# Patient Record
Sex: MALE | Race: WHITE | NOT HISPANIC OR LATINO | Employment: FULL TIME | URBAN - METROPOLITAN AREA
[De-identification: names, ages, dates, MRNs, and addresses within clinical notes are randomized per-mention and may not be internally consistent; named-entity substitution may affect disease eponyms.]

---

## 2020-03-15 ENCOUNTER — HOSPITAL ENCOUNTER (OUTPATIENT)
Dept: URGENT CARE | Facility: CLINIC | Age: 35
Discharge: HOME | End: 2020-03-15
Attending: FAMILY MEDICINE

## 2020-10-05 ENCOUNTER — HOSPITAL ENCOUNTER (OUTPATIENT)
Dept: URGENT CARE | Facility: CLINIC | Age: 35
Discharge: HOME | End: 2020-10-05
Attending: NURSE PRACTITIONER

## 2020-10-05 LAB — GLUCOSE BLD-MCNC: 144 MG/DL (ref 70–99)

## 2020-10-16 ENCOUNTER — OFFICE VISIT CONVERTED (OUTPATIENT)
Dept: CARDIOLOGY | Facility: CLINIC | Age: 35
End: 2020-10-16
Attending: SPECIALIST

## 2020-11-18 ENCOUNTER — CONVERSION ENCOUNTER (OUTPATIENT)
Dept: FAMILY MEDICINE CLINIC | Facility: CLINIC | Age: 35
End: 2020-11-18

## 2020-11-18 ENCOUNTER — HOSPITAL ENCOUNTER (OUTPATIENT)
Dept: FAMILY MEDICINE CLINIC | Facility: CLINIC | Age: 35
Discharge: HOME | End: 2020-11-18
Attending: NURSE PRACTITIONER

## 2020-11-18 ENCOUNTER — OFFICE VISIT CONVERTED (OUTPATIENT)
Dept: FAMILY MEDICINE CLINIC | Facility: CLINIC | Age: 35
End: 2020-11-18
Attending: NURSE PRACTITIONER

## 2020-11-18 LAB
ALBUMIN SERPL-MCNC: 4.2 G/DL (ref 3.5–5)
ALBUMIN/GLOB SERPL: 1.2 {RATIO} (ref 1.4–2.6)
ALP SERPL-CCNC: 71 U/L (ref 53–128)
ALT SERPL-CCNC: 40 U/L (ref 10–40)
ANION GAP SERPL CALC-SCNC: 19 MMOL/L (ref 8–19)
AST SERPL-CCNC: 34 U/L (ref 15–50)
BILIRUB SERPL-MCNC: 0.6 MG/DL (ref 0.2–1.3)
BUN SERPL-MCNC: 18 MG/DL (ref 5–25)
BUN/CREAT SERPL: 21 {RATIO} (ref 6–20)
CALCIUM SERPL-MCNC: 9.8 MG/DL (ref 8.7–10.4)
CHLORIDE SERPL-SCNC: 102 MMOL/L (ref 99–111)
CHOLEST SERPL-MCNC: 205 MG/DL (ref 107–200)
CHOLEST/HDLC SERPL: 5.9 {RATIO} (ref 3–6)
CONV CO2: 22 MMOL/L (ref 22–32)
CONV TOTAL PROTEIN: 7.8 G/DL (ref 6.3–8.2)
CREAT UR-MCNC: 0.85 MG/DL (ref 0.7–1.2)
EST. AVERAGE GLUCOSE BLD GHB EST-MCNC: 220 MG/DL
GFR SERPLBLD BASED ON 1.73 SQ M-ARVRAT: >60 ML/MIN/{1.73_M2}
GLOBULIN UR ELPH-MCNC: 3.6 G/DL (ref 2–3.5)
GLUCOSE SERPL-MCNC: 153 MG/DL (ref 70–99)
HBA1C MFR BLD: 9.3 % (ref 3.5–5.7)
HDLC SERPL-MCNC: 35 MG/DL (ref 40–60)
LDLC SERPL CALC-MCNC: 134 MG/DL (ref 70–100)
OSMOLALITY SERPL CALC.SUM OF ELEC: 291 MOSM/KG (ref 273–304)
POTASSIUM SERPL-SCNC: 4.6 MMOL/L (ref 3.5–5.3)
SODIUM SERPL-SCNC: 138 MMOL/L (ref 135–147)
T4 FREE SERPL-MCNC: 1 NG/DL (ref 0.9–1.8)
TRIGL SERPL-MCNC: 181 MG/DL (ref 40–150)
TSH SERPL-ACNC: 2.2 M[IU]/L (ref 0.27–4.2)
VLDLC SERPL-MCNC: 36 MG/DL (ref 5–37)

## 2020-11-20 LAB
C PEPTIDE SERPL-MCNC: 7.4 NG/ML (ref 1.1–4.4)
CONV HEPATITIS B SURFACE AG W CONFIRMATION RE: NEGATIVE
CONV HEPATITIS COMMENT: NORMAL
HBV CORE AB SER DONR QL IA: NEGATIVE
HBV CORE IGM SERPL QL IA: NEGATIVE
HBV E AB SERPL QL IA: NEGATIVE
HBV E AG SERPL QL IA: NEGATIVE
HBV SURFACE AB SER QL: REACTIVE
HCV AB S/CO SERPL IA: <0.1 S/CO RATIO (ref 0–0.9)

## 2020-12-07 ENCOUNTER — CONVERSION ENCOUNTER (OUTPATIENT)
Dept: FAMILY MEDICINE CLINIC | Facility: CLINIC | Age: 35
End: 2020-12-07

## 2020-12-07 ENCOUNTER — OFFICE VISIT CONVERTED (OUTPATIENT)
Dept: FAMILY MEDICINE CLINIC | Facility: CLINIC | Age: 35
End: 2020-12-07
Attending: NURSE PRACTITIONER

## 2021-02-23 ENCOUNTER — HOSPITAL ENCOUNTER (OUTPATIENT)
Dept: FAMILY MEDICINE CLINIC | Facility: CLINIC | Age: 36
Discharge: HOME | End: 2021-02-23
Attending: NURSE PRACTITIONER

## 2021-02-23 ENCOUNTER — OFFICE VISIT CONVERTED (OUTPATIENT)
Dept: FAMILY MEDICINE CLINIC | Facility: CLINIC | Age: 36
End: 2021-02-23
Attending: NURSE PRACTITIONER

## 2021-03-16 ENCOUNTER — OFFICE VISIT CONVERTED (OUTPATIENT)
Dept: CARDIOLOGY | Facility: CLINIC | Age: 36
End: 2021-03-16
Attending: SPECIALIST

## 2021-05-10 NOTE — H&P
History and Physical      Patient Name: Luiz Christianson   Patient ID: [id number]   Sex: Male   YOB: 1985        Visit Date: October 16, 2020    Provider: Russell Bledsoe MD   Location: [facility]   Location Address: [address] [place] [address], [address]   [place]  [id number]   Location Phone: [phone]          Chief Complaint  · Pedal edema.  · Congestive heart failure.      History Of Present Illness  Luiz Christianson is a 35 year old young male who moved here from [place] recently with a previous history of CHF. He had a cardiac cath in 2018 with normal coronary arteries. He has had some worsening pedal edema and shortness of breath on exertion since he has come here. No chest pain. No orthopnea. No PND. Cardiac risk factors: History of hypertension is positive. No history of smoking. History of hyperlipidemia is positive. History of diabetes mellitus is positive. No family history of coronary artery disease.   PAST MEDICAL HISTORY: Hernia; Cholelithiasis; Congestive heart failure; Ascites; Cellulitis; Sinus tachycardia; Type 2 diabetes mellitus; Hypertension; Nephrolithiasis.   FAMILY HISTORY: Positive for diabetes mellitus and hypertension. Negative for heart disease.   PSYCHOSOCIAL HISTORY: Denies tobacco use. Rare alcohol use. Moderate caffeine intake. .   CURRENT MEDICATIONS: Bisoprolol 10 mg daily; spironolactone 25 mg daily; furosemide 20 mg b.i.d.; lisinopril 10 mg b.i.d.; atorvastatin 80 mg daily; over-the-counter magnesium 500 mg daily.   ALLERGIES: No known drug allergies.       Review of Systems  · Constitutional  o Admits  o : fatigue, recent weight changes   o Denies  o : good general health lately  · Eyes  o Denies  o : blurred vision  · HENT  o Admits  o : chronic sinus problem  o Denies  o : hearing loss or ringing, swollen glands in neck  · Cardiovascular  o Admits  o : swelling (feet, ankles, hands), shortness of breath with activities  o Denies  o : chest pain,  "palpitations (fast, fluttering, or skipping beats)  · Respiratory  o Admits  o : chronic or frequent cough  o Denies  o : asthma or wheezing, COPD  · Gastrointestinal  o Admits  o : nausea or vomiting  o Denies  o : ulcers  · Neurologic  o Denies  o : lightheaded or dizzy, stroke, headaches  · Musculoskeletal  o Denies  o : joint pain, back pain  · Endocrine  o Admits  o : diabetes  o Denies  o : thyroid disease, heat or cold intolerance, excessive thirst or urination  · Heme-Lymph  o Denies  o : bleeding or bruising tendency, anemia      Vitals  Date Time BP Position Site L\R Cuff Size HR RR TEMP (F) WT  HT  BMI kg/m2 BSA m2 O2 Sat FR L/min FiO2 HC       10/16/2020 11:58 /92 Sitting    96 - R   299lbs 0oz 5'  11\" 41.7 2.61             Physical Examination  · Constitutional  o Appearance  o : Awake, alert, cooperative, pleasant.  · Eyes  o Pupils and Irises  o : Pupils equal and reacting to light and accommodation.  · Ears, Nose, Mouth and Throat  o Ears  o : Tympanic membranes are normal.  o Nose  o : Clear with no maxillary tenderness.  o Oral Cavity  o : Clear.  · Neck  o Inspection/Palpation  o : No JVD, bruits, thyromegaly, or adenopathy. Trachea midline. No axillary or cervical lymphadenopathy.  o Thyroid  o : No thyroid enlargement.   · Respiratory  o Inspection of Chest  o : No chest wall deformities, moving equal.  o Auscultation of Lungs  o : Good air entry with vesicular breath sounds.  o Percussion of Chest  o : Resonant bilaterally.   o Palpation of Chest  o : Equal movements bilaterally.  · Cardiovascular  o Heart  o :   § Auscultation of Heart  § : PMI is in the 5th intercostal space. S1 and S2 regular. No S3. No S4.  o Peripheral Vascular System  o :   § Extremities  § : 1+ edema.  · Gastrointestinal  o Abdominal Examination  o : No organomegaly, masses, tenderness, bruits, or abnormal pulsations. Bowel sounds are normal.  · Musculoskeletal  o General  o : Muscle strength is normal with normal " tone.  · Skin and Subcutaneous Tissue  o General Inspection  o : No rash, petechiae, or suspicious skin lesions. Skin turgor is normal.  · Labs  o Labs  o : Recent BNP was around 1220.  · Diagnostic Testing  o Diagnostic Testing  o : I reviewed his cath report from [place].          Assessment     ASSESSMENT & PLAN:    1.  Chronic diastolic heart failure.  Continue Lasix.  Add Zaroxolyn 2.5 mg every other day.  Monitor        electrolytes.  Check his BNP and BMP next visit.  2.  Essential hypertension, controlled.  Continue current dose of lisinopril and bisoprolol.  3.  Hyperlipidemia.  Continue current dose of Lipitor.  Be on a low-salt diet and fluid restriction.  Repeat        echocardiogram to evaluate the left ventricular systolic function.          Russell Bledsoe MD, FACC  CASSI:             Electronically Signed by: Daniella Chew-, Other -Author on October 19, 2020 11:40:07 AM  Electronically Co-signed by: Russell Bledsoe MD -Reviewer on October 20, 2020 12:48:17 PM

## 2021-05-10 NOTE — PROCEDURES
"   Procedure Note      Patient Name: Luiz Christianson   Patient ID: [id number]   Sex: Male   YOB: 1985    Primary Care Provider: Kandy CALVERT   Referring Provider: Kandy CALVERT    Visit Date: March 16, 2021    Provider: Russell Bledsoe MD   Location: [facility]   Location Address: [address] [place] [address], [facility]   [place], [place]  [id number]   Location Phone: [phone]          FINAL REPORT   TRANSTHORACIC ECHOCARDIOGRAM REPORT    Diagnosis: CHF (Congestive Heart Failure)   Height: 5'11\" Weight: 277 B/P: 132/78 BSA: 2.4   Tech: BNS   MEASUREMENTS:  RVID (Diastole) : RVID. (NORMAL: 0.7 to 2.4 cm max)   LVID (Systole): 3.6 cm (Diastole): 4.6 cm . (NORMAL: 3.7 - 5.4 cm)   Posterior Wall Thickness (Diastole): 1.3 cm. (NORMAL: 0.8 - 1.1 cm)   Septal Thickness (Diastole): 1.4 cm. (NORMAL: 0.7 - 1.2 cm)   LAID (Systole): 4.2 cm. (NORMAL: 1.9 - 3.8 cm)   Aortic Root Diameter (Diastole): 3.5 cm. (NORMAL: 2.0 - 3.7 cm)   DOPPLER:  E/A ratio 0.7 (NORMAL 0.8-2.0)   DT: 190 msec (NORMAL 140-240 msec.)   IVRT 81 m/sec (NORMAL  m/sec.)   E/E': 17 (NORMAL <8 avg.)   COMMENTS:  The patient underwent 2-D, M-Mode, and Doppler examination, including pulse-wave, continuous-wave, and color-flow analysis; the study is technically adequate.   FINDINGS:  AORTIC VALVE: Normal. Tricuspid in appearance with normal central closure.   MITRAL VALVE: Normal. Bicuspid in appearance.   TRICUSPID VALVE: Normal.   PULMONIC VALVE: Not well visualized.   LEFT ATRIUM: Normal. No intracavitary masses or clots seen. LA volume index is 19 mL/m2.   AORTIC ROOT: Normal in size with adequate motion.   LEFT VENTRICLE: Left ventricular hypertrophy. Normal left ventricular systolic function. Ejection fraction 60%.   RIGHT ATRIUM: Normal.   RIGHT VENTRICLE: Normal size and function.   PERICARDIUM: Unremarkable. No evidence of effusion.   INFERIOR VENA CAVA: Diameter is 1.8 cm.   DOPPLER: Doppler examination of the " aortic, mitral, tricuspid, and pulmonary valves was performed. Normal pulmonary artery systolic pressure by Doppler. No significant valvular abnormalities.   Faxed: 3/16/2021      CONCLUSION:  1.  Left ventricular hypertrophy. Normal left ventricular systolic function.  2.  No significant valvular abnormalities noted.        Russell Bledsoe MD  CASSI/pap                 Electronically Signed by: Phoebe Larson-, Other -Author on March 22, 2021 11:27:32 AM  Electronically Co-signed by: Russell Bledsoe MD -Reviewer on March 23, 2021 12:43:36 PM

## 2021-05-10 NOTE — H&P
History and Physical      Patient Name: Luiz Christianson   Patient ID: [id number]   Sex: Male   YOB: 1985        Visit Date: November 18, 2020    Provider: ENRIKE Leahy   Location: [facility]   Location Address: [address] [place] [address], [address] [place] [address]  [id number]   Location Phone: [phone]          Chief Complaint  · establish care  · medication refill      History Of Present Illness  Luiz Christianson is a 35 year old /White male who presents for evaluation and treatment of:      He is here today as a new patient to establish care.  He is recently moved here from [place].    He has a history of congestive heart failure, chronic diastolic heart failure, hypertension and hyperlipidemia.  He has seen cardiology Dr. Bledsoe.  He is currently stable on Lasix, spironolactone, Lisinopril, bisoprolol and atorvastatin all as listed.  He also takes aspirin 81 mg daily.    History of diabetes type 2: He states he was on Lantus low dose, 10 units previously.  He has been out of his insulin for over 2 months.  He states he has been following a keto diet.  He has not been checking his blood sugars.  He was in the ER last month and he did have labs checked at that time his blood sugar was 175.  He does not know what his last A1c was.  He has never been on oral medication for diabetes.  He states he knows he has diabetes type 2 and not type I.    He has lost 34 pounds in the past month by following a keto diet and after being back on his diuretics.    It was noted on his labs done in the ER that his liver functions were elevated.  His AST was 80, his .       Past Medical History  Disease Name Date Onset Notes   Congestive Heart Failure --  --    Depression (emotion) --  --    Diabetes mellitus --  --    Hyperlipidemia --  --    Hypertension --  --          Medication List  Name Date Started Instructions   Aspir-81 81 mg oral tablet,delayed release (/EC)  --   "  atorvastatin 80 mg oral tablet 10/27/2020 take 1 tablet (80 mg) by oral route once daily   bisoprolol fumarate 10 mg oral tablet 10/27/2020 take 1 tablet (10 mg) by oral route once daily   Lasix 20 mg oral tablet 10/27/2020 take 1 tablet (20 mg) by oral route 2 times per day   lisinopril 10 mg oral tablet 10/27/2020 take 1 tablet (10 mg) by oral route twice daily   spironolactone 25 mg oral tablet 10/27/2020 take 1 tablet (25 mg) by oral route once daily         Allergy List  Allergen Name Date Reaction Notes   NO KNOWN DRUG ALLERGIES --  --  --        Allergies Reconciled  Family Medical History  Disease Name Relative/Age Notes   Heart Disease  aunt or uncle   Diabetes Father/  Mother/   grandparent         Social History  Finding Status Start/Stop Quantity Notes   Tobacco Never --/-- --  --          Review of Systems  · Constitutional  o Admits  o : weight loss  o Denies  o : fever, fatigue, weight gain  · Cardiovascular  o Denies  o : lower extremity edema, claudication, chest pressure, palpitations  · Respiratory  o Denies  o : shortness of breath, wheezing, cough, hemoptysis, dyspnea on exertion  · Gastrointestinal  o Denies  o : nausea, vomiting, diarrhea, constipation, abdominal pain  · Genitourinary  o Denies  o : urgency, frequency  · Integument  o Denies  o : rash, itching  · Neurologic  o Denies  o : altered mental status, tingling or numbness  · Musculoskeletal  o Denies  o : joint pain, joint swelling  · Endocrine  o Admits  o : central obesity, weight loss  o Denies  o : polyuria, polydipsia  · Psychiatric  o Denies  o : anxiety, depression, suicidal ideation, homicidal ideation      Vitals  Date Time BP Position Site L\R Cuff Size HR RR TEMP (F) WT  HT  BMI kg/m2 BSA m2 O2 Sat FR L/min FiO2 HC       11/18/2020 02:07 /72 Sitting    94 - R  98 265lbs 4oz 5'  11\" 36.99 2.46 95 %            Physical Examination  · Constitutional  o Appearance  o : no acute distress, well-nourished  · Head and " Face  o Head  o :   § Inspection  § : atraumatic, normocephalic  · Neck  o Thyroid  o : gland size normal, nontender, no nodules or masses present on palpation, symmetric  · Respiratory  o Respiratory Effort  o : breathing comfortably, symmetric chest rise  o Auscultation of Lungs  o : clear to asculatation bilaterally, no wheezes, rales, or rhonchii  · Cardiovascular  o Heart  o :   § Auscultation of Heart  § : regular rate and rhythm, no murmurs, rubs, or gallops  o Peripheral Vascular System  o :   § Extremities  § : no edema  · Lymphatic  o Neck  o : no lymphadenopathy present  · Neurologic  o Mental Status Examination  o :   § Orientation  § : grossly oriented to person, place and time  o Gait and Station  o :   § Gait Screening  § : normal gait  · Psychiatric  o General  o : normal mood and affect          Assessment  · Need for influenza vaccination     V04.81/Z23  · Chronic diastolic congestive heart failure       Chronic diastolic (congestive) heart failure     428.32/I50.32  Currently stable, following with cardiology  · Diabetes mellitus, type 2     250.00/E11.9  I discussed with him that I want to check his A1c and other labs before deciding on a treatment plan for him. I discussed with him that we may be able to try him on Metformin or another oral med instead of insulin.  · Essential hypertension     401.9/I10  Blood pressure stable on meds as listed, following with cardiology.  · Hyperlipidemia     272.4/E78.5  Currently stable on atorvastatin 80 mg, will check lipid panel today.  · Elevated liver function tests     790.6/R79.89  We will recheck CMP today and will also check hepatitis screening. I discussed with him that if his liver functions are still elevated then I may need to order a liver ultrasound.      Plan  · Orders  o Fluzone Quadrivalent Vaccine, age 6 months + (71877) - V04.81/Z23 - 11/18/2020   Vaccine - Influenza; Dose: 0.5; Site: Left Deltoid; Route: Intramuscular; Date: 11/18/2020  15:12:00; Exp: 06/30/2021; Lot: [id number]; Mfg: sanofi pasteur; TradeName: Fluzone Quadrivalent; Administered By: Marisa Salas MA; Comment: pt tolerated well. pt left in good condition  o Diabetes 1 Panel (CMP, Lipid, A1c) [facility] (61776, 38915, 35925) - 250.00/E11.9 - 11/18/2020  o C-peptide (20022) - 250.00/E11.9 - 11/18/2020  o Thyroid Profile (46228, THYII, 59580) - 250.00/E11.9 - 11/18/2020  o ACO-39: Current medications updated and reviewed (1159F, ) - - 11/18/2020  o ACO-18: Negative screen for clinical depression using a standardized tool () - - 11/18/2020   0 pts.  o Hepatitis B and C screen (71860) - 790.6/R79.89 - 11/18/2020  · Medications  o Medications have been Reconciled  o Transition of Care or Provider Policy  · Instructions  o Continue blood sugar monitoring daily and record. Bring your log to office visits. Call the office for readings below 70 and above 250 or any complications.  o Discussed with patient blood pressure monitoring, hemoglobin A1C levels need to be below 7.0, and LDL (Lipid) goals below 70.  o Advised that cheeses and other sources of dairy fats, animal fats, fast food, and the extras (candy, pastries, pies, doughnuts and cookies) all contain LDL raising nutrients. Advised to increase fruits, vegetables, whole grains, and to monitor portion sizes.   o Patient was educated/instructed on their diagnosis, treatment and medications prior to discharge from the clinic today.  o Patient instructed to seek medical attention urgently for new or worsening symptoms.  o Call the office with any concerns or questions.  · Disposition  o Return to clinic in 3 months            Electronically Signed by: ENRIKE Leahy -Author on November 18, 2020 03:23:59 PM

## 2021-05-13 NOTE — PROGRESS NOTES
Progress Note      Patient Name: Luiz Christianson   Patient ID: [id number]   Sex: Male   YOB: 1985        Visit Date: 2020    Provider: ENRIKE Leahy   Location: [facility]   Location Address: [address] [place] [address], [address] [place] [address]  [id number]   Location Phone: [phone]          Chief Complaint  · headache      History Of Present Illness  Luiz Christianson is a 35 year old /White male who presents for evaluation and treatment of:      Patient is here for an acute visit.  He thinks his blood pressure is spiking but he has not checked it at home.  He states he does not know where his blood pressure monitor is at this time.  He states that he has been having headaches and feels that his blood pressure might be up.  His blood pressure is stable today at 142/72, rechecked at 126/70.  He is currently on Lisinopril 10 mg twice daily, bisoprolol 10 mg once daily, spironolactone 25 mg once daily and Lasix 20 mg twice daily.  He has a history of chronic diastolic congestive heart failure and essential hypertension.  He does follow with cardiologist Dr. Bledsoe.  He describes his headaches as being on the left side in the front, has difficulty with vision while he has a headache.  He complains of light bothering his eyes.  He is not sure if he has had nausea with it or not.  He does have a  baby in the house and has not been getting enough sleep and feeling stressed.  He does admit that he has had migraine headache in the past.    History of new onset diabetes type 2, non-insulin-dependent: He was started on Metformin 500 mg twice daily a few weeks ago.  His A1c was 9.3% on 2020.  He states he is tolerating Metformin without any side effects.       Past Medical History  Disease Name Date Onset Notes   Chronic diastolic congestive heart failure 2020 --    Congestive Heart Failure --  --    Depression (emotion) --  --    Diabetes mellitus --   --    Diabetes mellitus, type 2 11/18/2020 --    Essential hypertension 11/18/2020 --    Hyperlipidemia --  --    Hyperlipidemia 11/18/2020 --    Hypertension --  --          Medication List  Name Date Started Instructions   Aspir-81 81 mg oral tablet,delayed release (DR/EC)  --    atorvastatin 80 mg oral tablet 10/27/2020 take 1 tablet (80 mg) by oral route once daily   bisoprolol fumarate 10 mg oral tablet 10/27/2020 take 1 tablet (10 mg) by oral route once daily   Lasix 20 mg oral tablet 10/27/2020 take 1 tablet (20 mg) by oral route 2 times per day   lisinopril 10 mg oral tablet 10/27/2020 take 1 tablet (10 mg) by oral route twice daily   metformin 500 mg oral tablet 11/19/2020 take 1 tablet (500 mg) by oral route 2 times per day with morning and evening meals for 30 days   spironolactone 25 mg oral tablet 10/27/2020 take 1 tablet (25 mg) by oral route once daily         Allergy List  Allergen Name Date Reaction Notes   NO KNOWN DRUG ALLERGIES --  --  --        Allergies Reconciled  Family Medical History  Disease Name Relative/Age Notes   Heart Disease  aunt or uncle   Diabetes Father/  Mother/   grandparent         Social History  Finding Status Start/Stop Quantity Notes   Tobacco Never --/-- --  --          Immunizations  NameDate Admin Mfg Trade Name Lot Number Route Inj VIS Given VIS Publication   [id number]/18/2020 PMC Fluzone Quadrivalent [id number] IM LD 08/15/2019 08/15/2019   Comments: pt tolerated well. pt left in good condition         Review of Systems  · Constitutional  o Denies  o : fever, fatigue, weight loss, weight gain  · Eyes  o Admits  o : eye discomfort, blurred vision with headache  o Denies  o : discharge from eye  · HENT  o Admits  o : headaches  o Denies  o : neck pain, sore throat  · Cardiovascular  o Denies  o : lower extremity edema, claudication, chest pressure, palpitations  · Respiratory  o Denies  o : shortness of breath, wheezing, cough, hemoptysis, dyspnea on  "exertion  · Gastrointestinal  o Denies  o : nausea, vomiting, diarrhea, constipation, abdominal pain  · Genitourinary  o Denies  o : urgency, frequency  · Integument  o Denies  o : rash, itching      Vitals  Date Time BP Position Site L\R Cuff Size HR RR TEMP (F) WT  HT  BMI kg/m2 BSA m2 O2 Sat FR L/min FiO2 HC       12/07/2020 03:00 /72 Sitting     87 98.1 268lbs 0oz 5'  11\" 37.38 2.47 99 %            Physical Examination  · Constitutional  o Appearance  o : no acute distress, well-nourished  · Head and Face  o Head  o :   § Inspection  § : atraumatic, normocephalic  · Respiratory  o Respiratory Effort  o : breathing comfortably, symmetric chest rise  o Auscultation of Lungs  o : clear to asculatation bilaterally, no wheezes, rales, or rhonchii  · Cardiovascular  o Heart  o :   § Auscultation of Heart  § : regular rate and rhythm, no murmurs, rubs, or gallops  o Peripheral Vascular System  o :   § Extremities  § : no edema  · Neurologic  o Mental Status Examination  o :   § Orientation  § : grossly oriented to person, place and time  o Gait and Station  o :   § Gait Screening  § : normal gait  · Psychiatric  o General  o : normal mood and affect          Assessment  · Essential hypertension     401.9/I10  Blood pressure is currently stable on medications as listed. Patient instructed to find his blood pressure monitor in check his blood pressure when he is having a headache. He is to notify me if blood pressure is elevated.  · Chronic diastolic congestive heart failure       Chronic diastolic (congestive) heart failure     428.32/I50.32  He is currently stable, meds as listed. He is limited to fluid intake per cardiology instruction.  · Migraine     346.90/G43.909  I discussed with patient that his headaches are most likely migraine headaches due to stress and not enough sleep. He is not a candidate for triptan medications due to his heart conditions. Samples of Nurtec given to patient in the office and " discussed with him.      Plan  · Orders  o ACO-39: Current medications updated and reviewed (, 1159F) - - 12/07/2020  · Medications  o Nurtec ODT 75 mg oral tablet,disintegrating   SIG: take 1 tablet (75 mg) and place on top of tongue, allow to dissolve then swallow by oral route once as needed for migraine; max 1 dose/24 hrs for 2 days   DISP: (2) Tablet with 0 refills  Prescribed on 12/07/2020     · Instructions  o Patient advised to monitor blood pressure (B/P) at home and journal readings. Patient informed that a B/P reading at home of more than 130/80 is considered hypertension. For readings greater [id number]/90 or higher patient is advised to follow up in the office with readings for management. Patient advised to limit sodium intake.  o Patient was educated/instructed on their diagnosis, treatment and medications prior to discharge from the clinic today.  o Patient instructed to seek medical attention urgently for new or worsening symptoms.  o Call the office with any concerns or questions.  · Disposition  o Call or Return if symptoms worsen or persist.  o Return to clinic in 2 months            Electronically Signed by: Kandy Martinez APRN -Author on December 7, 2020 03:42:56 PM

## 2021-05-14 VITALS
DIASTOLIC BLOOD PRESSURE: 78 MMHG | OXYGEN SATURATION: 99 % | HEIGHT: 71 IN | HEART RATE: 74 BPM | BODY MASS INDEX: 38.8 KG/M2 | SYSTOLIC BLOOD PRESSURE: 132 MMHG | TEMPERATURE: 97.7 F | WEIGHT: 277.12 LBS

## 2021-05-14 VITALS
DIASTOLIC BLOOD PRESSURE: 72 MMHG | WEIGHT: 268 LBS | SYSTOLIC BLOOD PRESSURE: 142 MMHG | OXYGEN SATURATION: 99 % | TEMPERATURE: 98.1 F | HEIGHT: 71 IN | BODY MASS INDEX: 37.52 KG/M2

## 2021-05-14 VITALS
BODY MASS INDEX: 41.86 KG/M2 | SYSTOLIC BLOOD PRESSURE: 122 MMHG | DIASTOLIC BLOOD PRESSURE: 92 MMHG | HEIGHT: 71 IN | WEIGHT: 299 LBS | HEART RATE: 96 BPM

## 2021-05-14 VITALS
HEART RATE: 94 BPM | TEMPERATURE: 98 F | DIASTOLIC BLOOD PRESSURE: 72 MMHG | OXYGEN SATURATION: 95 % | BODY MASS INDEX: 37.14 KG/M2 | SYSTOLIC BLOOD PRESSURE: 110 MMHG | WEIGHT: 265.25 LBS | HEIGHT: 71 IN

## 2021-05-14 VITALS
HEART RATE: 97 BPM | WEIGHT: 276 LBS | DIASTOLIC BLOOD PRESSURE: 86 MMHG | HEIGHT: 71 IN | SYSTOLIC BLOOD PRESSURE: 134 MMHG | BODY MASS INDEX: 38.64 KG/M2

## 2021-05-14 NOTE — PROGRESS NOTES
Progress Note      Patient Name: Luiz Christianson   Patient ID: [id number]   Sex: Male   YOB: 1985    Primary Care Provider: Kandy CALVERT   Referring Provider: Kandy CALVERT    Visit Date: February 23, 2021    Provider: ENRIKE Leahy   [place] [facility] [place]   [place] Address: [address] [place] [address], [address]  [id number]   Location Phone: [phone]          Chief Complaint  · follow up      History Of Present Illness  Luiz Christianson is a 35 year old /White male who presents for evaluation and treatment of:      He is here for 3-month follow-up.    New onset diabetes type 2, non-insulin-dependent: His A1c was 9.3% 3 months ago and he was started on Metformin 500 mg twice daily.  He states he is tolerating medicine well.    Hypertension and congestive heart failure: Blood pressure stable 132/78 on Lisinopril 10 mg daily, bisoprolol 10 mg daily and he is on spironolactone 25 mg daily.    Hyperlipidemia: He is currently stable on atorvastatin 80 mg once daily.    He is complaining of his left pinky and ring finger going numb for the past 5 days.  He denies any known injuries.  He does complain of pain in his left wrist also.  He does computer work.    He is also complaining of ringing in his left ear.       Past Medical History  Disease Name Date Onset Notes   Chronic diastolic congestive heart failure 11/18/2020 --    Congestive Heart Failure --  --    Depression (emotion) --  --    Diabetes mellitus --  --    Diabetes mellitus, type 2 11/18/2020 --    Essential hypertension 11/18/2020 --    Hyperlipidemia --  --    Hyperlipidemia 11/18/2020 --    Hypertension --  --    Migraine 12/07/2020 --          Medication List  Name Date Started Instructions   Aspir-81 81 mg oral tablet,delayed release (DR/EC)  --    atorvastatin 80 mg oral tablet 10/27/2020 take 1 tablet (80 mg) by oral route once daily   bisoprolol fumarate 10 mg oral tablet  10/27/2020 take 1 tablet (10 mg) by oral route once daily   Lasix 20 mg oral tablet 10/27/2020 take 1 tablet (20 mg) by oral route 2 times per day   lisinopril 10 mg oral tablet 10/27/2020 take 1 tablet (10 mg) by oral route twice daily   metformin 500 mg oral tablet 11/19/2020 take 1 tablet (500 mg) by oral route 2 times per day with morning and evening meals for 30 days   Nurtec ODT 75 mg oral tablet,disintegrating 12/07/2020 take 1 tab and place on top of tongue, allow to dissolve then swallow by oral route once as needed for migraine; max 1 dose/24 hrs   spironolactone 25 mg oral tablet 10/27/2020 take 1 tablet (25 mg) by oral route once daily         Allergy List  Allergen Name Date Reaction Notes   NO KNOWN DRUG ALLERGIES --  --  --          Family Medical History  Disease Name Relative/Age Notes   Heart Disease  aunt or uncle   Diabetes Father/  Mother/   grandparent         Social History  Finding Status Start/Stop Quantity Notes   Tobacco Never --/-- --  --          Immunizations  NameDate Admin Mfg Trade Name Lot Number Route Inj VIS Given VIS Publication   Jegpbrvqk21/18/2020 [facility] Fluzone Quadrivalent [id number] IM LD 08/15/2019 08/15/2019   Comments: pt tolerated well. pt left in good condition         Review of Systems  · Constitutional  o Denies  o : fever, fatigue, weight loss, weight gain  · HENT  o Admits  o : tinnitus, ear fullness  o Denies  o : ear pain  · Cardiovascular  o Denies  o : lower extremity edema, claudication, chest pressure, palpitations  · Respiratory  o Denies  o : shortness of breath, wheezing, cough, hemoptysis, dyspnea on exertion  · Gastrointestinal  o Denies  o : nausea, vomiting, diarrhea, constipation, abdominal pain  · Integument  o Denies  o : rash, itching  · Neurologic  o Admits  o : tingling or numbness  o Denies  o : tremors  · Musculoskeletal  o Admits  o : wrist pain  o Denies  o : joint swelling, limitation of motion  · Endocrine  o Denies  o : polyuria, polydipsia,  "central obesity      Vitals  Date Time BP Position Site L\R Cuff Size HR RR TEMP (F) WT  HT  BMI kg/m2 BSA m2 O2 Sat FR L/min FiO2 HC       02/23/2021 02:21 /78 Sitting    74 - R  97.7 277lbs 2oz 5'  11\" 38.65 2.51 99 %            Physical Examination  · Constitutional  o Appearance  o : no acute distress, well-nourished  · Head and Face  o Head  o :   § Inspection  § : atraumatic, normocephalic  · Ears, Nose, Mouth and Throat  o Ears  o :   § External Ears  § : no auricle tenderness to palpation present, left cerumen present in canal   § Otoscopic Examination  § : tympanic membrane appearance within normal limits bilaterally  o Nose  o :   § Intranasal Exam  § : nares patent  · Neck  o Thyroid  o : gland size normal, nontender, no nodules or masses present on palpation, symmetric  · Respiratory  o Respiratory Effort  o : breathing comfortably, symmetric chest rise  o Auscultation of Lungs  o : clear to asculatation bilaterally, no wheezes, rales, or rhonchii  · Cardiovascular  o Heart  o :   § Auscultation of Heart  § : regular rate and rhythm, no murmurs, rubs, or gallops  o Peripheral Vascular System  o :   § Extremities  § : no edema  · Lymphatic  o Neck  o : no lymphadenopathy present  · Skin and Subcutaneous Tissue  o General Inspection  o : no rashes present  · Neurologic  o Mental Status Examination  o :   § Orientation  § : grossly oriented to person, place and time  o Gait and Station  o :   § Gait Screening  § : normal gait  · Psychiatric  o General  o : normal mood and affect  · Left Wrist  o Inspection  o : no deformity, no scarring, no redness, no swelling  o Palpation  o : non-tender to palpation          Assessment  · Diabetes mellitus, type 2     250.00/E11.9  We will recheck diabetic panel today, will call with results.  · Essential hypertension     401.9/I10  Stable on meds as listed.  · Hyperlipidemia     272.4/E78.5  · Finger numbness     782.0/R20.0  We will check vitamin B12 level " today.  · Left wrist pain     719.43/M25.532  I will give him an wrist splint today, advised to wear in his sleep and may wear during the day as needed.  · Impacted cerumen of left ear     380.4/H61.22  We irrigated his left ear and removed earwax. He states that the tinnitus has resolved after removing the earwax.      Plan  · Orders  o Diabetes 1 Panel (CMP, Lipid, A1c) [facility] (94470, 44012, 49394) - 250.00/E11.9 - 02/23/2021  o Urine microalbumin (95432) - 250.00/E11.9 - 02/23/2021  o ACO-39: Current medications updated and reviewed (, 1159F) - - 02/23/2021  o Vitamin B-12 (09838) - 782.0/R20.0, 250.00/E11.9 - 02/23/2021  o Folate (Folic Acid) (84119) - 782.0/R20.0, 250.00/E11.9 - 02/23/2021  o Cerumen Removal by MA or Nurse, Unilateral [facility] (34188) - 380.4/H61.22 - 02/23/2021   cerumen removal performed on left ear using 3 parts warm water and 1 part 3% hydrogen peroxide. tympanic membrane visualized. provider assessed pt after procedure. pt left office in stable condition. tac  · Instructions  o Discussed with patient blood pressure monitoring, hemoglobin A1C levels need to be below 7.0, and LDL (Lipid) goals below 70.  o Advised that cheeses and other sources of dairy fats, animal fats, fast food, and the extras (candy, pastries, pies, doughnuts and cookies) all contain LDL raising nutrients. Advised to increase fruits, vegetables, whole grains, and to monitor portion sizes.   o Patient was educated/instructed on their diagnosis, treatment and medications prior to discharge from the clinic today.  o Patient instructed to seek medical attention urgently for new or worsening symptoms.  o Call the office with any concerns or questions.  · Disposition  o Return to clinic in 3 months            Electronically Signed by: ENRIKE Leahy -Author on February 24, 2021 09:00:00 AM

## 2021-05-14 NOTE — PROGRESS NOTES
"   Progress Note      Patient Name: Luiz Christianson   Patient ID: [id number]   Sex: Male   YOB: 1985    Primary Care Provider: Kandy CALVERT   Referring Provider: Kandy CALVERT    Visit Date: March 16, 2021    Provider: Russell Bledsoe MD   [facility]   Location Address: [address] [place] [address], [facility]   [place], [place]  [id number]   Location Phone: [phone]          Chief Complaint     Hypertension.   Pedal edema.       History Of Present Illness  Luiz Christianson is a 35 year old morbidly obese male with history of previous CHF. No coronary artery disease. No chest pain. Pedal edema is improved.   CURRENT MEDICATIONS: Medication list was reviewed and is as documented.   PAST MEDICAL HISTORY: Hernia; Cholelithiasis; Congestive heart failure; Ascites; Cellulitis; Sinus tachycardia; Type 2 diabetes mellitus; Hypertension; Nephrolithiasis.   PSYCHOSOCIAL HISTORY: Rarely uses alcohol. Denies tobacco use.      ALLERGIES: No known drug allergies.       Review of Systems  · Cardiovascular  o Denies  o : palpitations (fast, fluttering, or skipping beats), swelling (feet, ankles, hands), shortness of breath while walking or lying flat, chest pain or angina pectoris   · Respiratory  o Denies  o : chronic or frequent cough      Vitals  Date Time BP Position Site L\R Cuff Size HR RR TEMP (F) WT  HT  BMI kg/m2 BSA m2 O2 Sat FR L/min FiO2 HC       03/16/2021 12:58 /86 Sitting    97 - R   276lbs 0oz 5'  11\" 38.49 2.5             Physical Examination  · Constitutional  o Appearance  o : Awake, alert, cooperative, pleasant.  · Respiratory  o Inspection of Chest  o : No chest wall deformities, moving equal.  o Auscultation of Lungs  o : Good air entry with vesicular breath sounds.  · Cardiovascular  o Heart  o :   § Auscultation of Heart  § : S1 and S2 regular. No S3. No S4.   o Peripheral Vascular System  o :   § Extremities  § : Peripheral pulses were well felt. No edema. No " cyanosis.  · Gastrointestinal  o Abdominal Examination  o : No masses or organomegaly noted.  · Echocardiogram  o Echocardiogram  o : Echocardiogram done today showed normal left ventricular systolic function and left ventricular hypertrophy.          Assessment     ASSESSMENT & PLAN:    1.  Chronic diastolic heart failure, stable.  Continue current dose of Lasix.  Discussed with him the results of the        echocardiogram.  2.  Essential hypertension, controlled hypertensive cardiovascular disease.  Continue lisinopril and bisoprolol.   3.  Morbid obesity.  Asked him to be on a low-fat diet to lose some weight.    4.  Hyperlipidemia.  Continue current dose of Lipitor.   5.  See me back in 6 months.     Russell Bledsoe MD, FACC  CASSI/rt                Electronically Signed by: Asia Dunlap-, Other -Author on March 26, 2021 10:26:23 AM  Electronically Co-signed by: Russell Bledsoe MD -Reviewer on March 29, 2021 09:36:54 AM

## 2021-06-11 PROBLEM — E78.5 HYPERLIPIDEMIA: Status: ACTIVE | Noted: 2020-11-18

## 2021-06-11 PROBLEM — I10 ESSENTIAL HYPERTENSION: Status: ACTIVE | Noted: 2020-11-18

## 2021-06-11 PROBLEM — R20.0 FINGER NUMBNESS: Status: ACTIVE | Noted: 2021-02-24

## 2021-06-11 PROBLEM — I50.32 CHRONIC DIASTOLIC CONGESTIVE HEART FAILURE: Status: ACTIVE | Noted: 2020-11-18

## 2021-06-11 PROBLEM — E11.9 DIABETES MELLITUS, TYPE 2: Status: ACTIVE | Noted: 2020-11-18

## 2021-06-11 PROBLEM — G43.909 MIGRAINE: Status: ACTIVE | Noted: 2020-12-07

## 2021-06-11 PROBLEM — I50.9 CONGESTIVE HEART FAILURE: Status: ACTIVE | Noted: 2021-06-11

## 2021-06-11 PROBLEM — M25.532 LEFT WRIST PAIN: Status: ACTIVE | Noted: 2021-02-24

## 2021-06-11 PROBLEM — F32.A DEPRESSION: Status: ACTIVE | Noted: 2021-06-11

## 2021-07-02 ENCOUNTER — OFFICE VISIT (OUTPATIENT)
Dept: FAMILY MEDICINE CLINIC | Facility: CLINIC | Age: 36
End: 2021-07-02

## 2021-07-02 VITALS
DIASTOLIC BLOOD PRESSURE: 88 MMHG | OXYGEN SATURATION: 98 % | HEIGHT: 71 IN | TEMPERATURE: 97.5 F | WEIGHT: 284.6 LBS | HEART RATE: 88 BPM | SYSTOLIC BLOOD PRESSURE: 138 MMHG | BODY MASS INDEX: 39.84 KG/M2

## 2021-07-02 DIAGNOSIS — I50.9 CONGESTIVE HEART FAILURE, UNSPECIFIED HF CHRONICITY, UNSPECIFIED HEART FAILURE TYPE (HCC): ICD-10-CM

## 2021-07-02 DIAGNOSIS — E11.9 TYPE 2 DIABETES MELLITUS WITHOUT COMPLICATION, WITHOUT LONG-TERM CURRENT USE OF INSULIN (HCC): Primary | ICD-10-CM

## 2021-07-02 DIAGNOSIS — M54.10 BACK PAIN WITH RADICULOPATHY: ICD-10-CM

## 2021-07-02 DIAGNOSIS — E78.2 MIXED HYPERLIPIDEMIA: ICD-10-CM

## 2021-07-02 DIAGNOSIS — I10 ESSENTIAL HYPERTENSION: ICD-10-CM

## 2021-07-02 DIAGNOSIS — G43.909 MIGRAINE WITHOUT STATUS MIGRAINOSUS, NOT INTRACTABLE, UNSPECIFIED MIGRAINE TYPE: ICD-10-CM

## 2021-07-02 PROCEDURE — 99214 OFFICE O/P EST MOD 30 MIN: CPT | Performed by: NURSE PRACTITIONER

## 2021-07-02 RX ORDER — BISOPROLOL FUMARATE 10 MG/1
10 TABLET, FILM COATED ORAL DAILY
Qty: 90 TABLET | Refills: 1 | Status: SHIPPED | OUTPATIENT
Start: 2021-07-02 | End: 2021-07-30 | Stop reason: SDUPTHER

## 2021-07-02 RX ORDER — LISINOPRIL 10 MG/1
10 TABLET ORAL DAILY
Qty: 90 TABLET | Refills: 1 | Status: SHIPPED | OUTPATIENT
Start: 2021-07-02 | End: 2021-07-30 | Stop reason: SDUPTHER

## 2021-07-02 RX ORDER — SPIRONOLACTONE 25 MG/1
25 TABLET ORAL DAILY
Qty: 90 TABLET | Refills: 1 | Status: SHIPPED | OUTPATIENT
Start: 2021-07-02 | End: 2021-07-30 | Stop reason: SDUPTHER

## 2021-07-02 RX ORDER — ATORVASTATIN CALCIUM 80 MG/1
80 TABLET, FILM COATED ORAL DAILY
Qty: 90 TABLET | Refills: 1 | Status: SHIPPED | OUTPATIENT
Start: 2021-07-02 | End: 2021-07-30 | Stop reason: SDUPTHER

## 2021-07-02 NOTE — PROGRESS NOTES
"Chief Complaint  Follow-up    Subjective          Luiz Christianson presents to [facility]  History of Present Illness  He is here for follow-up.    He is complaining of mild pain in his groin bilaterally.  He states he does do a lot of sitting at a computer.  He has been trying to exercise.  He does have some mild lower back pain.    Diabetes type 2, non-insulin-dependent: His last A1c was 6.5% on 2/23/2021, he is currently on Metformin 500 mg twice daily.  His urine microalbumin was normal at that time also.  He is trying to work on his diet and would like a referral to dietitian.    Hypertension and congestive heart failure.: Blood pressure stable on lisinopril 10 mg, bisoprolol 10 mg and he is on spironolactone.  He is also on Lasix 20 mg daily.    History of migraines: He has Nurtec to take as needed for migraines.    Hyperlipidemia: He is currently on atorvastatin 80 mg daily.  His triglycerides were 311, LDL 93, HDL 37, total cholesterol 192 on 2/23/2021.  He is not fasting today but states he can come back next week for labs.  Objective   Vital Signs:   /88   Pulse 88   Temp 97.5 °F (36.4 °C)   Ht 180.3 cm (71\")   Wt 129 kg (284 lb 9.6 oz)   SpO2 98%   BMI 39.69 kg/m²     Physical Exam  Vitals reviewed.   Constitutional:       Appearance: Normal appearance. He is well-developed.   Neck:      Thyroid: No thyroid mass, thyromegaly or thyroid tenderness.   Cardiovascular:      Rate and Rhythm: Normal rate and regular rhythm.      Heart sounds: No murmur heard.   No friction rub. No gallop.    Pulmonary:      Effort: Pulmonary effort is normal.      Breath sounds: Normal breath sounds. No wheezing or rhonchi.   Musculoskeletal:      Lumbar back: Spasms present. No tenderness.   Lymphadenopathy:      Cervical: No cervical adenopathy.   Skin:     General: Skin is warm and dry.   Neurological:      Mental Status: He is alert and oriented to person, place, and time.      " Cranial Nerves: No cranial nerve deficit.   Psychiatric:         Mood and Affect: Mood and affect normal.         Behavior: Behavior normal.         Thought Content: Thought content normal. Thought content does not include homicidal or suicidal ideation.         Judgment: Judgment normal.        Result Review :                 Assessment and Plan    Diagnoses and all orders for this visit:    1. Type 2 diabetes mellitus without complication, without long-term current use of insulin (CMS/[facility]) (Primary)  Assessment & Plan:  Diabetes is improving with treatment.   Continue current treatment regimen.  Diabetes will be reassessed in 3 months.    Orders:  -     Hemoglobin A1c; Future  -     Comprehensive Metabolic Panel; Future  -     Lipid Panel; Future  -     CBC w AUTO Differential; Future    2. Congestive heart failure, unspecified HF chronicity, unspecified heart failure type (CMS/[facility])  Assessment & Plan:  Heart failure is improving with treatment.  Continue current treatment regimen.  Heart failure will be reassessed in 3 months.      3. Essential hypertension  -     Comprehensive Metabolic Panel; Future  -     Lipid Panel; Future  -     CBC w AUTO Differential; Future    4. Mixed hyperlipidemia  -     Comprehensive Metabolic Panel; Future  -     Lipid Panel; Future    5. Migraine without status migrainosus, not intractable, unspecified migraine type  Assessment & Plan:  Headaches are improving with treatment.  Continue current treatment regimen.          6. Back pain with radiculopathy  Comments:  Discussed back health and stretches/exercises.    Other orders  -     atorvastatin (LIPITOR) 80 MG tablet; Take 1 tablet by mouth Daily.  Dispense: 90 tablet; Refill: 1  -     bisoprolol (ZEBeta) 10 MG tablet; Take 1 tablet by mouth Daily.  Dispense: 90 tablet; Refill: 1  -     lisinopril (PRINIVIL,ZESTRIL) 10 MG tablet; Take 1 tablet by mouth Daily.  Dispense: 90 tablet; Refill: 1  -     metFORMIN (GLUCOPHAGE) 500 MG  tablet; Take 1 tablet by mouth 2 (Two) Times a Day With Meals.  Dispense: 180 tablet; Refill: 1  -     spironolactone (ALDACTONE) 25 MG tablet; Take 1 tablet by mouth Daily.  Dispense: 90 tablet; Refill: 1      Follow Up   Return in about 6 months (around 1/2/2022) for Next scheduled follow up.  Patient was given instructions and counseling regarding his condition or for health maintenance advice. Please see specific information pulled into the AVS if appropriate.

## 2021-07-02 NOTE — PATIENT INSTRUCTIONS
Adductor Muscle Strain    An adductor muscle strain, also called a groin strain or pull, is an injury to the muscles or tendons on the upper, inner part of the thigh. These muscles are called the adductor muscles or groin muscles. They are responsible for moving the legs across the body or pulling the legs together.  A muscle strain occurs when a muscle is overstretched and some muscle fibers are torn. An adductor muscle strain can range from mild to severe, depending on how many muscle fibers are affected and whether the muscle fibers are partially or completely torn.  What are the causes?  Adductor muscle strains usually occur during exercise or while participating in sports. The injury often happens when a sudden, violent force is placed on a muscle, stretching the muscle too far. A strain is more likely to happen when your muscles are not warmed up or if you are not properly conditioned.  This injury may be caused by:  · Stretching the adductor muscles too far or too suddenly, often during side-to-side motion with a sudden change in direction.  · Putting repeated stress on the adductor muscles over a long period of time.  · Performing vigorous activity without properly stretching the adductor muscles beforehand.  What are the signs or symptoms?  Symptoms of this condition include:  · Pain and tenderness in the groin area. This begins as sharp pain and persists as a dull ache.  · A popping or snapping feeling when the injury occurs (for severe strains).  · Swelling or bruising.  · Muscle spasms.  · Weakness in the leg.  · Stiffness in the groin area with decreased ability to move the affected muscles.  How is this diagnosed?  This condition may be diagnosed based on:  · Your symptoms and a description of how the injury occurred.  · A physical exam.  · Imaging tests, such as:  ? X-rays. These are sometimes needed to rule out a broken bone or cartilage problems.  ? An ultrasound, CT scan, or MRI. These may be done  if your health care provider suspects a complete muscle tear or needs to check for other injuries.  How is this treated?  An adductor strain will often heal on its own. If needed, this condition may be treated with:  · PRICE therapy. PRICE stands for protection of the injured area, rest, ice, pressure (compression), and elevation.  · Medicines to help manage pain and swelling (anti-inflammatory medicines).  · Crutches. You may be directed to use these for the first few days to minimize your pain.  Depending on the severity of the muscle strain, recovery time may vary from a few weeks to several months. Severe injuries often require 4-6 weeks for recovery. In those cases, complete healing can take 4-5 months.  Follow these instructions at home:  PRICE Therapy    · Protect the muscle from being injured again.  · Rest. Do not use the strained muscle if it causes pain.  · If directed, put ice on the injured area:  ? Put ice in a plastic bag.  ? Place a towel between your skin and the bag.  ? Leave the ice on for 20 minutes, 2-3 times a day. Do this for the first 2 days after the injury.  · Apply compression by wrapping the injured area with an elastic bandage as told by your health care provider.  · Raise (elevate) the injured area above the level of your heart while you are sitting or lying down.  General instructions  · Take over-the-counter and prescription medicines only as told by your health care provider.  · Walk, stretch, and do exercises as told by your health care provider. Only do these activities if you can do so without any pain.  · Follow your treatment plan as told by your health care provider. This may include:  ? Physical therapy.  ? Massage.  ? Local electrical stimulation (transcutaneous electrical nerve stimulation, TENS).  How is this prevented?  · Warm up and stretch before being active.  · Cool down and stretch after being active.  · Give your body time to rest between periods of activity.  · Make  sure to use equipment that fits you.  · Be safe and responsible while being active to avoid slips and falls.  · Maintain physical fitness, including:  ? Proper conditioning in the adductor muscles.  ? Overall strength, flexibility, and endurance.  Contact a health care provider if:  · You have increased pain or swelling in the affected area.  · Your symptoms are not improving or they are getting worse.  Summary  · An adductor muscle strain, also called a groin strain or pull, is an injury to the muscles or tendons on the upper, inner part of the thigh.  · A muscle strain occurs when a muscle is overstretched and some muscle fibers are torn.  · Depending on the severity of the muscle strain, recovery time may vary from a few weeks to several months.  This information is not intended to replace advice given to you by your health care provider. Make sure you discuss any questions you have with your health care provider.  Document Revised: 04/07/2020 Document Reviewed: 05/20/2019  [employer] Patient Education © 2021 [employer]    Low Back Sprain or Strain Rehab  Ask your health care provider which exercises are safe for you. Do exercises exactly as told by your health care provider and adjust them as directed. It is normal to feel mild stretching, pulling, tightness, or discomfort as you do these exercises. Stop right away if you feel sudden pain or your pain gets worse. Do not begin these exercises until told by your health care provider.  Stretching and range-of-motion exercises  These exercises warm up your muscles and joints and improve the movement and flexibility of your back. These exercises also help to relieve pain, numbness, and tingling.  Lumbar rotation    1. Lie on your back on a firm surface and bend your knees.  2. Straighten your arms out to your sides so each arm forms a 90-degree angle (right angle) with a side of your body.  3. Slowly move (rotate) both of your knees to one side of your body until  you feel a stretch in your lower back (lumbar). Try not to let your shoulders lift off the floor.  4. Hold this position for __________ seconds.  5. Tense your abdominal muscles and slowly move your knees back to the starting position.  6. Repeat this exercise on the other side of your body.  Repeat __________ times. Complete this exercise __________ times a day.  Single knee to chest    1. Lie on your back on a firm surface with both legs straight.  2. Bend one of your knees. Use your hands to move your knee up toward your chest until you feel a gentle stretch in your lower back and buttock.  ? Hold your leg in this position by holding on to the front of your knee.  ? Keep your other leg as straight as possible.  3. Hold this position for __________ seconds.  4. Slowly return to the starting position.  5. Repeat with your other leg.  Repeat __________ times. Complete this exercise __________ times a day.  Prone extension on elbows    1. Lie on your abdomen on a firm surface (prone position).  2. Prop yourself up on your elbows.  3. Use your arms to help lift your chest up until you feel a gentle stretch in your abdomen and your lower back.  ? This will place some of your body weight on your elbows. If this is uncomfortable, try stacking pillows under your chest.  ? Your hips should stay down, against the surface that you are lying on. Keep your hip and back muscles relaxed.  4. Hold this position for __________ seconds.  5. Slowly relax your upper body and return to the starting position.  Repeat __________ times. Complete this exercise __________ times a day.  Strengthening exercises  These exercises build strength and endurance in your back. Endurance is the ability to use your muscles for a long time, even after they get tired.  Pelvic tilt  This exercise strengthens the muscles that lie deep in the abdomen.  1. Lie on your back on a firm surface. Bend your knees and keep your feet flat on the floor.  2. Tense  your abdominal muscles. Tip your pelvis up toward the ceiling and flatten your lower back into the floor.  ? To help with this exercise, you may place a small towel under your lower back and try to push your back into the towel.  3. Hold this position for __________ seconds.  4. Let your muscles relax completely before you repeat this exercise.  Repeat __________ times. Complete this exercise __________ times a day.  Alternating arm and leg raises    1. Get on your hands and knees on a firm surface. If you are on a hard floor, you may want to use padding, such as an exercise mat, to cushion your knees.  2. Line up your arms and legs. Your hands should be directly below your shoulders, and your knees should be directly below your hips.  3. Lift your left leg behind you. At the same time, raise your right arm and straighten it in front of you.  ? Do not lift your leg higher than your hip.  ? Do not lift your arm higher than your shoulder.  ? Keep your abdominal and back muscles tight.  ? Keep your hips facing the ground.  ? Do not arch your back.  ? Keep your balance carefully, and do not hold your breath.  4. Hold this position for __________ seconds.  5. Slowly return to the starting position.  6. Repeat with your right leg and your left arm.  Repeat __________ times. Complete this exercise __________ times a day.  Abdominal set with straight leg raise    1. Lie on your back on a firm surface.  2. Bend one of your knees and keep your other leg straight.  3. Tense your abdominal muscles and lift your straight leg up, 4-6 inches (10-15 cm) off the ground.  4. Keep your abdominal muscles tight and hold this position for __________ seconds.  ? Do not hold your breath.  ? Do not arch your back. Keep it flat against the ground.  5. Keep your abdominal muscles tense as you slowly lower your leg back to the starting position.  6. Repeat with your other leg.  Repeat __________ times. Complete this exercise __________ times a  day.  Single leg lower with bent knees  1. Lie on your back on a firm surface.  2. Tense your abdominal muscles and lift your feet off the floor, one foot at a time, so your knees and hips are bent in 90-degree angles (right angles).  ? Your knees should be over your hips and your lower legs should be parallel to the floor.  3. Keeping your abdominal muscles tense and your knee bent, slowly lower one of your legs so your toe touches the ground.  4. Lift your leg back up to return to the starting position.  ? Do not hold your breath.  ? Do not let your back arch. Keep your back flat against the ground.  5. Repeat with your other leg.  Repeat __________ times. Complete this exercise __________ times a day.  Posture and body mechanics  Good posture and healthy body mechanics can help to relieve stress in your body's tissues and joints. Body mechanics refers to the movements and positions of your body while you do your daily activities. Posture is part of body mechanics. Good posture means:  · Your spine is in its natural S-curve position (neutral).  · Your shoulders are pulled back slightly.  · Your head is not tipped forward.  Follow these guidelines to improve your posture and body mechanics in your everyday activities.  Standing    · When standing, keep your spine neutral and your feet about hip width apart. Keep a slight bend in your knees. Your ears, shoulders, and hips should line up.  · When you do a task in which you  one place for a long time, place one foot up on a stable object that is 2-4 inches (5-10 cm) high, such as a footstool. This helps keep your spine neutral.  Sitting    · When sitting, keep your spine neutral and keep your feet flat on the floor. Use a footrest, if necessary, and keep your thighs parallel to the floor. Avoid rounding your shoulders, and avoid tilting your head forward.  · When working at a desk or a computer, keep your desk at a height where your hands are slightly lower  than your elbows. Slide your chair under your desk so you are close enough to maintain good posture.  · When working at a computer, place your monitor at a height where you are looking straight ahead and you do not have to tilt your head forward or downward to look at the screen.  Resting  · When lying down and resting, avoid positions that are most painful for you.  · If you have pain with activities such as sitting, bending, stooping, or squatting, lie in a position in which your body does not bend very much. For example, avoid curling up on your side with your arms and knees near your chest (fetal position).  · If you have pain with activities such as standing for a long time or reaching with your arms, lie with your spine in a neutral position and bend your knees slightly. Try the following positions:  ? Lying on your side with a pillow between your knees.  ? Lying on your back with a pillow under your knees.  Lifting    · When lifting objects, keep your feet at least shoulder width apart and tighten your abdominal muscles.  · Bend your knees and hips and keep your spine neutral. It is important to lift using the strength of your legs, not your back. Do not lock your knees straight out.  · Always ask for help to lift heavy or awkward objects.  This information is not intended to replace advice given to you by your health care provider. Make sure you discuss any questions you have with your health care provider.  Document Revised: 04/10/2020 Document Reviewed: 01/09/2020  [employer] Patient Education © 2021 [employer]    Diabetes Mellitus and Nutrition, Adult  When you have diabetes, or diabetes mellitus, it is very important to have healthy eating habits because your blood sugar (glucose) levels are greatly affected by what you eat and drink. Eating healthy foods in the right amounts, at about the same times every day, can help you:  · Control your blood glucose.  · Lower your risk of heart disease.  · Improve  your blood pressure.  · Reach or maintain a healthy weight.  What can affect my meal plan?  Every person with diabetes is different, and each person has different needs for a meal plan. Your health care provider may recommend that you work with a dietitian to make a meal plan that is best for you. Your meal plan may vary depending on factors such as:  · The calories you need.  · The medicines you take.  · Your weight.  · Your blood glucose, blood pressure, and cholesterol levels.  · Your activity level.  · Other health conditions you have, such as heart or kidney disease.  How do carbohydrates affect me?  Carbohydrates, also called carbs, affect your blood glucose level more than any other type of food. Eating carbs naturally raises the amount of glucose in your blood. Carb counting is a method for keeping track of how many carbs you eat. Counting carbs is important to keep your blood glucose at a healthy level, especially if you use insulin or take certain oral diabetes medicines.  It is important to know how many carbs you can safely have in each meal. This is different for every person. Your dietitian can help you calculate how many carbs you should have at each meal and for each snack.  How does alcohol affect me?  Alcohol can cause a sudden decrease in blood glucose (hypoglycemia), especially if you use insulin or take certain oral diabetes medicines. Hypoglycemia can be a life-threatening condition. Symptoms of hypoglycemia, such as sleepiness, dizziness, and confusion, are similar to symptoms of having too much alcohol.  · Do not drink alcohol if:  ? Your health care provider tells you not to drink.  ? You are pregnant, may be pregnant, or are planning to become pregnant.  · If you drink alcohol:  ? Do not drink on an empty stomach.  ? Limit how much you use to:  § 0-1 drink a day for women.  § 0-2 drinks a day for men.  ? Be aware of how much alcohol is in your drink. In the [place] one drink equals one 12 oz  "bottle of beer (355 mL), one 5 oz glass of wine (148 mL), or one 1½ oz glass of hard liquor (44 mL).  ? Keep yourself hydrated with water, diet soda, or unsweetened iced tea.  § Keep in mind that regular soda, juice, and other mixers may contain a lot of sugar and must be counted as carbs.  What are tips for following this plan?    Reading food labels  · Start by checking the serving size on the \"Nutrition Facts\" label of packaged foods and drinks. The amount of calories, carbs, fats, and other nutrients listed on the label is based on one serving of the item. Many items contain more than one serving per package.  · Check the total grams (g) of carbs in one serving. You can calculate the number of servings of carbs in one serving by dividing the total carbs by 15. For example, if a food has 30 g of total carbs per serving, it would be equal to 2 servings of carbs.  · Check the number of grams (g) of saturated fats and trans fats in one serving. Choose foods that have a low amount or none of these fats.  · Check the number of milligrams (mg) of salt (sodium) in one serving. Most people should limit total sodium intake to less than 2,300 mg per day.  · Always check the nutrition information of foods labeled as \"low-fat\" or \"nonfat.\" These foods may be higher in added sugar or refined carbs and should be avoided.  · Talk to your dietitian to identify your daily goals for nutrients listed on the label.  Shopping  · Avoid buying canned, pre-made, or processed foods. These foods tend to be high in fat, sodium, and added sugar.  · Shop around the outside edge of the grocery store. This is where you will most often find fresh fruits and vegetables, bulk grains, fresh meats, and fresh dairy.  Cooking  · Use low-heat cooking methods, such as baking, instead of high-heat cooking methods like deep frying.  · Cook using healthy oils, such as olive, canola, or sunflower oil.  · Avoid cooking with butter, cream, or high-fat " meats.  Meal planning  · Eat meals and snacks regularly, preferably at the same times every day. Avoid going long periods of time without eating.  · Eat foods that are high in fiber, such as fresh fruits, vegetables, beans, and whole grains. Talk with your dietitian about how many servings of carbs you can eat at each meal.  · Eat 4-6 oz (112-168 g) of lean protein each day, such as lean meat, chicken, fish, eggs, or tofu. One ounce (oz) of lean protein is equal to:  ? 1 oz (28 g) of meat, chicken, or fish.  ? 1 egg.  ? ¼ cup (62 g) of tofu.  · Eat some foods each day that contain healthy fats, such as avocado, nuts, seeds, and fish.  What foods should I eat?  Fruits  Berries. Apples. Oranges. Peaches. Apricots. Plums. Grapes. Sridhar. Papaya. Pomegranate. Kiwi. Cherries.  Vegetables  Lettuce. Spinach. Leafy greens, including kale, chard, gualberto greens, and mustard greens. Beets. Cauliflower. Cabbage. Broccoli. Carrots. Green beans. Tomatoes. Peppers. Onions. Cucumbers. [place] sprouts.  Grains  Whole grains, such as whole-wheat or whole-grain bread, crackers, tortillas, cereal, and pasta. Unsweetened oatmeal. Quinoa. Brown or wild rice.  Meats and other proteins  Seafood. Poultry without skin. Lean cuts of poultry and beef. Tofu. Nuts. Seeds.  Dairy  Low-fat or fat-free dairy products such as milk, yogurt, and cheese.  The items listed above may not be a complete list of foods and beverages you can eat. Contact a dietitian for more information.  What foods should I avoid?  Fruits  Fruits canned with syrup.  Vegetables  Canned vegetables. Frozen vegetables with butter or cream sauce.  Grains  Refined white flour and flour products such as bread, pasta, snack foods, and cereals. Avoid all processed foods.  Meats and other proteins  Fatty cuts of meat. Poultry with skin. Breaded or fried meats. Processed meat. Avoid saturated fats.  Dairy  Full-fat yogurt, cheese, or milk.  Beverages  Sweetened drinks, such as soda or  iced tea.  The items listed above may not be a complete list of foods and beverages you should avoid. Contact a dietitian for more information.  Questions to ask a health care provider  · Do I need to meet with a diabetes educator?  · Do I need to meet with a dietitian?  · What number can I call if I have questions?  · When are the best times to check my blood glucose?  Where to find more information:  · American Diabetes Association: diabetes.org  · Academy of Nutrition and Dietetics: www.eatright.org  · National [place] of Diabetes and Digestive and Kidney Diseases: www.niddk.nih.gov  · Association of Diabetes Care and Education Specialists: www.diabeteseducator.org  Summary  · It is important to have healthy eating habits because your blood sugar (glucose) levels are greatly affected by what you eat and drink.  · A healthy meal plan will help you control your blood glucose and maintain a healthy lifestyle.  · Your health care provider may recommend that you work with a dietitian to make a meal plan that is best for you.  · Keep in mind that carbohydrates (carbs) and alcohol have immediate effects on your blood glucose levels. It is important to count carbs and to use alcohol carefully.  This information is not intended to replace advice given to you by your health care provider. Make sure you discuss any questions you have with your health care provider.  Document Revised: 11/24/2020 Document Reviewed: 11/24/2020  [employer] Patient Education © 2021 [employer]

## 2021-07-06 ENCOUNTER — LAB (OUTPATIENT)
Dept: FAMILY MEDICINE CLINIC | Facility: CLINIC | Age: 36
End: 2021-07-06

## 2021-07-06 DIAGNOSIS — I10 ESSENTIAL HYPERTENSION: ICD-10-CM

## 2021-07-06 DIAGNOSIS — E11.9 TYPE 2 DIABETES MELLITUS WITHOUT COMPLICATION, WITHOUT LONG-TERM CURRENT USE OF INSULIN (HCC): ICD-10-CM

## 2021-07-06 DIAGNOSIS — E78.2 MIXED HYPERLIPIDEMIA: ICD-10-CM

## 2021-07-06 LAB
ALBUMIN SERPL-MCNC: 4.6 G/DL (ref 3.5–5.2)
ALBUMIN/GLOB SERPL: 1.7 G/DL
ALP SERPL-CCNC: 68 U/L (ref 39–117)
ALT SERPL W P-5'-P-CCNC: 59 U/L (ref 1–41)
ANION GAP SERPL CALCULATED.3IONS-SCNC: 10.9 MMOL/L (ref 5–15)
AST SERPL-CCNC: 32 U/L (ref 1–40)
BILIRUB SERPL-MCNC: 0.6 MG/DL (ref 0–1.2)
BUN SERPL-MCNC: 18 MG/DL (ref 6–20)
BUN/CREAT SERPL: 19.8 (ref 7–25)
CALCIUM SPEC-SCNC: 9.2 MG/DL (ref 8.6–10.5)
CHLORIDE SERPL-SCNC: 98 MMOL/L (ref 98–107)
CHOLEST SERPL-MCNC: 188 MG/DL (ref 0–200)
CO2 SERPL-SCNC: 24.1 MMOL/L (ref 22–29)
CREAT SERPL-MCNC: 0.91 MG/DL (ref 0.76–1.27)
GFR SERPL CREATININE-BSD FRML MDRD: 93 ML/MIN/1.73
GLOBULIN UR ELPH-MCNC: 2.7 GM/DL
GLUCOSE SERPL-MCNC: 195 MG/DL (ref 65–99)
HBA1C MFR BLD: 9.2 % (ref 4.8–5.6)
HDLC SERPL-MCNC: 34 MG/DL (ref 40–60)
LDLC SERPL CALC-MCNC: 107 MG/DL (ref 0–100)
LDLC/HDLC SERPL: 2.93 {RATIO}
POTASSIUM SERPL-SCNC: 4.4 MMOL/L (ref 3.5–5.2)
PROT SERPL-MCNC: 7.3 G/DL (ref 6–8.5)
SODIUM SERPL-SCNC: 133 MMOL/L (ref 136–145)
TRIGL SERPL-MCNC: 272 MG/DL (ref 0–150)
VLDLC SERPL-MCNC: 47 MG/DL (ref 5–40)

## 2021-07-06 PROCEDURE — 36415 COLL VENOUS BLD VENIPUNCTURE: CPT | Performed by: NURSE PRACTITIONER

## 2021-07-06 PROCEDURE — 83036 HEMOGLOBIN GLYCOSYLATED A1C: CPT | Performed by: NURSE PRACTITIONER

## 2021-07-06 PROCEDURE — 80061 LIPID PANEL: CPT | Performed by: NURSE PRACTITIONER

## 2021-07-06 PROCEDURE — 80053 COMPREHEN METABOLIC PANEL: CPT | Performed by: NURSE PRACTITIONER

## 2021-07-21 ENCOUNTER — PATIENT MESSAGE (OUTPATIENT)
Dept: FAMILY MEDICINE CLINIC | Facility: CLINIC | Age: 36
End: 2021-07-21

## 2021-07-21 ENCOUNTER — TELEPHONE (OUTPATIENT)
Dept: FAMILY MEDICINE CLINIC | Facility: CLINIC | Age: 36
End: 2021-07-21

## 2021-07-21 RX ORDER — DAPAGLIFLOZIN 10 MG/1
10 TABLET, FILM COATED ORAL EVERY MORNING
Qty: 30 TABLET | Refills: 3 | Status: SHIPPED | OUTPATIENT
Start: 2021-07-21 | End: 2021-07-30 | Stop reason: SDUPTHER

## 2021-07-21 NOTE — TELEPHONE ENCOUNTER
Caller: Luiz Christianson    Relationship: Self    Best call back number:[phone]    What is the medical concern/diagnosis:     What specialty or service is being requested: NUTRITIONIST     What is the provider, practice or medical service name:     What is the office location:     What is the office phone number:     Any additional details:

## 2021-07-21 NOTE — TELEPHONE ENCOUNTER
Patient requesting referral to a nutritionist. States he wants to lose weight. Also, his cardiologist suggests he stay away from salt, and you want him to eat healthy to decrease sugar and cholesterol. States he wants to see a nutritionist so he can make better choices.

## 2021-07-21 NOTE — TELEPHONE ENCOUNTER
From: Luiz Christianson  To: ENRIKE Nieto  Sent: 7/21/2021 8:19 AM EDT  Subject: Prescription Question    Good morning Kandy,    You mentioned a new medicine in one of the test statements, has it already been sent to [facility] or do I need to do anything on my end?     Thanks!    Luiz

## 2021-07-30 RX ORDER — BISOPROLOL FUMARATE 10 MG/1
10 TABLET, FILM COATED ORAL DAILY
Qty: 90 TABLET | Refills: 1 | Status: SHIPPED | OUTPATIENT
Start: 2021-07-30 | End: 2022-01-13

## 2021-07-30 RX ORDER — DAPAGLIFLOZIN 10 MG/1
10 TABLET, FILM COATED ORAL EVERY MORNING
Qty: 30 TABLET | Refills: 3 | Status: SHIPPED | OUTPATIENT
Start: 2021-07-30 | End: 2021-11-29

## 2021-07-30 RX ORDER — SPIRONOLACTONE 25 MG/1
25 TABLET ORAL DAILY
Qty: 90 TABLET | Refills: 1 | Status: SHIPPED | OUTPATIENT
Start: 2021-07-30 | End: 2022-01-13

## 2021-07-30 RX ORDER — LISINOPRIL 10 MG/1
10 TABLET ORAL DAILY
Qty: 90 TABLET | Refills: 1 | Status: SHIPPED | OUTPATIENT
Start: 2021-07-30 | End: 2022-01-13

## 2021-07-30 RX ORDER — ATORVASTATIN CALCIUM 80 MG/1
80 TABLET, FILM COATED ORAL DAILY
Qty: 90 TABLET | Refills: 1 | Status: SHIPPED | OUTPATIENT
Start: 2021-07-30 | End: 2022-01-13

## 2021-08-03 ENCOUNTER — NUTRITION (OUTPATIENT)
Dept: DIABETES SERVICES | Facility: HOSPITAL | Age: 36
End: 2021-08-03

## 2021-08-03 VITALS — WEIGHT: 279.1 LBS | BODY MASS INDEX: 39.07 KG/M2 | HEIGHT: 71 IN

## 2021-08-03 DIAGNOSIS — E11.9 TYPE 2 DIABETES MELLITUS WITHOUT COMPLICATION, WITHOUT LONG-TERM CURRENT USE OF INSULIN (HCC): Primary | ICD-10-CM

## 2021-08-03 PROCEDURE — 97802 MEDICAL NUTRITION INDIV IN: CPT | Performed by: DIETITIAN, REGISTERED

## 2021-08-03 NOTE — PROGRESS NOTES
"Luiz Christianson presents to [facility] for nutrition consult r/t diagnosis of T2DM.     General Information  General Information   Referral From:: MD monk  Height: 180.3 cm (71\")  Height Method: Actual  Weight: 127 kg (279 lb 1.6 oz)  Weight Method: Standing scale  Is patient pregnant?: n/a    Diabetes History  Diabetes History  What type of diabetes do you have?: Type 2  Current DM knowledge: good  Do you test your blood sugar at home?: no    Education Preferences  Education Preferences  What areas of diabetes would you like to learn about?: diet information    Nutrition Information  Nutrition Information  When was the last time you saw a dietician?: never  Are you currently following a special meal plan?: occasionally  Do you use Food Assistance programs (WIC, food stamps, food bank)?: no  Do you need information about Food Assistance programs?: no  Enter everything you can remember eating in the last 24 hours (1 day): breakfast- sausage biscuit; lunch- ham and cheese sandwich w/ mustard and pickles; dinner- spaghetti w/ chickpea pasta; snacks- beef jerky; beverages- coffee, diet soda, water  How many meals do you eat each day?: 3  How many snacks do you eat each day?: 2  What is the biggest challenge you have with your diet?: Portions, Knowledge, Weight maintenance    Assessment Topics  Assessment Topics  Healthy Eating - Assessment: Competent  Being Active - Assessment: Competent  Taking Medication - Assessment: Competent    Medications  Pt reports taking Metformin and Farxiga.    Labs   Lab Results   Component Value Date    HGBA1C 9.20 (H) 07/06/2021        Nutrition counseling provided on carbohydrate counting, portion control, measuring and reading labels. Discussed eating out and gave suggestions on controlling carbohydrate intake and making healthier food choices.     Meal Plan:   Total Carbohydrates per meal: 3-4 carb servings/meal, at least 3 meals/day  Lean protein with " meals.  Limit added fats.  Snacks: 1 carbohydrate serving (</= 22 g) + 1 protein serving.     Daily exercise encouraged (as recommended by healthcare provider). Discussed the befits of exercise in lowering blood glucose, blood pressure, cholesterol, stress and controlling body weight.     Advised to continue daily blood glucose monitoring to assist with understanding of factors affecting blood glucose and assist with management of diabetes.  Discussed and provided with target BG ranges.     Literature provided: Diabetes Nutrition Placemat, Choose Your Foods Booklet    Phone number provided and encouraged to call with questions or concerns.     Time spent with patient: 30 minutes    Gracie Sam RDN, LD  08/03/2021

## 2021-10-04 ENCOUNTER — OFFICE VISIT (OUTPATIENT)
Dept: FAMILY MEDICINE CLINIC | Facility: CLINIC | Age: 36
End: 2021-10-04

## 2021-10-04 VITALS
HEART RATE: 81 BPM | SYSTOLIC BLOOD PRESSURE: 122 MMHG | BODY MASS INDEX: 39.09 KG/M2 | DIASTOLIC BLOOD PRESSURE: 82 MMHG | WEIGHT: 279.2 LBS | TEMPERATURE: 98.1 F | OXYGEN SATURATION: 97 % | HEIGHT: 71 IN

## 2021-10-04 DIAGNOSIS — E11.9 TYPE 2 DIABETES MELLITUS WITHOUT COMPLICATION, WITHOUT LONG-TERM CURRENT USE OF INSULIN (HCC): Primary | ICD-10-CM

## 2021-10-04 DIAGNOSIS — E78.2 MIXED HYPERLIPIDEMIA: ICD-10-CM

## 2021-10-04 DIAGNOSIS — I10 ESSENTIAL HYPERTENSION: ICD-10-CM

## 2021-10-04 LAB
ALBUMIN SERPL-MCNC: 4.9 G/DL (ref 3.5–5.2)
ALBUMIN/GLOB SERPL: 1.6 G/DL
ALP SERPL-CCNC: 89 U/L (ref 39–117)
ALT SERPL W P-5'-P-CCNC: 41 U/L (ref 1–41)
ANION GAP SERPL CALCULATED.3IONS-SCNC: 10.3 MMOL/L (ref 5–15)
AST SERPL-CCNC: 18 U/L (ref 1–40)
BILIRUB SERPL-MCNC: 0.5 MG/DL (ref 0–1.2)
BUN SERPL-MCNC: 15 MG/DL (ref 6–20)
BUN/CREAT SERPL: 20.8 (ref 7–25)
CALCIUM SPEC-SCNC: 9.9 MG/DL (ref 8.6–10.5)
CHLORIDE SERPL-SCNC: 101 MMOL/L (ref 98–107)
CHOLEST SERPL-MCNC: 186 MG/DL (ref 0–200)
CO2 SERPL-SCNC: 25.7 MMOL/L (ref 22–29)
CREAT SERPL-MCNC: 0.72 MG/DL (ref 0.76–1.27)
GFR SERPL CREATININE-BSD FRML MDRD: 122 ML/MIN/1.73
GLOBULIN UR ELPH-MCNC: 3.1 GM/DL
GLUCOSE SERPL-MCNC: 151 MG/DL (ref 65–99)
HBA1C MFR BLD: 7.2 % (ref 4.8–5.6)
HDLC SERPL-MCNC: 33 MG/DL (ref 40–60)
LDLC SERPL CALC-MCNC: 123 MG/DL (ref 0–100)
LDLC/HDLC SERPL: 3.61 {RATIO}
POTASSIUM SERPL-SCNC: 4.5 MMOL/L (ref 3.5–5.2)
PROT SERPL-MCNC: 8 G/DL (ref 6–8.5)
SODIUM SERPL-SCNC: 137 MMOL/L (ref 136–145)
TRIGL SERPL-MCNC: 169 MG/DL (ref 0–150)
VLDLC SERPL-MCNC: 30 MG/DL (ref 5–40)

## 2021-10-04 PROCEDURE — 80061 LIPID PANEL: CPT | Performed by: NURSE PRACTITIONER

## 2021-10-04 PROCEDURE — 90471 IMMUNIZATION ADMIN: CPT | Performed by: NURSE PRACTITIONER

## 2021-10-04 PROCEDURE — 99214 OFFICE O/P EST MOD 30 MIN: CPT | Performed by: NURSE PRACTITIONER

## 2021-10-04 PROCEDURE — 83036 HEMOGLOBIN GLYCOSYLATED A1C: CPT | Performed by: NURSE PRACTITIONER

## 2021-10-04 PROCEDURE — 90686 IIV4 VACC NO PRSV 0.5 ML IM: CPT | Performed by: NURSE PRACTITIONER

## 2021-10-04 PROCEDURE — 80053 COMPREHEN METABOLIC PANEL: CPT | Performed by: NURSE PRACTITIONER

## 2021-10-04 NOTE — PROGRESS NOTES
"Chief Complaint  Diabetes    Subjective          Luiz Christianson presents to [facility]  History of Present Illness  Patient presents for 3-month follow-up on diabetes.    Diabetes type 2, non-insulin-dependent: He is currently on Farxiga 10 mg daily and Metformin 500 mg twice daily.  His last A1c was 9.2% on 7/6/2021.  He has been trying to work on his diet.  He states that he ran out of his Farxiga 2 days ago but states it is coming by mail order.    Hypertension: Blood pressure stable 122/82 on lisinopril 10 mg daily and bisoprolol 10 mg daily.  He is also on spironolactone 25 mg daily.    Hyperlipidemia: He is currently on atorvastatin 80 mg daily.    He has questions about the Covid vaccine.  Objective   Vital Signs:   /82   Pulse 81   Temp 98.1 °F (36.7 °C)   Ht 180.3 cm (71\")   Wt 127 kg (279 lb 3.2 oz)   SpO2 97%   BMI 38.94 kg/m²     Physical Exam  Vitals reviewed.   Constitutional:       Appearance: Normal appearance. He is well-developed.   Neck:      Thyroid: No thyroid mass, thyromegaly or thyroid tenderness.   Cardiovascular:      Rate and Rhythm: Normal rate and regular rhythm.      Heart sounds: No murmur heard.   No friction rub. No gallop.    Pulmonary:      Effort: Pulmonary effort is normal.      Breath sounds: Normal breath sounds. No wheezing or rhonchi.   Lymphadenopathy:      Cervical: No cervical adenopathy.   Skin:     General: Skin is warm and dry.   Neurological:      Mental Status: He is alert and oriented to person, place, and time.      Cranial Nerves: No cranial nerve deficit.   Psychiatric:         Mood and Affect: Mood and affect normal.         Behavior: Behavior normal.         Thought Content: Thought content normal. Thought content does not include homicidal or suicidal ideation.         Judgment: Judgment normal.        Result Review :                 Assessment and Plan    Diagnoses and all orders for this visit:    1. Type 2 " diabetes mellitus without complication, without long-term current use of insulin (HCC) (Primary)  Assessment & Plan:  Diabetes is unchanged.   Continue current treatment regimen.  Dietary recommendations for ADA diet.  Diabetes will be reassessed Today with labs.    Orders:  -     Hemoglobin A1c  -     Comprehensive Metabolic Panel  -     Lipid Panel    2. Essential hypertension  -     Comprehensive Metabolic Panel  -     Lipid Panel    3. Mixed hyperlipidemia  Assessment & Plan:  Lipid abnormalities are unchanged.  Pharmacotherapy as ordered.  Lipids will be reassessed Today with labs.    Orders:  -     Comprehensive Metabolic Panel  -     Lipid Panel    Other orders  -     FluLaval/Fluarix >6 Months ([id number])  Discussed the different options for Covid vaccines, recommended either by Moderna or Pfizer vaccines.       Follow Up   Return in about 3 months (around 1/4/2022) for Next scheduled follow up.  Patient was given instructions and counseling regarding his condition or for health maintenance advice. Please see specific information pulled into the AVS if appropriate.

## 2021-10-05 NOTE — ASSESSMENT & PLAN NOTE
Lipid abnormalities are unchanged.  Pharmacotherapy as ordered.  Lipids will be reassessed Today with labs.

## 2021-10-05 NOTE — PATIENT INSTRUCTIONS
Frequently Asked Questions About COVID-19 Vaccination  · Below are answers to commonly asked questions about COVID-19 vaccination.  · Bust myths and learn the facts about COVID-19 vaccines  If I have already had COVID-19 and recovered, do I still need to get vaccinated with a COVID-19 vaccine?  Yes, you should be vaccinated regardless of whether you already had COVID-19. That's because experts do not yet know how long you are protected from getting sick again after recovering from COVID-19. Even if you have already recovered from COVID-19, it is possible--although rare--that you could be infected with the virus that causes COVID-19 again. Studies have shown that vaccination provides a strong boost in protection in people who have recovered from COVID-19. Learn more about why getting vaccinated is a safer way to build protection than getting infected.  If you were treated for COVID-19 with monoclonal antibodies or convalescent plasma, you should wait 90 days before getting a COVID-19 vaccine. Talk to your doctor if you are unsure what treatments you received or if you have more questions about getting a COVID-19 vaccine.  If you or your child has a history of multisystem inflammatory syndrome in adults or children (MIS-A or MIS-C), consider delaying vaccination until you or your child have recovered from being sick and for 90 days after the date of diagnosis of MIS-A or MIS-C. Learn more about the clinical considerations people with a history of multisystem MIS-C or MIS-A.  Experts are still learning more about how long vaccines protect against COVID-19. CDC will keep the public informed as new evidence becomes available.  Related pages:  · Benefits of Getting Vaccinated  · Preparing for Your COVID-19 Vaccination  Is it safe for my child to get a COVID-19 vaccine?  Yes. Studies show that COVID-19 vaccines are safe and effective. Like adults, children may have some side effects after COVID-19 vaccination. These side  effects may affect their ability to do daily activities, but they should go away in a few days. Children 12 years and older are now eligible to get vaccinated against COVID-19. COVID-19 vaccines have been used under the most intensive safety monitoring in [place] history, including studies in children 12 years and older. Your child cannot get COVID-19 from any COVID-19 vaccine.  Related page:  · COVID-19 Vaccines for Children and Teens  Why should my child get vaccinated against COVID-19?  COVID-19 vaccination can help protect your child from getting COVID-19. Although fewer children have been sick with COVID-19 compared to adults, children can be infected with the virus that causes COVID-19, can get sick from COVID-19, and can spread the virus that causes COVID-19 to others. Getting your child vaccinated helps to protect your child and your family. Vaccination is now recommended for everyone 12 years and older. Currently, the Pfizer-BioNTech COVID-19 Vaccine is the only one available to children 12 years and older.  Related page:  · COVID-19 Vaccines for Children and Teens  What are the ingredients in COVID-19 vaccines?  Vaccine ingredients can vary by . To learn more about the ingredients in authorized COVID-19 vaccines, see  · Information about the Pfizer-BioNTech COVID-19 Vaccine  · Information about the Moderna COVID-19 Vaccine  · Information about the [employer] COVID-19 Vaccine  · Ingredients Included in COVID-19 Vaccines  Do I need to wear a mask and avoid close contact with others if I am fully vaccinated?  No. Fully vaccinated people can resume activities without wearing a mask or physically distancing, except where required by federal, state, local, [place], or territorial laws, rules, and regulations, including local business and workplace guidance. If you are fully vaccinated, you can resume activities that you did before the pandemic.  Additional recommendations can be found at  When You've Been Fully Vaccinated.  Related pages:  · When You've Been Fully Vaccinated  · Key Things to Know  · Protect Yourself and Others  Can I choose which COVID-19 vaccine I get?  Yes. All currently authorized and recommended COVID-19 vaccines are safe and effective, and CDC does not recommend one vaccine over another. The most important decision is to get a COVID-19 vaccination as soon as possible. Widespread vaccination is a critical tool to help stop the pandemic.  People should be aware that a risk of a rare condition called thrombosis with thrombocytopenia syndrome (TTS) has been reported following vaccination with the [employer] COVID-19 Vaccine. TTS is a serious condition that involves blood clots with low platelet counts. This problem is rare, and most reports were in women between 18 and 49 years old. For women 50 years and older and men of any age, this problem is even more rare. There are other COVID-19 vaccine options available for which this risk has not been seen (Celotor, Moderna).  Learn more about your COVID-19 vaccination, including how to find a vaccination location, what to expect at your appointment, and more.  Related page:  · Your Vaccination  If I am pregnant, can I get a COVID-19 vaccine?  Yes, if you are pregnant, you can receive a COVID-19 vaccine.  You might want to have a conversation with your healthcare provider to help you decide whether to get vaccinated. While such a conversation might be helpful, it is not required before vaccination. Learn more about vaccination considerations for people who are pregnant or breastfeeding.  If you are pregnant and have received a COVID-19 vaccine, we encourage you to enroll in [employer]'s smartphone-based tool that provides personalized health check-ins after vaccination. A [employer] pregnancy registry has been established to gather information on the health of pregnant people who have received a COVID-19 vaccine.  Related  pages:  · COVID-19 Vaccines for Pregnant or Breastfeeding People  · Monitoring Systems for Pregnant People  · V-safe Pregnancy Registry  How long does protection from a COVID-19 vaccine last?  We don't know how long protection lasts for those who are vaccinated. What we do know is that COVID-19 has caused very serious illness and death for a lot of people. If you get COVID-19, you also risk giving it to loved ones who may get very sick. Getting a COVID-19 vaccine is a safer choice.  Experts are working to learn more about both natural immunity and vaccine-induced immunity. [facility] will keep the public informed as new evidence becomes available.  Related page:  · Vaccines Work  How many doses of COVID-19 vaccine will I need to get?  The number of doses needed depends on which vaccine you receive. To get the most protection:  · Two Pfizer-BioNTech vaccine doses should be given 3 weeks (21 days) apart.  · Two Moderna vaccine doses should be given 1 month (28 days) apart.  · Osito [employer] Johns[employer] Jose (J&J/Nely) COVID-19 vaccine requires only one dose.  If you receive a vaccine that requires two doses, you should get your second shot as close to the recommended interval as possible. However, your second dose may be given up to 6 weeks (42 days) after the first dose, if necessary.. You should not get the second dose earlier than the recommended interval.  Related pages:  · Pfizer-BioNTech  · Moderna  · Osito & Osito / Nely  If I have an underlying condition, can I get a COVID-19 vaccine?  People with underlying medical conditions can receive a COVID-19 vaccine as long as they have not had an immediate or severe allergic reaction to a COVID-19 vaccine or to any of the ingredients in the vaccine. Learn more about vaccination considerations for people with underlying medical conditions. Vaccination is an important consideration for adults of any age with certain underlying medical conditionsbecause they are at  increased risk for severe illness from COVID-19.  Related pages:  · Underlying Medical Conditions  · People at High Risk  · People with Allergies  Can I get vaccinated against COVID-19 while I am currently sick with COVID-19?  No. People with COVID-19 who have symptoms should wait to be vaccinated until they have recovered from their illness and have met the criteria for discontinuing isolation; those without symptoms should also wait until they meet the criteria before getting vaccinated. This guidance also applies to people who get COVID-19 before getting their second dose of vaccine.  Related pages:  · When to Quarantine  · Ending Home Isolation  Answers to more questions about:  · Healthcare Professionals and COVID-19 Vaccines  · Vaccines.gov  · Vaccine Administration Management System (VAMS)  · COVID-19 Vaccination in Long-term Care Facilities  · V-safe after Vaccination Health   06/15/2021  Content source: National Center for Immunization and Respiratory Diseases (NCIRD), Division of Viral Diseases  This information is not intended to replace advice given to you by your health care provider. Make sure you discuss any questions you have with your health care provider.  Document Revised: 08/04/2021 Document Reviewed: 08/04/2021  Elsevier Patient Education © 2021 [employer]  SARS-CoV-2 Virus (COVID-19) Vaccine Suspension for Injection (Moderna COVID-19 Vaccine)  What is this medicine?  SARS COVID-19 VACCINE (SARZ koh-vid 19 vak SEEN) is a vaccine used to reduce the risk of getting COVID-19. This vaccine does not treat COVID-19. There is no FDA-approved vaccine to prevent COVID-19. The FDA has authorized the emergency use of this vaccine during the COVID-19 pandemic.  This medicine may be used for other purposes; ask your health care provider or pharmacist if you have questions.  COMMON BRAND NAME(S): Moderna COVID-19  What should I tell my health care provider before I take this medicine?  They need to  know if you have any of these conditions:  · any allergies  · bleeding disorder  · fever or infection  · immune system problems  · received passive antibody therapy such as convalescent plasma or monoclonal antibodies for a COVID-19 infection  · recent or upcoming vaccine including previous COVID-19 vaccine  · an unusual or allergic reaction to COVID-19 vaccine, other medicines, foods, dyes, or preservatives  · pregnant or trying to get pregnant  · breast-feeding  How should I use this medicine?  This vaccine is injected into a muscle. It is given by a health care provider.  This vaccine requires 2 doses given 1 month apart to get the full benefit. Set a reminder for when your next dose is due. It is important you get the same type or brand of vaccine for both doses.  You will get a vaccination card to show you when to return for your second dose. Remember to bring your card with you when you return for your second dose.  A copy of the Fact Sheet for Recipients and Caregivers will be given before each vaccination. Be sure to read this information carefully each time. This sheet may change frequently.  Overdosage: If you think you have taken too much of this medicine contact a poison control center or emergency room at once.  NOTE: This medicine is only for you. Do not share this medicine with others.  What if I miss a dose?  It is important not to miss your dose. Call your health care provider if you are unable to keep an appointment.  What may interact with this medicine?  · certain medicines that thin your blood  · chemotherapy or radiation therapy  · medicines that lower your chance of fighting infection  · passive antibody therapy (monoclonal antibodies or convalescent plasma) for a COVID-19 infection  · steroid medicines like prednisone or cortisone  This list may not describe all possible interactions. Give your health care provider a list of all the medicines, herbs, non-prescription drugs, or dietary  supplements you use. Also tell them if you smoke, drink alcohol, or use illegal drugs. Some items may interact with your medicine.  What should I watch for while using this medicine?  Visit your health care provider regularly.  If you have received passive antibody therapy (also known as monoclonal antibodies or convalescent plasma) for a COVID-19 infection, wait at least 90 days after receiving the antibody therapy before getting this vaccine. If you received your first dose of vaccine and get passive antibody therapy before your second dose is due, wait at least 90 days from the antibody therapy before getting your second dose of this vaccine.  Cases of inflammation of the heart have been reported in adolescents and young adults after receiving this vaccine. It is not known whether the vaccine causes the heart inflammation. Talk to your health care provider right away if you are unusually weak or tired; have trouble breathing; chest pain; dizziness; fast, irregular heartbeat; fever; joint pain; swelling of the ankles, feet, hands or other unusual swelling. The Centers for Disease Control (CDC) is monitoring these reports to see if there is any relationship to this vaccine.  This vaccine, like all vaccines, may not fully protect everyone. Continue to follow all guidelines to prevent exposure.  What side effects may I notice from receiving this medicine?  Side effects that you should report to your doctor or health care professional as soon as possible:  · allergic reactions (skin rash, itching or hives; swelling of the face, lips, or tongue; dizziness or weakness)  · inflammation of the heart (unusually weak or tired; trouble breathing; chest pain; dizziness; fast, irregular heartbeat; fever; joint pain; swelling of the ankles, feet, hands or other unusual swelling)  Side effects that usually do not require medical attention (report these to your doctor or health care professional if they continue or are  bothersome):  · chills  · fever  · headache  · joint pain  · muscle pain  · nausea  · pain, redness, or irritation at site where injected  · swollen lymph nodes in the same arm as injection  · tiredness  This list may not describe all possible side effects. Call your doctor for medical advice about side effects. You may report side effects to FDA at 6-920-EHK-0356.  Where should I keep my medicine?  This vaccine is only given by a health care provider. It will not be stored at home.  NOTE: This sheet is a summary. It may not cover all possible information. If you have questions about this medicine, talk to your doctor, pharmacist, or health care provider.  © 2021 Elsevier/[employer] (2021-06-30 15:23:24)

## 2021-10-05 NOTE — ASSESSMENT & PLAN NOTE
Diabetes is unchanged.   Continue current treatment regimen.  Dietary recommendations for ADA diet.  Diabetes will be reassessed Today with labs.

## 2021-10-28 ENCOUNTER — OFFICE VISIT (OUTPATIENT)
Dept: CARDIOLOGY | Facility: CLINIC | Age: 36
End: 2021-10-28

## 2021-10-28 VITALS
HEIGHT: 71 IN | SYSTOLIC BLOOD PRESSURE: 114 MMHG | BODY MASS INDEX: 37.8 KG/M2 | DIASTOLIC BLOOD PRESSURE: 72 MMHG | WEIGHT: 270 LBS | HEART RATE: 82 BPM

## 2021-10-28 DIAGNOSIS — I10 HYPERTENSION, ESSENTIAL: Primary | ICD-10-CM

## 2021-10-28 DIAGNOSIS — E78.2 HYPERLIPEMIA, MIXED: ICD-10-CM

## 2021-10-28 DIAGNOSIS — I50.32 DIASTOLIC CHF, CHRONIC (HCC): ICD-10-CM

## 2021-10-28 PROCEDURE — 99214 OFFICE O/P EST MOD 30 MIN: CPT | Performed by: SPECIALIST

## 2021-10-28 NOTE — PROGRESS NOTES
[facility]  Cardiology progress Note    Patient Name: Luiz Christianson  : 1985    CHIEF COMPLAINT  Hypertension, pedal edema.      Subjective   Subjective     HISTORY OF PRESENT ILLNESS    Luiz Christianson is a 36 y.o. male with history of hypertension pedal edema.  Blood pressures controlled at home.  Pedal edema has improved.    Review of Systems:   Constitutional no fever,  no weight loss   Skin no rash   Otolaryngeal no difficulty swallowing   Cardiovascular See HPI   Pulmonary no cough, no sputum production   Gastrointestinal no constipation, no diarrhea   Genitourinary no dysuria, no hematuria   Hematologic no easy bruisability, no abnormal bleeding   Musculoskeletal no muscle pain   Neurologic no dizziness, no falls         Personal History     Social History:  reports that he has never smoked. He has never used smokeless tobacco. He reports that he does not drink alcohol and does not use drugs.    Home Medications:  Current Outpatient Medications on File Prior to Visit   Medication Sig   • aspirin (aspirin) 81 MG EC tablet    • atorvastatin (LIPITOR) 80 MG tablet Take 1 tablet by mouth Daily.   • bisoprolol (ZEBeta) 10 MG tablet Take 1 tablet by mouth Daily.   • Dapagliflozin Propanediol (Farxiga) 10 MG tablet Take 10 mg by mouth Every Morning.   • furosemide (LASIX) 20 MG tablet Take 20 mg by mouth 2 (Two) Times a Day.   • lisinopril (PRINIVIL,ZESTRIL) 10 MG tablet Take 1 tablet by mouth Daily.   • metFORMIN (GLUCOPHAGE) 500 MG tablet Take 1 tablet by mouth 2 (Two) Times a Day With Meals.   • Rimegepant Sulfate (NURTEC) 75 MG tablet dispersible tablet Take 1 tablet by mouth Daily.   • spironolactone (ALDACTONE) 25 MG tablet Take 1 tablet by mouth Daily.     No current facility-administered medications on file prior to visit.     Allergies:  No Known Allergies    Objective    Objective       Vitals:   Heart Rate:  [82] 82  BP: (114)/(72) 114/72  Body mass index is 37.66 kg/m².     Physical  Exam:   Constitutional: Awake, alert, No acute distress    Eyes: PERRLA, sclerae anicteric, no conjunctival injection   HENT: NCAT, mucous membranes moist   Neck: Supple, no thyromegaly, no lymphadenopathy, trachea midline   Respiratory: Clear to auscultation bilaterally, nonlabored respirations    Cardiovascular: RRR, no murmurs or rubs. Palpable pedal pulses bilaterally   Musculoskeletal: No bilateral ankle edema, no cyanosis to extremities   Psychiatric: Appropriate affect, cooperative   Neurologic: Oriented x 3, strength symmetric in all extremities, Cranial Nerves grossly intact to confrontation, speech clear   Skin: No rashes.    Result Review    Result Review:  I have personally reviewed the available results from  [x]  Laboratory  [x]  EKG  [x]  Cardiology  [x]  Medications  [x]  Old records  []  Other:   Procedures  Lab Results   Component Value Date    CHOL 186 10/04/2021    CHOL 188 07/06/2021     Lab Results   Component Value Date    TRIG 169 (H) 10/04/2021    TRIG 272 (H) 07/06/2021    TRIG 181 (H) 11/18/2020     Lab Results   Component Value Date    HDL 33 (L) 10/04/2021    HDL 34 (L) 07/06/2021    HDL 35 (L) 11/18/2020     Lab Results   Component Value Date     (H) 10/04/2021     (H) 07/06/2021     (H) 11/18/2020     Lab Results   Component Value Date    VLDL 30 10/04/2021    VLDL 47 (H) 07/06/2021    VLDL 36 11/18/2020         Impression/Plan:  1.  Essential hypertension controlled: Continue Zestril 10 mg once a day.  Blood pressure well controlled at home.  Continue bisoprolol 10 mg once a day.  2.  Hyperlipidemia: Continue Lipitor 80 mg once a day.  Able to tolerate Lipitor without any side effect such as muscle pains.  Lipid profile reviewed.  Which shows an LDL of 133.  Low-fat diet advised.  3.  Chronic diastolic heart failure stable: Continue Lasix 20 mg once a day.  Low-salt diet advised.           Russell Bledsoe MD   10/28/21   12:52 EDT

## 2021-11-10 RX ORDER — FUROSEMIDE 20 MG/1
20 TABLET ORAL 2 TIMES DAILY
Qty: 180 TABLET | Refills: 1 | Status: SHIPPED | OUTPATIENT
Start: 2021-11-10 | End: 2022-03-04 | Stop reason: SDUPTHER

## 2021-11-10 RX ORDER — FUROSEMIDE 20 MG/1
20 TABLET ORAL 2 TIMES DAILY
Qty: 180 TABLET | Refills: 1 | Status: SHIPPED | OUTPATIENT
Start: 2021-11-10 | End: 2021-11-10 | Stop reason: SDUPTHER

## 2021-11-15 ENCOUNTER — TELEPHONE (OUTPATIENT)
Dept: FAMILY MEDICINE CLINIC | Facility: CLINIC | Age: 36
End: 2021-11-15

## 2021-11-15 NOTE — TELEPHONE ENCOUNTER
PT CALLED AND WANTED TO KNOW IF SIDE PAIN AND BEING NAUSEOUS IS A SIDE OF EFFECT OF THE NEW MEDICATION THAT HE STARTED ON 11/10/21 FUROSEMIDE (LASIX).

## 2021-11-15 NOTE — TELEPHONE ENCOUNTER
Those are not usual side effects of Lasix.  If the symptoms do not improve he should make an appointment.

## 2021-11-15 NOTE — TELEPHONE ENCOUNTER
MADE PT APPT FOR WED 11/17/21 FOR 1045. PT SAID IF HE CAN'T MAKE IT TILL THEN HE WILL GO TO URGENT CARE

## 2021-11-17 ENCOUNTER — OFFICE VISIT (OUTPATIENT)
Dept: FAMILY MEDICINE CLINIC | Facility: CLINIC | Age: 36
End: 2021-11-17

## 2021-11-17 VITALS
HEART RATE: 94 BPM | TEMPERATURE: 98.8 F | HEIGHT: 71 IN | OXYGEN SATURATION: 95 % | DIASTOLIC BLOOD PRESSURE: 96 MMHG | SYSTOLIC BLOOD PRESSURE: 132 MMHG | BODY MASS INDEX: 39.09 KG/M2 | WEIGHT: 279.2 LBS

## 2021-11-17 DIAGNOSIS — N20.0 BILATERAL RENAL STONES: ICD-10-CM

## 2021-11-17 DIAGNOSIS — R10.9 LEFT FLANK PAIN: Primary | ICD-10-CM

## 2021-11-17 DIAGNOSIS — M62.830 SPASM OF LUMBAR PARASPINOUS MUSCLE: ICD-10-CM

## 2021-11-17 LAB
BILIRUB BLD-MCNC: NEGATIVE MG/DL
CLARITY, POC: CLEAR
COLOR UR: YELLOW
EXPIRATION DATE: ABNORMAL
GLUCOSE UR STRIP-MCNC: ABNORMAL MG/DL
KETONES UR QL: NEGATIVE
LEUKOCYTE EST, POC: NEGATIVE
Lab: ABNORMAL
NITRITE UR-MCNC: NEGATIVE MG/ML
PH UR: 6 [PH] (ref 5–8)
PROT UR STRIP-MCNC: NEGATIVE MG/DL
RBC # UR STRIP: ABNORMAL /UL
SP GR UR: 1.01 (ref 1–1.03)
UROBILINOGEN UR QL: NORMAL

## 2021-11-17 PROCEDURE — 81003 URINALYSIS AUTO W/O SCOPE: CPT | Performed by: NURSE PRACTITIONER

## 2021-11-17 PROCEDURE — 96372 THER/PROPH/DIAG INJ SC/IM: CPT | Performed by: NURSE PRACTITIONER

## 2021-11-17 PROCEDURE — 99214 OFFICE O/P EST MOD 30 MIN: CPT | Performed by: NURSE PRACTITIONER

## 2021-11-17 RX ORDER — CYCLOBENZAPRINE HCL 10 MG
10 TABLET ORAL NIGHTLY PRN
Qty: 30 TABLET | Refills: 2 | Status: SHIPPED | OUTPATIENT
Start: 2021-11-17 | End: 2022-06-14

## 2021-11-17 RX ORDER — KETOROLAC TROMETHAMINE 10 MG/1
10 TABLET, FILM COATED ORAL EVERY 6 HOURS PRN
Qty: 15 TABLET | Refills: 0 | Status: SHIPPED | OUTPATIENT
Start: 2021-11-17 | End: 2021-11-22

## 2021-11-17 RX ORDER — KETOROLAC TROMETHAMINE 30 MG/ML
60 INJECTION, SOLUTION INTRAMUSCULAR; INTRAVENOUS ONCE
Status: COMPLETED | OUTPATIENT
Start: 2021-11-17 | End: 2021-11-17

## 2021-11-17 RX ORDER — TAMSULOSIN HYDROCHLORIDE 0.4 MG/1
1 CAPSULE ORAL DAILY
Qty: 10 CAPSULE | Refills: 0 | Status: SHIPPED | OUTPATIENT
Start: 2021-11-17 | End: 2021-11-27

## 2021-11-17 RX ADMIN — KETOROLAC TROMETHAMINE 60 MG: 30 INJECTION, SOLUTION INTRAMUSCULAR; INTRAVENOUS at 11:22

## 2021-11-17 NOTE — PATIENT INSTRUCTIONS
Kidney Stones    Kidney stones are solid, rock-like deposits that form inside of the kidneys. The kidneys are a pair of organs that make urine. A kidney stone may form in a kidney and move into other parts of the urinary tract, including the tubes that connect the kidneys to the bladder (ureters), the bladder, and the tube that carries urine out of the body (urethra). As the stone moves through these areas, it can cause intense pain and block the flow of urine.  Kidney stones are created when high levels of certain minerals are found in the urine. The stones are usually passed out of the body through urination, but in some cases, medical treatment may be needed to remove them.  What are the causes?  Kidney stones may be caused by:  · A condition in which certain glands produce too much parathyroid hormone (primary hyperparathyroidism), which causes too much calcium buildup in the blood.  · A buildup of uric acid crystals in the bladder (hyperuricosuria). Uric acid is a chemical that the body produces when you eat certain foods. It usually exits the body in the urine.  · Narrowing (stricture) of one or both of the ureters.  · A kidney blockage that is present at birth (congenital obstruction).  · Past surgery on the kidney or the ureters, such as gastric bypass surgery.  What increases the risk?  The following factors may make you more likely to develop this condition:  · Having had a kidney stone in the past.  · Having a family history of kidney stones.  · Not drinking enough water.  · Eating a diet that is high in protein, salt (sodium), or sugar.  · Being overweight or obese.  What are the signs or symptoms?  Symptoms of a kidney stone may include:  · Pain in the side of the abdomen, right below the ribs (flank pain). Pain usually spreads (radiates) to the groin.  · Needing to urinate frequently or urgently.  · Painful urination.  · Blood in the urine (hematuria).  · Nausea.  · Vomiting.  · Fever and chills.  How  is this diagnosed?  This condition may be diagnosed based on:  · Your symptoms and medical history.  · A physical exam.  · Blood tests.  · Urine tests. These may be done before and after the stone passes out of your body through urination.  · Imaging tests, such as a CT scan, abdominal X-ray, or ultrasound.  · A procedure to examine the inside of the bladder (cystoscopy).  How is this treated?  Treatment for kidney stones depends on the size, location, and makeup of the stones. Kidney stones will often pass out of the body through urination. You may need to:  · Increase your fluid intake to help pass the stone. In some cases, you may be given fluids through an IV and may need to be monitored at the hospital.  · Take medicine for pain.  · Make changes in your diet to help prevent kidney stones from coming back.  Sometimes, medical procedures are needed to remove a kidney stone. This may involve:  · A procedure to break up kidney stones using:  ? A focused beam of light (laser therapy).  ? Shock waves (extracorporeal shock wave lithotripsy).  · Surgery to remove kidney stones. This may be needed if you have severe pain or have stones that block your urinary tract.  Follow these instructions at home:  Medicines  · Take over-the-counter and prescription medicines only as told by your health care provider.  · Ask your health care provider if the medicine prescribed to you requires you to avoid driving or using heavy machinery.  Eating and drinking  · Drink enough fluid to keep your urine pale yellow. You may be instructed to drink at least 8-10 glasses of water each day. This will help you pass the kidney stone.  · If directed, change your diet. This may include:  ? Limiting how much sodium you eat.  ? Eating more fruits and vegetables.  ? Limiting how much animal protein--such as red meat, poultry, fish, and eggs--you eat.  · Follow instructions from your health care provider about eating or drinking  restrictions.  General instructions  · Collect urine samples as told by your health care provider. You may need to collect a urine sample:  ? 24 hours after you pass the stone.  ? 8-12 weeks after passing the kidney stone, and every 6-12 months after that.  · Strain your urine every time you urinate, for as long as directed. Use the strainer that your health care provider recommends.  · Do not throw out the kidney stone after passing it. Keep the stone so it can be tested by your health care provider. Testing the makeup of your kidney stone may help prevent you from getting kidney stones in the future.  · Keep all follow-up visits as told by your health care provider. This is important. You may need follow-up X-rays or ultrasounds to make sure that your stone has passed.  How is this prevented?  To prevent another kidney stone:  · Drink enough fluid to keep your urine pale yellow. This is the best way to prevent kidney stones.  · Eat a healthy diet and follow recommendations from your health care provider about foods to avoid. You may be instructed to eat a low-protein diet. Recommendations vary depending on the type of kidney stone that you have.  · Maintain a healthy weight.  Where to find more information  · National Kidney Foundation (NKF): www.kidney.org  · [employer] (UCF): www.urologyhealth.org  Contact a health care provider if:  · You have pain that gets worse or does not get better with medicine.  Get help right away if:  · You have a fever or chills.  · You develop severe pain.  · You develop new abdominal pain.  · You faint.  · You are unable to urinate.  Summary  · Kidney stones are solid, rock-like deposits that form inside of the kidneys.  · Kidney stones can cause nausea, vomiting, blood in the urine, abdominal pain, and the urge to urinate frequently.  · Treatment for kidney stones depends on the size, location, and makeup of the stones. Kidney stones will often pass out of the body  through urination.  · Kidney stones can be prevented by drinking enough fluids, eating a healthy diet, and maintaining a healthy weight.  This information is not intended to replace advice given to you by your health care provider. Make sure you discuss any questions you have with your health care provider.  Document Revised: 05/05/2020 Document Reviewed: 05/05/2020  [employer] Patient Education © 2021 [employer]

## 2021-11-17 NOTE — PROGRESS NOTES
"Chief Complaint  left abdominal pain and left flank pain    Subjective          Luiz Christianson presents to [facility]  History of Present Illness  He presents today for an acute visit with complaints of pain on the left side of his back that has radiated to his left groin for the past 3 days.  He states on a previous CT scan it was noted that he had bilateral renal stones.  He has never passed a kidney stone that he knows of.  Urinalysis in office today is positive for trace of blood, negative leukocytes and negative nitrate.  He is positive for glucose but he is on Farxiga for diabetes.    I did review the CT scan of the abdomen and pelvis from 10/5/2020 which did show Bilateral nonobstructing renal stones measuring 6 mm or less.  Objective   Vital Signs:   /96   Pulse 94   Temp 98.8 °F (37.1 °C)   Ht 180.3 cm (71\")   Wt 127 kg (279 lb 3.2 oz)   SpO2 95%   BMI 38.94 kg/m²     Physical Exam  Vitals reviewed.   Constitutional:       Appearance: Normal appearance. He is well-developed.   Cardiovascular:      Rate and Rhythm: Normal rate and regular rhythm.      Heart sounds: No murmur heard.  No friction rub. No gallop.    Pulmonary:      Effort: Pulmonary effort is normal.      Breath sounds: Normal breath sounds. No wheezing or rhonchi.   Musculoskeletal:      Lumbar back: Spasms present. No tenderness.      Comments: No CVA tenderness.   Skin:     General: Skin is warm and dry.   Neurological:      Mental Status: He is alert and oriented to person, place, and time.      Cranial Nerves: No cranial nerve deficit.   Psychiatric:         Mood and Affect: Mood and affect normal.         Behavior: Behavior normal.         Thought Content: Thought content normal. Thought content does not include homicidal or suicidal ideation.         Judgment: Judgment normal.        Result Review :     UA    Urinalysis 11/17/21   Ketones, UA Negative   Leukocytes, UA Negative                "      Assessment and Plan    Diagnoses and all orders for this visit:    1. Left flank pain (Primary)  -     POCT urinalysis dipstick, automated  -     CT Abdomen Pelvis Stone Protocol; Future  -     ketorolac (TORADOL) injection 60 mg    2. Bilateral renal stones  -     CT Abdomen Pelvis Stone Protocol; Future  -     ketorolac (TORADOL) injection 60 mg    3. Spasm of lumbar paraspinous muscle    Other orders  -     ketorolac (TORADOL) 10 MG tablet; Take 1 tablet by mouth Every 6 (Six) Hours As Needed for Moderate Pain  for up to 5 days.  Dispense: 15 tablet; Refill: 0  -     tamsulosin (FLOMAX) 0.4 MG capsule 24 hr capsule; Take 1 capsule by mouth Daily for 10 days.  Dispense: 10 capsule; Refill: 0  -     cyclobenzaprine (FLEXERIL) 10 MG tablet; Take 1 tablet by mouth At Night As Needed for Muscle Spasms (back pain).  Dispense: 30 tablet; Refill: 2    Due to his symptoms and history of kidney stones shown on a CT scan in the past, will treat him with Toradol 60 mg IM, then start him on Toradol pills as needed for pain.  We will also start him on Flomax.  CT of the abdomen and pelvis with renal protocol ordered.  Advised patient that if he has any severe pain that he should go to the emergency room.  Advised to drink lots of water.  I also discussed with him that he has some muscle spasm in his lumbar spine so I will start him on Flexeril 10 mg nightly.    Follow Up   Return if symptoms worsen or fail to improve.  Patient was given instructions and counseling regarding his condition or for health maintenance advice. Please see specific information pulled into the AVS if appropriate.

## 2021-11-29 RX ORDER — DAPAGLIFLOZIN 10 MG/1
TABLET, FILM COATED ORAL
Qty: 90 TABLET | Refills: 3 | Status: SHIPPED | OUTPATIENT
Start: 2021-11-29 | End: 2022-01-04 | Stop reason: SINTOL

## 2021-12-14 ENCOUNTER — HOSPITAL ENCOUNTER (OUTPATIENT)
Dept: CT IMAGING | Facility: HOSPITAL | Age: 36
Discharge: HOME | End: 2021-12-14
Admitting: NURSE PRACTITIONER

## 2021-12-14 DIAGNOSIS — R10.9 LEFT FLANK PAIN: ICD-10-CM

## 2021-12-14 DIAGNOSIS — N20.0 BILATERAL RENAL STONES: ICD-10-CM

## 2021-12-14 PROCEDURE — 74176 CT ABD & PELVIS W/O CONTRAST: CPT

## 2022-01-13 RX ORDER — LISINOPRIL 10 MG/1
TABLET ORAL
Qty: 90 TABLET | Refills: 1 | Status: SHIPPED | OUTPATIENT
Start: 2022-01-13 | End: 2022-08-26 | Stop reason: SDUPTHER

## 2022-01-13 RX ORDER — ATORVASTATIN CALCIUM 80 MG/1
TABLET, FILM COATED ORAL
Qty: 90 TABLET | Refills: 1 | Status: SHIPPED | OUTPATIENT
Start: 2022-01-13 | End: 2022-08-26 | Stop reason: SDUPTHER

## 2022-01-13 RX ORDER — SPIRONOLACTONE 25 MG/1
TABLET ORAL
Qty: 90 TABLET | Refills: 1 | Status: SHIPPED | OUTPATIENT
Start: 2022-01-13 | End: 2022-08-26 | Stop reason: SDUPTHER

## 2022-01-13 RX ORDER — BISOPROLOL FUMARATE 10 MG/1
TABLET, FILM COATED ORAL
Qty: 90 TABLET | Refills: 1 | Status: SHIPPED | OUTPATIENT
Start: 2022-01-13 | End: 2022-08-26 | Stop reason: SDUPTHER

## 2022-01-14 ENCOUNTER — OFFICE VISIT (OUTPATIENT)
Dept: FAMILY MEDICINE CLINIC | Facility: CLINIC | Age: 37
End: 2022-01-14

## 2022-01-14 VITALS
HEART RATE: 77 BPM | DIASTOLIC BLOOD PRESSURE: 78 MMHG | SYSTOLIC BLOOD PRESSURE: 134 MMHG | BODY MASS INDEX: 38.36 KG/M2 | OXYGEN SATURATION: 99 % | TEMPERATURE: 97.6 F | WEIGHT: 274 LBS | HEIGHT: 71 IN

## 2022-01-14 DIAGNOSIS — E11.9 TYPE 2 DIABETES MELLITUS WITHOUT COMPLICATION, WITHOUT LONG-TERM CURRENT USE OF INSULIN: Primary | ICD-10-CM

## 2022-01-14 DIAGNOSIS — I10 PRIMARY HYPERTENSION: ICD-10-CM

## 2022-01-14 DIAGNOSIS — E78.2 HYPERLIPEMIA, MIXED: ICD-10-CM

## 2022-01-14 PROCEDURE — 99214 OFFICE O/P EST MOD 30 MIN: CPT | Performed by: NURSE PRACTITIONER

## 2022-01-14 NOTE — PROGRESS NOTES
"Chief Complaint  Diabetes    Subjective          Luiz Christianson presents to [facility]  History of Present Illness   36-year-old male presents today for follow-up on diabetes.    Diabetes type 2, non-insulin-dependent: He did not tolerate Farxiga it was causing urinary irritation. We discontinue Farxiga and start him on Januvia. He states that he is no longer having any irritation. He is also taking metformin. His last A1c 3 months ago was 7.2% and had improved.    Hyperlipidemia: He is currently on atorvastatin 80 mg every evening. He is not fasting today.    Hypertension: Blood pressure is stable 134/78 on lisinopril 10 mg daily, bisoprolol 10 mg daily and he takes spironolactone 25 mg daily.  Objective   Vital Signs:   /78   Pulse 77   Temp 97.6 °F (36.4 °C)   Ht 180.3 cm (71\")   Wt 124 kg (274 lb)   SpO2 99%   BMI 38.22 kg/m²     Physical Exam  Vitals reviewed.   Constitutional:       Appearance: Normal appearance. He is well-developed.   Neck:      Thyroid: No thyroid mass, thyromegaly or thyroid tenderness.   Cardiovascular:      Rate and Rhythm: Normal rate and regular rhythm.      Heart sounds: No murmur heard.  No friction rub. No gallop.    Pulmonary:      Effort: Pulmonary effort is normal.      Breath sounds: Normal breath sounds. No wheezing or rhonchi.   Lymphadenopathy:      Cervical: No cervical adenopathy.   Skin:     General: Skin is warm and dry.   Neurological:      Mental Status: He is alert and oriented to person, place, and time.      Cranial Nerves: No cranial nerve deficit.   Psychiatric:         Mood and Affect: Mood and affect normal.         Behavior: Behavior normal.         Thought Content: Thought content normal. Thought content does not include homicidal or suicidal ideation.         Judgment: Judgment normal.        Result Review :     CMP    CMP 7/6/21 10/4/21   Glucose 195 (A) 151 (A)   BUN 18 15   Creatinine 0.91 0.72 (A)   eGFR Non "  Am 93 122   Sodium 133 (A) 137   Potassium 4.4 4.5   Chloride 98 101   Calcium 9.2 9.9   Albumin 4.60 4.90   Total Bilirubin 0.6 0.5   Alkaline Phosphatase 68 89   AST (SGOT) 32 18   ALT (SGPT) 59 (A) 41   (A) Abnormal value            Lipid Panel    Lipid Panel 7/6/21 10/4/21   Total Cholesterol 188 186   Triglycerides 272 (A) 169 (A)   HDL Cholesterol 34 (A) 33 (A)   VLDL Cholesterol 47 (A) 30   LDL Cholesterol  107 (A) 123 (A)   LDL/HDL Ratio 2.93 3.61   (A) Abnormal value            A1C Last 3 Results    HGBA1C Last 3 Results 7/6/21 10/4/21   Hemoglobin A1C 9.20 (A) 7.20 (A)   (A) Abnormal value                      Assessment and Plan    Diagnoses and all orders for this visit:    1. Type 2 diabetes mellitus without complication, without long-term current use of insulin (HCC) (Primary)  Assessment & Plan:  Diabetes is improving with treatment.   Continue current treatment regimen.  Reminded to get yearly retinal exam.  Diabetes will be reassessed in 3 months.    Orders:  -     Hemoglobin A1c; Future  -     Comprehensive Metabolic Panel; Future  -     Lipid Panel; Future  -     Microalbumin / Creatinine Urine Ratio - Urine, Clean Catch; Future  -     TSH+Free T4; Future    2. Hyperlipemia, mixed  Assessment & Plan:  Lipid abnormalities are unchanged.  Pharmacotherapy as ordered. and Patient will return for fasting labs to recheck lipid panel.  Lipids will be reassessed in 3 months.    Orders:  -     Comprehensive Metabolic Panel; Future  -     Lipid Panel; Future  -     TSH+Free T4; Future    3. Primary hypertension  Assessment & Plan:  Hypertension is improving with treatment.  Continue current treatment regimen.  Continue current medications.  Blood pressure will be reassessed at the next regular appointment.    Orders:  -     Comprehensive Metabolic Panel; Future  -     Lipid Panel; Future  -     TSH+Free T4; Future      Follow Up   Return in about 3 months (around 4/14/2022) for Next scheduled follow  up.  Patient was given instructions and counseling regarding his condition or for health maintenance advice. Please see specific information pulled into the AVS if appropriate.

## 2022-03-04 RX ORDER — FUROSEMIDE 20 MG/1
20 TABLET ORAL 2 TIMES DAILY
Qty: 30 TABLET | Refills: 0 | Status: SHIPPED | OUTPATIENT
Start: 2022-03-04 | End: 2022-03-04 | Stop reason: SDUPTHER

## 2022-03-04 RX ORDER — FUROSEMIDE 20 MG/1
20 TABLET ORAL 2 TIMES DAILY
Qty: 180 TABLET | Refills: 2 | Status: SHIPPED | OUTPATIENT
Start: 2022-03-04 | End: 2022-06-14 | Stop reason: SDUPTHER

## 2022-04-04 ENCOUNTER — TELEPHONE (OUTPATIENT)
Dept: FAMILY MEDICINE CLINIC | Facility: CLINIC | Age: 37
End: 2022-04-04

## 2022-04-04 NOTE — TELEPHONE ENCOUNTER
ATTEMPTED TO CALL PATIENT TO RESCHEDULE APPOINTMENT ON 4/15 AT 3:45. PATIENT DID NOT ANSWER LEFT A VOICEMAIL

## 2022-05-13 ENCOUNTER — OFFICE VISIT (OUTPATIENT)
Dept: FAMILY MEDICINE CLINIC | Facility: CLINIC | Age: 37
End: 2022-05-13

## 2022-05-13 VITALS
SYSTOLIC BLOOD PRESSURE: 128 MMHG | HEIGHT: 71 IN | BODY MASS INDEX: 38.72 KG/M2 | WEIGHT: 276.6 LBS | DIASTOLIC BLOOD PRESSURE: 88 MMHG | TEMPERATURE: 98.1 F | OXYGEN SATURATION: 98 % | HEART RATE: 75 BPM

## 2022-05-13 DIAGNOSIS — I50.32 DIASTOLIC CHF, CHRONIC: ICD-10-CM

## 2022-05-13 DIAGNOSIS — R07.89 CHEST HEAVINESS: ICD-10-CM

## 2022-05-13 DIAGNOSIS — E11.9 TYPE 2 DIABETES MELLITUS WITHOUT COMPLICATION, WITHOUT LONG-TERM CURRENT USE OF INSULIN: Primary | ICD-10-CM

## 2022-05-13 DIAGNOSIS — I10 PRIMARY HYPERTENSION: ICD-10-CM

## 2022-05-13 PROCEDURE — 99214 OFFICE O/P EST MOD 30 MIN: CPT | Performed by: NURSE PRACTITIONER

## 2022-05-13 PROCEDURE — 93000 ELECTROCARDIOGRAM COMPLETE: CPT | Performed by: NURSE PRACTITIONER

## 2022-05-13 NOTE — ASSESSMENT & PLAN NOTE
Diabetes is improving with treatment.   Continue current treatment regimen.  Patient states he will return for fasting labs on Monday which will include an A1c.  Diabetes will be reassessed in 3 months.

## 2022-05-13 NOTE — ASSESSMENT & PLAN NOTE
Due to his congestive heart failure, I will add on a BNP to his lab work-up and a chest x-ray.  Patient did not want to do his blood work today but states he will return on Monday for fasting labs.

## 2022-05-13 NOTE — PROGRESS NOTES
"Chief Complaint  Diabetes    Subjective          Luiz Christianson presents to [facility]  History of Present Illness   36-year-old male presents today for a 3-month follow-up.  He was unable to get his labs done prior to his visit.  He states he is not fasting today.    Diabetes type 2, non-insulin-dependent: His last A1c was 7.2% 7 months ago which was an improvement from 9.2%.  He is currently on Januvia and metformin.    Hypertension: Blood pressure stable on bisoprolol and lisinopril as listed.    Hyperlipidemia: He is currently on atorvastatin 80 mg every evening.    Diastolic congestive heart failure: He is currently on Lasix and spironolactone.  He follows with cardiology, Dr. Bledsoe.  He states he has been having some chest heaviness/pressure feeling intermittently for the past few weeks.  He states he did have some chest pain last week but states \"it was not enough to send me to the hospital.\"  He denies having any chest pain or chest pressure today.  He states he has a follow-up appointment with his cardiologist next month on 6/30/2022.    Current Outpatient Medications on File Prior to Visit   Medication Sig Dispense Refill   • aspirin 81 MG EC tablet      • atorvastatin (LIPITOR) 80 MG tablet TAKE 1 TABLET DAILY 90 tablet 1   • bisoprolol (ZEBeta) 10 MG tablet TAKE 1 TABLET DAILY 90 tablet 1   • cyclobenzaprine (FLEXERIL) 10 MG tablet Take 1 tablet by mouth At Night As Needed for Muscle Spasms (back pain). 30 tablet 2   • furosemide (LASIX) 20 MG tablet Take 1 tablet by mouth 2 (Two) Times a Day for 90 days. 180 tablet 2   • lisinopril (PRINIVIL,ZESTRIL) 10 MG tablet TAKE 1 TABLET DAILY 90 tablet 1   • metFORMIN (GLUCOPHAGE) 500 MG tablet TAKE 1 TABLET TWICE DAILY  WITH MEALS. 180 tablet 1   • Rimegepant Sulfate (NURTEC) 75 MG tablet dispersible tablet Take 1 tablet by mouth Daily.     • SITagliptin (Januvia) 100 MG tablet Take 1 tablet by mouth Daily. 90 tablet 2   • " "spironolactone (ALDACTONE) 25 MG tablet TAKE 1 TABLET DAILY 90 tablet 1     No current facility-administered medications on file prior to visit.       Objective   Vital Signs:   /88   Pulse 75   Temp 98.1 °F (36.7 °C)   Ht 180.3 cm (71\")   Wt 125 kg (276 lb 9.6 oz)   SpO2 98%   BMI 38.58 kg/m²     Physical Exam  Vitals reviewed.   Constitutional:       Appearance: Normal appearance. He is well-developed. He is obese.   Neck:      Thyroid: No thyroid mass, thyromegaly or thyroid tenderness.   Cardiovascular:      Rate and Rhythm: Normal rate and regular rhythm.      Heart sounds: No murmur heard.    No friction rub. No gallop.   Pulmonary:      Effort: Pulmonary effort is normal.      Breath sounds: Normal breath sounds. No wheezing or rhonchi.   Lymphadenopathy:      Cervical: No cervical adenopathy.   Skin:     General: Skin is warm and dry.   Neurological:      Mental Status: He is alert and oriented to person, place, and time.      Cranial Nerves: No cranial nerve deficit.   Psychiatric:         Mood and Affect: Mood and affect normal.         Behavior: Behavior normal.         Thought Content: Thought content normal. Thought content does not include homicidal or suicidal ideation.         Judgment: Judgment normal.        Result Review :     CMP    CMP 7/6/21 10/4/21   Glucose 195 (A) 151 (A)   BUN 18 15   Creatinine 0.91 0.72 (A)   eGFR Non African Am 93 122   Sodium 133 (A) 137   Potassium 4.4 4.5   Chloride 98 101   Calcium 9.2 9.9   Albumin 4.60 4.90   Total Bilirubin 0.6 0.5   Alkaline Phosphatase 68 89   AST (SGOT) 32 18   ALT (SGPT) 59 (A) 41   (A) Abnormal value                Lipid Panel    Lipid Panel 7/6/21 10/4/21   Total Cholesterol 188 186   Triglycerides 272 (A) 169 (A)   HDL Cholesterol 34 (A) 33 (A)   VLDL Cholesterol 47 (A) 30   LDL Cholesterol  107 (A) 123 (A)   LDL/HDL Ratio 2.93 3.61   (A) Abnormal value            A1C Last 3 Results    HGBA1C Last 3 Results 7/6/21 10/4/21 "   Hemoglobin A1C 9.20 (A) 7.20 (A)   (A) Abnormal value                 ECG 12 Lead    Date/Time: 5/13/2022 4:21 PM  Performed by: Kandy Martinez APRN  Authorized by: Kandy Martinez APRN   Comparison: compared with previous ECG from 10/6/2020  Comparison to previous ECG: Borderline T abnormalities are noted  Rhythm: sinus rhythm  Rate: normal  Conduction: conduction normal  QRS axis: normal  Other findings: T wave abnormality    Clinical impression: abnormal EKG and non-specific ECG  Comments: EKG was reviewed with Dr. Vernon.              Assessment and Plan    Diagnoses and all orders for this visit:    1. Type 2 diabetes mellitus without complication, without long-term current use of insulin (HCC) (Primary)  Assessment & Plan:  Diabetes is improving with treatment.   Continue current treatment regimen.  Patient states he will return for fasting labs on Monday which will include an A1c.  Diabetes will be reassessed in 3 months.      2. Chest heaviness  Assessment & Plan:  I discussed the abnormal EKG findings with patient.  He denies having chest pain at this time.  Advised him that if he has any more chest pain or chest heaviness that he needs to go to the emergency room, verbalizes understanding.  Advised him I want him to call his cardiologist Monday and get an earlier follow-up appointment with his cardiologist.    Orders:  -     ECG 12 Lead  -     XR Chest PA & Lateral; Future    3. Diastolic CHF, chronic (HCC)  Assessment & Plan:  Due to his congestive heart failure, I will add on a BNP to his lab work-up and a chest x-ray.  Patient did not want to do his blood work today but states he will return on Monday for fasting labs.    Orders:  -     ECG 12 Lead  -     BNP; Future  -     XR Chest PA & Lateral; Future    4. Primary hypertension  Assessment & Plan:  Hypertension is improving with treatment.  Continue current treatment regimen.  Continue current medications.  Blood pressure will be  reassessed at the next regular appointment.        Follow Up   Return in about 3 months (around 8/13/2022) for Next scheduled follow up or sooner if any worsening of symptoms.  Patient was given instructions and counseling regarding his condition or for health maintenance advice. Please see specific information pulled into the AVS if appropriate.

## 2022-05-13 NOTE — ASSESSMENT & PLAN NOTE
I discussed the abnormal EKG findings with patient.  He denies having chest pain at this time.  Advised him that if he has any more chest pain or chest heaviness that he needs to go to the emergency room, verbalizes understanding.  Advised him I want him to call his cardiologist Monday and get an earlier follow-up appointment with his cardiologist.

## 2022-05-16 ENCOUNTER — LAB (OUTPATIENT)
Dept: FAMILY MEDICINE CLINIC | Facility: CLINIC | Age: 37
End: 2022-05-16

## 2022-05-16 DIAGNOSIS — I50.32 DIASTOLIC CHF, CHRONIC: ICD-10-CM

## 2022-05-16 LAB — NT-PROBNP SERPL-MCNC: 10.6 PG/ML (ref 0–450)

## 2022-05-16 PROCEDURE — 83880 ASSAY OF NATRIURETIC PEPTIDE: CPT | Performed by: NURSE PRACTITIONER

## 2022-05-16 PROCEDURE — 36415 COLL VENOUS BLD VENIPUNCTURE: CPT | Performed by: NURSE PRACTITIONER

## 2022-06-14 ENCOUNTER — OFFICE VISIT (OUTPATIENT)
Dept: CARDIOLOGY | Facility: CLINIC | Age: 37
End: 2022-06-14

## 2022-06-14 VITALS
BODY MASS INDEX: 40.29 KG/M2 | HEART RATE: 83 BPM | SYSTOLIC BLOOD PRESSURE: 135 MMHG | DIASTOLIC BLOOD PRESSURE: 73 MMHG | HEIGHT: 69 IN | WEIGHT: 272 LBS

## 2022-06-14 DIAGNOSIS — E78.2 HYPERLIPEMIA, MIXED: ICD-10-CM

## 2022-06-14 DIAGNOSIS — I50.32 DIASTOLIC CHF, CHRONIC: Primary | ICD-10-CM

## 2022-06-14 DIAGNOSIS — I10 HYPERTENSION, ESSENTIAL: ICD-10-CM

## 2022-06-14 PROBLEM — I50.9 CONGESTIVE HEART FAILURE: Status: RESOLVED | Noted: 2021-06-11 | Resolved: 2022-06-14

## 2022-06-14 PROBLEM — R07.89 CHEST HEAVINESS: Status: RESOLVED | Noted: 2022-05-13 | Resolved: 2022-06-14

## 2022-06-14 PROCEDURE — 99214 OFFICE O/P EST MOD 30 MIN: CPT | Performed by: NURSE PRACTITIONER

## 2022-06-14 RX ORDER — FUROSEMIDE 20 MG/1
20 TABLET ORAL 2 TIMES DAILY
Qty: 180 TABLET | Refills: 2 | Status: SHIPPED | OUTPATIENT
Start: 2022-06-14 | End: 2022-08-26 | Stop reason: SDUPTHER

## 2022-06-14 NOTE — PROGRESS NOTES
"Chief Complaint  Hypertension, Hyperlipidemia, and Congestive Heart Failure    Subjective            History of Present Illness  Luiz Christianson is a 37-year-old white/ male patient who presents to the office today for follow-up.  He has chronic diastolic CHF, hypertension, and hyperlipidemia.  He reports that he obtain the COVID-vaccine and experienced \"heart heaviness\" for approximately 2 months afterwards.  He states that it has now \"cleared up\".  He denies any chest pain, shortness of breath, lightheadedness/dizziness, palpitations, or edema.  He reports that there was a couple of days within the last month where he has taken a metolazone pill for fluid retention, he was expecting the fluid retention because he had increased his fluid intake.  He reports compliance with all his medications.    PMH  Past Medical History:   Diagnosis Date   • CHF (congestive heart failure) (HCC)    • Chronic diastolic congestive heart failure (HCC) 11/18/2020   • Diabetes mellitus, type 2 (HCC) 11/18/2020   • Diastolic CHF, chronic  11/18/2020   • Emotional depression    • Essential hypertension 11/18/2020   • Finger numbness 02/24/2021   • Hyperlipidemia    • Left wrist pain 02/24/2021   • Migraine 12/07/2020         ALLERGY  Allergies   Allergen Reactions   • Farxiga [Dapagliflozin] Other (See Comments)     Bladder irritation (persistant)          SURGICALHX  History reviewed. No pertinent surgical history.       SOC  Social History     Socioeconomic History   • Marital status:    Tobacco Use   • Smoking status: Never Smoker   • Smokeless tobacco: Never Used   Vaping Use   • Vaping Use: Never used   Substance and Sexual Activity   • Alcohol use: Never   • Drug use: Never   • Sexual activity: Yes     Partners: Female     Birth control/protection: Injection         FAMHX  Family History   Problem Relation Age of Onset   • Diabetes Mother    • Diabetes Father    • Heart disease Other         AUNT OR UNCLE   • Diabetes " "Other           MEDSIGONLY  Current Outpatient Medications on File Prior to Visit   Medication Sig   • aspirin 81 MG EC tablet Daily.   • atorvastatin (LIPITOR) 80 MG tablet TAKE 1 TABLET DAILY   • bisoprolol (ZEBeta) 10 MG tablet TAKE 1 TABLET DAILY   • lisinopril (PRINIVIL,ZESTRIL) 10 MG tablet TAKE 1 TABLET DAILY   • metFORMIN (GLUCOPHAGE) 500 MG tablet TAKE 1 TABLET TWICE DAILY  WITH MEALS.   • SITagliptin (Januvia) 100 MG tablet Take 1 tablet by mouth Daily.   • spironolactone (ALDACTONE) 25 MG tablet TAKE 1 TABLET DAILY   • furosemide (LASIX) 20 MG tablet Take 1 tablet by mouth 2 (Two) Times a Day for 90 days.   • [DISCONTINUED] cyclobenzaprine (FLEXERIL) 10 MG tablet Take 1 tablet by mouth At Night As Needed for Muscle Spasms (back pain).   • [DISCONTINUED] Rimegepant Sulfate (NURTEC) 75 MG tablet dispersible tablet Take 1 tablet by mouth Daily.     No current facility-administered medications on file prior to visit.         Objective   /73   Pulse 83   Ht 175.3 cm (69\")   Wt 123 kg (272 lb)   BMI 40.17 kg/m²     Physical Exam  Constitutional:       Appearance: He is obese.   HENT:      Head: Normocephalic.   Neck:      Vascular: No carotid bruit.   Cardiovascular:      Rate and Rhythm: Normal rate and regular rhythm.      Pulses: Normal pulses.      Heart sounds: Normal heart sounds. No murmur heard.  Pulmonary:      Effort: Pulmonary effort is normal.      Breath sounds: Normal breath sounds.   Musculoskeletal:      Cervical back: Neck supple.      Right lower leg: No edema.      Left lower leg: No edema.   Skin:     General: Skin is dry.      Capillary Refill: Capillary refill takes less than 2 seconds.   Neurological:      Mental Status: He is alert and oriented to person, place, and time.   Psychiatric:         Behavior: Behavior normal.       Result Review :   The following data was reviewed by: ENRIKE Cee on 06/14/2022:  proBNP   Date Value Ref Range Status   05/16/2022 10.6 0.0 - " 450.0 pg/mL Final     CMP    CMP 10/4/21   Glucose 151 (A)   BUN 15   Creatinine 0.72 (A)   eGFR Non African Am 122   Sodium 137   Potassium 4.5   Chloride 101   Calcium 9.9   Albumin 4.90   Total Bilirubin 0.5   Alkaline Phosphatase 89   AST (SGOT) 18   ALT (SGPT) 41   (A) Abnormal value               Lab Results   Component Value Date    TSH 2.200 11/18/2020      Lab Results   Component Value Date    FREET4 1.0 11/18/2020      No results found for: DDIMERQUANT  Magnesium   Date Value Ref Range Status   03/25/2014 2.1 1.6 - 2.6 mg/dL Final      No results found for: DIGOXIN   Lab Results   Component Value Date    TROPONINT 0.02 10/11/2020           Lipid Panel    Lipid Panel 10/4/21   Total Cholesterol 186   Triglycerides 169 (A)   HDL Cholesterol 33 (A)   VLDL Cholesterol 30   LDL Cholesterol  123 (A)   LDL/HDL Ratio 3.61   (A) Abnormal value          03/16/21 echo:  1.  Left ventricular hypertrophy. Normal left ventricular systolic function.  2.  No significant valvular abnormalities noted.               Assessment and Plan    Diagnoses and all orders for this visit:    1. Diastolic CHF, chronic  (Primary)  Symptomatically stable at this time and euvolemic on exam today.  Continue bisoprolol 10 mg daily, Lasix 20 mg twice daily, lisinopril 10 mg daily, and spironolactone 25 mg daily.    2. Hypertension, essential  Currently controlled without adverse effects of medication, continue bisoprolol 10 mg daily and lisinopril 10 mg daily.    3. Hyperlipemia, mixed  Last lipid panel was 10/4/2021 with  which is outside of his goal range, patient has active orders to have this rechecked once he is able to fast.  For now continue atorvastatin 80 mg daily.  Once new lipid panel is obtained medication may need to be adjusted if LDL is still elevated.      Follow Up   Return in about 8 months (around 2/14/2023) for Follow up with Dr Bledsoe.    Patient was given instructions and counseling regarding his condition or  for health maintenance advice. Please see specific information pulled into the AVS if appropriate.     Luiz BUSHRA Christianson  reports that he has never smoked. He has never used smokeless tobacco.           Lily Chilel, APRN  06/14/22  15:26 EDT    Dictated Utilizing Dragon Dictation

## 2022-07-12 RX ORDER — ATORVASTATIN CALCIUM 80 MG/1
TABLET, FILM COATED ORAL
Qty: 90 TABLET | Refills: 1 | OUTPATIENT
Start: 2022-07-12

## 2022-07-12 RX ORDER — SPIRONOLACTONE 25 MG/1
TABLET ORAL
Qty: 90 TABLET | Refills: 1 | OUTPATIENT
Start: 2022-07-12

## 2022-07-12 RX ORDER — BISOPROLOL FUMARATE 10 MG/1
TABLET, FILM COATED ORAL
Qty: 90 TABLET | Refills: 1 | OUTPATIENT
Start: 2022-07-12

## 2022-07-12 RX ORDER — LISINOPRIL 10 MG/1
TABLET ORAL
Qty: 90 TABLET | Refills: 1 | OUTPATIENT
Start: 2022-07-12

## 2022-08-08 NOTE — ASSESSMENT & PLAN NOTE
Heart failure is improving with treatment.  Continue current treatment regimen.  Heart failure will be reassessed in 3 months.   good throughout

## 2022-08-26 RX ORDER — LISINOPRIL 10 MG/1
10 TABLET ORAL DAILY
Qty: 90 TABLET | Refills: 1 | Status: SHIPPED | OUTPATIENT
Start: 2022-08-26 | End: 2023-02-02

## 2022-08-26 RX ORDER — FUROSEMIDE 20 MG/1
20 TABLET ORAL 2 TIMES DAILY
Qty: 180 TABLET | Refills: 3 | Status: SHIPPED | OUTPATIENT
Start: 2022-08-26 | End: 2022-08-26 | Stop reason: SDUPTHER

## 2022-08-26 RX ORDER — SPIRONOLACTONE 25 MG/1
25 TABLET ORAL DAILY
Qty: 90 TABLET | Refills: 1 | Status: SHIPPED | OUTPATIENT
Start: 2022-08-26 | End: 2023-02-02

## 2022-08-26 RX ORDER — FUROSEMIDE 20 MG/1
20 TABLET ORAL 2 TIMES DAILY
Qty: 180 TABLET | Refills: 3 | Status: SHIPPED | OUTPATIENT
Start: 2022-08-26 | End: 2023-01-03 | Stop reason: SDUPTHER

## 2022-08-26 RX ORDER — BISOPROLOL FUMARATE 10 MG/1
10 TABLET, FILM COATED ORAL DAILY
Qty: 90 TABLET | Refills: 1 | Status: SHIPPED | OUTPATIENT
Start: 2022-08-26 | End: 2023-02-02

## 2022-08-26 RX ORDER — ATORVASTATIN CALCIUM 80 MG/1
80 TABLET, FILM COATED ORAL DAILY
Qty: 90 TABLET | Refills: 1 | Status: SHIPPED | OUTPATIENT
Start: 2022-08-26 | End: 2023-02-02

## 2022-08-30 ENCOUNTER — TELEPHONE (OUTPATIENT)
Dept: FAMILY MEDICINE CLINIC | Facility: CLINIC | Age: 37
End: 2022-08-30

## 2022-08-30 NOTE — TELEPHONE ENCOUNTER
PATIENT CAME INTO OFFICE, SCHEDULED A TRANSFER OF CARE W/ ROSA NEW PATIENT APPOINTMENT WITH [facility]. PATIENT STATED HE WILL RUN OUT OF HIS METFORMIN BEGINNING OF September AND WANTED TO KNOW IF HE COULD GET ENOUGH FILLED UNTIL APPOINTMENT.

## 2022-08-31 NOTE — TELEPHONE ENCOUNTER
Caller: Luiz Christianson    Relationship: Self    Best call back number: [phone]    What is the best time to reach you: PREFERS [facility] MESSAGES     Who are you requesting to speak with (clinical staff, provider,  specific staff member): CLINICAL     Do you know the name of the person who called: NO     What was the call regarding: PATIENT WAS RETURNING A CALL WITH INFORMATION LEFT ON VOICEMAIL.   PATIENT NEEDS HIS metFORMIN (GLUCOPHAGE) 500 MG tablet REFILLED AND SENT TO THE [facility]   PHONE NUMBER [phone]  PATIENT HAS MORE THAN A 3 DAY SUPPLY     Do you require a callback: NO         ”

## 2022-09-02 NOTE — ASSESSMENT & PLAN NOTE
Diabetes is improving with treatment.   Continue current treatment regimen.  Reminded to get yearly retinal exam.  Diabetes will be reassessed in 3 months.  
Hypertension is improving with treatment.  Continue current treatment regimen.  Continue current medications.  Blood pressure will be reassessed at the next regular appointment.  
Lipid abnormalities are unchanged.  Pharmacotherapy as ordered. and Patient will return for fasting labs to recheck lipid panel.  Lipids will be reassessed in 3 months.  
Consultant

## 2022-09-19 ENCOUNTER — OFFICE VISIT (OUTPATIENT)
Dept: FAMILY MEDICINE CLINIC | Facility: CLINIC | Age: 37
End: 2022-09-19

## 2022-09-19 VITALS
BODY MASS INDEX: 38.56 KG/M2 | DIASTOLIC BLOOD PRESSURE: 78 MMHG | SYSTOLIC BLOOD PRESSURE: 112 MMHG | TEMPERATURE: 97.9 F | OXYGEN SATURATION: 98 % | WEIGHT: 275.4 LBS | HEART RATE: 83 BPM | HEIGHT: 71 IN

## 2022-09-19 DIAGNOSIS — E11.65 TYPE 2 DIABETES MELLITUS WITH HYPERGLYCEMIA, WITHOUT LONG-TERM CURRENT USE OF INSULIN: ICD-10-CM

## 2022-09-19 DIAGNOSIS — Z23 NEED FOR PROPHYLACTIC VACCINATION WITH STREPTOCOCCUS PNEUMONIAE (PNEUMOCOCCUS) AND INFLUENZA VACCINES: ICD-10-CM

## 2022-09-19 DIAGNOSIS — Z23 NEED FOR DIPHTHERIA-TETANUS-PERTUSSIS (TDAP) VACCINE: ICD-10-CM

## 2022-09-19 DIAGNOSIS — E78.2 HYPERLIPEMIA, MIXED: ICD-10-CM

## 2022-09-19 DIAGNOSIS — I10 HYPERTENSION, ESSENTIAL: Primary | ICD-10-CM

## 2022-09-19 DIAGNOSIS — E11.41 DIABETIC MONONEUROPATHY ASSOCIATED WITH TYPE 2 DIABETES MELLITUS: ICD-10-CM

## 2022-09-19 PROCEDURE — 99214 OFFICE O/P EST MOD 30 MIN: CPT

## 2022-09-19 PROCEDURE — 90686 IIV4 VACC NO PRSV 0.5 ML IM: CPT

## 2022-09-19 PROCEDURE — 90471 IMMUNIZATION ADMIN: CPT

## 2022-09-19 PROCEDURE — 90715 TDAP VACCINE 7 YRS/> IM: CPT

## 2022-09-19 PROCEDURE — 80061 LIPID PANEL: CPT

## 2022-09-19 PROCEDURE — 82043 UR ALBUMIN QUANTITATIVE: CPT

## 2022-09-19 PROCEDURE — 80050 GENERAL HEALTH PANEL: CPT

## 2022-09-19 PROCEDURE — 83036 HEMOGLOBIN GLYCOSYLATED A1C: CPT

## 2022-09-19 PROCEDURE — 90472 IMMUNIZATION ADMIN EACH ADD: CPT

## 2022-09-19 PROCEDURE — 90677 PCV20 VACCINE IM: CPT

## 2022-09-19 NOTE — PROGRESS NOTES
Chief Complaint  Chief Complaint   Patient presents with   • Establish Care   • Diabetes       Subjective          Luiz Christianson presents to [facility]  History of Present Illness  Patient presents today to follow up on diabetes, hypertension, hyperlipidemia.     He was last seen by Kandy Martinez in May of this year but he refused lab work that day.  He has been seen by cardiology since then.    His last A1c was 7.2% which had improved from 9.3% previously.  It has not been rechecked since October 2021.  He is currently on Januvia and metformin.  He does report some occasional numbness and tingling to bilateral feet.  He has not had a diabetic eye exam or foot exam within the last year but states that his cardiologist does regularly check his feet for swelling.    His blood pressure is stable on bisoprolol and lisinopril.  He sees cardiology regularly for diastolic congestive heart failure and is on Lasix and spironolactone.  He denies any swelling, weight gain, shortness of breath, chest pain or discomfort.    He is currently on atorvastatin 80 mg every evening and lipids were last checked in October 2021 with a slightly elevated LDL and triglycerides.      Objective     Medical History:  Past Medical History:   Diagnosis Date   • Chronic diastolic congestive heart failure (HCC) 11/18/2020   • Diabetes mellitus, type 2 (HCC) 11/18/2020   • Diastolic CHF, chronic  11/18/2020   • Emotional depression    • Essential hypertension 11/18/2020   • Finger numbness 02/24/2021   • Hyperlipidemia    • Left wrist pain 02/24/2021   • Migraine 12/07/2020     History reviewed. No pertinent surgical history.   Social History     Tobacco Use   • Smoking status: Never Smoker   • Smokeless tobacco: Never Used   Vaping Use   • Vaping Use: Never used   Substance Use Topics   • Alcohol use: Never   • Drug use: Never     Family History   Problem Relation Age of Onset   • Diabetes Mother    • Diabetes  "Father    • Heart disease Other         AUNT OR UNCLE   • Diabetes Other        Medications:  Prior to Admission medications    Medication Sig Start Date End Date Taking? Authorizing Provider   aspirin 81 MG EC tablet Daily.   Yes Provider, MD Lauren   atorvastatin (LIPITOR) 80 MG tablet Take 1 tablet by mouth Daily. 8/26/22  Yes Russell Bledsoe MD   bisoprolol (ZEBeta) 10 MG tablet Take 1 tablet by mouth Daily. 8/26/22  Yes Russell Bledsoe MD   furosemide (LASIX) 20 MG tablet Take 1 tablet by mouth 2 (Two) Times a Day for 360 days. 8/26/22 8/21/23 Yes Russell Bledsoe MD   lisinopril (PRINIVIL,ZESTRIL) 10 MG tablet Take 1 tablet by mouth Daily. 8/26/22  Yes Russell Bledsoe MD   metFORMIN (GLUCOPHAGE) 500 MG tablet Take 1 tablet by mouth 2 (Two) Times a Day With Meals. 8/31/22  Yes Luz Maria White APRN   SITagliptin (Januvia) 100 MG tablet Take 1 tablet by mouth Daily. 8/26/22  Yes Russell Bledsoe MD   spironolactone (ALDACTONE) 25 MG tablet Take 1 tablet by mouth Daily. 8/26/22  Yes Russell Bledsoe MD        Allergies:   Farxiga [dapagliflozin]    Health Maintenance Due   Topic Date Due   • ANNUAL PHYSICAL  Never done   • Hepatitis B (1 of 3 - Risk 3-dose series) Never done   • DIABETIC EYE EXAM  Never done   • COVID-19 Vaccine (3 - Booster for Moderna series) 02/28/2022           Vital Signs:     /78   Pulse 83   Temp 97.9 °F (36.6 °C)   Ht 180.3 cm (71\")   Wt 125 kg (275 lb 6.4 oz)   SpO2 98%   BMI 38.41 kg/m²       Physical Exam  Vitals reviewed.   Constitutional:       Appearance: Normal appearance. He is well-developed. He is obese.   HENT:      Head: Normocephalic and atraumatic.      Right Ear: External ear normal.      Left Ear: External ear normal.      Mouth/Throat:      Pharynx: No oropharyngeal exudate.   Eyes:      Conjunctiva/sclera: Conjunctivae normal.      Pupils: Pupils are equal, round, and reactive to light.   Cardiovascular:      Rate and " Rhythm: Normal rate and regular rhythm.      Pulses:           Dorsalis pedis pulses are 2+ on the right side and 2+ on the left side.      Heart sounds: No murmur heard.    No friction rub. No gallop.   Pulmonary:      Effort: Pulmonary effort is normal.      Breath sounds: Normal breath sounds. No wheezing or rhonchi.   Abdominal:      General: Bowel sounds are normal. There is no distension.      Palpations: Abdomen is soft.      Tenderness: There is no abdominal tenderness.   Feet:      Right foot:      Protective Sensation: 3 sites tested. 3 sites sensed.      Skin integrity: Skin integrity normal. No ulcer or blister.      Toenail Condition: Right toenails are normal.      Left foot:      Protective Sensation: 3 sites tested. 3 sites sensed.      Skin integrity: Skin integrity normal. No ulcer or blister.      Toenail Condition: Left toenails are normal.      Comments: Diabetic Foot Exam Performed     Skin:     General: Skin is warm and dry.   Neurological:      Mental Status: He is alert and oriented to person, place, and time.      Cranial Nerves: No cranial nerve deficit.   Psychiatric:         Mood and Affect: Affect normal. Mood is anxious.         Behavior: Behavior normal.         Thought Content: Thought content normal.         Judgment: Judgment normal.          Result Review :     CMP    CMP 9/19/22   Glucose 214 (A)   BUN 13   Creatinine 1.01   Sodium 137   Potassium 4.6   Chloride 96 (A)   Calcium 9.9   Albumin 4.80   Total Bilirubin 0.5   Alkaline Phosphatase 89   AST (SGOT) 42 (A)   ALT (SGPT) 81 (A)   (A) Abnormal value              Lipid Panel    Lipid Panel 9/19/22   Total Cholesterol 177   Triglycerides 325 (A)   HDL Cholesterol 30 (A)   VLDL Cholesterol 54 (A)   LDL Cholesterol  93   LDL/HDL Ratio 2.73   (A) Abnormal value            TSH    TSH 9/19/22   TSH 1.730           A1C Last 3 Results    HGBA1C Last 3 Results 9/19/22   Hemoglobin A1C 8.40 (A)   (A) Abnormal value             Microalbumin    Microalbumin 9/19/22   Microalbumin, Urine <1.2           Data reviewed: Reviewed notes from last visit with PCP and discussed most recent lab work and need to repeat labs today.            Assessment and Plan    Diagnoses and all orders for this visit:    1. Hypertension, essential (Primary)  Assessment & Plan:  Hypertension is improving with treatment.  Continue current treatment regimen.  Dietary sodium restriction.  Weight loss.  Continue current medications.  Blood pressure will be reassessed in 3 months.    Orders:  -     CBC & Differential  -     Comprehensive Metabolic Panel  -     TSH    2. Hyperlipemia, mixed  Assessment & Plan:  Lipid abnormalities are unchanged.  Nutritional counseling was provided. and Pharmacotherapy as ordered.  Lipids will be reassessed in 3 months.    Orders:  -     Lipid Panel  -     TSH    3. Type 2 diabetes mellitus with hyperglycemia, without long-term current use of insulin (HCC)  Assessment & Plan:  Diabetes is unchanged.   Continue current treatment regimen.  Dietary recommendations for ADA diet.  Discussed foot care.  Reminded to get yearly retinal exam.  Diabetes will be reassessed in 3 months.    Orders:  -     TSH  -     Hemoglobin A1c  -     MicroAlbumin, Urine, Random - Urine, Clean Catch  -     Discontinue: SITagliptin (Januvia) 100 MG tablet; Take 1 tablet by mouth Daily.  Dispense: 90 tablet; Refill: 3    4. Diabetic mononeuropathy associated with type 2 diabetes mellitus (HCC)  Comments:  Focus on decreasing A1c and improving overall blood sugar levels to decrease any long-term effects from hyperglycemia.    5. Need for prophylactic vaccination with Streptococcus pneumoniae (Pneumococcus) and Influenza vaccines  -     Pneumococcal Conjugate Vaccine 20-Valent (PCV20)  -     FluLaval/Fluzone >6 mos (1303-7239)    6. Need for diphtheria-tetanus-pertussis (Tdap) vaccine  -     Tdap Vaccine Greater Than or Equal To 8yo IM    Patient was advised to  have an annual diabetic eye exam.  We will check his A1c today to determine if it is continuing to improve, especially since he is reporting symptoms of neuropathy in his feet.  Diabetic foot exam performed in the office today with no abnormalities, no loss of sensation.  Patient was advised to return in approximately 3 months to continue monitoring lab work including A1c, lipids, hypertension.      Smoking Cessation:    Luiz Christianson  reports that he has never smoked. He has never used smokeless tobacco.          Follow Up   Return in about 3 months (around 12/19/2022) for f/u on diabetes, hypertension, hyperlipidemia.  Patient was given instructions and counseling regarding his condition or for health maintenance advice. Please see specific information pulled into the AVS if appropriate.

## 2022-09-20 DIAGNOSIS — E11.9 TYPE 2 DIABETES MELLITUS WITHOUT COMPLICATION, WITHOUT LONG-TERM CURRENT USE OF INSULIN: Primary | ICD-10-CM

## 2022-09-20 LAB
ALBUMIN SERPL-MCNC: 4.8 G/DL (ref 3.5–5.2)
ALBUMIN UR-MCNC: <1.2 MG/DL
ALBUMIN/GLOB SERPL: 1.5 G/DL
ALP SERPL-CCNC: 89 U/L (ref 39–117)
ALT SERPL W P-5'-P-CCNC: 81 U/L (ref 1–41)
ANION GAP SERPL CALCULATED.3IONS-SCNC: 15.4 MMOL/L (ref 5–15)
AST SERPL-CCNC: 42 U/L (ref 1–40)
BASOPHILS # BLD AUTO: 0.15 10*3/MM3 (ref 0–0.2)
BASOPHILS NFR BLD AUTO: 1.5 % (ref 0–1.5)
BILIRUB SERPL-MCNC: 0.5 MG/DL (ref 0–1.2)
BUN SERPL-MCNC: 13 MG/DL (ref 6–20)
BUN/CREAT SERPL: 12.9 (ref 7–25)
CALCIUM SPEC-SCNC: 9.9 MG/DL (ref 8.6–10.5)
CHLORIDE SERPL-SCNC: 96 MMOL/L (ref 98–107)
CHOLEST SERPL-MCNC: 177 MG/DL (ref 0–200)
CO2 SERPL-SCNC: 25.6 MMOL/L (ref 22–29)
CREAT SERPL-MCNC: 1.01 MG/DL (ref 0.76–1.27)
DEPRECATED RDW RBC AUTO: 41 FL (ref 37–54)
EGFRCR SERPLBLD CKD-EPI 2021: 97 ML/MIN/1.73
EOSINOPHIL # BLD AUTO: 0.22 10*3/MM3 (ref 0–0.4)
EOSINOPHIL NFR BLD AUTO: 2.3 % (ref 0.3–6.2)
ERYTHROCYTE [DISTWIDTH] IN BLOOD BY AUTOMATED COUNT: 12.5 % (ref 12.3–15.4)
GLOBULIN UR ELPH-MCNC: 3.2 GM/DL
GLUCOSE SERPL-MCNC: 214 MG/DL (ref 65–99)
HBA1C MFR BLD: 8.4 % (ref 4.8–5.6)
HCT VFR BLD AUTO: 50.3 % (ref 37.5–51)
HDLC SERPL-MCNC: 30 MG/DL (ref 40–60)
HGB BLD-MCNC: 16.9 G/DL (ref 13–17.7)
IMM GRANULOCYTES # BLD AUTO: 0.06 10*3/MM3 (ref 0–0.05)
IMM GRANULOCYTES NFR BLD AUTO: 0.6 % (ref 0–0.5)
LDLC SERPL CALC-MCNC: 93 MG/DL (ref 0–100)
LDLC/HDLC SERPL: 2.73 {RATIO}
LYMPHOCYTES # BLD AUTO: 3.38 10*3/MM3 (ref 0.7–3.1)
LYMPHOCYTES NFR BLD AUTO: 34.8 % (ref 19.6–45.3)
MCH RBC QN AUTO: 30.5 PG (ref 26.6–33)
MCHC RBC AUTO-ENTMCNC: 33.6 G/DL (ref 31.5–35.7)
MCV RBC AUTO: 90.8 FL (ref 79–97)
MONOCYTES # BLD AUTO: 1.04 10*3/MM3 (ref 0.1–0.9)
MONOCYTES NFR BLD AUTO: 10.7 % (ref 5–12)
NEUTROPHILS NFR BLD AUTO: 4.86 10*3/MM3 (ref 1.7–7)
NEUTROPHILS NFR BLD AUTO: 50.1 % (ref 42.7–76)
NRBC BLD AUTO-RTO: 0 /100 WBC (ref 0–0.2)
PLATELET # BLD AUTO: 345 10*3/MM3 (ref 140–450)
PMV BLD AUTO: 9.7 FL (ref 6–12)
POTASSIUM SERPL-SCNC: 4.6 MMOL/L (ref 3.5–5.2)
PROT SERPL-MCNC: 8 G/DL (ref 6–8.5)
RBC # BLD AUTO: 5.54 10*6/MM3 (ref 4.14–5.8)
SODIUM SERPL-SCNC: 137 MMOL/L (ref 136–145)
TRIGL SERPL-MCNC: 325 MG/DL (ref 0–150)
TSH SERPL DL<=0.05 MIU/L-ACNC: 1.73 UIU/ML (ref 0.27–4.2)
VLDLC SERPL-MCNC: 54 MG/DL (ref 5–40)
WBC NRBC COR # BLD: 9.71 10*3/MM3 (ref 3.4–10.8)

## 2022-09-23 ENCOUNTER — PATIENT ROUNDING (BHMG ONLY) (OUTPATIENT)
Dept: FAMILY MEDICINE CLINIC | Facility: CLINIC | Age: 37
End: 2022-09-23

## 2022-09-23 DIAGNOSIS — E11.9 TYPE 2 DIABETES MELLITUS WITHOUT COMPLICATION, WITHOUT LONG-TERM CURRENT USE OF INSULIN: ICD-10-CM

## 2022-09-23 NOTE — PROGRESS NOTES
September 23, 2022    Hello, may I speak with Luiz Christianson?    My name is Khadar Payan    I am  with [facility]  [address]  [address]  [place] [address]  [phone].    Before we get started may I verify your date of birth? 1985    I am calling to officially welcome you to our practice and ask about your recent visit. Is this a good time to talk? yes    Tell me about your visit with us. What things went well?  This visit went as well as I expected        We're always looking for ways to make our patients' experiences even better. Do you have recommendations on ways we may improve?  no    Overall were you satisfied with your first visit to our practice? yes       I appreciate you taking the time to speak with me today. Is there anything else I can do for you? no      Thank you, and have a great day.

## 2022-10-14 DIAGNOSIS — E11.9 TYPE 2 DIABETES MELLITUS WITHOUT COMPLICATION, WITHOUT LONG-TERM CURRENT USE OF INSULIN: ICD-10-CM

## 2023-01-04 RX ORDER — FUROSEMIDE 20 MG/1
20 TABLET ORAL 2 TIMES DAILY
Qty: 180 TABLET | Refills: 3 | Status: SHIPPED | OUTPATIENT
Start: 2023-01-04 | End: 2023-12-30

## 2023-01-17 ENCOUNTER — OFFICE VISIT (OUTPATIENT)
Dept: FAMILY MEDICINE CLINIC | Facility: CLINIC | Age: 38
End: 2023-01-17
Payer: COMMERCIAL

## 2023-01-17 VITALS
BODY MASS INDEX: 38.92 KG/M2 | HEART RATE: 68 BPM | TEMPERATURE: 98.1 F | OXYGEN SATURATION: 99 % | WEIGHT: 278 LBS | SYSTOLIC BLOOD PRESSURE: 132 MMHG | HEIGHT: 71 IN | DIASTOLIC BLOOD PRESSURE: 84 MMHG

## 2023-01-17 DIAGNOSIS — E66.01 CLASS 2 SEVERE OBESITY DUE TO EXCESS CALORIES WITH SERIOUS COMORBIDITY AND BODY MASS INDEX (BMI) OF 38.0 TO 38.9 IN ADULT: ICD-10-CM

## 2023-01-17 DIAGNOSIS — E11.65 TYPE 2 DIABETES MELLITUS WITH HYPERGLYCEMIA, WITHOUT LONG-TERM CURRENT USE OF INSULIN: Primary | ICD-10-CM

## 2023-01-17 DIAGNOSIS — I10 HYPERTENSION, ESSENTIAL: ICD-10-CM

## 2023-01-17 PROBLEM — E66.812 CLASS 2 SEVERE OBESITY DUE TO EXCESS CALORIES WITH SERIOUS COMORBIDITY AND BODY MASS INDEX (BMI) OF 38.0 TO 38.9 IN ADULT: Status: ACTIVE | Noted: 2023-01-17

## 2023-01-17 PROCEDURE — 83036 HEMOGLOBIN GLYCOSYLATED A1C: CPT

## 2023-01-17 PROCEDURE — 99214 OFFICE O/P EST MOD 30 MIN: CPT

## 2023-01-17 PROCEDURE — 80048 BASIC METABOLIC PNL TOTAL CA: CPT

## 2023-01-17 NOTE — PROGRESS NOTES
Chief Complaint  Chief Complaint   Patient presents with   • Diabetes   • Hypertension       Subjective      Luiz Christianson presents to [facility]  History of Present Illness  Patient presents today to follow-up on diabetes, hypertension, hyperlipidemia.  Patient sees cardiology for hypertension, hyperlipidemia, chronic diastolic CHF.  He has a follow-up appointment with cardiology next month.    At his last visit he was found to have an elevated A1c of 8.4.  It had previously been 7.2.  He was advised to increase his metformin from 500 mg to 1000 mg twice daily.  He has been compliant with medications.    Patient has been more physically active and going to the gym.  He has concerns of overdoing it given his cardiac history.      Objective     Medical History:  Past Medical History:   Diagnosis Date   • Chronic diastolic congestive heart failure (HCC) 11/18/2020   • Diabetes mellitus, type 2 (HCC) 11/18/2020   • Diastolic CHF, chronic  11/18/2020   • Emotional depression    • Essential hypertension 11/18/2020   • Finger numbness 02/24/2021   • Hyperlipidemia    • Left wrist pain 02/24/2021   • Migraine 12/07/2020     History reviewed. No pertinent surgical history.   Social History     Tobacco Use   • Smoking status: Never   • Smokeless tobacco: Never   Vaping Use   • Vaping Use: Never used   Substance Use Topics   • Alcohol use: Never   • Drug use: Never     Family History   Problem Relation Age of Onset   • Diabetes Mother    • Diabetes Father    • Heart disease Other         AUNT OR UNCLE   • Diabetes Other        Medications:  Prior to Admission medications    Medication Sig Start Date End Date Taking? Authorizing Provider   aspirin 81 MG EC tablet Daily.   Yes Provider, MD Lauren   atorvastatin (LIPITOR) 80 MG tablet Take 1 tablet by mouth Daily. 8/26/22  Yes Russell Bledsoe MD   bisoprolol (ZEBeta) 10 MG tablet Take 1 tablet by mouth Daily. 8/26/22  Yes Rakan  "MD Russell   furosemide (LASIX) 20 MG tablet Take 1 tablet by mouth 2 (Two) Times a Day for 360 days. 1/4/23 12/30/23 Yes Russell Bledsoe MD   lisinopril (PRINIVIL,ZESTRIL) 10 MG tablet Take 1 tablet by mouth Daily. 8/26/22  Yes Russell Bledsoe MD   SITagliptin (Januvia) 100 MG tablet Take 1 tablet by mouth Daily. 9/23/22  Yes Luz Maria White APRN   spironolactone (ALDACTONE) 25 MG tablet Take 1 tablet by mouth Daily. 8/26/22  Yes Russell Bledsoe MD   metFORMIN (GLUCOPHAGE) 500 MG tablet Take 2 tablets by mouth 2 (Two) Times a Day With Meals for 90 days. 10/14/22 1/12/23  Luz Maria White APRN        Allergies:   Farxiga [dapagliflozin]    Health Maintenance Due   Topic Date Due   • Hepatitis B (1 of 3 - 3-dose series) Never done   • COVID-19 Vaccine (1) Never done   • ANNUAL PHYSICAL  Never done   • DIABETIC EYE EXAM  Never done         Vital Signs:   /84 (BP Location: Left arm, Patient Position: Sitting, Cuff Size: Adult)   Pulse 68   Temp 98.1 °F (36.7 °C)   Ht 180.3 cm (71\")   Wt 126 kg (278 lb)   SpO2 99%   BMI 38.77 kg/m²     Wt Readings from Last 3 Encounters:   01/17/23 126 kg (278 lb)   09/19/22 125 kg (275 lb 6.4 oz)   06/14/22 123 kg (272 lb)     BP Readings from Last 3 Encounters:   01/17/23 132/84   09/19/22 112/78   06/14/22 135/73       Class 2 Severe Obesity (BMI >=35 and <=39.9). Obesity-related health conditions include the following: hypertension, diabetes mellitus and dyslipidemias. Obesity is unchanged. BMI is is above average; BMI management plan is completed. We discussed portion control and increasing exercise.       Physical Exam  Vitals reviewed.   Constitutional:       Appearance: Normal appearance. He is well-developed. He is obese.   HENT:      Head: Normocephalic and atraumatic.   Eyes:      Conjunctiva/sclera: Conjunctivae normal.      Pupils: Pupils are equal, round, and reactive to light.   Cardiovascular:      Rate and Rhythm: Normal rate " and regular rhythm.      Heart sounds: No murmur heard.    No friction rub. No gallop.   Pulmonary:      Effort: Pulmonary effort is normal.      Breath sounds: Normal breath sounds. No wheezing or rhonchi.   Abdominal:      General: Bowel sounds are normal. There is no distension.      Palpations: Abdomen is soft.      Tenderness: There is no abdominal tenderness.   Skin:     General: Skin is warm and dry.   Neurological:      Mental Status: He is alert and oriented to person, place, and time.      Cranial Nerves: No cranial nerve deficit.   Psychiatric:         Mood and Affect: Mood and affect normal.         Behavior: Behavior normal.         Thought Content: Thought content normal.         Judgment: Judgment normal.          Result Review :    The following data was reviewed by ENRIKE Shaw on 01/17/23 at 16:51 EST:    Common labs    Common Labs 9/19/22 9/19/22 9/19/22 9/19/22 9/19/22    1428 1428 1428 1428 1428   Glucose    214 (A)    BUN    13    Creatinine    1.01    Sodium    137    Potassium    4.6    Chloride    96 (A)    Calcium    9.9    Albumin    4.80    Total Bilirubin    0.5    Alkaline Phosphatase    89    AST (SGOT)    42 (A)    ALT (SGPT)    81 (A)    WBC  9.71      Hemoglobin  16.9      Hematocrit  50.3      Platelets  345      Total Cholesterol   177     Triglycerides   325 (A)     HDL Cholesterol   30 (A)     LDL Cholesterol    93     Hemoglobin A1C 8.40 (A)       Microalbumin, Urine     <1.2   (A) Abnormal value                Microalbumin    Microalbumin 9/19/22   Microalbumin, Urine <1.2             No Images in the past 120 days found..                  Assessment and Plan    Diagnoses and all orders for this visit:    1. Type 2 diabetes mellitus with hyperglycemia, without long-term current use of insulin (HCC) (Primary)  Assessment & Plan:  Diabetes is unchanged.   Continue current treatment regimen.  Dietary recommendations for ADA diet.  Regular aerobic  exercise.  Diabetes will be reassessed in 3 months.    Orders:  -     Basic metabolic panel  -     Hemoglobin A1c    2. Hypertension, essential  Assessment & Plan:  Hypertension is improving with treatment.  Continue current treatment regimen.  Dietary sodium restriction.  Weight loss.  Regular aerobic exercise.  Continue current medications.  Blood pressure will be reassessed in 3 months.      3. Class 2 severe obesity due to excess calories with serious comorbidity and body mass index (BMI) of 38.0 to 38.9 in adult ([facility])  Assessment & Plan:  Patient's (Body mass index is 38.77 kg/m².) indicates that they are obese (BMI >30) with health conditions that include hypertension, diabetes mellitus and dyslipidemias . Weight is unchanged. BMI is is above average; BMI management plan is completed. We discussed portion control and increasing exercise.     Patient was advised to monitor heart rate and respirations when exercising and to stop physical activity with any chest pain, extreme shortness of breath or racing heartbeat.  If this does not resolve with rest patient was advised to report to the ER for further evaluation. Patient was advised to avoid use of protein powders with added caffeine products.  Patient has upcoming appointment with cardiology and was encouraged to further discuss this with his cardiologist for any specific parameters that may be best for him given his cardiac history.    Labs will be checked today to monitor for improvement in A1c after increasing metformin.  Patient will follow-up in 3 months to continue monitoring diabetes and weight loss.        Smoking Cessation:    Luiz TOMLINSON Dellaysha  reports that he has never smoked. He has never used smokeless tobacco.            Follow Up   Return in about 3 months (around 4/17/2023) for Next scheduled follow up.  Patient was given instructions and counseling regarding his condition or for health maintenance advice. Please see specific information pulled into  the AVS if appropriate.     Please note that portions of this note were completed with a voice recognition program.    Answers for HPI/ROS submitted by the patient on 1/17/2023  What is the primary reason for your visit?: Other  Please describe your symptoms.: Follow up on medications, check numbers to see if they need to be adjusted. General questions regarding exercise.  Have you had these symptoms before?: No  How long have you been having these symptoms?: Greater than 2 weeks

## 2023-01-17 NOTE — ASSESSMENT & PLAN NOTE
Patient's (Body mass index is 38.77 kg/m².) indicates that they are obese (BMI >30) with health conditions that include hypertension, diabetes mellitus and dyslipidemias . Weight is unchanged. BMI is is above average; BMI management plan is completed. We discussed portion control and increasing exercise.

## 2023-01-18 LAB
ANION GAP SERPL CALCULATED.3IONS-SCNC: 11 MMOL/L (ref 5–15)
BUN SERPL-MCNC: 13 MG/DL (ref 6–20)
BUN/CREAT SERPL: 16 (ref 7–25)
CALCIUM SPEC-SCNC: 10 MG/DL (ref 8.6–10.5)
CHLORIDE SERPL-SCNC: 98 MMOL/L (ref 98–107)
CO2 SERPL-SCNC: 27 MMOL/L (ref 22–29)
CREAT SERPL-MCNC: 0.81 MG/DL (ref 0.76–1.27)
EGFRCR SERPLBLD CKD-EPI 2021: 115 ML/MIN/1.73
GLUCOSE SERPL-MCNC: 197 MG/DL (ref 65–99)
HBA1C MFR BLD: 8.3 % (ref 4.8–5.6)
POTASSIUM SERPL-SCNC: 4.2 MMOL/L (ref 3.5–5.2)
SODIUM SERPL-SCNC: 136 MMOL/L (ref 136–145)

## 2023-01-20 DIAGNOSIS — E11.65 TYPE 2 DIABETES MELLITUS WITH HYPERGLYCEMIA, WITHOUT LONG-TERM CURRENT USE OF INSULIN: Primary | ICD-10-CM

## 2023-01-20 RX ORDER — SEMAGLUTIDE 1.34 MG/ML
0.25 INJECTION, SOLUTION SUBCUTANEOUS WEEKLY
Qty: 6 ML | Refills: 2 | Status: SHIPPED | OUTPATIENT
Start: 2023-01-20 | End: 2023-02-17 | Stop reason: ALTCHOICE

## 2023-02-02 RX ORDER — LISINOPRIL 10 MG/1
TABLET ORAL
Qty: 90 TABLET | Refills: 1 | Status: SHIPPED | OUTPATIENT
Start: 2023-02-02

## 2023-02-02 RX ORDER — ATORVASTATIN CALCIUM 80 MG/1
TABLET, FILM COATED ORAL
Qty: 90 TABLET | Refills: 1 | Status: SHIPPED | OUTPATIENT
Start: 2023-02-02

## 2023-02-02 RX ORDER — BISOPROLOL FUMARATE 10 MG/1
TABLET, FILM COATED ORAL
Qty: 90 TABLET | Refills: 1 | Status: SHIPPED | OUTPATIENT
Start: 2023-02-02

## 2023-02-02 RX ORDER — SPIRONOLACTONE 25 MG/1
TABLET ORAL
Qty: 90 TABLET | Refills: 1 | Status: SHIPPED | OUTPATIENT
Start: 2023-02-02

## 2023-02-16 NOTE — PROGRESS NOTES
[facility]  Cardiology progress Note    Patient Name: Luiz Christianson  : 1985    CHIEF COMPLAINT  Hypertension        Subjective   Subjective     HISTORY OF PRESENT ILLNESS    Luiz Christianson is a 37 y.o. male with history of hypertension.  No chest pain or shortness of breath.    REVIEW OF SYSTEMS    Constitutional:    No fever, no weight loss  Skin:     No rash  Otolaryngeal:    No difficulty swallowing  Cardiovascular: See HPI.  Pulmonary:    No cough, no sputum production    Personal History     Social History:    reports that he has never smoked. He has never used smokeless tobacco. He reports that he does not drink alcohol and does not use drugs.    Home Medications:  Current Outpatient Medications on File Prior to Visit   Medication Sig   • aspirin 81 MG EC tablet Daily.   • atorvastatin (LIPITOR) 80 MG tablet TAKE 1 TABLET DAILY   • bisoprolol (ZEBeta) 10 MG tablet TAKE 1 TABLET DAILY   • furosemide (LASIX) 20 MG tablet Take 1 tablet by mouth 2 (Two) Times a Day for 360 days.   • lisinopril (PRINIVIL,ZESTRIL) 10 MG tablet TAKE 1 TABLET DAILY   • metFORMIN (GLUCOPHAGE) 500 MG tablet Take 2 tablets by mouth 2 (Two) Times a Day With Meals for 90 days.   • SITagliptin (Januvia) 100 MG tablet Take 1 tablet by mouth Daily.   • spironolactone (ALDACTONE) 25 MG tablet TAKE 1 TABLET DAILY   • [DISCONTINUED] Semaglutide,0.25 or 0.5MG/DOS, (Ozempic, 0.25 or 0.5 MG/DOSE,) 2 MG/1.5ML solution pen-injector Inject 0.25 mg under the skin into the appropriate area as directed 1 (One) Time Per Week.     No current facility-administered medications on file prior to visit.       Past Medical History:   Diagnosis Date   • Chronic diastolic congestive heart failure (HCC) 2020   • Diabetes mellitus, type 2 (HCC) 2020   • Diastolic CHF, chronic  2020   • Emotional depression    • Essential hypertension 2020   • Finger numbness 2021   • Hyperlipidemia    • Left wrist pain 2021   •  Migraine 12/07/2020       Allergies:  Allergies   Allergen Reactions   • Farxiga [Dapagliflozin] Other (See Comments)     Bladder irritation (persistant)       Objective    Objective       Vitals:   Heart Rate:  [86] 86  BP: (131)/(85) 131/85  Body mass index is 38.91 kg/m².     PHYSICAL EXAM:    General Appearance:   · well developed  · well nourished  HENT:   · oropharynx moist  · lips not cyanotic  Neck:  · thyroid not enlarged  · supple  Respiratory:  · no respiratory distress  · normal breath sounds  · no rales  Cardiovascular:  · no jugular venous distention  · regular rhythm  · apical impulse normal  · S1 normal, S2 normal  · no S3, no S4   · no murmur  · no rub, no thrill  · carotid pulses normal; no bruit  · pedal pulses normal  · lower extremity edema: none    Skin:   · warm, dry  Psychiatric:  · judgement and insight appropriate  · normal mood and affect        Result Review:  I have personally reviewed the available results from  [x]  Laboratory  [x]  EKG  [x]  Cardiology  [x]  Medications  [x]  Old records  []  Other:     Procedures  Lab Results   Component Value Date    CHOL 177 09/19/2022    CHOL 186 10/04/2021    CHOL 188 07/06/2021     Lab Results   Component Value Date    TRIG 325 (H) 09/19/2022    TRIG 169 (H) 10/04/2021    TRIG 272 (H) 07/06/2021     Lab Results   Component Value Date    HDL 30 (L) 09/19/2022    HDL 33 (L) 10/04/2021    HDL 34 (L) 07/06/2021     Lab Results   Component Value Date    LDL 93 09/19/2022     (H) 10/04/2021     (H) 07/06/2021     Lab Results   Component Value Date    VLDL 54 (H) 09/19/2022    VLDL 30 10/04/2021    VLDL 47 (H) 07/06/2021        Impression/Plan:  1.  Essential hypertension controlled: Continue Zestril 10 mg once a day.  Continue Zebeta 10 mg once a day.  Monitor blood pressure regularly.  2.  Hyperlipidemia: Continue Lipitor 80 mg once a day.  Monitor lipid and hepatic profile.  3.  Chronic diastolic heart failure stable: Continue Lasix 20  mg twice a day.           Russell Bledsoe MD   02/17/23   12:24 EST

## 2023-02-17 ENCOUNTER — OFFICE VISIT (OUTPATIENT)
Dept: CARDIOLOGY | Facility: CLINIC | Age: 38
End: 2023-02-17
Payer: COMMERCIAL

## 2023-02-17 VITALS
DIASTOLIC BLOOD PRESSURE: 85 MMHG | BODY MASS INDEX: 39.06 KG/M2 | HEART RATE: 86 BPM | SYSTOLIC BLOOD PRESSURE: 131 MMHG | HEIGHT: 71 IN | WEIGHT: 279 LBS

## 2023-02-17 DIAGNOSIS — E78.2 HYPERLIPEMIA, MIXED: ICD-10-CM

## 2023-02-17 DIAGNOSIS — I50.32 DIASTOLIC CHF, CHRONIC: ICD-10-CM

## 2023-02-17 DIAGNOSIS — I10 HYPERTENSION, ESSENTIAL: Primary | ICD-10-CM

## 2023-02-17 PROCEDURE — 99214 OFFICE O/P EST MOD 30 MIN: CPT | Performed by: SPECIALIST

## 2023-05-18 DIAGNOSIS — E11.9 TYPE 2 DIABETES MELLITUS WITHOUT COMPLICATION, WITHOUT LONG-TERM CURRENT USE OF INSULIN: ICD-10-CM

## 2023-06-09 ENCOUNTER — OFFICE VISIT (OUTPATIENT)
Dept: FAMILY MEDICINE CLINIC | Facility: CLINIC | Age: 38
End: 2023-06-09
Payer: COMMERCIAL

## 2023-06-09 VITALS
HEIGHT: 71 IN | TEMPERATURE: 97.9 F | DIASTOLIC BLOOD PRESSURE: 85 MMHG | WEIGHT: 273.8 LBS | SYSTOLIC BLOOD PRESSURE: 145 MMHG | OXYGEN SATURATION: 98 % | BODY MASS INDEX: 38.33 KG/M2 | HEART RATE: 76 BPM

## 2023-06-09 DIAGNOSIS — E11.65 TYPE 2 DIABETES MELLITUS WITH HYPERGLYCEMIA, WITHOUT LONG-TERM CURRENT USE OF INSULIN: Primary | ICD-10-CM

## 2023-06-09 DIAGNOSIS — R06.2 WHEEZING: ICD-10-CM

## 2023-06-09 DIAGNOSIS — J30.1 SEASONAL ALLERGIC RHINITIS DUE TO POLLEN: ICD-10-CM

## 2023-06-09 DIAGNOSIS — E66.01 CLASS 2 SEVERE OBESITY DUE TO EXCESS CALORIES WITH SERIOUS COMORBIDITY AND BODY MASS INDEX (BMI) OF 38.0 TO 38.9 IN ADULT: ICD-10-CM

## 2023-06-09 DIAGNOSIS — E78.2 HYPERLIPEMIA, MIXED: ICD-10-CM

## 2023-06-09 DIAGNOSIS — I10 HYPERTENSION, ESSENTIAL: ICD-10-CM

## 2023-06-09 LAB
ALBUMIN SERPL-MCNC: 4.5 G/DL (ref 3.5–5.2)
ALBUMIN/GLOB SERPL: 1.5 G/DL
ALP SERPL-CCNC: 113 U/L (ref 39–117)
ALT SERPL W P-5'-P-CCNC: 79 U/L (ref 1–41)
ANION GAP SERPL CALCULATED.3IONS-SCNC: 11.3 MMOL/L (ref 5–15)
AST SERPL-CCNC: 38 U/L (ref 1–40)
BASOPHILS # BLD AUTO: 0.14 10*3/MM3 (ref 0–0.2)
BASOPHILS NFR BLD AUTO: 1.5 % (ref 0–1.5)
BILIRUB SERPL-MCNC: 0.4 MG/DL (ref 0–1.2)
BUN SERPL-MCNC: 11 MG/DL (ref 6–20)
BUN/CREAT SERPL: 12.4 (ref 7–25)
CALCIUM SPEC-SCNC: 10.2 MG/DL (ref 8.6–10.5)
CHLORIDE SERPL-SCNC: 99 MMOL/L (ref 98–107)
CHOLEST SERPL-MCNC: 135 MG/DL (ref 0–200)
CO2 SERPL-SCNC: 25.7 MMOL/L (ref 22–29)
CREAT SERPL-MCNC: 0.89 MG/DL (ref 0.76–1.27)
DEPRECATED RDW RBC AUTO: 39 FL (ref 37–54)
EGFRCR SERPLBLD CKD-EPI 2021: 111.1 ML/MIN/1.73
EOSINOPHIL # BLD AUTO: 0.31 10*3/MM3 (ref 0–0.4)
EOSINOPHIL NFR BLD AUTO: 3.3 % (ref 0.3–6.2)
ERYTHROCYTE [DISTWIDTH] IN BLOOD BY AUTOMATED COUNT: 12.3 % (ref 12.3–15.4)
GLOBULIN UR ELPH-MCNC: 3.1 GM/DL
GLUCOSE SERPL-MCNC: 185 MG/DL (ref 65–99)
HBA1C MFR BLD: 8.2 % (ref 4.8–5.6)
HCT VFR BLD AUTO: 43.9 % (ref 37.5–51)
HDLC SERPL-MCNC: 26 MG/DL (ref 40–60)
HGB BLD-MCNC: 15.2 G/DL (ref 13–17.7)
IMM GRANULOCYTES # BLD AUTO: 0.05 10*3/MM3 (ref 0–0.05)
IMM GRANULOCYTES NFR BLD AUTO: 0.5 % (ref 0–0.5)
LDLC SERPL CALC-MCNC: 79 MG/DL (ref 0–100)
LDLC/HDLC SERPL: 2.88 {RATIO}
LYMPHOCYTES # BLD AUTO: 3.57 10*3/MM3 (ref 0.7–3.1)
LYMPHOCYTES NFR BLD AUTO: 38.2 % (ref 19.6–45.3)
MCH RBC QN AUTO: 30.5 PG (ref 26.6–33)
MCHC RBC AUTO-ENTMCNC: 34.6 G/DL (ref 31.5–35.7)
MCV RBC AUTO: 88 FL (ref 79–97)
MONOCYTES # BLD AUTO: 0.9 10*3/MM3 (ref 0.1–0.9)
MONOCYTES NFR BLD AUTO: 9.6 % (ref 5–12)
NEUTROPHILS NFR BLD AUTO: 4.37 10*3/MM3 (ref 1.7–7)
NEUTROPHILS NFR BLD AUTO: 46.9 % (ref 42.7–76)
NRBC BLD AUTO-RTO: 0 /100 WBC (ref 0–0.2)
PLATELET # BLD AUTO: 368 10*3/MM3 (ref 140–450)
PMV BLD AUTO: 9.8 FL (ref 6–12)
POTASSIUM SERPL-SCNC: 4.3 MMOL/L (ref 3.5–5.2)
PROT SERPL-MCNC: 7.6 G/DL (ref 6–8.5)
RBC # BLD AUTO: 4.99 10*6/MM3 (ref 4.14–5.8)
SODIUM SERPL-SCNC: 136 MMOL/L (ref 136–145)
TRIGL SERPL-MCNC: 171 MG/DL (ref 0–150)
VLDLC SERPL-MCNC: 30 MG/DL (ref 5–40)
WBC NRBC COR # BLD: 9.34 10*3/MM3 (ref 3.4–10.8)

## 2023-06-09 PROCEDURE — 83036 HEMOGLOBIN GLYCOSYLATED A1C: CPT

## 2023-06-09 PROCEDURE — 85025 COMPLETE CBC W/AUTO DIFF WBC: CPT

## 2023-06-09 PROCEDURE — 80061 LIPID PANEL: CPT

## 2023-06-09 PROCEDURE — 80053 COMPREHEN METABOLIC PANEL: CPT

## 2023-06-09 RX ORDER — CETIRIZINE HYDROCHLORIDE 10 MG/1
10 TABLET ORAL DAILY
Qty: 90 TABLET | Refills: 1 | Status: SHIPPED | OUTPATIENT
Start: 2023-06-09

## 2023-06-09 RX ORDER — AZITHROMYCIN 250 MG/1
TABLET, FILM COATED ORAL
Qty: 6 TABLET | Refills: 0 | Status: SHIPPED | OUTPATIENT
Start: 2023-06-09

## 2023-06-09 NOTE — PROGRESS NOTES
Chief Complaint  Chief Complaint   Patient presents with    Diabetes    Hypertension    Sore Throat       Subjective      Luiz Christianson presents to [facility]  History of Present Illness  Patient presents today for follow-up on hypertension, hyperlipidemia and diabetes.  He also has complaints of a sore throat.    Hypertension/hyperlipidemia/chronic diastolic heart failure: Blood pressure is slightly elevated today at 145/85.  Previously stable on current medications of bisoprolol 10 mg daily, Lasix 20 mg twice daily, lisinopril 10 mg daily, spironolactone 25 mg daily.  Patient does see cardiology, Dr. Bledsoe whom he last saw in February with no medication changes.  He was advised to continue Lasix and spironolactone for treatment of heart failure, continue atorvastatin 80 mg for treatment of mixed hyperlipidemia. He does not monitor his blood pressure at home. He denies any chest pain, headaches, blurred vision, increased swelling.    Type 2 diabetes: Previously stable on current medications of metformin 1000 mg twice daily and Januvia 100 mg daily. He does not monitor his glucose at home.  When A1c was last checked 4 months ago it was slightly elevated at 8.3.  Patient is concerned about weight gain and wondering if diabetes contributes to inability to lose weight.    Patients states that he began developing a sore throat about 2 days ago. He notices this when he eats and swallows.  He denies any fevers or chills, shortness of breath, productive cough.  He has not tried anything at home for treatment of the symptoms.    Objective     Medical History:  Past Medical History:   Diagnosis Date    Chronic diastolic congestive heart failure 11/18/2020    Diabetes mellitus, type 2 11/18/2020    Diastolic CHF, chronic  11/18/2020    Emotional depression     Essential hypertension 11/18/2020    Finger numbness 02/24/2021    Hyperlipidemia     Left wrist pain 02/24/2021    Migraine  "12/07/2020     History reviewed. No pertinent surgical history.   Social History     Tobacco Use    Smoking status: Never    Smokeless tobacco: Never   Vaping Use    Vaping Use: Never used   Substance Use Topics    Alcohol use: Never    Drug use: Never     Family History   Problem Relation Age of Onset    Diabetes Mother     Diabetes Father     Heart disease Other         AUNT OR UNCLE    Diabetes Other        Medications:  Prior to Admission medications    Medication Sig Start Date End Date Taking? Authorizing Provider   aspirin 81 MG EC tablet Daily.   Yes Provider, MD Lauren   atorvastatin (LIPITOR) 80 MG tablet TAKE 1 TABLET DAILY 2/2/23  Yes Russell Bledsoe MD   bisoprolol (ZEBeta) 10 MG tablet TAKE 1 TABLET DAILY 2/2/23  Yes Russell Bledsoe MD   furosemide (LASIX) 20 MG tablet Take 1 tablet by mouth 2 (Two) Times a Day for 360 days. 1/4/23 12/30/23 Yes Russell Bledsoe MD   lisinopril (PRINIVIL,ZESTRIL) 10 MG tablet TAKE 1 TABLET DAILY 2/2/23  Yes Russell Bledsoe MD   metFORMIN (GLUCOPHAGE) 500 MG tablet TAKE 2 TABLETS BY MOUTH TWICE DAILY WITH MEALS 5/19/23  Yes Luz Maria White APRN   SITagliptin (Januvia) 100 MG tablet Take 1 tablet by mouth Daily. 9/23/22  Yes Luz Maria White APRN   spironolactone (ALDACTONE) 25 MG tablet TAKE 1 TABLET DAILY 2/2/23  Yes Russell Bledsoe MD        Allergies:   Farxiga [dapagliflozin]    Health Maintenance Due   Topic Date Due    Hepatitis B (1 of 3 - 3-dose series) Never done    ANNUAL PHYSICAL  Never done    DIABETIC EYE EXAM  Never done    COVID-19 Vaccine (3 - Moderna series) 02/28/2022         Vital Signs:   /85   Pulse 76   Temp 97.9 °F (36.6 °C)   Ht 180.3 cm (71\")   Wt 124 kg (273 lb 12.8 oz)   SpO2 98%   BMI 38.19 kg/m²     Wt Readings from Last 3 Encounters:   06/09/23 124 kg (273 lb 12.8 oz)   02/17/23 127 kg (279 lb)   01/17/23 126 kg (278 lb)     BP Readings from Last 3 Encounters:   06/09/23 145/85 "   02/17/23 131/85   01/17/23 132/84     Physical Exam  Vitals reviewed.   Constitutional:       Appearance: Normal appearance. He is well-developed.   HENT:      Head: Normocephalic and atraumatic.      Mouth/Throat:      Pharynx: Posterior oropharyngeal erythema present.   Eyes:      Conjunctiva/sclera: Conjunctivae normal.      Pupils: Pupils are equal, round, and reactive to light.   Cardiovascular:      Rate and Rhythm: Normal rate and regular rhythm.      Heart sounds: No murmur heard.    No friction rub. No gallop.   Pulmonary:      Effort: Pulmonary effort is normal.      Breath sounds: Examination of the right-upper field reveals wheezing. Wheezing present. No rhonchi.   Abdominal:      General: Bowel sounds are normal. There is no distension.      Palpations: Abdomen is soft.      Tenderness: There is no abdominal tenderness.   Skin:     General: Skin is warm and dry.   Neurological:      Mental Status: He is alert and oriented to person, place, and time.      Cranial Nerves: No cranial nerve deficit.   Psychiatric:         Mood and Affect: Mood and affect normal.         Behavior: Behavior normal.         Thought Content: Thought content normal.         Judgment: Judgment normal.        Result Review :    The following data was reviewed by ENRIKE Shaw on 06/09/23 at 11:09 EDT:    Common labs          9/19/2022    14:28 1/17/2023    16:35   Common Labs   Glucose 214  197    BUN 13  13    Creatinine 1.01  0.81    Sodium 137  136    Potassium 4.6  4.2    Chloride 96  98    Calcium 9.9  10.0    Albumin 4.80     Total Bilirubin 0.5     Alkaline Phosphatase 89     AST (SGOT) 42     ALT (SGPT) 81     WBC 9.71     Hemoglobin 16.9     Hematocrit 50.3     Platelets 345     Total Cholesterol 177     Triglycerides 325     HDL Cholesterol 30     LDL Cholesterol  93     Hemoglobin A1C 8.40  8.30    Microalbumin, Urine <1.2       A1C Last 3 Results          9/19/2022    14:28 1/17/2023    16:35   HGBA1C  Last 3 Results   Hemoglobin A1C 8.40  8.30        No Images in the past 120 days found..    Data reviewed : Consultant notes from recent cardiology visit               Assessment and Plan    Diagnoses and all orders for this visit:    1. Type 2 diabetes mellitus with hyperglycemia, without long-term current use of insulin (Primary)  Assessment & Plan:  Diabetes is unchanged.   Continue current treatment regimen.  Dietary recommendations for ADA diet.  Regular aerobic exercise.  Discussed foot care.  Reminded to get yearly retinal exam.  Diabetes will be reassessed in 6 months.    Orders:  -     Hemoglobin A1c    2. Hypertension, essential  Assessment & Plan:  Hypertension is improving with treatment.  Continue current treatment regimen.  Dietary sodium restriction.  Weight loss.  Continue current medications.  Blood pressure will be reassessed at the next regular appointment.    Orders:  -     CBC & Differential  -     Comprehensive Metabolic Panel  -     Lipid Panel    3. Hyperlipemia, mixed  Assessment & Plan:  Lipid abnormalities are improving with treatment.  Nutritional counseling was provided. and Pharmacotherapy as ordered.  Lipids will be reassessed in 6 months.    Orders:  -     CBC & Differential  -     Comprehensive Metabolic Panel  -     Lipid Panel    4. Class 2 severe obesity due to excess calories with serious comorbidity and body mass index (BMI) of 38.0 to 38.9 in adult  Assessment & Plan:  Patient's (Body mass index is 38.19 kg/m².) indicates that they are obese (BMI >30) with health conditions that include hypertension, diabetes mellitus, and dyslipidemias . Weight is unchanged. BMI  is above average; BMI management plan is completed. We discussed portion control, increasing exercise, and pharmacologic options including GLP-1 agonist for treatment of diabetes and obesity .       5. Wheezing  -     azithromycin (Zithromax Z-Clayton) 250 MG tablet; Take 2 tablets by mouth on day 1, then 1 tablet daily  on days 2-5  Dispense: 6 tablet; Refill: 0  -     cetirizine (zyrTEC) 10 MG tablet; Take 1 tablet by mouth Daily.  Dispense: 90 tablet; Refill: 1    6. Seasonal allergic rhinitis due to pollen  -     cetirizine (zyrTEC) 10 MG tablet; Take 1 tablet by mouth Daily.  Dispense: 90 tablet; Refill: 1    A1c to be evaluated today.  If no improvement we discussed potential medication changes including dosage adjustments to current medications or potential addition of GLP-1 agonist.  Patient prefers to not begin injectable form of medication so we discussed potential use of Rybelsus.    For treatment of recent sore throat, congestion and wheezing noted on assessment today patient to complete course of Z-Clayton and begin regular use of Zyrtec for allergic rhinitis.    Patient will follow-up with me in 6 months or sooner if indicated.  If A1c is elevated or unchanged and would make medication changes or additions, patient will need to follow-up with me sooner.        Smoking Cessation:    Luiz TOMLINSON Sammy  reports that he has never smoked. He has never used smokeless tobacco.            Follow Up   Return in about 6 months (around 12/9/2023) for Next scheduled follow up.  Patient was given instructions and counseling regarding his condition or for health maintenance advice. Please see specific information pulled into the AVS if appropriate.     Please note that portions of this note were completed with a voice recognition program.

## 2023-06-09 NOTE — ASSESSMENT & PLAN NOTE
Patient's (Body mass index is 38.19 kg/m².) indicates that they are obese (BMI >30) with health conditions that include hypertension, diabetes mellitus, and dyslipidemias . Weight is unchanged. BMI  is above average; BMI management plan is completed. We discussed portion control, increasing exercise, and pharmacologic options including GLP-1 agonist for treatment of diabetes and obesity .

## 2023-06-09 NOTE — ASSESSMENT & PLAN NOTE
Diabetes is unchanged.   Continue current treatment regimen.  Dietary recommendations for ADA diet.  Regular aerobic exercise.  Discussed foot care.  Reminded to get yearly retinal exam.  Diabetes will be reassessed in 6 months.

## 2023-06-12 DIAGNOSIS — E11.65 TYPE 2 DIABETES MELLITUS WITH HYPERGLYCEMIA, WITHOUT LONG-TERM CURRENT USE OF INSULIN: Primary | ICD-10-CM

## 2023-06-12 RX ORDER — ORAL SEMAGLUTIDE 3 MG/1
3 TABLET ORAL DAILY
Qty: 30 TABLET | Refills: 0 | Status: SHIPPED | OUTPATIENT
Start: 2023-06-12

## 2023-08-08 RX ORDER — BISOPROLOL FUMARATE 10 MG/1
TABLET, FILM COATED ORAL
Qty: 90 TABLET | Refills: 1 | Status: SHIPPED | OUTPATIENT
Start: 2023-08-08

## 2023-08-08 RX ORDER — SPIRONOLACTONE 25 MG/1
TABLET ORAL
Qty: 90 TABLET | Refills: 1 | Status: SHIPPED | OUTPATIENT
Start: 2023-08-08

## 2023-08-08 RX ORDER — ATORVASTATIN CALCIUM 80 MG/1
TABLET, FILM COATED ORAL
Qty: 90 TABLET | Refills: 1 | Status: SHIPPED | OUTPATIENT
Start: 2023-08-08

## 2023-08-08 RX ORDER — LISINOPRIL 10 MG/1
TABLET ORAL
Qty: 90 TABLET | Refills: 1 | Status: SHIPPED | OUTPATIENT
Start: 2023-08-08

## 2023-08-11 DIAGNOSIS — E11.9 TYPE 2 DIABETES MELLITUS WITHOUT COMPLICATION, WITHOUT LONG-TERM CURRENT USE OF INSULIN: ICD-10-CM

## 2023-08-18 ENCOUNTER — OFFICE VISIT (OUTPATIENT)
Dept: FAMILY MEDICINE CLINIC | Facility: CLINIC | Age: 38
End: 2023-08-18
Payer: COMMERCIAL

## 2023-08-18 VITALS
BODY MASS INDEX: 38.6 KG/M2 | WEIGHT: 275.7 LBS | DIASTOLIC BLOOD PRESSURE: 82 MMHG | SYSTOLIC BLOOD PRESSURE: 128 MMHG | HEIGHT: 71 IN | TEMPERATURE: 98.6 F | HEART RATE: 74 BPM | OXYGEN SATURATION: 100 %

## 2023-08-18 DIAGNOSIS — J02.9 PHARYNGITIS, UNSPECIFIED ETIOLOGY: Primary | ICD-10-CM

## 2023-08-18 PROCEDURE — 99212 OFFICE O/P EST SF 10 MIN: CPT | Performed by: NURSE PRACTITIONER

## 2023-08-18 NOTE — PROGRESS NOTES
"Chief Complaint  Sore Throat    Subjective      Luiz Christianson is a 38 year old male that presents to [facility] with c/o persistent sore throat that started after beginning rybelsus in June.  He states that he initially had some tingling in the throat that progressed to discomfort. He states that he only took it for 3 days and stopped it. Even after stopping the medication, the pain has not resolved.  Pain is intermittent in nature, mild and near the selene's apple area. He denies difficulty swallowing, voice hoarseness, n/v or acid reflux.          Current Outpatient Medications   Medication Instructions    aspirin 81 MG EC tablet Daily    atorvastatin (LIPITOR) 80 MG tablet TAKE 1 TABLET DAILY    bisoprolol (ZEBeta) 10 MG tablet TAKE 1 TABLET DAILY    cetirizine (ZYRTEC) 10 mg, Oral, Daily    furosemide (LASIX) 20 mg, Oral, 2 Times Daily    lisinopril (PRINIVIL,ZESTRIL) 10 MG tablet TAKE 1 TABLET DAILY    metFORMIN (GLUCOPHAGE) 500 MG tablet TAKE 2 TABLETS BY MOUTH TWICE DAILY WITH MEALS    Rybelsus 3 mg, Oral, Daily    SITagliptin (JANUVIA) 100 mg, Oral, Daily    spironolactone (ALDACTONE) 25 MG tablet TAKE 1 TABLET DAILY    triamcinolone (KENALOG) 0.1 % ointment 1 application , Topical, 2 Times Daily       The following portions of the patient's history were reviewed and updated as appropriate: allergies, current medications, past family history, past medical history, past social history, past surgical history, and problem list.    Objective   Vital Signs:   /82   Pulse 74   Temp 98.6 øF (37 øC)   Ht 180.3 cm (71\")   Wt 125 kg (275 lb 11.2 oz)   SpO2 100%   BMI 38.45 kg/mý     Wt Readings from Last 3 Encounters:   08/18/23 125 kg (275 lb 11.2 oz)   06/09/23 124 kg (273 lb 12.8 oz)   02/17/23 127 kg (279 lb)     BP Readings from Last 3 Encounters:   08/18/23 128/82   06/09/23 145/85   02/17/23 131/85     Physical Exam  Vitals reviewed.   Constitutional:       " Appearance: Normal appearance. He is well-developed. He is not ill-appearing.   HENT:      Head: Normocephalic and atraumatic.      Mouth/Throat:      Mouth: Mucous membranes are moist.      Pharynx: Uvula midline. Posterior oropharyngeal erythema present. No oropharyngeal exudate.      Tonsils: No tonsillar exudate or tonsillar abscesses. 1+ on the right. 1+ on the left.   Eyes:      Conjunctiva/sclera: Conjunctivae normal.      Pupils: Pupils are equal, round, and reactive to light.   Neck:      Thyroid: Thyroid tenderness (left side) present. No thyroid mass or thyromegaly.   Cardiovascular:      Rate and Rhythm: Normal rate and regular rhythm.      Heart sounds: No murmur heard.  Pulmonary:      Effort: Pulmonary effort is normal.      Breath sounds: Normal breath sounds.   Skin:     General: Skin is warm and dry.   Neurological:      Mental Status: He is alert and oriented to person, place, and time.   Psychiatric:         Mood and Affect: Mood and affect normal.         Behavior: Behavior normal.        Result Review :  The following data was reviewed by: ENRIKE Elias on 08/18/2023:          Lab Results (last 72 hours)       ** No results found for the last 72 hours. **             No Images in the past 120 days found..      Procedures        Assessment and Plan   Diagnoses and all orders for this visit:    1. Pharyngitis, unspecified etiology (Primary)  -     Ambulatory Referral to ENT (Otolaryngology)        Pharyngitis possibly related to post nasal drip or acid reflux even though symptoms do not feel similar.     Medications Discontinued During This Encounter   Medication Reason    azithromycin (Zithromax Z-Clayton) 250 MG tablet *Therapy completed          Follow Up   No follow-ups on file.  Patient was given instructions and counseling regarding his condition or for health maintenance advice. Please see specific information pulled into the AVS if appropriate.       Brenda Alfonso  APRN  08/18/23  14:13 EDT

## 2023-08-23 DIAGNOSIS — E11.9 TYPE 2 DIABETES MELLITUS WITHOUT COMPLICATION, WITHOUT LONG-TERM CURRENT USE OF INSULIN: ICD-10-CM

## 2023-08-23 NOTE — TELEPHONE ENCOUNTER
Caller: Luiz Christianson    Relationship: Self    Best call back number: [phone]     Requested Prescriptions:   Requested Prescriptions     Pending Prescriptions Disp Refills    metFORMIN (GLUCOPHAGE) 500 MG tablet 180 tablet 0     Sig: Take 2 tablets by mouth 2 (Two) Times a Day With Meals.        Pharmacy where request should be sent: [facility]/PH[facility]ACY #[id number] - [place], [place] - [address] [facility] - [phone]  - [phone] FX     Last office visit with prescribing clinician: 6/9/2023   Last telemedicine visit with prescribing clinician: Visit date not found   Next office visit with prescribing clinician: 2/16/2024     Additional details provided by patient: PATIENT NEEDS PRESCRIPTION RESENT TO NEW PHARMACY - [facility]    Does the patient have less than a 3 day supply:  [x] Yes  [] No      Daren Lehman Rep   08/23/23 16:17 EDT

## 2023-08-28 ENCOUNTER — TELEPHONE (OUTPATIENT)
Dept: FAMILY MEDICINE CLINIC | Facility: CLINIC | Age: 38
End: 2023-08-28
Payer: COMMERCIAL

## 2023-08-28 DIAGNOSIS — E11.9 TYPE 2 DIABETES MELLITUS WITHOUT COMPLICATION, WITHOUT LONG-TERM CURRENT USE OF INSULIN: ICD-10-CM

## 2023-08-28 NOTE — TELEPHONE ENCOUNTER
Patient is needing his refill resent to [facility] please.  [employer] is not covering his Insurance anymore. Asking for a rush please as he is completely out. Please call and advise   [phone]    metFORMIN (GLUCOPHAGE) 500 MG tablet

## 2023-09-25 DIAGNOSIS — E11.9 TYPE 2 DIABETES MELLITUS WITHOUT COMPLICATION, WITHOUT LONG-TERM CURRENT USE OF INSULIN: ICD-10-CM

## 2023-10-15 NOTE — PROGRESS NOTES
[facility]  Cardiology progress Note    Patient Name: Luiz Christianson  : 1985    CHIEF COMPLAINT  Hypertension        Subjective   Subjective     HISTORY OF PRESENT ILLNESS    Luiz Christianson is a 38 y.o. male with history of hypertension, hyperlipidemia.  No chest pain or shortness of breath.  He had an episode of palpitations a few weeks before relieved spontaneously    REVIEW OF SYSTEMS    Constitutional:    No fever, no weight loss  Skin:     No rash  Otolaryngeal:    No difficulty swallowing  Cardiovascular: See HPI.  Pulmonary:    No cough, no sputum production    Personal History     Social History:    reports that he has never smoked. He has never used smokeless tobacco. He reports that he does not drink alcohol and does not use drugs.    Home Medications:  Current Outpatient Medications on File Prior to Visit   Medication Sig    aspirin 81 MG EC tablet Daily.    atorvastatin (LIPITOR) 80 MG tablet TAKE 1 TABLET DAILY    bisoprolol (ZEBeta) 10 MG tablet TAKE 1 TABLET DAILY    furosemide (LASIX) 20 MG tablet Take 1 tablet by mouth 2 (Two) Times a Day for 360 days.    lisinopril (PRINIVIL,ZESTRIL) 10 MG tablet TAKE 1 TABLET DAILY    metFORMIN (GLUCOPHAGE) 500 MG tablet TAKE 2 TABLETS BY MOUTH 2 TIMES A DAY WITH MEALS.    SITagliptin (Januvia) 100 MG tablet Take 1 tablet by mouth Daily.    spironolactone (ALDACTONE) 25 MG tablet TAKE 1 TABLET DAILY    triamcinolone (KENALOG) 0.1 % ointment Apply 1 application  topically to the appropriate area as directed 2 (Two) Times a Day.    [DISCONTINUED] cetirizine (zyrTEC) 10 MG tablet Take 1 tablet by mouth Daily. (Patient not taking: Reported on 2023)    [DISCONTINUED] Semaglutide (Rybelsus) 3 MG tablet Take 1 tablet by mouth Daily. (Patient not taking: Reported on 2023)     No current facility-administered medications on file prior to visit.       Past Medical History:   Diagnosis Date    Chronic diastolic congestive heart failure 2020     Diabetes mellitus, type 2 11/18/2020    Diastolic CHF, chronic  11/18/2020    Emotional depression     Essential hypertension 11/18/2020    Finger numbness 02/24/2021    Hyperlipidemia     Left wrist pain 02/24/2021    Migraine 12/07/2020       Allergies:  Allergies   Allergen Reactions    Farxiga [Dapagliflozin] Other (See Comments)     Bladder irritation (persistant)       Objective    Objective       Vitals:   Heart Rate:  [76] 76  BP: (129)/(85) 129/85  Body mass index is 38.77 kg/m².     PHYSICAL EXAM:    General Appearance:   well developed  well nourished  HENT:   oropharynx moist  lips not cyanotic  Neck:  thyroid not enlarged  supple  Respiratory:  no respiratory distress  normal breath sounds  no rales  Cardiovascular:  no jugular venous distention  regular rhythm  apical impulse normal  S1 normal, S2 normal  no S3, no S4   no murmur  no rub, no thrill  carotid pulses normal; no bruit  pedal pulses normal  lower extremity edema: none    Skin:   warm, dry  Psychiatric:  judgement and insight appropriate  normal mood and affect        Result Review:  I have personally reviewed the available results from  [x]  Laboratory  [x]  EKG  [x]  Cardiology  [x]  Medications  [x]  Old records  []  Other:     Procedures  Lab Results   Component Value Date    CHOL 135 06/09/2023    CHOL 177 09/19/2022    CHOL 186 10/04/2021     Lab Results   Component Value Date    TRIG 171 (H) 06/09/2023    TRIG 325 (H) 09/19/2022    TRIG 169 (H) 10/04/2021     Lab Results   Component Value Date    HDL 26 (L) 06/09/2023    HDL 30 (L) 09/19/2022    HDL 33 (L) 10/04/2021     Lab Results   Component Value Date    LDL 79 06/09/2023    LDL 93 09/19/2022     (H) 10/04/2021     Lab Results   Component Value Date    VLDL 30 06/09/2023    VLDL 54 (H) 09/19/2022    VLDL 30 10/04/2021        Impression/Plan:  1.  Chronic diastolic heart failure stable: Continue Lasix 20 mg twice a day.  Monitor BMP.  2.  Hyperlipidemia: Continue Lipitor  80 mg once a day.  Monitor lipid and hepatic profile.  3.  Essential hypertension controlled: Continue Zestril 10 mg once a day.  Continue Lipitor 10 mg once a day.  Monitor blood pressure regularly.  4.  Palpitations: 24-hour Holter monitoring.  Echocardiogram.        Russell Bledsoe MD   10/17/23   11:41 EDT

## 2023-10-17 ENCOUNTER — OFFICE VISIT (OUTPATIENT)
Dept: CARDIOLOGY | Facility: CLINIC | Age: 38
End: 2023-10-17
Payer: COMMERCIAL

## 2023-10-17 VITALS
HEART RATE: 76 BPM | WEIGHT: 278 LBS | DIASTOLIC BLOOD PRESSURE: 85 MMHG | BODY MASS INDEX: 38.92 KG/M2 | SYSTOLIC BLOOD PRESSURE: 129 MMHG | HEIGHT: 71 IN

## 2023-10-17 DIAGNOSIS — I10 HYPERTENSION, ESSENTIAL: Primary | ICD-10-CM

## 2023-10-17 DIAGNOSIS — I50.32 DIASTOLIC CHF, CHRONIC: ICD-10-CM

## 2023-10-17 DIAGNOSIS — E78.2 HYPERLIPEMIA, MIXED: ICD-10-CM

## 2023-10-17 DIAGNOSIS — R00.2 PALPITATIONS: ICD-10-CM

## 2023-10-26 ENCOUNTER — OFFICE VISIT (OUTPATIENT)
Dept: OTOLARYNGOLOGY | Facility: CLINIC | Age: 38
End: 2023-10-26
Payer: COMMERCIAL

## 2023-10-26 VITALS — HEIGHT: 71 IN | WEIGHT: 276 LBS | BODY MASS INDEX: 38.64 KG/M2 | TEMPERATURE: 96.4 F

## 2023-10-26 DIAGNOSIS — J31.2 CHRONIC PHARYNGITIS: Primary | ICD-10-CM

## 2023-10-26 PROCEDURE — 99203 OFFICE O/P NEW LOW 30 MIN: CPT | Performed by: OTOLARYNGOLOGY

## 2023-10-26 NOTE — PROGRESS NOTES
Patient Name: Luiz Christianson   Visit Date: 10/26/2023   Patient ID: [id number]  Provider: Gavin Christianson MD    Sex: male  Location: [facility]   YOB: 1985  Location Address: [address] [place] [address], [address],?[place]?[address]    Primary Care Provider Luz Maria White APRN  Location Phone: [phone]    Referring Provider: SAMY Elias*        Chief Complaint  Sore Throat    History of Present Illness  Luiz Christianson is a 38 y.o. male who presents to [facility] today as a consult from SAMY Elias* for evaluation of chronic sore throat.  He tells me that he initially developed sore throat sometime in June after starting on semaglutide.  He describes it as more of an achy pain and points to the midline of his neck in the area of the cricoid cartilage when asked where he experienced the sensation.  It was associated with mild dysphagia.  He was not experiencing any hoarseness or lymphadenopathy at that time. He is unsure of any fevers.  He denies any heartburn.  He also reports that he had an infected tooth around that time which eventually required extraction.  His symptoms have since resolved.  He denies any family history of thyroid cancer.    Past Medical History:   Diagnosis Date    Chronic diastolic congestive heart failure 11/18/2020    Diabetes mellitus, type 2 11/18/2020    Diastolic CHF, chronic  11/18/2020    Emotional depression     Essential hypertension 11/18/2020    Finger numbness 02/24/2021    Hyperlipidemia     Left wrist pain 02/24/2021    Migraine 12/07/2020       History reviewed. No pertinent surgical history.      Current Outpatient Medications:     aspirin 81 MG EC tablet, Daily., Disp: , Rfl:     atorvastatin (LIPITOR) 80 MG tablet, TAKE 1 TABLET DAILY, Disp: 90 tablet, Rfl: 1    bisoprolol (ZEBeta) 10 MG tablet, TAKE 1 TABLET DAILY, Disp: 90 tablet, Rfl: 1    furosemide (LASIX) 20 MG  "tablet, Take 1 tablet by mouth 2 (Two) Times a Day for 360 days., Disp: 180 tablet, Rfl: 3    lisinopril (PRINIVIL,ZESTRIL) 10 MG tablet, TAKE 1 TABLET DAILY, Disp: 90 tablet, Rfl: 1    metFORMIN (GLUCOPHAGE) 500 MG tablet, TAKE 2 TABLETS BY MOUTH 2 TIMES A DAY WITH MEALS., Disp: 180 tablet, Rfl: 0    SITagliptin (Januvia) 100 MG tablet, Take 1 tablet by mouth Daily., Disp: 90 tablet, Rfl: 3    spironolactone (ALDACTONE) 25 MG tablet, TAKE 1 TABLET DAILY, Disp: 90 tablet, Rfl: 1    triamcinolone (KENALOG) 0.1 % ointment, Apply 1 application  topically to the appropriate area as directed 2 (Two) Times a Day., Disp: 30 g, Rfl: 0     Allergies   Allergen Reactions    Farxiga [Dapagliflozin] Other (See Comments)     Bladder irritation (persistant)       Social History     Tobacco Use    Smoking status: Never    Smokeless tobacco: Never   Vaping Use    Vaping Use: Never used   Substance Use Topics    Alcohol use: Never    Drug use: Never        Objective     Vital Signs:   Temp 96.4 °F (35.8 °C)   Ht 180.3 cm (71\")   Wt 125 kg (276 lb)   BMI 38.49 kg/m²       Physical Exam    General: Well developed, well nourished patient of stated age in no acute distress. Voice is strong and clear.   Head: Normocephalic and atraumatic.  Face: No lesions.  Bilateral parotid and submandibular glands are unremarkable.  Stensen's and Warthin's ducts are productive of clear saliva bilaterally.  House-Brackmann I/VI     bilaterally.   muscles and temporomandibular joint nontender to palpation.  No TMJ crepitus.  Eyes: PERRLA, sclerae anicteric, no conjunctival injection. Extraocular movements are intact and full. No nystagmus.   Ears: Auricles are normal in appearance. Bilateral external auditory canals are unremarkable. Bilateral tympanic membranes are clear and without effusion. Hearing normal to conversational voice.   Nose: External nose is normal in appearance. Bilateral nares are patent with normal appearing mucosa. " Septum midline. Turbinates are unremarkable. No lesions.   Oral Cavity: Lips are normal in appearance. Oral mucosa is unremarkable. Gingiva is unremarkable.  Partial, poor dentition for age. Tongue is unremarkable with good movement. Hard palate is unremarkable.   Oropharynx: Soft palate is unremarkable with full movement. Uvula is unremarkable. Bilateral tonsils are unremarkable. Posterior oropharynx is unremarkable.    Larynx and hypopharynx: Deferred secondary to gag reflex.  Neck: Supple.  No mass.  Nontender to palpation.  Trachea midline. Thyroid normal size and without nodules to palpation.   Lymphatic: No lymphadenopathy upon palpation.  Respiratory: Clear to auscultation bilaterally, nonlabored respirations    Cardiovascular: RRR, no murmurs, rubs, or gallops,   Psychiatric: Appropriate affect, cooperative   Neurologic: Oriented x 3, strength symmetric in all extremities, Cranial Nerves II-XII are grossly intact to confrontation   Skin: Warm and dry. No rashes.    Procedures           Result Review :               Assessment and Plan    Diagnoses and all orders for this visit:    1. Chronic pharyngitis (Primary)    Impressions and findings were discussed at great length.  Currently, he is seen for evaluation after experiencing a chronic sore throat after starting semaglutide.  He also had an odontogenic infection at the time requiring extraction and he notices symptoms resolved shortly thereafter.  He has not taken the semaglutide in some time and the symptoms have not returned.  We discussed the differential for chronic pharyngitis including the potential for reflux as that can often be a side effect of the semaglutide.  Options for further evaluation were discussed including the potential for laryngoscopic examination today.  After thorough discussion we will hold off on any further work-up since he is feeling better.  He was given ample time to ask questions, all of his were answered to his  satisfaction.      Follow Up   No follow-ups on file.  Patient was given instructions and counseling regarding his condition or for health maintenance advice. Please see specific information pulled into the AVS if appropriate.

## 2023-11-08 DIAGNOSIS — E11.9 TYPE 2 DIABETES MELLITUS WITHOUT COMPLICATION, WITHOUT LONG-TERM CURRENT USE OF INSULIN: ICD-10-CM

## 2023-11-16 RX ORDER — FUROSEMIDE 20 MG/1
20 TABLET ORAL 2 TIMES DAILY
Qty: 180 TABLET | Refills: 3 | Status: SHIPPED | OUTPATIENT
Start: 2023-11-16

## 2023-11-27 DIAGNOSIS — E11.9 TYPE 2 DIABETES MELLITUS WITHOUT COMPLICATION, WITHOUT LONG-TERM CURRENT USE OF INSULIN: ICD-10-CM

## 2023-11-27 RX ORDER — SITAGLIPTIN 100 MG/1
100 TABLET, FILM COATED ORAL DAILY
Qty: 90 TABLET | Refills: 3 | Status: SHIPPED | OUTPATIENT
Start: 2023-11-27

## 2023-11-28 ENCOUNTER — TELEPHONE (OUTPATIENT)
Dept: CARDIOLOGY | Facility: CLINIC | Age: 38
End: 2023-11-28
Payer: COMMERCIAL

## 2023-11-28 NOTE — TELEPHONE ENCOUNTER
Received VM from patient stating he had questions regarding holter.     Attempted to call. VM left

## 2024-02-07 ENCOUNTER — TELEPHONE (OUTPATIENT)
Dept: FAMILY MEDICINE CLINIC | Facility: CLINIC | Age: 39
End: 2024-02-07
Payer: COMMERCIAL

## 2024-02-07 RX ORDER — LISINOPRIL 10 MG/1
10 TABLET ORAL DAILY
Qty: 90 TABLET | Refills: 1 | Status: SHIPPED | OUTPATIENT
Start: 2024-02-07

## 2024-02-07 RX ORDER — SPIRONOLACTONE 25 MG/1
25 TABLET ORAL DAILY
Qty: 90 TABLET | Refills: 1 | Status: SHIPPED | OUTPATIENT
Start: 2024-02-07

## 2024-02-07 RX ORDER — ATORVASTATIN CALCIUM 80 MG/1
80 TABLET, FILM COATED ORAL DAILY
Qty: 90 TABLET | Refills: 1 | Status: SHIPPED | OUTPATIENT
Start: 2024-02-07

## 2024-02-07 RX ORDER — BISOPROLOL FUMARATE 10 MG/1
10 TABLET, FILM COATED ORAL DAILY
Qty: 90 TABLET | Refills: 1 | Status: SHIPPED | OUTPATIENT
Start: 2024-02-07

## 2024-02-07 NOTE — TELEPHONE ENCOUNTER
Pt is needing refills on atorvastatin ,spironolactone ,     bisoprolol ,lisinopril . Please call pt when these have been sent to Pharmacy

## 2024-02-14 ENCOUNTER — OFFICE VISIT (OUTPATIENT)
Dept: FAMILY MEDICINE CLINIC | Facility: CLINIC | Age: 39
End: 2024-02-14
Payer: COMMERCIAL

## 2024-02-14 VITALS
OXYGEN SATURATION: 96 % | HEIGHT: 71 IN | WEIGHT: 269.2 LBS | BODY MASS INDEX: 37.69 KG/M2 | SYSTOLIC BLOOD PRESSURE: 112 MMHG | TEMPERATURE: 97.6 F | HEART RATE: 84 BPM | DIASTOLIC BLOOD PRESSURE: 78 MMHG

## 2024-02-14 DIAGNOSIS — I50.32 DIASTOLIC CHF, CHRONIC: ICD-10-CM

## 2024-02-14 DIAGNOSIS — N50.812 LEFT TESTICULAR PAIN: ICD-10-CM

## 2024-02-14 DIAGNOSIS — E11.65 TYPE 2 DIABETES MELLITUS WITH HYPERGLYCEMIA, WITHOUT LONG-TERM CURRENT USE OF INSULIN: ICD-10-CM

## 2024-02-14 DIAGNOSIS — I10 HYPERTENSION, ESSENTIAL: Primary | ICD-10-CM

## 2024-02-14 DIAGNOSIS — E78.2 HYPERLIPEMIA, MIXED: ICD-10-CM

## 2024-02-14 DIAGNOSIS — M25.552 LEFT HIP PAIN: ICD-10-CM

## 2024-02-14 DIAGNOSIS — Z23 NEED FOR INFLUENZA VACCINATION: ICD-10-CM

## 2024-02-14 LAB
ALBUMIN UR-MCNC: 1.7 MG/DL
BILIRUB UR QL STRIP: NEGATIVE
CHOLEST SERPL-MCNC: 172 MG/DL (ref 0–200)
CLARITY UR: CLEAR
COLOR UR: YELLOW
CREAT UR-MCNC: 150.5 MG/DL
GLUCOSE UR STRIP-MCNC: ABNORMAL MG/DL
HBA1C MFR BLD: 10.1 % (ref 4.8–5.6)
HDLC SERPL-MCNC: 34 MG/DL (ref 40–60)
HGB UR QL STRIP.AUTO: NEGATIVE
HOLD SPECIMEN: NORMAL
KETONES UR QL STRIP: NEGATIVE
LDLC SERPL CALC-MCNC: 100 MG/DL (ref 0–100)
LDLC/HDLC SERPL: 2.75 {RATIO}
LEUKOCYTE ESTERASE UR QL STRIP.AUTO: NEGATIVE
MICROALBUMIN/CREAT UR: 11.3 MG/G (ref 0–29)
NITRITE UR QL STRIP: NEGATIVE
PH UR STRIP.AUTO: 5.5 [PH] (ref 5–8)
PROT UR QL STRIP: NEGATIVE
SP GR UR STRIP: 1.02 (ref 1–1.03)
TRIGL SERPL-MCNC: 223 MG/DL (ref 0–150)
UROBILINOGEN UR QL STRIP: ABNORMAL
VLDLC SERPL-MCNC: 38 MG/DL (ref 5–40)

## 2024-02-14 PROCEDURE — 83036 HEMOGLOBIN GLYCOSYLATED A1C: CPT

## 2024-02-14 PROCEDURE — 80061 LIPID PANEL: CPT

## 2024-02-14 PROCEDURE — 81003 URINALYSIS AUTO W/O SCOPE: CPT

## 2024-02-14 PROCEDURE — 82043 UR ALBUMIN QUANTITATIVE: CPT

## 2024-02-14 PROCEDURE — 82570 ASSAY OF URINE CREATININE: CPT

## 2024-02-14 RX ORDER — TIZANIDINE 4 MG/1
4 TABLET ORAL NIGHTLY PRN
Qty: 20 TABLET | Refills: 0 | Status: SHIPPED | OUTPATIENT
Start: 2024-02-14

## 2024-02-14 RX ORDER — SEMAGLUTIDE 1.34 MG/ML
0.25 INJECTION, SOLUTION SUBCUTANEOUS WEEKLY
Qty: 3 ML | Refills: 0 | Status: SHIPPED | OUTPATIENT
Start: 2024-02-14

## 2024-02-19 ENCOUNTER — TELEPHONE (OUTPATIENT)
Dept: FAMILY MEDICINE CLINIC | Facility: CLINIC | Age: 39
End: 2024-02-19
Payer: COMMERCIAL

## 2024-02-19 NOTE — TELEPHONE ENCOUNTER
Caller:   SHARMILA GARCIA     Relationship:   SELF     Best call back number:    [phone]     Who are you requesting to speak with (clinical staff, provider,  specific staff member):   CLINICAL     What was the call regarding:     PATIENT RETURNING CALL TO OFFICE/[facility] FOR LAB RESULTS PLEASE.

## 2024-03-18 ENCOUNTER — HOSPITAL ENCOUNTER (OUTPATIENT)
Dept: ULTRASOUND IMAGING | Facility: HOSPITAL | Age: 39
Discharge: HOME | End: 2024-03-18
Payer: COMMERCIAL

## 2024-03-18 DIAGNOSIS — E11.65 TYPE 2 DIABETES MELLITUS WITH HYPERGLYCEMIA, WITHOUT LONG-TERM CURRENT USE OF INSULIN: ICD-10-CM

## 2024-03-18 DIAGNOSIS — N50.812 LEFT TESTICULAR PAIN: ICD-10-CM

## 2024-03-18 PROCEDURE — 76870 US EXAM SCROTUM: CPT

## 2024-03-18 RX ORDER — SEMAGLUTIDE 0.68 MG/ML
INJECTION, SOLUTION SUBCUTANEOUS
Qty: 4.5 ML | Refills: 1 | Status: SHIPPED | OUTPATIENT
Start: 2024-03-18

## 2024-03-19 DIAGNOSIS — N43.3 HYDROCELE IN ADULT: ICD-10-CM

## 2024-03-19 DIAGNOSIS — N50.812 LEFT TESTICULAR PAIN: Primary | ICD-10-CM

## 2024-03-25 ENCOUNTER — TELEPHONE (OUTPATIENT)
Dept: FAMILY MEDICINE CLINIC | Facility: CLINIC | Age: 39
End: 2024-03-25
Payer: COMMERCIAL

## 2024-03-25 DIAGNOSIS — E11.9 TYPE 2 DIABETES MELLITUS WITHOUT COMPLICATION, WITHOUT LONG-TERM CURRENT USE OF INSULIN: ICD-10-CM

## 2024-03-25 NOTE — TELEPHONE ENCOUNTER
Spoke with patient and let him know Luz Maria had sent in a 90 day supply of those medication 11/2023. [facility] and [facility] stated the new pharmacy address is in [place], [place]. Will resend medications.

## 2024-03-25 NOTE — TELEPHONE ENCOUNTER
Caller: Luiz Christianson    Relationship: Self    Best call back number: [phone]    What is the best time to reach you: ANYTIME TODAY    Who are you requesting to speak with (clinical staff, provider,  specific staff member): CLINICAL    Do you know the name of the person who called: PATIENT    What was the call regarding: PATIENT HAS BEEN WITHOUT METFORMIN AND JANUVIA FOR WEEKS. HE IS NOW EXPERIENCING A RASH. PLEASE CALL PATIENT BACK AND ADVISE.    Is it okay if the provider responds through MyChart: N/A

## 2024-03-25 NOTE — TELEPHONE ENCOUNTER
Called patient and let him know that I resent these medications to the new address for [facility]. Patient verbalized understanding.

## 2024-05-06 ENCOUNTER — TELEPHONE (OUTPATIENT)
Dept: CARDIOLOGY | Facility: CLINIC | Age: 39
End: 2024-05-06
Payer: COMMERCIAL

## 2024-05-06 RX ORDER — LISINOPRIL 10 MG/1
10 TABLET ORAL DAILY
Qty: 30 TABLET | Refills: 0 | Status: SHIPPED | OUTPATIENT
Start: 2024-05-06

## 2024-05-06 RX ORDER — ATORVASTATIN CALCIUM 80 MG/1
80 TABLET, FILM COATED ORAL DAILY
Qty: 30 TABLET | Refills: 0 | Status: SHIPPED | OUTPATIENT
Start: 2024-05-06

## 2024-05-06 RX ORDER — BISOPROLOL FUMARATE 10 MG/1
10 TABLET, FILM COATED ORAL DAILY
Qty: 30 TABLET | Refills: 0 | Status: SHIPPED | OUTPATIENT
Start: 2024-05-06

## 2024-05-06 RX ORDER — SPIRONOLACTONE 25 MG/1
25 TABLET ORAL DAILY
Qty: 30 TABLET | Refills: 0 | Status: SHIPPED | OUTPATIENT
Start: 2024-05-06

## 2024-05-31 DIAGNOSIS — E11.65 TYPE 2 DIABETES MELLITUS WITH HYPERGLYCEMIA, WITHOUT LONG-TERM CURRENT USE OF INSULIN: ICD-10-CM

## 2024-05-31 RX ORDER — SEMAGLUTIDE 0.68 MG/ML
INJECTION, SOLUTION SUBCUTANEOUS
Qty: 3 ML | Refills: 1 | Status: SHIPPED | OUTPATIENT
Start: 2024-05-31

## 2024-06-08 PROBLEM — N50.82 SCROTAL PAIN: Status: ACTIVE | Noted: 2024-06-08

## 2024-06-08 PROBLEM — N28.1 RENAL CYST: Status: ACTIVE | Noted: 2024-06-08

## 2024-06-18 NOTE — PROGRESS NOTES
[facility]  Cardiology progress Note    Patient Name: Luiz Christianson  : 1985    CHIEF COMPLAINT  Hypertension        Subjective   Subjective     HISTORY OF PRESENT ILLNESS    Luiz Christianson is a 39 y.o. male with history of hypertension and CHF.  No palpitations.    REVIEW OF SYSTEMS    Constitutional:    No fever, no weight loss  Skin:     No rash  Otolaryngeal:    No difficulty swallowing  Cardiovascular: See HPI.  Pulmonary:    No cough, no sputum production    Personal History     Social History:    reports that he has never smoked. He has never used smokeless tobacco. He reports that he does not drink alcohol and does not use drugs.    Home Medications:  Current Outpatient Medications on File Prior to Visit   Medication Sig    aspirin 81 MG EC tablet Daily.    furosemide (LASIX) 20 MG tablet TAKE 1 TABLET BY MOUTH TWICE A DAY    metFORMIN (GLUCOPHAGE) 500 MG tablet Take 2 tablets by mouth 2 (Two) Times a Day With Meals.    Semaglutide,0.25 or 0.5MG/DOS, (Ozempic, 0.25 or 0.5 MG/DOSE,) 2 MG/3ML solution pen-injector INJECT 0.25 MG UNDER THE SKIN INTO THE APPROPRIATE AREA AS DIRECTED 1 (ONE) TIME PER WEEK.    SITagliptin (Januvia) 100 MG tablet Take 1 tablet by mouth Daily.    tiZANidine (ZANAFLEX) 4 MG tablet Take 1 tablet by mouth At Night As Needed for Muscle Spasms.    triamcinolone (KENALOG) 0.1 % ointment Apply 1 application  topically to the appropriate area as directed 2 (Two) Times a Day.    [DISCONTINUED] atorvastatin (LIPITOR) 80 MG tablet Take 1 tablet by mouth Daily.    [DISCONTINUED] bisoprolol (ZEBeta) 10 MG tablet Take 1 tablet by mouth Daily.    [DISCONTINUED] lisinopril (PRINIVIL,ZESTRIL) 10 MG tablet Take 1 tablet by mouth Daily.    [DISCONTINUED] spironolactone (ALDACTONE) 25 MG tablet Take 1 tablet by mouth Daily.     No current facility-administered medications on file prior to visit.       Past Medical History:   Diagnosis Date    Chronic diastolic congestive heart  failure 11/18/2020    Diabetes mellitus, type 2 11/18/2020    Diastolic CHF, chronic  11/18/2020    Emotional depression     Essential hypertension 11/18/2020    Finger numbness 02/24/2021    Hyperlipidemia     Left wrist pain 02/24/2021    Migraine 12/07/2020       Allergies:  Allergies   Allergen Reactions    Farxiga [Dapagliflozin] Other (See Comments)     Bladder irritation (persistant)       Objective    Objective       Vitals:   Heart Rate:  [90] 90  BP: (137)/(78) 137/78  Body mass index is 36.96 kg/m².     PHYSICAL EXAM:    General Appearance:   well developed  well nourished  HENT:   oropharynx moist  lips not cyanotic  Neck:  thyroid not enlarged  supple  Respiratory:  no respiratory distress  normal breath sounds  no rales  Cardiovascular:  no jugular venous distention  regular rhythm  apical impulse normal  S1 normal, S2 normal  no S3, no S4   no murmur  no rub, no thrill  carotid pulses normal; no bruit  pedal pulses normal  lower extremity edema: none    Skin:   warm, dry  Psychiatric:  judgement and insight appropriate  normal mood and affect        Result Review:  I have personally reviewed the available results from  [x]  Laboratory  [x]  EKG  [x]  Cardiology  [x]  Medications  [x]  Old records  []  Other:     Procedures  Lab Results   Component Value Date    CHOL 172 02/14/2024    CHOL 135 06/09/2023    CHOL 177 09/19/2022     Lab Results   Component Value Date    TRIG 223 (H) 02/14/2024    TRIG 171 (H) 06/09/2023    TRIG 325 (H) 09/19/2022     Lab Results   Component Value Date    HDL 34 (L) 02/14/2024    HDL 26 (L) 06/09/2023    HDL 30 (L) 09/19/2022     Lab Results   Component Value Date     02/14/2024    LDL 79 06/09/2023    LDL 93 09/19/2022     Lab Results   Component Value Date    VLDL 38 02/14/2024    VLDL 30 06/09/2023    VLDL 54 (H) 09/19/2022     Results for orders placed in visit on 11/21/23    Adult Transthoracic Echo Complete W/ Cont if Necessary Per Protocol    Interpretation  Summary  Normal left ventricular systolic function.  No significant valve abnormalities noted.     Impression/Plan:  1.  Chronic diastolic heart failure stable: Continue Lasix 20 mg twice a day.  Monitor BMP.  2.  Mixed hyperlipidemia: Continue Lipitor 80 mg once a day.  Monitor lipid and hepatic profile.  3.  Essential hypertension controlled: Continue Zestril 10 mg once a day.  Continue bisoprolol 10 mg once a day.  Monitor blood pressure regularly.  4.  Palpitations: Echocardiogram within normal limits.  Continue Zebeta 10 mg once a day.                 Russell Bledsoe MD   06/21/24   10:15 EDT

## 2024-06-21 ENCOUNTER — OFFICE VISIT (OUTPATIENT)
Dept: CARDIOLOGY | Facility: CLINIC | Age: 39
End: 2024-06-21
Payer: COMMERCIAL

## 2024-06-21 VITALS
HEART RATE: 90 BPM | WEIGHT: 265 LBS | BODY MASS INDEX: 37.1 KG/M2 | HEIGHT: 71 IN | DIASTOLIC BLOOD PRESSURE: 78 MMHG | SYSTOLIC BLOOD PRESSURE: 137 MMHG

## 2024-06-21 DIAGNOSIS — I10 HYPERTENSION, ESSENTIAL: ICD-10-CM

## 2024-06-21 DIAGNOSIS — E78.2 HYPERLIPEMIA, MIXED: ICD-10-CM

## 2024-06-21 DIAGNOSIS — I50.32 DIASTOLIC CHF, CHRONIC: Primary | ICD-10-CM

## 2024-06-21 RX ORDER — SPIRONOLACTONE 25 MG/1
25 TABLET ORAL DAILY
Qty: 90 TABLET | Refills: 3 | Status: SHIPPED | OUTPATIENT
Start: 2024-06-21

## 2024-06-21 RX ORDER — LISINOPRIL 10 MG/1
10 TABLET ORAL DAILY
Qty: 90 TABLET | Refills: 3 | Status: SHIPPED | OUTPATIENT
Start: 2024-06-21

## 2024-06-21 RX ORDER — BISOPROLOL FUMARATE 10 MG/1
10 TABLET, FILM COATED ORAL DAILY
Qty: 90 TABLET | Refills: 3 | Status: SHIPPED | OUTPATIENT
Start: 2024-06-21

## 2024-06-21 RX ORDER — ATORVASTATIN CALCIUM 80 MG/1
80 TABLET, FILM COATED ORAL DAILY
Qty: 90 TABLET | Refills: 3 | Status: SHIPPED | OUTPATIENT
Start: 2024-06-21

## 2024-08-14 ENCOUNTER — OFFICE VISIT (OUTPATIENT)
Dept: FAMILY MEDICINE CLINIC | Facility: CLINIC | Age: 39
End: 2024-08-14
Payer: COMMERCIAL

## 2024-08-14 VITALS
BODY MASS INDEX: 37.34 KG/M2 | HEART RATE: 90 BPM | TEMPERATURE: 98.3 F | HEIGHT: 71 IN | SYSTOLIC BLOOD PRESSURE: 104 MMHG | DIASTOLIC BLOOD PRESSURE: 72 MMHG | OXYGEN SATURATION: 97 % | WEIGHT: 266.7 LBS

## 2024-08-14 DIAGNOSIS — I50.32 DIASTOLIC CHF, CHRONIC: ICD-10-CM

## 2024-08-14 DIAGNOSIS — I10 HYPERTENSION, ESSENTIAL: ICD-10-CM

## 2024-08-14 DIAGNOSIS — E11.65 TYPE 2 DIABETES MELLITUS WITH HYPERGLYCEMIA, WITHOUT LONG-TERM CURRENT USE OF INSULIN: ICD-10-CM

## 2024-08-14 DIAGNOSIS — J30.2 SEASONAL ALLERGIES: ICD-10-CM

## 2024-08-14 DIAGNOSIS — N52.9 ERECTILE DYSFUNCTION, UNSPECIFIED ERECTILE DYSFUNCTION TYPE: ICD-10-CM

## 2024-08-14 DIAGNOSIS — E78.2 HYPERLIPEMIA, MIXED: ICD-10-CM

## 2024-08-14 DIAGNOSIS — Z00.00 ANNUAL PHYSICAL EXAM: Primary | ICD-10-CM

## 2024-08-14 LAB
ALBUMIN SERPL-MCNC: 4.4 G/DL (ref 3.5–5.2)
ALBUMIN/GLOB SERPL: 1.3 G/DL
ALP SERPL-CCNC: 117 U/L (ref 39–117)
ALT SERPL W P-5'-P-CCNC: 66 U/L (ref 1–41)
ANION GAP SERPL CALCULATED.3IONS-SCNC: 12.6 MMOL/L (ref 5–15)
AST SERPL-CCNC: 41 U/L (ref 1–40)
BASOPHILS # BLD AUTO: 0.11 10*3/MM3 (ref 0–0.2)
BASOPHILS NFR BLD AUTO: 1 % (ref 0–1.5)
BILIRUB SERPL-MCNC: 0.4 MG/DL (ref 0–1.2)
BUN SERPL-MCNC: 10 MG/DL (ref 6–20)
BUN/CREAT SERPL: 14.7 (ref 7–25)
CALCIUM SPEC-SCNC: 10.4 MG/DL (ref 8.6–10.5)
CHLORIDE SERPL-SCNC: 98 MMOL/L (ref 98–107)
CHOLEST SERPL-MCNC: 153 MG/DL (ref 0–200)
CO2 SERPL-SCNC: 23.4 MMOL/L (ref 22–29)
CREAT SERPL-MCNC: 0.68 MG/DL (ref 0.76–1.27)
DEPRECATED RDW RBC AUTO: 40 FL (ref 37–54)
EGFRCR SERPLBLD CKD-EPI 2021: 119.8 ML/MIN/1.73
EOSINOPHIL # BLD AUTO: 0.34 10*3/MM3 (ref 0–0.4)
EOSINOPHIL NFR BLD AUTO: 3.2 % (ref 0.3–6.2)
ERYTHROCYTE [DISTWIDTH] IN BLOOD BY AUTOMATED COUNT: 12.5 % (ref 12.3–15.4)
GLOBULIN UR ELPH-MCNC: 3.5 GM/DL
GLUCOSE SERPL-MCNC: 216 MG/DL (ref 65–99)
HBA1C MFR BLD: 9.8 % (ref 4.8–5.6)
HCT VFR BLD AUTO: 46.5 % (ref 37.5–51)
HDLC SERPL-MCNC: 29 MG/DL (ref 40–60)
HGB BLD-MCNC: 15.7 G/DL (ref 13–17.7)
IMM GRANULOCYTES # BLD AUTO: 0.05 10*3/MM3 (ref 0–0.05)
IMM GRANULOCYTES NFR BLD AUTO: 0.5 % (ref 0–0.5)
LDLC SERPL CALC-MCNC: 88 MG/DL (ref 0–100)
LDLC/HDLC SERPL: 2.83 {RATIO}
LYMPHOCYTES # BLD AUTO: 3.38 10*3/MM3 (ref 0.7–3.1)
LYMPHOCYTES NFR BLD AUTO: 31.6 % (ref 19.6–45.3)
MCH RBC QN AUTO: 29.7 PG (ref 26.6–33)
MCHC RBC AUTO-ENTMCNC: 33.8 G/DL (ref 31.5–35.7)
MCV RBC AUTO: 87.9 FL (ref 79–97)
MONOCYTES # BLD AUTO: 1.32 10*3/MM3 (ref 0.1–0.9)
MONOCYTES NFR BLD AUTO: 12.3 % (ref 5–12)
NEUTROPHILS NFR BLD AUTO: 5.51 10*3/MM3 (ref 1.7–7)
NEUTROPHILS NFR BLD AUTO: 51.4 % (ref 42.7–76)
NRBC BLD AUTO-RTO: 0 /100 WBC (ref 0–0.2)
PLATELET # BLD AUTO: 343 10*3/MM3 (ref 140–450)
PMV BLD AUTO: 9.8 FL (ref 6–12)
POTASSIUM SERPL-SCNC: 4.4 MMOL/L (ref 3.5–5.2)
PROT SERPL-MCNC: 7.9 G/DL (ref 6–8.5)
RBC # BLD AUTO: 5.29 10*6/MM3 (ref 4.14–5.8)
SODIUM SERPL-SCNC: 134 MMOL/L (ref 136–145)
TRIGL SERPL-MCNC: 209 MG/DL (ref 0–150)
TSH SERPL DL<=0.05 MIU/L-ACNC: 1.9 UIU/ML (ref 0.27–4.2)
VLDLC SERPL-MCNC: 36 MG/DL (ref 5–40)
WBC NRBC COR # BLD AUTO: 10.71 10*3/MM3 (ref 3.4–10.8)

## 2024-08-14 PROCEDURE — 83036 HEMOGLOBIN GLYCOSYLATED A1C: CPT

## 2024-08-14 PROCEDURE — 80061 LIPID PANEL: CPT

## 2024-08-14 PROCEDURE — 80050 GENERAL HEALTH PANEL: CPT

## 2024-08-14 NOTE — PROGRESS NOTES
Chief Complaint  Chief Complaint   Patient presents with    Annual Exam    Diabetes    Hypertension    Cough     X 3-4 weeks       Subjective      Luiz Christianson presents to [facility]  History of Present Illness  The patient presents for an annual physical and follow-up on hypertension and diabetes.    He reports a persistent cough for the past 3 to 4 weeks, describing it as a sensation of something lodged in his throat that is cleared by coughing. He has not experienced any fever or chills. He had a COVID-19 infection 4 weeks ago but is currently asymptomatic. He suspects allergies may be contributing to his symptoms and is considering taking Zyrtec.    He also reports low energy levels and decreased sexual performance, including erectile dysfunction and reduced libido, over the past few months. He is currently on spironolactone and is concerned about potential side effects. He has eliminated caffeine from his diet.    His blood pressure is well-managed, and his diabetes is under control. He is on Ozempic 0.5 mg once a week but missed a dose due to travel. He is also taking metformin and Januvia. He is interested in exploring options to increase his testosterone levels to boost his energy and is hesitant about using Viagra due to potential cardiac risks.    He saw Dr. Bledsoe for his heart on 06/21/2024. He did not change any medications. He is to continue Lasix 20 mg twice a day, monitor his chemistry panel, and ensure his renal function and potassium levels are okay. He is to continue Lipitor 80 mg once a day for his cholesterol and monitor his liver enzymes. His blood pressure is good. He is to continue Zestril and bisoprolol once a day. His echocardiogram was within normal limits. He is to continue Zebeta 10 mg once a day, which is the bisoprolol. He is to take daily aspirin, Lasix, Zebeta, Lipitor, and lisinopril.      Objective     Medical History:  Past Medical History:    Diagnosis Date    Chronic diastolic congestive heart failure 11/18/2020    Diabetes mellitus, type 2 11/18/2020    Diastolic CHF, chronic  11/18/2020    Emotional depression     Essential hypertension 11/18/2020    Finger numbness 02/24/2021    Hyperlipidemia     Left wrist pain 02/24/2021    Migraine 12/07/2020     History reviewed. No pertinent surgical history.   Social History     Tobacco Use    Smoking status: Never    Smokeless tobacco: Never   Vaping Use    Vaping status: Never Used   Substance Use Topics    Alcohol use: Never    Drug use: Never     Family History   Problem Relation Age of Onset    Diabetes Mother     Diabetes Father     Heart disease Other         AUNT OR UNCLE    Diabetes Other        Medications:  Prior to Admission medications    Medication Sig Start Date End Date Taking? Authorizing Provider   aspirin 81 MG EC tablet Daily.   Yes Provider, MD Lauren   atorvastatin (LIPITOR) 80 MG tablet Take 1 tablet by mouth Daily. 6/21/24  Yes Russell Bledsoe MD   bisoprolol (ZEBeta) 10 MG tablet Take 1 tablet by mouth Daily. 6/21/24  Yes Russell Bledsoe MD   furosemide (LASIX) 20 MG tablet TAKE 1 TABLET BY MOUTH TWICE A DAY 11/16/23  Yes Russell Bledsoe MD   lisinopril (PRINIVIL,ZESTRIL) 10 MG tablet Take 1 tablet by mouth Daily. 6/21/24  Yes Russell Bledsoe MD   metFORMIN (GLUCOPHAGE) 500 MG tablet Take 2 tablets by mouth 2 (Two) Times a Day With Meals. 3/25/24  Yes Luz Maria White APRN   Semaglutide,0.25 or 0.5MG/DOS, (Ozempic, 0.25 or 0.5 MG/DOSE,) 2 MG/3ML solution pen-injector INJECT 0.25 MG UNDER THE SKIN INTO THE APPROPRIATE AREA AS DIRECTED 1 (ONE) TIME PER WEEK. 5/31/24  Yes Luz Maria White APRN   SITagliptin (Januvia) 100 MG tablet Take 1 tablet by mouth Daily. 3/25/24  Yes Luz Maria White APRN   spironolactone (ALDACTONE) 25 MG tablet Take 1 tablet by mouth Daily. 6/21/24  Yes Russell Bledsoe MD   tiZANidine (ZANAFLEX) 4 MG tablet Take  "1 tablet by mouth At Night As Needed for Muscle Spasms. 2/14/24   Luz Maria White APRN   triamcinolone (KENALOG) 0.1 % ointment Apply 1 application  topically to the appropriate area as directed 2 (Two) Times a Day. 7/31/23   Luz Maria White APRN        Allergies:   Farxiga [dapagliflozin]    Health Maintenance Due   Topic Date Due    DIABETIC EYE EXAM  Never done    Hepatitis B (1 of 3 - 19+ 3-dose series) Never done    ANNUAL PHYSICAL  Never done    COVID-19 Vaccine (3 - 2023-24 season) 09/01/2023    DIABETIC FOOT EXAM  09/19/2023    INFLUENZA VACCINE  08/01/2024         Vital Signs:   /72 (BP Location: Left arm, Patient Position: Sitting, Cuff Size: Adult)   Pulse 90   Temp 98.3 °F (36.8 °C) (Oral)   Ht 180.3 cm (71\")   Wt 121 kg (266 lb 11.2 oz)   SpO2 97%   BMI 37.20 kg/m²     Wt Readings from Last 3 Encounters:   08/14/24 121 kg (266 lb 11.2 oz)   06/21/24 120 kg (265 lb)   02/14/24 122 kg (269 lb 3.2 oz)     BP Readings from Last 3 Encounters:   08/14/24 104/72   06/21/24 137/78   02/14/24 112/78       Physical Exam  Vitals reviewed.   Constitutional:       Appearance: Normal appearance. He is well-developed. He is obese.   HENT:      Head: Normocephalic and atraumatic.   Eyes:      Conjunctiva/sclera: Conjunctivae normal.      Pupils: Pupils are equal, round, and reactive to light.   Cardiovascular:      Rate and Rhythm: Normal rate and regular rhythm.      Heart sounds: No murmur heard.     No friction rub. No gallop.   Pulmonary:      Effort: Pulmonary effort is normal.      Breath sounds: Normal breath sounds. No wheezing or rhonchi.   Abdominal:      General: Bowel sounds are normal. There is no distension.      Palpations: Abdomen is soft.      Tenderness: There is no abdominal tenderness.   Skin:     General: Skin is warm and dry.   Neurological:      Mental Status: He is alert and oriented to person, place, and time.      Cranial Nerves: No cranial nerve deficit. "   Psychiatric:         Mood and Affect: Mood and affect normal.         Behavior: Behavior normal.         Thought Content: Thought content normal.         Judgment: Judgment normal.       Physical Exam  Vital Signs  Blood pressure reading is 104/72.      Result Review :    The following data was reviewed by ENRIKE Shaw on 08/15/24 at 10:04 EDT:    Common labs          2/14/2024    12:22 8/14/2024    15:41   Common Labs   Glucose  216    BUN  10    Creatinine  0.68    Sodium  134    Potassium  4.4    Chloride  98    Calcium  10.4    Albumin  4.4    Total Bilirubin  0.4    Alkaline Phosphatase  117    AST (SGOT)  41    ALT (SGPT)  66    WBC  10.71    Hemoglobin  15.7    Hematocrit  46.5    Platelets  343    Total Cholesterol 172  153    Triglycerides 223  209    HDL Cholesterol 34  29    LDL Cholesterol  100  88    Hemoglobin A1C 10.10  9.80    Microalbumin, Urine 1.7       A1C Last 3 Results          2/14/2024    12:22 8/14/2024    15:41   HGBA1C Last 3 Results   Hemoglobin A1C 10.10  9.80        No Images in the past 120 days found..    Results  Laboratory Studies  A1c was up to 10.               Assessment and Plan    Diagnoses and all orders for this visit:    1. Annual physical exam (Primary)  -     CBC & Differential  -     Comprehensive Metabolic Panel  -     Lipid Panel  -     TSH Rfx On Abnormal To Free T4    2. Type 2 diabetes mellitus with hyperglycemia, without long-term current use of insulin  -     Hemoglobin A1c    3. Hypertension, essential  -     CBC & Differential  -     Comprehensive Metabolic Panel  -     Lipid Panel    4. Hyperlipemia, mixed  -     CBC & Differential  -     Comprehensive Metabolic Panel  -     Lipid Panel    5. Diastolic CHF, chronic  -     CBC & Differential  -     Comprehensive Metabolic Panel  -     Lipid Panel  -     TSH Rfx On Abnormal To Free T4    6. Erectile dysfunction, unspecified erectile dysfunction type    7. Seasonal allergies       Assessment &  Plan  1. Cough.  The cough could be attributed to allergies or postnasal drip. Over-the-counter antihistamines such as Zyrtec or Xyzal are recommended for symptom relief. If there is no improvement in the cough after a few weeks, the lisinopril dosage may need to be adjusted, as it can cause a dry, nagging cough. He does not report fever or chills, making an infection less likely.     2. Erectile Dysfunction.  His blood pressure is well managed at 104/72, and his diabetes has been improving. However, his elevated A1c of 10 could be contributing to the erectile dysfunction. Spironolactone, which he has been taking for some time, could also be a factor due to its potential side effects. He is advised to discontinue Aldactone temporarily to assess its impact on the erectile dysfunction. If there is no improvement after discontinuing Aldactone, the focus will shift to managing his diabetes and lowering his blood sugar levels. Testosterone levels may also be checked to rule out low testosterone as a cause.    3. Diabetes Mellitus.  His A1c is elevated at 10. He is advised to continue his current regimen of metformin, Januvia, and Ozempic at 0.5 mg. Routine labs, including A1c, will be repeated today. If the A1c remains high, adjustments to his medication regimen will be considered.    4. Hypertension.  His blood pressure is well controlled at 104/72. He is advised to continue his current medications, including Lasix 20 mg twice a day, Lipitor 80 mg once a day, Zestril, and bisoprolol. Routine monitoring of renal function and potassium levels is recommended.            Smoking Cessation:    Luiz TOMLINSON Sammy  reports that he has never smoked. He has never used smokeless tobacco.       Follow Up   Return in about 3 months (around 11/14/2024) for Next scheduled follow up.  Patient was given instructions and counseling regarding his condition or for health maintenance advice. Please see specific information pulled into the AVS  if appropriate.     Please note that portions of this note were completed with a voice recognition program.    Patient or patient representative verbalized consent for the use of Ambient Listening during the visit with  ENRIKE Shaw for chart documentation. 8/15/2024  09:59 EDT

## 2024-08-22 RX ORDER — SEMAGLUTIDE 1.34 MG/ML
1 INJECTION, SOLUTION SUBCUTANEOUS WEEKLY
Qty: 6 ML | Refills: 0 | Status: SHIPPED | OUTPATIENT
Start: 2024-08-22

## 2024-09-23 ENCOUNTER — TELEMEDICINE (OUTPATIENT)
Dept: FAMILY MEDICINE CLINIC | Facility: TELEHEALTH | Age: 39
End: 2024-09-23
Payer: COMMERCIAL

## 2024-09-23 DIAGNOSIS — R39.89 SUSPECTED UTI: Primary | ICD-10-CM

## 2024-09-23 PROCEDURE — 99213 OFFICE O/P EST LOW 20 MIN: CPT | Performed by: NURSE PRACTITIONER

## 2024-09-23 RX ORDER — PHENAZOPYRIDINE HYDROCHLORIDE 200 MG/1
200 TABLET, FILM COATED ORAL 3 TIMES DAILY PRN
Qty: 6 TABLET | Refills: 0 | Status: SHIPPED | OUTPATIENT
Start: 2024-09-23 | End: 2024-09-25

## 2024-09-23 RX ORDER — SEMAGLUTIDE 1.34 MG/ML
INJECTION, SOLUTION SUBCUTANEOUS
COMMUNITY
Start: 2024-08-22

## 2024-09-23 RX ORDER — SULFAMETHOXAZOLE/TRIMETHOPRIM 800-160 MG
1 TABLET ORAL 2 TIMES DAILY
Qty: 14 TABLET | Refills: 0 | Status: SHIPPED | OUTPATIENT
Start: 2024-09-23 | End: 2024-09-30

## 2024-11-17 DIAGNOSIS — E11.9 TYPE 2 DIABETES MELLITUS WITHOUT COMPLICATION, WITHOUT LONG-TERM CURRENT USE OF INSULIN: ICD-10-CM

## 2024-11-18 DIAGNOSIS — E11.9 TYPE 2 DIABETES MELLITUS WITHOUT COMPLICATION, WITHOUT LONG-TERM CURRENT USE OF INSULIN: ICD-10-CM

## 2024-11-18 RX ORDER — SITAGLIPTIN 100 MG/1
100 TABLET, FILM COATED ORAL DAILY
Qty: 90 TABLET | Refills: 3 | Status: SHIPPED | OUTPATIENT
Start: 2024-11-18

## 2024-12-13 RX ORDER — SEMAGLUTIDE 1.34 MG/ML
1 INJECTION, SOLUTION SUBCUTANEOUS WEEKLY
Qty: 9 ML | Refills: 1 | Status: SHIPPED | OUTPATIENT
Start: 2024-12-13

## 2024-12-18 ENCOUNTER — OFFICE VISIT (OUTPATIENT)
Dept: FAMILY MEDICINE CLINIC | Facility: CLINIC | Age: 39
End: 2024-12-18
Payer: COMMERCIAL

## 2024-12-18 VITALS
TEMPERATURE: 97.9 F | SYSTOLIC BLOOD PRESSURE: 108 MMHG | DIASTOLIC BLOOD PRESSURE: 72 MMHG | OXYGEN SATURATION: 98 % | WEIGHT: 264.2 LBS | HEART RATE: 95 BPM | BODY MASS INDEX: 36.99 KG/M2 | HEIGHT: 71 IN

## 2024-12-18 DIAGNOSIS — E11.9 TYPE 2 DIABETES MELLITUS WITHOUT COMPLICATION, WITHOUT LONG-TERM CURRENT USE OF INSULIN: Primary | ICD-10-CM

## 2024-12-18 DIAGNOSIS — I10 HYPERTENSION, ESSENTIAL: ICD-10-CM

## 2024-12-18 DIAGNOSIS — M25.531 RIGHT WRIST PAIN: ICD-10-CM

## 2024-12-18 DIAGNOSIS — E78.2 HYPERLIPEMIA, MIXED: ICD-10-CM

## 2024-12-18 DIAGNOSIS — I50.32 DIASTOLIC CHF, CHRONIC: ICD-10-CM

## 2024-12-18 DIAGNOSIS — Z56.6 WORK-RELATED STRESS: ICD-10-CM

## 2024-12-18 PROCEDURE — 99214 OFFICE O/P EST MOD 30 MIN: CPT

## 2024-12-18 SDOH — HEALTH STABILITY - MENTAL HEALTH: OTHER PHYSICAL AND MENTAL STRAIN RELATED TO WORK: Z56.6

## 2024-12-18 NOTE — PROGRESS NOTES
Chief Complaint  Chief Complaint   Patient presents with    Diabetes    Hypertension    Hyperlipidemia    Wrist Pain     Right wrist pain due to excessive writing over the past 3 months       Subjective      Luiz Christianson presents to [facility]  History of Present Illness  The patient is a 39-year-old male who presents today to follow up on hypertension, hyperlipidemia, diabetes, and right wrist pain.    He has been without Ozempic for the past month but plans to refill his prescription. He reports no adverse effects from the medication. He is uncertain about his current blood glucose levels. For diabetes, he is prescribed Januvia 100 mg daily, metformin 1000 mg twice daily, and Ozempic 1 mg once weekly.    He has been experiencing pain in his right wrist, which he attributes to extensive writing over the past 2 to 3 months. The pain has subsided since completing the initial draft of his work but persists with certain movements such as twisting. The pain is localized to the ulnar side of the wrist and does not radiate up the arm. He used a brace during the peak of his symptoms, which provided relief. Currently, he is attempting to prevent further exacerbation of the pain.    He reports feeling stressed due to work and home life. He is a  and is currently working with a client in [place]. 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 He is getting enough sleep and has been trying to work out more. He enjoys playing video games and reading. He has not taken any antidepressants or antianxiety medications in the past.    Supplemental Information  He has a history of chronic diastolic congestive heart failure and sees cardiology for management of these issues. He has an appointment scheduled with Dr. Bledsoe in 02/2025. He has been experiencing palpitations, particularly after physical exertion, which resolve spontaneously. He reports no associated chest pain or  shortness of breath. His daily caffeine intake is limited to one cup of coffee, and he does not consume pre-workout supplements. He also reports no peripheral edema. He occasionally experiences abdominal discomfort during coughing or sneezing.          Objective     Medical History:  Past Medical History:   Diagnosis Date    Chronic diastolic congestive heart failure 11/18/2020    Diabetes mellitus, type 2 11/18/2020    Diastolic CHF, chronic  11/18/2020    Emotional depression     Essential hypertension 11/18/2020    Finger numbness 02/24/2021    Hyperlipidemia     Left wrist pain 02/24/2021    Migraine 12/07/2020     History reviewed. No pertinent surgical history.   Social History     Tobacco Use    Smoking status: Never    Smokeless tobacco: Never   Vaping Use    Vaping status: Never Used   Substance Use Topics    Alcohol use: Not Currently    Drug use: Never     Family History   Problem Relation Age of Onset    Diabetes Mother     Diabetes Father     Heart disease Other         AUNT OR UNCLE    Diabetes Other        Medications:  Prior to Admission medications    Medication Sig Start Date End Date Taking? Authorizing Provider   aspirin 81 MG EC tablet Daily.   Yes Provider, MD Lauren   atorvastatin (LIPITOR) 80 MG tablet Take 1 tablet by mouth Daily. 6/21/24  Yes Russell Bledsoe MD   bisoprolol (ZEBeta) 10 MG tablet Take 1 tablet by mouth Daily. 6/21/24  Yes Russell Bledsoe MD   furosemide (LASIX) 20 MG tablet TAKE 1 TABLET BY MOUTH TWICE A DAY 11/16/23  Yes Russell Bledsoe MD   Januvia 100 MG tablet TAKE 1 TABLET DAILY 11/18/24  Yes Luz Maria White APRN   lisinopril (PRINIVIL,ZESTRIL) 10 MG tablet Take 1 tablet by mouth Daily. 6/21/24  Yes Russell Bledsoe MD   metFORMIN (GLUCOPHAGE) 500 MG tablet TAKE 2 TABLETS 2 TIMES     DAILY WITH MEALS 11/18/24  Yes Luz Maria White APRN   Ozempic, 1 MG/DOSE, 4 MG/3ML solution pen-injector Inject 1 mg under the skin into the  "appropriate area as directed 1 (One) Time Per Week. 12/13/24  Yes Luz Maria White APRN   spironolactone (ALDACTONE) 25 MG tablet Take 1 tablet by mouth Daily. 6/21/24  Yes Russell Bledsoe MD        Allergies:   Farxiga [dapagliflozin]    Health Maintenance Due   Topic Date Due    DIABETIC EYE EXAM  Never done    Hepatitis B (1 of 3 - 19+ 3-dose series) Never done    COVID-19 Vaccine (3 - 2024-25 season) 09/01/2024    HEMOGLOBIN A1C  02/14/2025         Vital Signs:   /72 (BP Location: Left arm, Patient Position: Sitting, Cuff Size: Adult)   Pulse 95   Temp 97.9 °F (36.6 °C) (Oral)   Ht 180.3 cm (71\")   Wt 120 kg (264 lb 3.2 oz)   SpO2 98%   BMI 36.85 kg/m²     Wt Readings from Last 3 Encounters:   12/18/24 120 kg (264 lb 3.2 oz)   08/14/24 121 kg (266 lb 11.2 oz)   06/21/24 120 kg (265 lb)     BP Readings from Last 3 Encounters:   12/18/24 108/72   08/14/24 104/72   06/21/24 137/78       Physical Exam  Vitals reviewed.   Constitutional:       Appearance: Normal appearance. He is well-developed. He is obese.   HENT:      Head: Normocephalic and atraumatic.   Eyes:      Conjunctiva/sclera: Conjunctivae normal.      Pupils: Pupils are equal, round, and reactive to light.   Cardiovascular:      Rate and Rhythm: Normal rate and regular rhythm.      Heart sounds: No murmur heard.     No friction rub. No gallop.   Pulmonary:      Effort: Pulmonary effort is normal.      Breath sounds: Normal breath sounds. No wheezing or rhonchi.   Abdominal:      General: Bowel sounds are normal. There is no distension.      Palpations: Abdomen is soft.      Tenderness: There is no abdominal tenderness.   Skin:     General: Skin is warm and dry.   Neurological:      Mental Status: He is alert and oriented to person, place, and time.      Cranial Nerves: No cranial nerve deficit.   Psychiatric:         Mood and Affect: Mood and affect normal.         Behavior: Behavior normal.         Thought Content: Thought " content normal.         Judgment: Judgment normal.       Physical Exam  Lungs are clear.    Vital Signs  Blood pressure is 108/72.      Result Review :    The following data was reviewed by ENRIKE Shaw on 12/18/24 at 11:45 EST:    Common labs          2/14/2024    12:22 8/14/2024    15:41   Common Labs   Glucose  216    BUN  10    Creatinine  0.68    Sodium  134    Potassium  4.4    Chloride  98    Calcium  10.4    Albumin  4.4    Total Bilirubin  0.4    Alkaline Phosphatase  117    AST (SGOT)  41    ALT (SGPT)  66    WBC  10.71    Hemoglobin  15.7    Hematocrit  46.5    Platelets  343    Total Cholesterol 172  153    Triglycerides 223  209    HDL Cholesterol 34  29    LDL Cholesterol  100  88    Hemoglobin A1C 10.10  9.80    Microalbumin, Urine 1.7           No Images in the past 120 days found..    Results  Laboratory Studies  A1c was high in August 2024.               Assessment and Plan    Diagnoses and all orders for this visit:    1. Type 2 diabetes mellitus without complication, without long-term current use of insulin (Primary)    2. Hypertension, essential    3. Diastolic CHF, chronic    4. Hyperlipemia, mixed    5. Right wrist pain    6. Work-related stress       Assessment & Plan  1. Diabetes Mellitus.  His A1c levels were elevated during the last evaluation in August 2024. He has been off Ozempic for a month. He is advised to resume his medication regimen, including Ozempic 1 mg once weekly, metformin 1000 mg twice daily, and Januvia 100 mg daily. A re-evaluation of his A1c levels will be conducted during his next visit in 3 months.    2. Right Wrist Pain.  He is advised to continue using a brace for support, apply ice packs, and rest the wrist. Acetaminophen is recommended for pain management. He is instructed to avoid NSAIDs like ibuprofen due to his history of heart failure.    3. Stress Management.  He is encouraged to engage in stress-relieving activities, prioritize sleep, and  explore deep breathing exercises available online. If he feels the need for medication, he can research and message the provider.    4.  Hypertension/hyperlipidemia.  Continue current medication regimen.  Blood pressure is currently well-managed.  Keep follow-up appointment with cardiology to further discuss occasional palpitations with activity.    Follow-up  The patient will follow up in 3 months.          Smoking Cessation:    Luiz Christianson  reports that he has never smoked. He has never used smokeless tobacco.           Follow Up   Return in about 3 months (around 3/18/2025), or if symptoms worsen or fail to improve, for Next scheduled follow up.  Patient was given instructions and counseling regarding his condition or for health maintenance advice. Please see specific information pulled into the AVS if appropriate.     Please note that portions of this note were completed with a voice recognition program.    Patient or patient representative verbalized consent for the use of Ambient Listening during the visit with  ENRIKE Shaw for chart documentation. 12/18/2024  11:45 EST

## 2024-12-26 RX ORDER — FUROSEMIDE 20 MG/1
20 TABLET ORAL 2 TIMES DAILY
Qty: 180 TABLET | Refills: 3 | Status: SHIPPED | OUTPATIENT
Start: 2024-12-26

## 2025-02-12 ENCOUNTER — OFFICE VISIT (OUTPATIENT)
Dept: FAMILY MEDICINE CLINIC | Facility: CLINIC | Age: 40
End: 2025-02-12
Payer: COMMERCIAL

## 2025-02-12 VITALS
BODY MASS INDEX: 37.17 KG/M2 | HEART RATE: 100 BPM | DIASTOLIC BLOOD PRESSURE: 72 MMHG | SYSTOLIC BLOOD PRESSURE: 108 MMHG | TEMPERATURE: 98.9 F | OXYGEN SATURATION: 98 % | WEIGHT: 265.5 LBS | HEIGHT: 71 IN

## 2025-02-12 DIAGNOSIS — J40 BRONCHITIS: ICD-10-CM

## 2025-02-12 DIAGNOSIS — E78.2 HYPERLIPEMIA, MIXED: ICD-10-CM

## 2025-02-12 DIAGNOSIS — E11.9 TYPE 2 DIABETES MELLITUS WITHOUT COMPLICATION, WITHOUT LONG-TERM CURRENT USE OF INSULIN: ICD-10-CM

## 2025-02-12 DIAGNOSIS — R05.8 PRODUCTIVE COUGH: Primary | ICD-10-CM

## 2025-02-12 DIAGNOSIS — J04.0 ACUTE LARYNGITIS: ICD-10-CM

## 2025-02-12 DIAGNOSIS — R50.9 FEVER AND CHILLS: ICD-10-CM

## 2025-02-12 DIAGNOSIS — I10 HYPERTENSION, ESSENTIAL: ICD-10-CM

## 2025-02-12 DIAGNOSIS — I50.32 DIASTOLIC CHF, CHRONIC: ICD-10-CM

## 2025-02-12 LAB
ALBUMIN SERPL-MCNC: 4.2 G/DL (ref 3.5–5.2)
ALP SERPL-CCNC: 67 U/L (ref 39–117)
ALT SERPL W P-5'-P-CCNC: 44 U/L (ref 1–41)
ANION GAP SERPL CALCULATED.3IONS-SCNC: 17.4 MMOL/L (ref 5–15)
AST SERPL-CCNC: 28 U/L (ref 1–40)
BILIRUB CONJ SERPL-MCNC: 0.3 MG/DL (ref 0–0.3)
BILIRUB INDIRECT SERPL-MCNC: 0.6 MG/DL
BILIRUB SERPL-MCNC: 0.9 MG/DL (ref 0–1.2)
BUN SERPL-MCNC: 13 MG/DL (ref 6–20)
BUN/CREAT SERPL: 12.5 (ref 7–25)
CALCIUM SPEC-SCNC: 9.4 MG/DL (ref 8.6–10.5)
CHLORIDE SERPL-SCNC: 98 MMOL/L (ref 98–107)
CHOLEST SERPL-MCNC: 115 MG/DL (ref 0–200)
CO2 SERPL-SCNC: 22.6 MMOL/L (ref 22–29)
CREAT SERPL-MCNC: 1.04 MG/DL (ref 0.76–1.27)
EGFRCR SERPLBLD CKD-EPI 2021: 92.5 ML/MIN/1.73
GLUCOSE SERPL-MCNC: 196 MG/DL (ref 65–99)
HBA1C MFR BLD: 8.4 % (ref 4.8–5.6)
HDLC SERPL-MCNC: 26 MG/DL (ref 40–60)
LDLC SERPL CALC-MCNC: 66 MG/DL (ref 0–100)
LDLC/HDLC SERPL: 2.43 {RATIO}
POTASSIUM SERPL-SCNC: 4.4 MMOL/L (ref 3.5–5.2)
PROT SERPL-MCNC: 7.2 G/DL (ref 6–8.5)
SODIUM SERPL-SCNC: 138 MMOL/L (ref 136–145)
TRIGL SERPL-MCNC: 129 MG/DL (ref 0–150)
VLDLC SERPL-MCNC: 23 MG/DL (ref 5–40)

## 2025-02-12 PROCEDURE — 99214 OFFICE O/P EST MOD 30 MIN: CPT

## 2025-02-12 PROCEDURE — 80061 LIPID PANEL: CPT | Performed by: SPECIALIST

## 2025-02-12 PROCEDURE — 80048 BASIC METABOLIC PNL TOTAL CA: CPT | Performed by: SPECIALIST

## 2025-02-12 PROCEDURE — 83036 HEMOGLOBIN GLYCOSYLATED A1C: CPT

## 2025-02-12 PROCEDURE — 80076 HEPATIC FUNCTION PANEL: CPT | Performed by: SPECIALIST

## 2025-02-12 RX ORDER — AZITHROMYCIN 250 MG/1
TABLET, FILM COATED ORAL
Qty: 6 TABLET | Refills: 0 | Status: SHIPPED | OUTPATIENT
Start: 2025-02-12

## 2025-02-12 RX ORDER — BENZONATATE 100 MG/1
100 CAPSULE ORAL 3 TIMES DAILY PRN
Qty: 30 CAPSULE | Refills: 0 | Status: SHIPPED | OUTPATIENT
Start: 2025-02-12

## 2025-02-12 NOTE — PROGRESS NOTES
Chief Complaint  Chief Complaint   Patient presents with    Respiratory issues     Pt states he is having lingering respiratory issues. He had flu symptoms a few weeks ago and still has phlegm/mucus and drainage.    Cough    Chest Pain     Pt states he has pain under the left pectoral muscle       Subjective      Luiz Christianson presents to [facility]  History of Present Illness  The patient is a 39-year-old male who presents today with respiratory issues, left-sided chest pain, and an ongoing cough.    He reports a persistent cough that has been present for several weeks, which he suspects may be due to a previous influenza infection, although no formal testing was conducted. He experiences pain at the base of his diaphragm during coughing episodes, leading him to question whether the discomfort is a result of the cough itself or an underlying issue. He also reports occasional difficulty breathing when lying down, but this symptom improves with movement. He experienced a fever on Monday and initially thought his symptoms were due to drainage, but this has not been the case for the past few weeks. His cough is triggered by physical exertion, such as lifting heavy objects or climbing stairs, and is accompanied by congestion. He reports no ear pain or pressure, and no headaches or sinus pressure, except for one episode of headache which he attributes to dehydration or caffeine withdrawal. He is not currently taking any over-the-counter medications and has discontinued DayQuil, NyQuil, and Mucinex due to uncertainty about their effectiveness.    He is currently on metformin and Januvia for diabetes management. He has previously taken Farxiga but has not tried glipizide.    Supplemental Information  He is not taking Ozempic because it is causing soreness around his throat which he fears is related to his thyroid.        Objective     Medical History:  Past Medical History:   Diagnosis  Date    Chronic diastolic congestive heart failure 11/18/2020    Diabetes mellitus, type 2 11/18/2020    Diastolic CHF, chronic  11/18/2020    Emotional depression     Essential hypertension 11/18/2020    Finger numbness 02/24/2021    Hyperlipidemia     Left wrist pain 02/24/2021    Migraine 12/07/2020     No past surgical history on file.   Social History     Tobacco Use    Smoking status: Never    Smokeless tobacco: Never   Vaping Use    Vaping status: Never Used   Substance Use Topics    Alcohol use: Not Currently    Drug use: Never     Family History   Problem Relation Age of Onset    Diabetes Mother     Diabetes Father     Heart disease Other         AUNT OR UNCLE    Diabetes Other        Medications:  Prior to Admission medications    Medication Sig Start Date End Date Taking? Authorizing Provider   aspirin 81 MG EC tablet Daily.   Yes Provider, MD Lauren   atorvastatin (LIPITOR) 80 MG tablet Take 1 tablet by mouth Daily. 6/21/24  Yes Russell Bledsoe MD   bisoprolol (ZEBeta) 10 MG tablet Take 1 tablet by mouth Daily. 6/21/24  Yes Russell Bledsoe MD   furosemide (LASIX) 20 MG tablet TAKE 1 TABLET BY MOUTH TWICE A DAY 12/26/24  Yes Lily Chilel APRN   Januvia 100 MG tablet TAKE 1 TABLET DAILY 11/18/24  Yes Luz Maria White APRN   lisinopril (PRINIVIL,ZESTRIL) 10 MG tablet Take 1 tablet by mouth Daily. 6/21/24  Yes Russell Bledsoe MD   metFORMIN (GLUCOPHAGE) 500 MG tablet TAKE 2 TABLETS 2 TIMES     DAILY WITH MEALS 11/18/24  Yes Luz Maria White APRN   Ozempic, 1 MG/DOSE, 4 MG/3ML solution pen-injector Inject 1 mg under the skin into the appropriate area as directed 1 (One) Time Per Week. 12/13/24  Yes Luz Maria White APRN   spironolactone (ALDACTONE) 25 MG tablet Take 1 tablet by mouth Daily. 6/21/24  Yes Russell Bledsoe MD        Allergies:   Farxiga [dapagliflozin]    Health Maintenance Due   Topic Date Due    DIABETIC EYE EXAM  Never done    Hepatitis B  "(1 of 3 - 19+ 3-dose series) Never done    BMI FOLLOWUP  06/09/2024    COVID-19 Vaccine (3 - 2024-25 season) 09/01/2024    HEMOGLOBIN A1C  02/14/2025         Vital Signs:   /72 (BP Location: Left arm, Patient Position: Sitting, Cuff Size: Adult)   Pulse 100   Temp 98.9 °F (37.2 °C) (Oral)   Ht 180.3 cm (71\")   Wt 120 kg (265 lb 8 oz)   SpO2 98%   BMI 37.03 kg/m²     Wt Readings from Last 3 Encounters:   02/12/25 120 kg (265 lb 8 oz)   12/18/24 120 kg (264 lb 3.2 oz)   08/14/24 121 kg (266 lb 11.2 oz)     BP Readings from Last 3 Encounters:   02/12/25 108/72   12/18/24 108/72   08/14/24 104/72       Physical Exam  Vitals reviewed.   Constitutional:       Appearance: Normal appearance. He is well-developed.   HENT:      Head: Normocephalic and atraumatic.      Right Ear: No tenderness. There is impacted cerumen.      Left Ear: No tenderness. There is impacted cerumen.      Nose: Congestion present.      Mouth/Throat:      Pharynx: Posterior oropharyngeal erythema present.   Eyes:      Conjunctiva/sclera: Conjunctivae normal.      Pupils: Pupils are equal, round, and reactive to light.   Cardiovascular:      Rate and Rhythm: Normal rate and regular rhythm.      Heart sounds: No murmur heard.     No friction rub. No gallop.   Pulmonary:      Effort: Pulmonary effort is normal. No tachypnea or respiratory distress.      Breath sounds: Normal breath sounds. No wheezing or rhonchi.   Abdominal:      General: Bowel sounds are normal. There is no distension.      Palpations: Abdomen is soft.      Tenderness: There is no abdominal tenderness.   Skin:     General: Skin is warm and dry.   Neurological:      Mental Status: He is alert and oriented to person, place, and time.      Cranial Nerves: No cranial nerve deficit.   Psychiatric:         Mood and Affect: Mood and affect normal.         Behavior: Behavior normal.         Thought Content: Thought content normal.         Judgment: Judgment normal.       Physical " Exam  Throat was examined.  Lungs are clear. No wheezes or crackles.      Result Review :    The following data was reviewed by ENRIKE Shaw on 02/12/25 at 13:47 EST:    Common labs          8/14/2024    15:41   Common Labs   Glucose 216    BUN 10    Creatinine 0.68    Sodium 134    Potassium 4.4    Chloride 98    Calcium 10.4    Albumin 4.4    Total Bilirubin 0.4    Alkaline Phosphatase 117    AST (SGOT) 41    ALT (SGPT) 66    WBC 10.71    Hemoglobin 15.7    Hematocrit 46.5    Platelets 343    Total Cholesterol 153    Triglycerides 209    HDL Cholesterol 29    LDL Cholesterol  88    Hemoglobin A1C 9.80          No Images in the past 120 days found..    Results                 Assessment and Plan    Diagnoses and all orders for this visit:    1. Productive cough (Primary)  -     azithromycin (Zithromax Z-Clayton) 250 MG tablet; Take 2 tablets by mouth on day 1, then 1 tablet daily on days 2-5  Dispense: 6 tablet; Refill: 0  -     benzonatate (Tessalon Perles) 100 MG capsule; Take 1 capsule by mouth 3 (Three) Times a Day As Needed for Cough.  Dispense: 30 capsule; Refill: 0    2. Fever and chills  -     azithromycin (Zithromax Z-Clayton) 250 MG tablet; Take 2 tablets by mouth on day 1, then 1 tablet daily on days 2-5  Dispense: 6 tablet; Refill: 0  -     benzonatate (Tessalon Perles) 100 MG capsule; Take 1 capsule by mouth 3 (Three) Times a Day As Needed for Cough.  Dispense: 30 capsule; Refill: 0    3. Bronchitis  -     azithromycin (Zithromax Z-Clayton) 250 MG tablet; Take 2 tablets by mouth on day 1, then 1 tablet daily on days 2-5  Dispense: 6 tablet; Refill: 0  -     benzonatate (Tessalon Perles) 100 MG capsule; Take 1 capsule by mouth 3 (Three) Times a Day As Needed for Cough.  Dispense: 30 capsule; Refill: 0    4. Acute laryngitis  -     azithromycin (Zithromax Z-Clayton) 250 MG tablet; Take 2 tablets by mouth on day 1, then 1 tablet daily on days 2-5  Dispense: 6 tablet; Refill: 0  -     benzonatate (Tessalon  Perles) 100 MG capsule; Take 1 capsule by mouth 3 (Three) Times a Day As Needed for Cough.  Dispense: 30 capsule; Refill: 0    5. Type 2 diabetes mellitus without complication, without long-term current use of insulin  -     Hemoglobin A1c    6. Diastolic CHF, chronic  -     Hepatic Function Panel  -     Basic Metabolic Panel  -     Lipid Panel    7. Hypertension, essential  -     Hepatic Function Panel  -     Basic Metabolic Panel  -     Lipid Panel    8. Hyperlipemia, mixed  -     Hepatic Function Panel  -     Basic Metabolic Panel  -     Lipid Panel       Assessment & Plan  1. Bronchitis.  Symptoms include a productive cough, congestion, and chest pain, which have persisted for a couple of weeks. There is no evidence of pneumonia upon lung examination. A prescription for Z-Clayton (azithromycin) will be provided. Over-the-counter Mucinex (guaifenesin) is recommended to help thin secretions. A cough suppressant will also be prescribed to aid in sleep. He is advised to drink plenty of water.    2. Sinusitis.  There is a possibility of sinusitis contributing to the symptoms. No sinus pressure or headaches were reported, but sinus and nasal airways appear normal. The prescribed Z-Clayton and over-the-counter Mucinex should help alleviate any sinus-related symptoms.    3. Diabetes Mellitus.  He is currently on metformin and Januvia. He reports soreness around his throat that she believes may be related to GLP-1 agonist medication. An A1c test will be added to his lab work to assess current glucose control. He is advised to continue his current medications and avoid injectable GLP-1 medications due to side effects. If glucose levels remain high, glipizide may be considered as an alternative treatment option.          Smoking Cessation:    Luiz BUSHRA Christianson  reports that he has never smoked. He has never used smokeless tobacco.             Follow Up   Return in about 3 months (around 5/12/2025), or if symptoms worsen or fail to  improve, for Next scheduled follow up.  Patient was given instructions and counseling regarding his condition or for health maintenance advice. Please see specific information pulled into the AVS if appropriate.     Please note that portions of this note were completed with a voice recognition program.    Patient or patient representative verbalized consent for the use of Ambient Listening during the visit with  ENRIKE Shaw for chart documentation. 2/12/2025  13:47 EST

## 2025-02-13 DIAGNOSIS — E11.9 TYPE 2 DIABETES MELLITUS WITHOUT COMPLICATION, WITHOUT LONG-TERM CURRENT USE OF INSULIN: Primary | ICD-10-CM

## 2025-02-13 RX ORDER — GLIPIZIDE 5 MG/1
5 TABLET ORAL
Qty: 60 TABLET | Refills: 2 | Status: SHIPPED | OUTPATIENT
Start: 2025-02-13

## 2025-02-24 ENCOUNTER — TELEMEDICINE (OUTPATIENT)
Dept: FAMILY MEDICINE CLINIC | Facility: TELEHEALTH | Age: 40
End: 2025-02-24
Payer: COMMERCIAL

## 2025-02-24 DIAGNOSIS — K59.00 CONSTIPATION, UNSPECIFIED CONSTIPATION TYPE: Primary | ICD-10-CM

## 2025-02-24 PROCEDURE — 99213 OFFICE O/P EST LOW 20 MIN: CPT | Performed by: NURSE PRACTITIONER

## 2025-02-24 RX ORDER — MAGNESIUM HYDROXIDE 1200 MG/15ML
1 LIQUID ORAL ONCE
Qty: 1 EACH | Refills: 0 | Status: SHIPPED | OUTPATIENT
Start: 2025-02-24 | End: 2025-02-24

## 2025-02-24 NOTE — PATIENT INSTRUCTIONS
Constipation, Adult  Constipation is when a person has trouble pooping (having a bowel movement). When you have this condition, you may poop fewer than 3 times a week. Your poop (stool) may also be dry, hard, or bigger than normal.  Follow these instructions at home:  Eating and drinking    Eat foods that have a lot of fiber, such as:  Fresh fruits and vegetables.  Whole grains.  Beans.  Eat less of foods that are low in fiber and high in fat and sugar, such as:  French fries.  Hamburgers.  Cookies.  Candy.  Soda.  Drink enough fluid to keep your pee (urine) pale yellow.  General instructions  Exercise regularly or as told by your doctor. Try to do 150 minutes of exercise each week.  Go to the restroom when you feel like you need to poop. Do not hold it in.  Take over-the-counter and prescription medicines only as told by your doctor. These include any fiber supplements.  When you poop:  Do deep breathing while relaxing your lower belly (abdomen).  Relax your pelvic floor. The pelvic floor is a group of muscles that support the rectum, bladder, and intestines (as well as the uterus in women).  Watch your condition for any changes. Tell your doctor if you notice any.  Keep all follow-up visits as told by your doctor. This is important.  Contact a doctor if:  You have pain that gets worse.  You have a fever.  You have not pooped for 4 days.  You vomit.  You are not hungry.  You lose weight.  You are bleeding from the opening of the butt (anus).  You have thin, pencil-like poop.  Get help right away if:  You have a fever, and your symptoms suddenly get worse.  You leak poop or have blood in your poop.  Your belly feels hard or bigger than normal (bloated).  You have very bad belly pain.  You feel dizzy or you faint.  Summary  Constipation is when a person poops fewer than 3 times a week, has trouble pooping, or has poop that is dry, hard, or bigger than normal.  Eat foods that have a lot of fiber.  Drink enough fluid  to keep your pee (urine) pale yellow.  Take over-the-counter and prescription medicines only as told by your doctor. These include any fiber supplements.  This information is not intended to replace advice given to you by your health care provider. Make sure you discuss any questions you have with your health care provider.  Document Revised: 11/01/2023 Document Reviewed: 11/01/2023  Elsevier Patient Education © 2024 [employer] Inc[employer]

## 2025-02-24 NOTE — PROGRESS NOTES
No chief complaint on file.      Video Visit Reason:   Free Text Description: I believe I'm constipated as a side effect of a recent medication. I'm just wanting to discuss the next steps for treatment.  Subjective   Luiz Christianson is a 39 y.o. male.     History of Present Illness  The patient presents via virtual visit for evaluation of constipation.    He reports experiencing constipation, which he attributes to the initiation of benzonatate therapy approximately one week ago. He has since discontinued the medication a few days prior to this consultation. He has not previously experienced constipation and typically maintains regular bowel movements. He has not yet attempted any laxative treatments. He also mentions occasional episodes of nausea. He has not had a BM since approximately 2/15/2025.    Supplemental Information  He was seen in office on 02/12/2025 for bronchitis and was given an antibiotic and benzonatate.            The following portions of the patient's history were reviewed and updated as appropriate: allergies, current medications, past medical history, and problem list.      Past Medical History:   Diagnosis Date    Chronic diastolic congestive heart failure 11/18/2020    Diabetes mellitus, type 2 11/18/2020    Diastolic CHF, chronic  11/18/2020    Emotional depression     Essential hypertension 11/18/2020    Finger numbness 02/24/2021    Hyperlipidemia     Left wrist pain 02/24/2021    Migraine 12/07/2020     Social History     Socioeconomic History    Marital status:    Tobacco Use    Smoking status: Never    Smokeless tobacco: Never   Vaping Use    Vaping status: Never Used   Substance and Sexual Activity    Alcohol use: Not Currently    Drug use: Never    Sexual activity: Yes     Partners: Female     Birth control/protection: Injection     medication documentation: reviewed and updated with patient and   Current Outpatient Medications:     aspirin 81 MG EC tablet, Daily., Disp: , Rfl:      atorvastatin (LIPITOR) 80 MG tablet, Take 1 tablet by mouth Daily., Disp: 90 tablet, Rfl: 3    azithromycin (Zithromax Z-Clayton) 250 MG tablet, Take 2 tablets by mouth on day 1, then 1 tablet daily on days 2-5, Disp: 6 tablet, Rfl: 0    benzonatate (Tessalon Perles) 100 MG capsule, Take 1 capsule by mouth 3 (Three) Times a Day As Needed for Cough., Disp: 30 capsule, Rfl: 0    bisoprolol (ZEBeta) 10 MG tablet, Take 1 tablet by mouth Daily., Disp: 90 tablet, Rfl: 3    furosemide (LASIX) 20 MG tablet, TAKE 1 TABLET BY MOUTH TWICE A DAY, Disp: 180 tablet, Rfl: 3    glipizide (GLUCOTROL) 5 MG tablet, Take 1 tablet by mouth 2 (Two) Times a Day Before Meals., Disp: 60 tablet, Rfl: 2    Januvia 100 MG tablet, TAKE 1 TABLET DAILY, Disp: 90 tablet, Rfl: 3    lisinopril (PRINIVIL,ZESTRIL) 10 MG tablet, Take 1 tablet by mouth Daily., Disp: 90 tablet, Rfl: 3    magnesium citrate solution, Take 296 mL by mouth 1 (One) Time for 1 dose., Disp: 296 mL, Rfl: 0    metFORMIN (GLUCOPHAGE) 500 MG tablet, TAKE 2 TABLETS 2 TIMES     DAILY WITH MEALS, Disp: 360 tablet, Rfl: 3    sodium phosphate (FLEET) 7-19 GM/118ML ADULT enema, Insert 1 enema into the rectum 1 (One) Time for 1 dose., Disp: 1 each, Rfl: 0    spironolactone (ALDACTONE) 25 MG tablet, Take 1 tablet by mouth Daily., Disp: 90 tablet, Rfl: 3  Review of Systems  See HPI  Objective   Physical Exam  Constitutional:       General: He is not in acute distress.     Appearance: He is not ill-appearing.   Pulmonary:      Effort: Pulmonary effort is normal.   Abdominal:      General: There is no distension.      Tenderness: There is no abdominal tenderness.   Neurological:      Mental Status: He is alert.         Assessment & Plan   Diagnoses and all orders for this visit:    1. Constipation, unspecified constipation type (Primary)  -     magnesium citrate solution; Take 296 mL by mouth 1 (One) Time for 1 dose.  Dispense: 296 mL; Refill: 0  -     sodium phosphate (FLEET) 7-19 GM/118ML  ADULT enema; Insert 1 enema into the rectum 1 (One) Time for 1 dose.  Dispense: 1 each; Refill: 0     The patient's constipation is likely related to the use of benzonatate, which he started taking for bronchitis. He stopped taking the medication a couple of days ago but continues to experience symptoms. He is advised to take Dulcolax tablets, 1 to 4 tablets depending on the severity of his constipation. If there is no improvement, he should use a Fleet enema and half a bottle of magnesium citrate. If there is still no relief, he should drink the remaining half of the magnesium citrate bottle. If symptoms persist beyond tomorrow, he should seek immediate medical attention. He is also advised to monitor for vomiting, which could indicate a more serious issue.             Follow Up:  If your symptoms are not resolving by the completion of your treatment or are worsening, see your primary care provider for follow up. If you don't have a primary care provider, you may go to any Urgent Care for re-evaluation. If you develop any life threatening symptoms, go to the nearest Emergency Department immediately or call EMS.       Patient or patient representative verbalized consent for the use of Ambient Listening during the visit with  ENRIKE Ashraf for chart documentation. 2/24/2025  10:09 EST        The use of  Video Visit was utilized during this visit, using both [employer] and [employer]. The use of a video visit has been reviewed with the patient and verbal informed consent has been obtained. No technical difficulties occurred during the visit.    is located at [address] [place] [address] [address] [place] [address]  Provider is located at [place], [place]

## 2025-03-20 NOTE — PROGRESS NOTES
[facility]  Cardiology progress Note    Patient Name: Luiz Christianson  : 1985    CHIEF COMPLAINT  Hypertension        Subjective   Subjective     HISTORY OF PRESENT ILLNESS    Luiz Christianson is a 39 y.o. male with history of hypertension  and CHF.  He has some atypical chest pains.  He also complains of cough for a month or so.    REVIEW OF SYSTEMS    Constitutional:    No fever, no weight loss  Skin:     No rash  Otolaryngeal:    No difficulty swallowing  Cardiovascular: See HPI.  Pulmonary:    No cough, no sputum production    Personal History     Social History:    reports that he has never smoked. He has never used smokeless tobacco. He reports that he does not currently use alcohol. He reports that he does not use drugs.    Home Medications:  Current Outpatient Medications on File Prior to Visit   Medication Sig    aspirin 81 MG EC tablet Daily.    atorvastatin (LIPITOR) 80 MG tablet Take 1 tablet by mouth Daily.    bisoprolol (ZEBeta) 10 MG tablet Take 1 tablet by mouth Daily.    Dextromethorphan-guaiFENesin (Coricidin HBP Congestion/Cough)  MG capsule Take 1 dose by mouth 3 times a day for 10 days.    furosemide (LASIX) 20 MG tablet TAKE 1 TABLET BY MOUTH TWICE A DAY    Januvia 100 MG tablet TAKE 1 TABLET DAILY    metFORMIN (GLUCOPHAGE) 500 MG tablet TAKE 2 TABLETS 2 TIMES     DAILY WITH MEALS    spironolactone (ALDACTONE) 25 MG tablet Take 1 tablet by mouth Daily.    [DISCONTINUED] lisinopril (PRINIVIL,ZESTRIL) 10 MG tablet Take 1 tablet by mouth Daily.    [DISCONTINUED] docusate sodium (COLACE) 100 MG capsule Take 1 capsule by mouth 2 (Two) Times a Day for 30 days.    [DISCONTINUED] glipizide (GLUCOTROL) 5 MG tablet Take 1 tablet by mouth 2 (Two) Times a Day Before Meals. (Patient not taking: Reported on 3/25/2025)    [DISCONTINUED] polyethylene glycol (MIRALAX) 17 g packet Take 17 g by mouth Daily.    [DISCONTINUED] Wheat Dextrin (Benefiber Drink Mix) pack Take 1 each by mouth As  Needed (constipation) for up to 10 days.     No current facility-administered medications on file prior to visit.       Past Medical History:   Diagnosis Date    Chronic diastolic congestive heart failure 11/18/2020    Diabetes mellitus, type 2 11/18/2020    Diastolic CHF, chronic  11/18/2020    Emotional depression     Essential hypertension 11/18/2020    Finger numbness 02/24/2021    Hyperlipidemia     Left wrist pain 02/24/2021    Migraine 12/07/2020       Allergies:  Allergies   Allergen Reactions    Farxiga [Dapagliflozin] Other (See Comments)     Bladder irritation (persistant)       Objective    Objective       Vitals:   Heart Rate:  [103] 103  BP: (116)/(87) 116/87  Body mass index is 38.22 kg/m².     PHYSICAL EXAM:    General Appearance:   well developed  well nourished  HENT:   oropharynx moist  lips not cyanotic  Neck:  thyroid not enlarged  supple  Respiratory:  no respiratory distress  normal breath sounds  no rales  Cardiovascular:  no jugular venous distention  regular rhythm  apical impulse normal  S1 normal, S2 normal  no S3, no S4   no murmur  no rub, no thrill  carotid pulses normal; no bruit  pedal pulses normal  lower extremity edema: none    Skin:   warm, dry  Psychiatric:  judgement and insight appropriate  normal mood and affect        Result Review:  I have personally reviewed the available results from  [x]  Laboratory  [x]  EKG  [x]  Cardiology  [x]  Medications  [x]  Old records  []  Other:     Procedures  Lab Results   Component Value Date    CHOL 115 02/12/2025    CHOL 153 08/14/2024    CHOL 172 02/14/2024     Lab Results   Component Value Date    TRIG 129 02/12/2025    TRIG 209 (H) 08/14/2024    TRIG 223 (H) 02/14/2024     Lab Results   Component Value Date    HDL 26 (L) 02/12/2025    HDL 29 (L) 08/14/2024    HDL 34 (L) 02/14/2024     Lab Results   Component Value Date    LDL 66 02/12/2025    LDL 88 08/14/2024     02/14/2024     Lab Results   Component Value Date    VLDL 23  02/12/2025    VLDL 36 08/14/2024    VLDL 38 02/14/2024     Results for orders placed in visit on 11/21/23    Adult Transthoracic Echo Complete W/ Cont if Necessary Per Protocol    Interpretation Summary  Normal left ventricular systolic function.  No significant valve abnormalities noted.     Impression/Plan:  1.  Chronic diastolic heart failure stable: Continue Lasix 20 mg twice a day.  Monitor BMP.  2.  Mixed hyperlipidemia: Continue Lipitor 80 mg once a day.  Monitor lipid and hepatic profile.  3.  Essential hypertension controlled: Change lisinopril to losartan 50 mg once a day in view of cough .continue bisoprolol 10 mg once a day.  Monitor blood pressure regularly.  4.  Palpitations: Echocardiogram within normal limits.  Continue Zebeta 10 mg once a day.  5.  Precordial atypical chest pain: Stress echocardiogram.           Russell Bledsoe MD   03/25/25   14:37 EDT

## 2025-03-25 ENCOUNTER — OFFICE VISIT (OUTPATIENT)
Dept: FAMILY MEDICINE CLINIC | Facility: CLINIC | Age: 40
End: 2025-03-25
Payer: COMMERCIAL

## 2025-03-25 ENCOUNTER — OFFICE VISIT (OUTPATIENT)
Dept: CARDIOLOGY | Facility: CLINIC | Age: 40
End: 2025-03-25
Payer: COMMERCIAL

## 2025-03-25 VITALS
DIASTOLIC BLOOD PRESSURE: 62 MMHG | HEIGHT: 71 IN | HEART RATE: 98 BPM | WEIGHT: 275 LBS | OXYGEN SATURATION: 99 % | SYSTOLIC BLOOD PRESSURE: 100 MMHG | BODY MASS INDEX: 38.5 KG/M2 | TEMPERATURE: 98.9 F

## 2025-03-25 VITALS
BODY MASS INDEX: 38.36 KG/M2 | HEIGHT: 71 IN | WEIGHT: 274 LBS | HEART RATE: 103 BPM | SYSTOLIC BLOOD PRESSURE: 116 MMHG | DIASTOLIC BLOOD PRESSURE: 87 MMHG

## 2025-03-25 DIAGNOSIS — R07.9 CHEST PAIN, UNSPECIFIED TYPE: ICD-10-CM

## 2025-03-25 DIAGNOSIS — I50.32 DIASTOLIC CHF, CHRONIC: Primary | ICD-10-CM

## 2025-03-25 DIAGNOSIS — R00.2 PALPITATIONS: ICD-10-CM

## 2025-03-25 DIAGNOSIS — I49.9 IRREGULAR HEARTBEAT: ICD-10-CM

## 2025-03-25 DIAGNOSIS — R10.9 RIGHT SIDED ABDOMINAL PAIN: ICD-10-CM

## 2025-03-25 DIAGNOSIS — N28.1 RENAL CYST: ICD-10-CM

## 2025-03-25 DIAGNOSIS — I10 HYPERTENSION, ESSENTIAL: ICD-10-CM

## 2025-03-25 DIAGNOSIS — R00.0 TACHYCARDIA: ICD-10-CM

## 2025-03-25 DIAGNOSIS — R74.8 ELEVATED LIVER ENZYMES: Primary | ICD-10-CM

## 2025-03-25 DIAGNOSIS — E78.2 HYPERLIPEMIA, MIXED: ICD-10-CM

## 2025-03-25 PROCEDURE — 99214 OFFICE O/P EST MOD 30 MIN: CPT | Performed by: SPECIALIST

## 2025-03-25 PROCEDURE — 93000 ELECTROCARDIOGRAM COMPLETE: CPT | Performed by: FAMILY MEDICINE

## 2025-03-25 PROCEDURE — 99214 OFFICE O/P EST MOD 30 MIN: CPT | Performed by: FAMILY MEDICINE

## 2025-03-25 RX ORDER — LOSARTAN POTASSIUM 50 MG/1
50 TABLET ORAL DAILY
Qty: 90 TABLET | Refills: 3 | Status: SHIPPED | OUTPATIENT
Start: 2025-03-25

## 2025-03-25 NOTE — PROGRESS NOTES
Chief Complaint  Constipation (Starting around 2/16/25 patient had an upper respiratory infection and after that developed constipation.  Tried over the counter medications and things started to move but has not gotten back to normal.  Still has some pain and sometimes feels lumps/pain in the tract on his side. )    Subjective        Luiz Christianson presents to [facility]  History of Present Illness  The patient presents for evaluation of right-sided abdominal pain, irregular heartbeat, and elevated liver enzymes.    He has been experiencing right-sided abdominal pain for approximately 1 month, which he attributes to fluid accumulation in his system. The pain is described as variable in intensity and location, sometimes radiating downwards. He reports no use of over-the-counter analgesics for pain management. He has a history of kidney stones and has not undergone a renal protocol MRI. He reports no urinary discomfort but notes increased frequency of urination. He has a history of testicular pain, which has since resolved. He also reports a single episode of hematochezia, likely due to straining during defecation. He experiences nausea when bowel movements are absent but reports no vomiting. He has a history of bronchitis and reports coughing when performing abdominal crunches, which can lead to choking and subsequent nausea. He has a history of constipation dating back to mid-February 2025, which was resolved with over-the-counter medications. Since then, he has been experiencing frequent bowel movements and persistent coughing, leading to the use of core muscles. He notes that fluid intake facilitates easier bowel movements without straining, but due to his history of heart failure, his physician has advised him to limit fluid intake.    He has been informed of elevated liver enzymes and a diagnosis of fatty liver.    His blood pressure has been low since August 2024, a deviation  "from his previously normal readings. He has an EKG scheduled and an echocardiogram to assess for potential pericardial effusion. He reports no chest pain and is compliant with his baby aspirin regimen.    Supplemental Information  He has been prescribed new medications for his diabetes but has not started them due to his current health issues. His appetite is currently diminished.    FAMILY HISTORY  He does not have a family history of bowel issues, liver issues, or kidney issues.    MEDICATIONS  Current: Baby aspirin        Medical History: has a past medical history of Chronic diastolic congestive heart failure (11/18/2020), Diabetes mellitus, type 2 (11/18/2020), Diastolic CHF, chronic  (11/18/2020), Emotional depression, Essential hypertension (11/18/2020), Finger numbness (02/24/2021), Hyperlipidemia, Left wrist pain (02/24/2021), and Migraine (12/07/2020).   Surgical History: has no past surgical history on file.   Family History: family history includes Diabetes in his father, mother, and another family member; Heart disease in an other family member.   Social History: reports that he has never smoked. He has been exposed to tobacco smoke. He has never used smokeless tobacco. He reports that he does not currently use alcohol. He reports that he does not use drugs.  Immunization History   Administered Date(s) Administered    COVID-19 (MODERNA) 1st,2nd,3rd Dose Monovalent 12/06/2021, 01/03/2022    Fluzone (or Fluarix & Flulaval for VFC) >6mos 10/04/2021, 09/19/2022, 02/14/2024    Influenza, Unspecified 11/18/2020    Pneumococcal Conjugate 20-Valent (PCV20) 09/19/2022    Tdap 09/19/2022       Objective   Vital Signs:  /62   Pulse 98   Temp 98.9 °F (37.2 °C)   Ht 180.3 cm (71\")   Wt 125 kg (275 lb)   SpO2 99%   BMI 38.35 kg/m²   Estimated body mass index is 38.35 kg/m² as calculated from the following:    Height as of this encounter: 180.3 cm (71\").    Weight as of this encounter: 125 kg (275 lb).   "   Class 2 Severe Obesity (BMI >=35 and <=39.9). Obesity-related health conditions include the following: hypertension. Obesity is improving with lifestyle modifications. BMI is is above average; BMI management plan is completed. We discussed low calorie, low carb based diet program, portion control, and increasing exercise.      ROS:  Review of Systems   Constitutional:  Negative for fatigue and fever.   HENT:  Negative for congestion and ear pain.    Eyes:  Negative for blurred vision and discharge.   Respiratory:  Negative for cough and shortness of breath.    Cardiovascular:  Negative for chest pain, palpitations and leg swelling.   Gastrointestinal:  Negative for abdominal distention, abdominal pain, constipation and diarrhea.   Genitourinary:  Negative for difficulty urinating and dysuria.   Musculoskeletal:  Negative for back pain and gait problem.   Skin:  Negative for rash and skin lesions.   Neurological:  Negative for headache, memory problem and confusion.   Psychiatric/Behavioral:  Negative for behavioral problems and depressed mood.       Physical Exam  Vitals reviewed.   Constitutional:       Appearance: Normal appearance.   HENT:      Head: Normocephalic.      Right Ear: Tympanic membrane normal.      Left Ear: Tympanic membrane normal.      Nose: Nose normal.      Mouth/Throat:      Mouth: Mucous membranes are moist.   Eyes:      Extraocular Movements: Extraocular movements intact.      Conjunctiva/sclera: Conjunctivae normal.      Pupils: Pupils are equal, round, and reactive to light.   Cardiovascular:      Rate and Rhythm: Regular rhythm. Tachycardia present. Rhythm irregular.      Pulses: Normal pulses.      Heart sounds: Normal heart sounds.   Pulmonary:      Effort: Pulmonary effort is normal.      Breath sounds: Normal breath sounds.   Abdominal:      General: Bowel sounds are normal.      Palpations: Abdomen is soft.      Tenderness: There is rebound. There is no right CVA tenderness or left  CVA tenderness.   Musculoskeletal:         General: Normal range of motion.      Cervical back: Normal range of motion.   Skin:     General: Skin is warm and dry.   Neurological:      General: No focal deficit present.      Mental Status: He is alert and oriented to person, place, and time.   Psychiatric:         Mood and Affect: Mood normal.         Behavior: Behavior normal.       Physical Exam  Lungs were auscultated.  Heart sounds irregular.  Abdomen was examined.    Vital Signs  Blood pressure is 100 systolic.      Result Review     The following data was reviewed by: Malou Vernon MD on 03/25/2025:  Common labs          8/14/2024    15:41 2/12/2025    13:49   Common Labs   Glucose 216  196    BUN 10  13    Creatinine 0.68  1.04    Sodium 134  138    Potassium 4.4  4.4    Chloride 98  98    Calcium 10.4  9.4    Albumin 4.4  4.2    Total Bilirubin 0.4  0.9    Alkaline Phosphatase 117  67    AST (SGOT) 41  28    ALT (SGPT) 66  44    WBC 10.71     Hemoglobin 15.7     Hematocrit 46.5     Platelets 343     Total Cholesterol 153  115    Triglycerides 209  129    HDL Cholesterol 29  26    LDL Cholesterol  88  66    Hemoglobin A1C 9.80  8.40      Results  Laboratory Studies  A1c was 8.4 about a month ago. Liver enzymes have been elevated since 2022.    Imaging  Abdominal CT in 2021 showed kidney stones and cysts.    Testing  Holter monitor showed heart rate went up to 116, average around 79.        ECG 12 Lead    Date/Time: 3/25/2025 5:42 PM  Performed by: Malou Vernon MD    Authorized by: Malou Vernon MD  Comparison: compared with previous ECG from 5/13/2022  Ectopy: atrial premature contractions  Rate: tachycardic    Clinical impression: abnormal EKG            Assessment and Plan    Diagnoses and all orders for this visit:    1. Elevated liver enzymes (Primary)  -     CT Abdomen Pelvis With & Without Contrast; Future    2. Right sided abdominal pain  -     CT Abdomen Pelvis With & Without Contrast;  Future    3. Renal cyst  -     CT Abdomen Pelvis With & Without Contrast; Future    4. Irregular heartbeat  -     ECG 12 Lead  -     Holter Monitor - 72 Hour Up To 15 Days; Future    5. Tachycardia  -     Holter Monitor - 72 Hour Up To 15 Days; Future      Assessment & Plan  1. Right-sided abdominal pain.  The patient's symptoms may be indicative of appendicitis, particularly given the discomfort in the lower right quadrant of the abdomen. A comprehensive CT scan of the abdomen will be conducted to evaluate the condition of the kidneys, liver, and intestines. The patient has been advised to maintain a comfortable sleeping position until the results of the CT scan are available.    2. Irregular heartbeat.  The patient's heart rate exhibits significant variability, with periods of tachycardia followed by bradycardia. This pattern was not observed in previous EKGs, suggesting a recent development. The possibility of atrial fibrillation was also noted. An EKG will be performed today to assess the current rhythm. A Holter monitor will be utilized for further evaluation of the heart rhythm over a period of 3 to 5 days. The patient has been instructed to continue taking baby aspirin as it provides protection against potential clot formation if he is experiencing intermittent atrial fibrillation.    3. Elevated liver enzymes.  The patient's liver enzymes have been consistently elevated since 2022. A CT scan with contrast will be conducted to assess the liver's condition and identify any potential issues.       I spent 35 minutes caring for Luiz on this date of service. This time includes time spent by me in the following activities:reviewing tests  Follow Up   Return in about 4 weeks (around 4/22/2025).  Patient was given instructions and counseling regarding his condition or for health maintenance advice. Please see specific information pulled into the AVS if appropriate.   Patient or patient representative verbalized  consent for the use of Ambient Listening during the visit with  Malou Vernon MD for chart documentation. 3/25/2025  17:07 EDT    Malou Vernon MD

## 2025-03-26 ENCOUNTER — TELEPHONE (OUTPATIENT)
Dept: CARDIOLOGY | Facility: CLINIC | Age: 40
End: 2025-03-26
Payer: COMMERCIAL

## 2025-03-26 RX ORDER — METOPROLOL SUCCINATE 25 MG/1
25 TABLET, EXTENDED RELEASE ORAL NIGHTLY
Qty: 90 TABLET | Refills: 3 | Status: SHIPPED | OUTPATIENT
Start: 2025-03-26

## 2025-03-26 NOTE — TELEPHONE ENCOUNTER
----- Message from Lily Getachew sent at 3/26/2025 12:05 PM EDT -----  I have reviewed the EKGs from Dr Vernon's office visit yesterday, the irregularity and rapid rate are consistent with atrial fibrillation. Holter monitor has been ordered for further evaluation. Stress echo has already been ordered by Dr Bledsoe. Would recommend changing zebeta to Toprol XL 25 mg nightly for better heart rate control. Would also recommend eliquis 5 mg BID in place of aspirin due to chadsvasc score of 3.

## 2025-04-04 ENCOUNTER — APPOINTMENT (OUTPATIENT)
Dept: CT IMAGING | Facility: HOSPITAL | Age: 40
End: 2025-04-04
Payer: COMMERCIAL

## 2025-04-04 ENCOUNTER — HOSPITAL ENCOUNTER (EMERGENCY)
Facility: HOSPITAL | Age: 40
Discharge: HOME | End: 2025-04-04
Attending: EMERGENCY MEDICINE
Payer: COMMERCIAL

## 2025-04-04 VITALS
DIASTOLIC BLOOD PRESSURE: 79 MMHG | OXYGEN SATURATION: 100 % | SYSTOLIC BLOOD PRESSURE: 122 MMHG | RESPIRATION RATE: 18 BRPM | TEMPERATURE: 98.2 F | BODY MASS INDEX: 39.38 KG/M2 | HEART RATE: 72 BPM | HEIGHT: 71 IN | WEIGHT: 281.31 LBS

## 2025-04-04 DIAGNOSIS — N39.0 URINARY TRACT INFECTION WITHOUT HEMATURIA, SITE UNSPECIFIED: ICD-10-CM

## 2025-04-04 DIAGNOSIS — R74.8 ELEVATED LIVER ENZYMES: ICD-10-CM

## 2025-04-04 DIAGNOSIS — R10.84 GENERALIZED ABDOMINAL PAIN: Primary | ICD-10-CM

## 2025-04-04 LAB
ALBUMIN SERPL-MCNC: 4 G/DL (ref 3.5–5.2)
ALBUMIN/GLOB SERPL: 1.2 G/DL
ALP SERPL-CCNC: 81 U/L (ref 39–117)
ALT SERPL W P-5'-P-CCNC: 230 U/L (ref 1–41)
ANION GAP SERPL CALCULATED.3IONS-SCNC: 17.9 MMOL/L (ref 5–15)
AST SERPL-CCNC: 187 U/L (ref 1–40)
BACTERIA UR QL AUTO: ABNORMAL /HPF
BASOPHILS # BLD AUTO: 0.13 10*3/MM3 (ref 0–0.2)
BASOPHILS NFR BLD AUTO: 1 % (ref 0–1.5)
BILIRUB SERPL-MCNC: 1.8 MG/DL (ref 0–1.2)
BILIRUB UR QL STRIP: NEGATIVE
BUN SERPL-MCNC: 17 MG/DL (ref 6–20)
BUN/CREAT SERPL: 13.6 (ref 7–25)
CALCIUM SPEC-SCNC: 9.7 MG/DL (ref 8.6–10.5)
CHLORIDE SERPL-SCNC: 93 MMOL/L (ref 98–107)
CLARITY UR: CLEAR
CO2 SERPL-SCNC: 19.1 MMOL/L (ref 22–29)
COLOR UR: ABNORMAL
CREAT SERPL-MCNC: 1.25 MG/DL (ref 0.76–1.27)
DEPRECATED RDW RBC AUTO: 47.4 FL (ref 37–54)
EGFRCR SERPLBLD CKD-EPI 2021: 74.2 ML/MIN/1.73
EOSINOPHIL # BLD AUTO: 0.2 10*3/MM3 (ref 0–0.4)
EOSINOPHIL NFR BLD AUTO: 1.5 % (ref 0.3–6.2)
ERYTHROCYTE [DISTWIDTH] IN BLOOD BY AUTOMATED COUNT: 14.5 % (ref 12.3–15.4)
GLOBULIN UR ELPH-MCNC: 3.4 GM/DL
GLUCOSE SERPL-MCNC: 166 MG/DL (ref 65–99)
GLUCOSE UR STRIP-MCNC: NEGATIVE MG/DL
GRAN CASTS URNS QL MICRO: ABNORMAL /LPF
HAV IGM SERPL QL IA: NORMAL
HBV CORE IGM SERPL QL IA: NORMAL
HBV SURFACE AG SERPL QL IA: NORMAL
HCT VFR BLD AUTO: 50.5 % (ref 37.5–51)
HCV AB SER QL: NORMAL
HGB BLD-MCNC: 16.3 G/DL (ref 13–17.7)
HGB UR QL STRIP.AUTO: NEGATIVE
HOLD SPECIMEN: NORMAL
HOLD SPECIMEN: NORMAL
HYALINE CASTS UR QL AUTO: ABNORMAL /LPF
IMM GRANULOCYTES # BLD AUTO: 0.1 10*3/MM3 (ref 0–0.05)
IMM GRANULOCYTES NFR BLD AUTO: 0.8 % (ref 0–0.5)
INR PPP: 2.77 (ref 0.86–1.15)
KETONES UR QL STRIP: ABNORMAL
LEUKOCYTE ESTERASE UR QL STRIP.AUTO: ABNORMAL
LIPASE SERPL-CCNC: 45 U/L (ref 13–60)
LYMPHOCYTES # BLD AUTO: 4.03 10*3/MM3 (ref 0.7–3.1)
LYMPHOCYTES NFR BLD AUTO: 31 % (ref 19.6–45.3)
MCH RBC QN AUTO: 29.2 PG (ref 26.6–33)
MCHC RBC AUTO-ENTMCNC: 32.3 G/DL (ref 31.5–35.7)
MCV RBC AUTO: 90.5 FL (ref 79–97)
MONOCYTES # BLD AUTO: 1.46 10*3/MM3 (ref 0.1–0.9)
MONOCYTES NFR BLD AUTO: 11.2 % (ref 5–12)
NEUTROPHILS NFR BLD AUTO: 54.5 % (ref 42.7–76)
NEUTROPHILS NFR BLD AUTO: 7.09 10*3/MM3 (ref 1.7–7)
NITRITE UR QL STRIP: NEGATIVE
NRBC BLD AUTO-RTO: 0 /100 WBC (ref 0–0.2)
PH UR STRIP.AUTO: 5.5 [PH] (ref 5–8)
PLATELET # BLD AUTO: 328 10*3/MM3 (ref 140–450)
PMV BLD AUTO: 10.2 FL (ref 6–12)
POTASSIUM SERPL-SCNC: 4.2 MMOL/L (ref 3.5–5.2)
PROT SERPL-MCNC: 7.4 G/DL (ref 6–8.5)
PROT UR QL STRIP: ABNORMAL
PROTHROMBIN TIME: 30.7 SECONDS (ref 11.8–14.9)
RBC # BLD AUTO: 5.58 10*6/MM3 (ref 4.14–5.8)
RBC # UR STRIP: ABNORMAL /HPF
REF LAB TEST METHOD: ABNORMAL
SODIUM SERPL-SCNC: 130 MMOL/L (ref 136–145)
SP GR UR STRIP: 1.02 (ref 1–1.03)
SQUAMOUS #/AREA URNS HPF: ABNORMAL /HPF
UROBILINOGEN UR QL STRIP: ABNORMAL
WBC # UR STRIP: ABNORMAL /HPF
WBC NRBC COR # BLD AUTO: 13.01 10*3/MM3 (ref 3.4–10.8)
WHOLE BLOOD HOLD COAG: NORMAL
WHOLE BLOOD HOLD SPECIMEN: NORMAL

## 2025-04-04 PROCEDURE — 74177 CT ABD & PELVIS W/CONTRAST: CPT

## 2025-04-04 PROCEDURE — 36415 COLL VENOUS BLD VENIPUNCTURE: CPT

## 2025-04-04 PROCEDURE — 96374 THER/PROPH/DIAG INJ IV PUSH: CPT

## 2025-04-04 PROCEDURE — 85610 PROTHROMBIN TIME: CPT | Performed by: EMERGENCY MEDICINE

## 2025-04-04 PROCEDURE — 25810000003 SODIUM CHLORIDE 0.9 % SOLUTION

## 2025-04-04 PROCEDURE — 80074 ACUTE HEPATITIS PANEL: CPT

## 2025-04-04 PROCEDURE — 83690 ASSAY OF LIPASE: CPT | Performed by: EMERGENCY MEDICINE

## 2025-04-04 PROCEDURE — 99285 EMERGENCY DEPT VISIT HI MDM: CPT

## 2025-04-04 PROCEDURE — 81001 URINALYSIS AUTO W/SCOPE: CPT | Performed by: EMERGENCY MEDICINE

## 2025-04-04 PROCEDURE — 80053 COMPREHEN METABOLIC PANEL: CPT | Performed by: EMERGENCY MEDICINE

## 2025-04-04 PROCEDURE — 25010000002 MORPHINE PER 10 MG: Performed by: EMERGENCY MEDICINE

## 2025-04-04 PROCEDURE — 96375 TX/PRO/DX INJ NEW DRUG ADDON: CPT

## 2025-04-04 PROCEDURE — 85025 COMPLETE CBC W/AUTO DIFF WBC: CPT | Performed by: EMERGENCY MEDICINE

## 2025-04-04 PROCEDURE — 25010000002 CEFTRIAXONE PER 250 MG

## 2025-04-04 PROCEDURE — 25510000001 IOPAMIDOL PER 1 ML: Performed by: EMERGENCY MEDICINE

## 2025-04-04 PROCEDURE — 25010000002 ONDANSETRON PER 1 MG

## 2025-04-04 RX ORDER — IOPAMIDOL 755 MG/ML
100 INJECTION, SOLUTION INTRAVASCULAR
Status: COMPLETED | OUTPATIENT
Start: 2025-04-04 | End: 2025-04-04

## 2025-04-04 RX ORDER — PHENAZOPYRIDINE HYDROCHLORIDE 200 MG/1
200 TABLET, FILM COATED ORAL 3 TIMES DAILY PRN
Qty: 12 TABLET | Refills: 0 | Status: SHIPPED | OUTPATIENT
Start: 2025-04-04

## 2025-04-04 RX ORDER — SODIUM CHLORIDE 0.9 % (FLUSH) 0.9 %
10 SYRINGE (ML) INJECTION AS NEEDED
Status: DISCONTINUED | OUTPATIENT
Start: 2025-04-04 | End: 2025-04-04 | Stop reason: HOSPADM

## 2025-04-04 RX ORDER — ONDANSETRON 4 MG/1
4 TABLET, ORALLY DISINTEGRATING ORAL 4 TIMES DAILY PRN
Qty: 20 TABLET | Refills: 0 | Status: SHIPPED | OUTPATIENT
Start: 2025-04-04

## 2025-04-04 RX ORDER — CEPHALEXIN 500 MG/1
500 CAPSULE ORAL 4 TIMES DAILY
Qty: 28 CAPSULE | Refills: 0 | Status: SHIPPED | OUTPATIENT
Start: 2025-04-04 | End: 2025-04-11

## 2025-04-04 RX ORDER — ONDANSETRON 2 MG/ML
4 INJECTION INTRAMUSCULAR; INTRAVENOUS ONCE
Status: COMPLETED | OUTPATIENT
Start: 2025-04-04 | End: 2025-04-04

## 2025-04-04 RX ADMIN — IOPAMIDOL 95 ML: 755 INJECTION, SOLUTION INTRAVENOUS at 12:09

## 2025-04-04 RX ADMIN — SODIUM CHLORIDE 2000 MG: 9 INJECTION INTRAMUSCULAR; INTRAVENOUS; SUBCUTANEOUS at 15:16

## 2025-04-04 RX ADMIN — MORPHINE SULFATE 4 MG: 4 INJECTION, SOLUTION INTRAMUSCULAR; INTRAVENOUS at 14:12

## 2025-04-04 RX ADMIN — Medication 10 ML: at 11:46

## 2025-04-04 RX ADMIN — SODIUM CHLORIDE 1000 ML: 9 INJECTION, SOLUTION INTRAVENOUS at 13:49

## 2025-04-04 RX ADMIN — ONDANSETRON 4 MG: 2 INJECTION INTRAMUSCULAR; INTRAVENOUS at 11:46

## 2025-04-04 NOTE — ED PROVIDER NOTES
Time: 11:28 AM EDT  Date of encounter:  4/4/2025  Independent Historian/Clinical History and Information was obtained by:   Patient    History is limited by: N/A    Chief Complaint: Abdominal pain      History of Present Illness:  Patient is a 39 y.o. year old male who presents to the emergency department for evaluation of abdominal pain, bilateral flank pain, nausea, diarrhea.  Patient reports he has had bilateral flank pain over the past 2 months, reports he was having some issues with constipation which have now resolved, now he reports having diarrhea today.  Patient also reports intermittent dysuria.  Patient also reports mid upper abdominal pain, nausea for the past 3 days.  Patient denies fevers or chills.      Patient Care Team  Primary Care Provider: Luz Maria White APRN    Past Medical History:     Allergies   Allergen Reactions    Farxiga [Dapagliflozin] Other (See Comments)     Bladder irritation (persistant)     Past Medical History:   Diagnosis Date    Chronic diastolic congestive heart failure 11/18/2020    Diabetes mellitus, type 2 11/18/2020    Diastolic CHF, chronic  11/18/2020    Emotional depression     Essential hypertension 11/18/2020    Finger numbness 02/24/2021    Hyperlipidemia     Left wrist pain 02/24/2021    Migraine 12/07/2020     No past surgical history on file.  Family History   Problem Relation Age of Onset    Diabetes Mother     Diabetes Father     Heart disease Other         AUNT OR UNCLE    Diabetes Other        Home Medications:  Prior to Admission medications    Medication Sig Start Date End Date Taking? Authorizing Provider   apixaban (ELIQUIS) 5 MG tablet tablet Take 1 tablet by mouth 2 (Two) Times a Day. 3/26/25   Lily Chilel APRN   atorvastatin (LIPITOR) 80 MG tablet Take 1 tablet by mouth Daily. 6/21/24   Russell Bledsoe MD   furosemide (LASIX) 20 MG tablet TAKE 1 TABLET BY MOUTH TWICE A DAY 12/26/24   Lily Chilel APRN   Januvia 100 MG  "tablet TAKE 1 TABLET DAILY 11/18/24   Christopher ENRIKE Ayala   losartan (COZAAR) 50 MG tablet Take 1 tablet by mouth Daily. 3/25/25   Russell Bledsoe MD   metFORMIN (GLUCOPHAGE) 500 MG tablet TAKE 2 TABLETS 2 TIMES     DAILY WITH MEALS 11/18/24   Christopher ENRIKE Ayala   metoprolol succinate XL (TOPROL-XL) 25 MG 24 hr tablet Take 1 tablet by mouth Every Night. 3/26/25   Lily Chilel APRN   spironolactone (ALDACTONE) 25 MG tablet Take 1 tablet by mouth Daily. 6/21/24   Russell Bledsoe MD        Social History:   Social History     Tobacco Use    Smoking status: Never     Passive exposure: Past    Smokeless tobacco: Never   Vaping Use    Vaping status: Never Used   Substance Use Topics    Alcohol use: Not Currently    Drug use: Never         Review of Systems:  Review of Systems   Constitutional:  Negative for activity change, appetite change and fever.   HENT:  Negative for congestion and sore throat.    Eyes: Negative.    Respiratory:  Negative for cough and shortness of breath.    Cardiovascular:  Negative for chest pain and leg swelling.   Gastrointestinal:  Positive for abdominal pain, diarrhea and nausea. Negative for vomiting.   Endocrine: Negative.    Genitourinary:  Positive for dysuria and flank pain. Negative for decreased urine volume.   Musculoskeletal:  Negative for neck pain.   Skin:  Negative for rash and wound.   Allergic/Immunologic: Negative.    Neurological:  Negative for weakness, numbness and headaches.   Hematological: Negative.    Psychiatric/Behavioral: Negative.     All other systems reviewed and are negative.       Physical Exam:  /79 (BP Location: Left arm, Patient Position: Sitting)   Pulse 72   Temp 98.2 °F (36.8 °C) (Oral)   Resp 18   Ht 180.3 cm (71\")   Wt 128 kg (281 lb 4.9 oz)   SpO2 100%   BMI 39.23 kg/m²     Physical Exam  Vitals and nursing note reviewed.   Constitutional:       General: He is not in acute distress.     Appearance: Normal " appearance. He is not toxic-appearing.   HENT:      Head: Normocephalic and atraumatic.      Jaw: There is normal jaw occlusion.   Eyes:      General: Lids are normal.      Extraocular Movements: Extraocular movements intact.      Conjunctiva/sclera: Conjunctivae normal.      Pupils: Pupils are equal, round, and reactive to light.   Cardiovascular:      Rate and Rhythm: Normal rate and regular rhythm.      Pulses: Normal pulses.      Heart sounds: Normal heart sounds.   Pulmonary:      Effort: Pulmonary effort is normal. No respiratory distress.      Breath sounds: Normal breath sounds. No wheezing or rhonchi.   Abdominal:      General: Abdomen is flat.      Palpations: Abdomen is soft.      Tenderness: There is abdominal tenderness in the right upper quadrant and epigastric area. There is no guarding or rebound.   Musculoskeletal:         General: Normal range of motion.      Cervical back: Normal range of motion and neck supple.      Right lower leg: No edema.      Left lower leg: No edema.   Skin:     General: Skin is warm and dry.      Coloration: Skin is jaundiced.   Neurological:      Mental Status: He is alert and oriented to person, place, and time. Mental status is at baseline.   Psychiatric:         Mood and Affect: Mood normal.            Medical Decision Making:      Comorbidities that affect care:    Congestive Heart Failure, Diabetes, Hypertension    External Notes reviewed:    Previous Clinic Note: Family medicine office visit from 3/25/2025 when patient was seen for right sided abdominal pain, elevated liver enzymes, renal cyst, an order was placed for CT of the abdomen and pelvis with and without contrast, however this test has not been done yet.      The following orders were placed and all results were independently analyzed by me:  Orders Placed This Encounter   Procedures    CT Abdomen Pelvis With Contrast    [place] Draw    Comprehensive Metabolic Panel    Lipase    Urinalysis With Microscopic  If Indicated (No Culture) - Urine, Clean Catch    CBC Auto Differential    Urinalysis, Microscopic Only - Urine, Clean Catch    Protime-INR    Hepatitis Panel, Acute    Urinalysis With Culture If Indicated -    Ambulatory Referral to Gastroenterology    NPO Diet NPO Type: Strict NPO    Undress & Gown    Gastroenterology (on-call MD unless specified)    Insert Peripheral IV    CBC & Differential    Green Top (Gel)    Lavender Top    Gold Top - SST    Light Blue Top       Medications Given in the Emergency Department:  Medications   sodium chloride 0.9 % flush 10 mL (10 mL Intravenous Given 4/4/25 1146)   ondansetron (ZOFRAN) injection 4 mg (4 mg Intravenous Given 4/4/25 1146)   iopamidol (ISOVUE-370) 76 % injection 100 mL (95 mL Intravenous Given 4/4/25 1209)   sodium chloride 0.9 % bolus 1,000 mL (0 mL Intravenous Stopped 4/4/25 1510)   morphine injection 4 mg (4 mg Intravenous Given 4/4/25 1412)   cefTRIAXone (ROCEPHIN) in NS 2 GRAMS/20ml IV PUSH syringe (2,000 mg Intravenous Given 4/4/25 1516)        ED Course:         Labs:    Lab Results (last 24 hours)       Procedure Component Value Units Date/Time    CBC & Differential [[id number]]  (Abnormal) Collected: 04/04/25 1131    Specimen: Blood Updated: 04/04/25 1140    Narrative:      The following orders were created for panel order CBC & Differential.  Procedure                               Abnormality         Status                     ---------                               -----------         ------                     CBC Auto Differential[[id number]]        Abnormal            Final result                 Please view results for these tests on the individual orders.    Comprehensive Metabolic Panel [[id number]]  (Abnormal) Collected: 04/04/25 1131    Specimen: Blood Updated: 04/04/25 1158     Glucose 166 mg/dL      BUN 17 mg/dL      Creatinine 1.25 mg/dL      Sodium 130 mmol/L      Potassium 4.2 mmol/L      Chloride 93 mmol/L      CO2 19.1 mmol/L      Calcium  9.7 mg/dL      Total Protein 7.4 g/dL      Albumin 4.0 g/dL      ALT (SGPT) 230 U/L      AST (SGOT) 187 U/L      Alkaline Phosphatase 81 U/L      Total Bilirubin 1.8 mg/dL      Globulin 3.4 gm/dL      A/G Ratio 1.2 g/dL      BUN/Creatinine Ratio 13.6     Anion Gap 17.9 mmol/L      eGFR 74.2 mL/min/1.73     Narrative:      GFR Categories in Chronic Kidney Disease (CKD)      GFR Category          GFR (mL/min/1.73)    Interpretation  G1                     90 or greater         Normal or high (1)  G2                      60-89                Mild decrease (1)  G3a                   45-59                Mild to moderate decrease  G3b                   30-44                Moderate to severe decrease  G4                    15-29                Severe decrease  G5                    14 or less           Kidney failure          (1)In the absence of evidence of kidney disease, neither GFR category G1 or G2 fulfill the criteria for CKD.    eGFR calculation 2021 CKD-EPI creatinine equation, which does not include race as a factor    Lipase [[id number]]  (Normal) Collected: 04/04/25 1131    Specimen: Blood Updated: 04/04/25 1158     Lipase 45 U/L     Urinalysis With Microscopic If Indicated (No Culture) - Urine, Clean Catch [[id number]]  (Abnormal) Collected: 04/04/25 1131    Specimen: Urine, Clean Catch Updated: 04/04/25 1221     Color, UA Dark Yellow     Appearance, UA Clear     pH, UA 5.5     Specific Gravity, UA 1.016     Glucose, UA Negative     Ketones, UA Trace     Bilirubin, UA Negative     Blood, UA Negative     Protein, UA >=300 mg/dL (3+)     Leuk Esterase, UA Small (1+)     Nitrite, UA Negative     Urobilinogen, UA 1.0 E.U./dL    CBC Auto Differential [[id number]]  (Abnormal) Collected: 04/04/25 1131    Specimen: Blood Updated: 04/04/25 1140     WBC 13.01 10*3/mm3      RBC 5.58 10*6/mm3      Hemoglobin 16.3 g/dL      Hematocrit 50.5 %      MCV 90.5 fL      MCH 29.2 pg      MCHC 32.3 g/dL      RDW 14.5 %      RDW-SD  47.4 fl      MPV 10.2 fL      Platelets 328 10*3/mm3      Neutrophil % 54.5 %      Lymphocyte % 31.0 %      Monocyte % 11.2 %      Eosinophil % 1.5 %      Basophil % 1.0 %      Immature Grans % 0.8 %      Neutrophils, Absolute 7.09 10*3/mm3      Lymphocytes, Absolute 4.03 10*3/mm3      Monocytes, Absolute 1.46 10*3/mm3      Eosinophils, Absolute 0.20 10*3/mm3      Basophils, Absolute 0.13 10*3/mm3      Immature Grans, Absolute 0.10 10*3/mm3      nRBC 0.0 /100 WBC     Urinalysis, Microscopic Only - Urine, Clean Catch [[id number]]  (Abnormal) Collected: 04/04/25 1131    Specimen: Urine, Clean Catch Updated: 04/04/25 1221     RBC, UA None Seen /HPF      WBC, UA 6-10 /HPF      Bacteria, UA Trace /HPF      Squamous Epithelial Cells, UA 0-2 /HPF      Hyaline Casts, UA 3-6 /LPF      Granular Casts, UA 0-2 /LPF      Methodology Manual Light Microscopy    Protime-INR [[id number]]  (Abnormal) Collected: 04/04/25 1417    Specimen: Blood Updated: 04/04/25 1501     Protime 30.7 Seconds      INR 2.77    Narrative:      Suggested Therapeutic Ranges For Oral Anticoagulant Therapy:  Level of Therapy                      INR Target Range  Standard Dose                            2.0-3.0  High Dose                                2.5-3.5  Patients not receiving anticoagulant  Therapy Normal Range                     0.86-1.15    Hepatitis Panel, Acute [[id number]] Collected: 04/04/25 1510    Specimen: Blood Updated: 04/04/25 1512             Imaging:    CT Abdomen Pelvis With Contrast  Result Date: 4/4/2025  CT ABDOMEN PELVIS W CONTRAST Date of Exam: 4/4/2025 12:07 PM EDT Indication: abdominal pain, bilateral flank pain abdominal pain. Comparison: 12/14/2021 and prior Technique: Axial CT images were obtained of the abdomen and pelvis after the uneventful intravenous administration of iodinated contrast. Reconstructed coronal and sagittal images were also obtained. Automated exposure control and iterative construction methods were used.  FINDINGS: Abdomen/Pelvis: Lower Chest: Limited imaging lung bases demonstrates evidence of old granulomatous disease. No free air noted below the diaphragm. Small pericardial effusion is noted slightly increased in comparison. Organs: Liver is somewhat heterogeneous in appearance and diffusely decreased in attenuation. Subtle nodular contour of the margin of the liver is noted. No definite focal lesion Benefiber. The gallbladder is decompressed, contracted. Small to moderate amount of ascites noted. Spleen is normal in size and appearance. The kidneys demonstrate some tiny subcentimeter low-attenuation lesions too small to characterize but statistically likely represent cysts. The pancreas and adrenal glands are grossly unremarkable. Lymph nodes are noted within the naga hepatic region which may be related to underlying liver dysfunction GI/Bowel: The stomach and small bowel demonstrate no definite acute abnormality. Small lymph nodes within the mesentery likely reactive. Ascites noted as mentioned above. The ileocecal valve is grossly unremarkable in appearance. The appendix is visualized and appears within normal limits. The colon demonstrates no definite acute abnormality Pelvis: Bladder is incompletely distended with diffuse wall thickening. Perivesicular stranding is nonspecific. Fat-containing left inguinal hernia noted. Peritoneum/Retroperitoneum: The aorta is normal in caliber. 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 Small lymph nodes within the retroperitoneum are likely reactive Bones/Soft Tissues: No acute osseous abnormality. Trace body wall anasarca.     1.Heterogeneous appearance of the liver with suggestion of a subtle nodular contour. Findings suggest underlying cirrhosis. Please correlate with liver function tests. 2.Small to moderate amount of ascites. 3.Diffuse wall thickening of the urinary bladder. Please correlate with urinalysis. 4.Other incidental findings as  above. Electronically Signed: Jose Quiñonez MD  4/4/2025 12:39 PM EDT  Workstation ID: [id number]        Differential Diagnosis and Discussion:    Abdominal Pain: Based on the patient's signs and symptoms, I considered abdominal aortic aneurysm, small bowel obstruction, pancreatitis, acute cholecystitis, acute appendecitis, peptic ulcer disease, gastritis, colitis, endocrine disorders, irritable bowel syndrome and other differential diagnosis an etiology of the patient's abdominal pain.  Flank Pain: Differential diagnosis includes but is not limited to kidney stones, pyelonephritis, musculoskeletal disorders, renal infarction, urinary tract infection, hydronephrosis, radiculopathy, aortic aneurysm, renal cell carcinoma.    PROCEDURES:    Labs were collected in the emergency department and all labs were reviewed and interpreted by me.  CT scan was performed in the emergency department and the CT scan radiology impression was interpreted by me.    No orders to display       Procedures    MDM     Amount and/or Complexity of Data Reviewed  Clinical lab tests: reviewed  Tests in the radiology section of CPT®: reviewed  Decide to obtain previous medical records or to obtain history from someone other than the patient: yes       The patient is resting comfortably and feels better, is alert and in no distress. Repeat examination is unremarkable and benign; in particular, there's no discomfort at McBurney's point and there is no pulsatile mass. The history, exam, diagnostic testing, and current condition does not suggest acute appendicitis, bowel obstruction, acute cholecystitis, bowel perforation, major gastrointestinal bleeding, severe diverticulitis, abdominal aortic aneurysm, mesenteric ischemia, volvulus, sepsis, or other significant pathology that warrants further testing, continued ED treatment, admission, for surgical evaluation at this point. The vital signs have been stable. The patient does not have  uncontrollable pain, intractable vomiting, or other significant symptoms. The patient's condition is stable and appropriate for discharge from the emergency department.  CT of the abdomen pelvis shows wall thickening of the bladder and urinalysis correlates with urinary tract infection with small amount of leukocytes and 6-10 WBC, patient also has urinary symptoms and WBC of 13.  Will start on antibiotics and send urine culture.  Discussed CT findings of possible cirrhosis and elevated liver enzymes with Dr. Irving with gastroenterology.  He states patient will need to follow-up with him as an outpatient.               Patient Care Considerations:    SEPSIS was considered but is NOT present in the emergency department as SIRS criteria is not present.      Consultants/Shared Management Plan:    Consultant: I have discussed the case with Dr. Irving with gastroenterology who states patient will need to follow-up with him as an outpatient to monitor liver enzymes, also recommends hepatitis panel.    Social Determinants of Health:    Patient is independent, reliable, and has access to care.       Disposition and Care Coordination:    Discharged: I considered escalation of care by admitting this patient to the hospital, however patient's pain is controlled, patient is not septic, is appropriate for treatment as an outpatient.    I have explained the patient´s condition, diagnoses and treatment plan based on the information available to me at this time. I have answered questions and addressed any concerns. The patient has a good  understanding of the patient´s diagnosis, condition, and treatment plan as can be expected at this point. The vital signs have been stable. The patient´s condition is stable and appropriate for discharge from the emergency department.      The patient will pursue further outpatient evaluation with the primary care physician or other designated or consulting physician as outlined in the discharge  instructions. They are agreeable to this plan of care and follow-up instructions have been explained in detail. The patient has received these instructions in written format and has expressed an understanding of the discharge instructions. The patient is aware that any significant change in condition or worsening of symptoms should prompt an immediate return to this or the closest emergency department or call to 911.  I have explained discharge medications and the need for follow up with the patient/caretakers. This was also printed in the discharge instructions. Patient was discharged with the following medications and follow up:      Medication List        New Prescriptions      cephalexin 500 MG capsule  Commonly known as: KEFLEX  Take 1 capsule by mouth 4 (Four) Times a Day for 7 days.     ondansetron ODT 4 MG disintegrating tablet  Commonly known as: ZOFRAN-ODT  Place 1 tablet on the tongue 4 (Four) Times a Day As Needed for Nausea or Vomiting.     phenazopyridine 200 MG tablet  Commonly known as: PYRIDIUM  Take 1 tablet by mouth 3 (Three) Times a Day As Needed for Bladder Spasms or Dysuria.               Where to Get Your Medications        These medications were sent to [facility]/pharmacy #[id number] - [place], [place] - [address] - [phone]  - [phone] FX  [address] [place] [address]      Hours: 24-hours Phone: [phone]   cephalexin 500 MG capsule  ondansetron ODT 4 MG disintegrating tablet  phenazopyridine 200 MG tablet      Dave Irving MD  [address] [facility]  [place] [id number] [phone]             Final diagnoses:   Generalized abdominal pain   Urinary tract infection without hematuria, site unspecified   Elevated liver enzymes        ED Disposition       ED Disposition   Discharge    Condition   Stable    Comment   --               This medical record created using voice recognition software.             Yue Burkett, ENRIKE  04/04/25 [id number]

## 2025-04-04 NOTE — DISCHARGE INSTRUCTIONS
Continue taking the antibiotics until the entire course is finished.  You may take the Pyridium to help with your urinary symptoms.  A referral was sent to gastroenterology and their office should be contacting you to set up an appointment.  If you do not hear back from them in the next couple of days you should give them a call.

## 2025-04-06 ENCOUNTER — TELEMEDICINE (OUTPATIENT)
Dept: FAMILY MEDICINE CLINIC | Facility: TELEHEALTH | Age: 40
End: 2025-04-06
Payer: COMMERCIAL

## 2025-04-06 DIAGNOSIS — R10.9 BILATERAL FLANK PAIN: Primary | ICD-10-CM

## 2025-04-06 DIAGNOSIS — K59.00 CONSTIPATION, UNSPECIFIED CONSTIPATION TYPE: ICD-10-CM

## 2025-04-06 RX ORDER — POLYETHYLENE GLYCOL 3350 17 G/17G
17 POWDER, FOR SOLUTION ORAL DAILY
Qty: 30 EACH | Refills: 0 | Status: SHIPPED | OUTPATIENT
Start: 2025-04-06

## 2025-04-06 NOTE — PROGRESS NOTES
You have chosen to receive care through a telehealth visit.  Do you consent to use a video/audio connection for your medical care today? Yes     HPI  History of Present Illness  The patient presents via virtual visit for evaluation of abdominal pain.    He sought emergency care on 04/04/2025 due to abdominal pain, which was investigated with a CT scan revealing intestinal wall thickening. He was diagnosed with a urinary tract infection (UTI) and initiated on cephalexin, to be taken 4 times daily for 7 days, and Pyridium. His pain has slightly intensified since the ED visit, localized to his back along the spine and bilateral flanks. He rates his current pain as 3 out of 10, down from a peak of 4 to 5 late last night. He reports mild sweating, which he attributes to a hot bath taken the previous night. He does not recall if a fever was reported during his ED visit. He has a history of kidney stones, which have previously passed naturally.    He experiences nausea, particularly when straining during bowel movements or experiencing drainage issues. He took his first dose of Zofran last night, which he found beneficial. He does not experience dysuria but reports pain during defecation. He has a history of constipation, which has resolved, but he still requires significant effort for bowel movements. He consumed prunes last night, which caused bloating.   He is scheduled for a follow-up with a gastroenterologist. He has Dulcolax at for constipation but has not used this.     MEDICATIONS  cephalexin, Pyridium, Zofran, Aldactone, metoprolol, losartan, Januvia, Lasix    Review of Systems: See HPI    Past Medical History:   Diagnosis Date    Chronic diastolic congestive heart failure 11/18/2020    Diabetes mellitus, type 2 11/18/2020    Diastolic CHF, chronic  11/18/2020    Emotional depression     Essential hypertension 11/18/2020    Finger numbness 02/24/2021    Hyperlipidemia     Left wrist pain 02/24/2021    Migraine  12/07/2020       Family History   Problem Relation Age of Onset    Diabetes Mother     Diabetes Father     Heart disease Other         AUNT OR UNCLE    Diabetes Other        Social History     Socioeconomic History    Marital status:    Tobacco Use    Smoking status: Never     Passive exposure: Past    Smokeless tobacco: Never   Vaping Use    Vaping status: Never Used   Substance and Sexual Activity    Alcohol use: Not Currently    Drug use: Never    Sexual activity: Yes     Partners: Female     Birth control/protection: Injection         There were no vitals taken for this visit.    PHYSICAL EXAM  Virtual Visit Physical Exam  Physical Exam      Diagnoses and all orders for this visit:    1. Bilateral flank pain (Primary)    2. Constipation, unspecified constipation type  -     polyethylene glycol (MIRALAX) 17 g packet; Take 17 g by mouth Daily.  Dispense: 30 each; Refill: 0    May take Tylenol 500 mg every 4-6 hours prn pain.   Increase water   Assessment & Plan  1. Abdominal pain.  The etiology of the pain remains uncertain, with potential causes including gas or constipation. The possibility of an intestinal stomach virus is considered less likely. He is currently on cephalexin for a urinary tract infection and Zofran 4 mg for nausea. He is advised to take his medications with food. A prescription for MiraLAX will be provided. He is encouraged to maintain a diet consisting primarily of clear liquids, such as the BRAT diet (bananas, rice, apples, toast), baked chicken, and other bland foods that are gentle on the stomach. He is also advised to increase his water intake. If the Zofran 4 mg proves ineffective, he may increase the dosage to 8 mg. Should the pain intensify, he is instructed to return to the emergency department.    2. Constipation.  He is advised to start taking a stool softener today. If the stool softener does not alleviate the symptoms, he can use MiraLAX, which will be called in for  him.      FOLLOW-UP  As discussed during visit with [facility], if symptoms worsen or fail to improve, follow-up with PCP/Urgent Care/Emergency Department.    Patient verbalizes understanding of medications, instructions for treatment and follow-up.    Patient or patient representative verbalized consent for the use of Ambient Listening during the visit with  ENRIKE Campos for chart documentation. 4/6/2025  08:31 EDT    ENRIKE Campos  04/06/2025  08:31 EDT    The use of a video visit has been reviewed with the patient and verbal informed consent has been obtained. Myself and Luiz Christianson participated in this visit. The patient is located in [place], [place], and I am located in [place], [place]. [facility] and [facility] were utilized.

## 2025-04-08 ENCOUNTER — LAB (OUTPATIENT)
Dept: LAB | Facility: HOSPITAL | Age: 40
End: 2025-04-08
Payer: COMMERCIAL

## 2025-04-08 ENCOUNTER — OFFICE VISIT (OUTPATIENT)
Dept: GASTROENTEROLOGY | Facility: CLINIC | Age: 40
End: 2025-04-08
Payer: COMMERCIAL

## 2025-04-08 ENCOUNTER — HOSPITAL ENCOUNTER (OUTPATIENT)
Facility: HOSPITAL | Age: 40
Discharge: HOME | End: 2025-04-08
Payer: COMMERCIAL

## 2025-04-08 VITALS
DIASTOLIC BLOOD PRESSURE: 86 MMHG | WEIGHT: 286.6 LBS | OXYGEN SATURATION: 99 % | BODY MASS INDEX: 40.12 KG/M2 | HEART RATE: 71 BPM | HEIGHT: 71 IN | SYSTOLIC BLOOD PRESSURE: 119 MMHG

## 2025-04-08 VITALS — HEIGHT: 69 IN | BODY MASS INDEX: 42.36 KG/M2 | WEIGHT: 286 LBS

## 2025-04-08 DIAGNOSIS — R07.9 CHEST PAIN, UNSPECIFIED TYPE: ICD-10-CM

## 2025-04-08 DIAGNOSIS — R18.8 CIRRHOSIS OF LIVER WITH ASCITES, UNSPECIFIED HEPATIC CIRRHOSIS TYPE: ICD-10-CM

## 2025-04-08 DIAGNOSIS — R17 ELEVATED BILIRUBIN: ICD-10-CM

## 2025-04-08 DIAGNOSIS — K74.60 CIRRHOSIS OF LIVER WITH ASCITES, UNSPECIFIED HEPATIC CIRRHOSIS TYPE: ICD-10-CM

## 2025-04-08 DIAGNOSIS — R11.2 NAUSEA AND VOMITING, UNSPECIFIED VOMITING TYPE: ICD-10-CM

## 2025-04-08 DIAGNOSIS — R79.89 ELEVATED LIVER FUNCTION TESTS: Primary | ICD-10-CM

## 2025-04-08 LAB
ALPHA-FETOPROTEIN: <2 NG/ML (ref 0–8.3)
AMMONIA BLD-SCNC: 23 UMOL/L (ref 16–60)
AORTIC DIMENSIONLESS INDEX: 0.63 (DI)
ASCENDING AORTA: 2.7 CM
AV MEAN PRESS GRAD SYS DOP V1V2: 4.3 MMHG
AV VMAX SYS DOP: 120.8 CM/SEC
BH CV ECHO MEAS - AO MAX PG: 5.8 MMHG
BH CV ECHO MEAS - AO ROOT DIAM: 2.24 CM
BH CV ECHO MEAS - AO V2 VTI: 21.3 CM
BH CV ECHO MEAS - AVA(I,D): 1.9 CM2
BH CV ECHO MEAS - EDV(CUBED): 202.4 ML
BH CV ECHO MEAS - EDV(MOD-SP2): 182 ML
BH CV ECHO MEAS - EDV(MOD-SP4): 212 ML
BH CV ECHO MEAS - EF(MOD-SP2): 12.1 %
BH CV ECHO MEAS - EF(MOD-SP4): 23.1 %
BH CV ECHO MEAS - ESV(CUBED): 169.4 ML
BH CV ECHO MEAS - ESV(MOD-SP2): 160 ML
BH CV ECHO MEAS - ESV(MOD-SP4): 163 ML
BH CV ECHO MEAS - FS: 5.8 %
BH CV ECHO MEAS - IVS/LVPW: 1.01 CM
BH CV ECHO MEAS - IVSD: 1.04 CM
BH CV ECHO MEAS - LAT PEAK E' VEL: 11.6 CM/SEC
BH CV ECHO MEAS - LV DIASTOLIC VOL/BSA (35-75): 87.2 CM2
BH CV ECHO MEAS - LV MASS(C)D: 249.6 GRAMS
BH CV ECHO MEAS - LV MAX PG: 2.41 MMHG
BH CV ECHO MEAS - LV MEAN PG: 1.39 MMHG
BH CV ECHO MEAS - LV SYSTOLIC VOL/BSA (12-30): 67.1 CM2
BH CV ECHO MEAS - LV V1 MAX: 77.7 CM/SEC
BH CV ECHO MEAS - LV V1 VTI: 13.4 CM
BH CV ECHO MEAS - LVIDD: 5.9 CM
BH CV ECHO MEAS - LVIDS: 5.5 CM
BH CV ECHO MEAS - LVOT AREA: 3 CM2
BH CV ECHO MEAS - LVOT DIAM: 1.96 CM
BH CV ECHO MEAS - LVPWD: 1.03 CM
BH CV ECHO MEAS - MED PEAK E' VEL: 9.2 CM/SEC
BH CV ECHO MEAS - MR MAX PG: 49.1 MMHG
BH CV ECHO MEAS - MR MAX VEL: 349.4 CM/SEC
BH CV ECHO MEAS - MV DEC SLOPE: 1412 CM/SEC2
BH CV ECHO MEAS - MV DEC TIME: 0.09 SEC
BH CV ECHO MEAS - MV E MAX VEL: 123 CM/SEC
BH CV ECHO MEAS - RAP SYSTOLE: 15 MMHG
BH CV ECHO MEAS - RVSP: 40 MMHG
BH CV ECHO MEAS - SV(LVOT): 40.4 ML
BH CV ECHO MEAS - SV(MOD-SP2): 22 ML
BH CV ECHO MEAS - SV(MOD-SP4): 49 ML
BH CV ECHO MEAS - SVI(LVOT): 16.6 ML/M2
BH CV ECHO MEAS - SVI(MOD-SP2): 9.1 ML/M2
BH CV ECHO MEAS - SVI(MOD-SP4): 20.2 ML/M2
BH CV ECHO MEAS - TR MAX PG: 25.2 MMHG
BH CV ECHO MEAS - TR MAX VEL: 251.1 CM/SEC
BH CV ECHO MEASUREMENTS AVERAGE E/E' RATIO: 11.83
BH CV XLRA - TDI S': 10.3 CM/SEC
BILIRUB CONJ SERPL-MCNC: 0.6 MG/DL (ref 0–0.3)
DEPRECATED RDW RBC AUTO: 42.7 FL (ref 37–54)
ERYTHROCYTE [DISTWIDTH] IN BLOOD BY AUTOMATED COUNT: 13.3 % (ref 12.3–15.4)
HAV IGM SERPL QL IA: NORMAL
HBV CORE IGM SERPL QL IA: NORMAL
HBV SURFACE AG SERPL QL IA: NORMAL
HCT VFR BLD AUTO: 47 % (ref 37.5–51)
HCV AB SER QL: NORMAL
HGB BLD-MCNC: 15 G/DL (ref 13–17.7)
INR PPP: 2.56 (ref 0.86–1.15)
LEFT ATRIUM VOLUME INDEX: 31.6 ML/M2
LV EF BIPLANE MOD: 18.6 %
MCH RBC QN AUTO: 28.2 PG (ref 26.6–33)
MCHC RBC AUTO-ENTMCNC: 31.9 G/DL (ref 31.5–35.7)
MCV RBC AUTO: 88.5 FL (ref 79–97)
PLATELET # BLD AUTO: 352 10*3/MM3 (ref 140–450)
PMV BLD AUTO: 10.5 FL (ref 6–12)
PROTHROMBIN TIME: 28.9 SECONDS (ref 11.8–14.9)
RBC # BLD AUTO: 5.31 10*6/MM3 (ref 4.14–5.8)
WBC NRBC COR # BLD AUTO: 10.97 10*3/MM3 (ref 3.4–10.8)

## 2025-04-08 PROCEDURE — 85610 PROTHROMBIN TIME: CPT

## 2025-04-08 PROCEDURE — 80053 COMPREHEN METABOLIC PANEL: CPT

## 2025-04-08 PROCEDURE — 82525 ASSAY OF COPPER: CPT

## 2025-04-08 PROCEDURE — 82103 ALPHA-1-ANTITRYPSIN TOTAL: CPT

## 2025-04-08 PROCEDURE — 82105 ALPHA-FETOPROTEIN SERUM: CPT

## 2025-04-08 PROCEDURE — 82784 ASSAY IGA/IGD/IGG/IGM EACH: CPT

## 2025-04-08 PROCEDURE — 86334 IMMUNOFIX E-PHORESIS SERUM: CPT

## 2025-04-08 PROCEDURE — 86038 ANTINUCLEAR ANTIBODIES: CPT

## 2025-04-08 PROCEDURE — 82104 ALPHA-1-ANTITRYPSIN PHENO: CPT

## 2025-04-08 PROCEDURE — 93306 TTE W/DOPPLER COMPLETE: CPT

## 2025-04-08 PROCEDURE — 80074 ACUTE HEPATITIS PANEL: CPT

## 2025-04-08 PROCEDURE — 82390 ASSAY OF CERULOPLASMIN: CPT

## 2025-04-08 PROCEDURE — 93306 TTE W/DOPPLER COMPLETE: CPT | Performed by: SPECIALIST

## 2025-04-08 PROCEDURE — 85027 COMPLETE CBC AUTOMATED: CPT

## 2025-04-08 PROCEDURE — 82248 BILIRUBIN DIRECT: CPT

## 2025-04-08 PROCEDURE — 82728 ASSAY OF FERRITIN: CPT

## 2025-04-08 PROCEDURE — 84165 PROTEIN E-PHORESIS SERUM: CPT

## 2025-04-08 PROCEDURE — 36415 COLL VENOUS BLD VENIPUNCTURE: CPT

## 2025-04-08 PROCEDURE — 99214 OFFICE O/P EST MOD 30 MIN: CPT

## 2025-04-08 PROCEDURE — 84466 ASSAY OF TRANSFERRIN: CPT

## 2025-04-08 PROCEDURE — 83540 ASSAY OF IRON: CPT

## 2025-04-08 PROCEDURE — 25510000001 PERFLUTREN 6.52 MG/ML SUSPENSION: Performed by: SPECIALIST

## 2025-04-08 PROCEDURE — 82140 ASSAY OF AMMONIA: CPT

## 2025-04-08 PROCEDURE — 86381 MITOCHONDRIAL ANTIBODY EACH: CPT

## 2025-04-08 PROCEDURE — 86015 ACTIN ANTIBODY EACH: CPT

## 2025-04-08 RX ORDER — LACTULOSE 10 G/15ML
SOLUTION ORAL
Qty: 900 ML | Refills: 1 | Status: ON HOLD | OUTPATIENT
Start: 2025-04-08

## 2025-04-08 RX ADMIN — PERFLUTREN 9.78 MG: 6.52 INJECTION, SUSPENSION INTRAVENOUS at 13:46

## 2025-04-08 NOTE — PROGRESS NOTES
Chief Complaint     Elevated Hepatic Enzymes, Bloated (Pt states he has bloating at night time, and makes it difficult to sleep ), and Abdominal Pain (RLQ intermittent pain, pt states it ranges from a dull pain to a sharp pain)    Patient or patient representative verbalized consent for the use of Ambient Listening during the visit with  ENRIKE Crisostomo for chart documentation. 4/9/2025  10:49 EDT      History of Present Illness     Luiz Christianson is a 39 y.o. male who presents to [facility] on referral from ENRIKE Gonzalez for a gastroenterology evaluation of elevated liver enzymes.      History of Present Illness  The patient is a 39-year-old male who presents for evaluation of elevated liver enzymes.    He was recently evaluated in the emergency room on 04/04/2025 due to generalized abdominal pain, which he describes as intermittent and localized to the right upper quadrant, lower abdomen, and lower back. The most severe discomfort is experienced in the flanks and back, which he attributes to a sensation of accumulated pressure. He reports no alcohol consumption outside of work functions, with the last instance being in 11/2024. He does not use intravenous drugs or have unprofessional tattoos. He has no history of hepatitis exposure or blood transfusions. He does not use herbal medications and only takes prescribed medications. His pain fluctuates, with periods of no pain followed by dull or acute pain, currently experiencing no pain. He reports no itching but notes increased abdominal swelling, particularly at night, which disrupts his sleep. He has noticed increased bruising and bleeding, which he attributes to his recent initiation of Eliquis. His bowel movements occur 2 to 3 times daily, often coinciding with urination. He reports no cognitive issues such as disorientation or memory loss. He has been informed of a fatty liver diagnosis by his general  practitioner in the past. He has not undergone any upper or lower endoscopy procedures. He has been under significant stress due to work and health concerns. He reports no black, tarry stools but notes occasional bright red blood in his stool when it is dry and hard. He reports no heartburn but admits to weight gain, from 265 pounds in 02/2025 to nearly 290 pounds currently. He has been experiencing nausea and vomiting, particularly after consuming processed foods, and has requested his wife to avoid these. He has been experiencing dizziness, which he believes is a side effect of his new medications. He uses acetaminophen for pain management but has not used it recently due to concerns about potential side effects. He has not been prescribed any gastrointestinal medications. He is currently on Lasix and Aldactone and has been avoiding salt in his diet.    SOCIAL HISTORY  He rarely drinks alcohol, only at work functions, with the last one being in November. He does not use illicit drugs.    FAMILY HISTORY  He reports no family history of liver disease, colon cancer, or colon polyps.    MEDICATIONS  Current: Eliquis, metoprolol, losartan, Lasix, Aldactone       History      Past Medical History:   Diagnosis Date    Chronic diastolic congestive heart failure 11/18/2020    Diabetes mellitus, type 2 11/18/2020    Diastolic CHF, chronic  11/18/2020    Emotional depression     Essential hypertension 11/18/2020    Finger numbness 02/24/2021    Hyperlipidemia     Left wrist pain 02/24/2021    Migraine 12/07/2020       History reviewed. No pertinent surgical history.    Family History   Problem Relation Age of Onset    Diabetes Mother     Diabetes Father     Heart disease Other         AUNT OR UNCLE    Diabetes Other     Colon cancer Neg Hx         Current Medications        Current Outpatient Medications:     apixaban (ELIQUIS) 5 MG tablet tablet, Take 1 tablet by mouth 2 (Two) Times a Day., Disp: 180 tablet, Rfl: 3     atorvastatin (LIPITOR) 80 MG tablet, Take 1 tablet by mouth Daily., Disp: 90 tablet, Rfl: 3    cephalexin (KEFLEX) 500 MG capsule, Take 1 capsule by mouth 4 (Four) Times a Day for 7 days., Disp: 28 capsule, Rfl: 0    furosemide (LASIX) 20 MG tablet, TAKE 1 TABLET BY MOUTH TWICE A DAY, Disp: 180 tablet, Rfl: 3    Januvia 100 MG tablet, TAKE 1 TABLET DAILY, Disp: 90 tablet, Rfl: 3    losartan (COZAAR) 50 MG tablet, Take 1 tablet by mouth Daily., Disp: 90 tablet, Rfl: 3    metFORMIN (GLUCOPHAGE) 500 MG tablet, TAKE 2 TABLETS 2 TIMES     DAILY WITH MEALS, Disp: 360 tablet, Rfl: 3    metoprolol succinate XL (TOPROL-XL) 25 MG 24 hr tablet, Take 1 tablet by mouth Every Night., Disp: 90 tablet, Rfl: 3    ondansetron ODT (ZOFRAN-ODT) 4 MG disintegrating tablet, Place 1 tablet on the tongue 4 (Four) Times a Day As Needed for Nausea or Vomiting., Disp: 20 tablet, Rfl: 0    phenazopyridine (PYRIDIUM) 200 MG tablet, Take 1 tablet by mouth 3 (Three) Times a Day As Needed for Bladder Spasms or Dysuria., Disp: 12 tablet, Rfl: 0    spironolactone (ALDACTONE) 25 MG tablet, Take 1 tablet by mouth Daily., Disp: 90 tablet, Rfl: 3    lactulose (CHRONULAC) 10 GM/15ML solution, Take 15-30 mls daily for a goal of 2-3 BM/day, Disp: 900 mL, Rfl: 1    polyethylene glycol (MIRALAX) 17 g packet, Take 17 g by mouth Daily. (Patient not taking: Reported on 4/8/2025), Disp: 30 each, Rfl: 0  No current facility-administered medications for this visit.     Allergies     Allergies   Allergen Reactions    Farxiga [Dapagliflozin] Other (See Comments)     Bladder irritation (persistant)       Social History       Social History     Social History Narrative    Not on file       Immunizations     Immunization:  Immunization History   Administered Date(s) Administered    COVID-19 (MODERNA) 1st,2nd,3rd Dose Monovalent 12/06/2021, 01/03/2022    Fluzone (or Fluarix & Flulaval for VFC) >6mos 10/04/2021, 09/19/2022, 02/14/2024    Influenza, Unspecified 11/18/2020  "   Pneumococcal Conjugate 20-Valent (PCV20) 09/19/2022    Tdap 09/19/2022          Objective     Objective     Vital Signs:   /86 (BP Location: Left arm, Patient Position: Sitting, Cuff Size: Large Adult)   Pulse 71   Ht 180.3 cm (71\")   Wt 130 kg (286 lb 9.6 oz)   SpO2 99%   BMI 39.97 kg/m²       Physical Exam  HENT:      Head: Normocephalic.   Eyes:      General: Scleral icterus present.   Cardiovascular:      Rate and Rhythm: Normal rate.   Pulmonary:      Effort: Pulmonary effort is normal.   Skin:     Coloration: Skin is jaundiced.   Neurological:      General: No focal deficit present.      Mental Status: He is alert.   Psychiatric:         Mood and Affect: Mood normal.                 Results      Result Review :   The following data was reviewed by: ENRIKE Crisostomo on 04/08/2025:    Lab Results - Last 18 Months   Lab Units 04/08/25  1146 04/04/25  1131 08/14/24  1541   WBC 10*3/mm3 10.97* 13.01* 10.71   HEMOGLOBIN g/dL 15.0 16.3 15.7   MCV fL 88.5 90.5 87.9   PLATELETS 10*3/mm3 352 328 343         Lab 04/04/25  1131   NEUTROS ABS 7.09*     Lab Results - Last 18 Months   Lab Units 04/08/25  1146 04/04/25  1131 02/12/25  1349   BUN mg/dL 16 17 13   CREATININE mg/dL 1.35* 1.25 1.04   SODIUM mmol/L 129* 130* 138   POTASSIUM mmol/L 4.0 4.2 4.4   CHLORIDE mmol/L 93* 93* 98   CO2 mmol/L 20.8* 19.1* 22.6   GLUCOSE mg/dL 80 166* 196*      Lab Results - Last 18 Months   Lab Units 04/08/25  1146 04/04/25  1417   PROTIME Seconds 28.9* 30.7*   INR  2.56* 2.77*     Lab Results - Last 18 Months   Lab Units 04/08/25  1146 04/04/25  1131 02/12/25  1349   AST (SGOT) U/L 249* 187* 28   ALT (SGPT) U/L 313* 230* 44*   ALK PHOS U/L 73 81 67   BILIRUBIN mg/dL 1.6* 1.8* 0.9   BILIRUBIN DIRECT mg/dL 0.6*  --  0.3   TOTAL PROTEIN g/dL 7.2 7.4 7.2   ALBUMIN g/dL 3.3* 4.0 4.2      Lab Results - Last 18 Months   Lab Units 04/08/25  1146   IRON mcg/dL 41*   TRANSFERRIN mg/dL 358   TIBC mcg/dL 533   FERRITIN ng/mL " 135.00     Lab Results - Last 18 Months   Lab Units 04/08/25  1146 04/04/25  1510   HEP A IGM  Non-Reactive Non-Reactive       CT Abdomen Pelvis With Contrast  Result Date: 4/4/2025  1.Heterogeneous appearance of the liver with suggestion of a subtle nodular contour. Findings suggest underlying cirrhosis. Please correlate with liver function tests. 2.Small to moderate amount of ascites. 3.Diffuse wall thickening of the urinary bladder. Please correlate with urinalysis. 4.Other incidental findings as above. Electronically Signed: Jose Quiñonez MD  4/4/2025 12:39 PM EDT  Workstation ID: [id number]      Results  Laboratory Studies  ALT was 44 a month ago and increased to 230. AST was 28 a month ago and increased to 187. Alkaline phosphatase and bilirubin are very elevated.    Imaging  CT of the abdomen and pelvis showed heterogeneous appearance of the liver with suggestion of septal nodular contour.             Assessment and Plan        Assessment and Plan    Diagnoses and all orders for this visit:    1. Elevated liver function tests (Primary)    2. Elevated bilirubin    3. Cirrhosis of liver with ascites, unspecified hepatic cirrhosis type  -     AFP Tumor Marker; Future  -     Alpha - 1 - Antitrypsin Phenotype; Future  -     SANTI; Future  -     Hepatic Function Panel; Future  -     Hepatitis Panel, Acute; Future  -     Iron Profile; Future  -     Ferritin; Future  -     Copper, Serum; Future  -     Protime-INR; Future  -     Ceruloplasmin; Future  -     Mitochondrial Antibodies, M2; Future  -     Anti-Smooth Muscle Antibody Titer; Future  -     JOMAR + PE; Future  -     CBC (No Diff); Future  -     Comprehensive Metabolic Panel; Future  -     lactulose (CHRONULAC) 10 GM/15ML solution; Take 15-30 mls daily for a goal of 2-3 BM/day  Dispense: 900 mL; Refill: 1  -     Ambulatory Referral to Nutrition Services  -     Ammonia; Future    4. BMI 39.0-39.9,adult    5. Nausea and vomiting, unspecified vomiting  type          Assessment & Plan  1. Elevated liver enzymes.  The patient's elevated liver enzymes necessitate further investigation to ascertain the etiology. A hepatic function panel will be ordered to determine whether the elevation is due to liver or bile duct issues. Extensive lab work will also be conducted to rule out rare causes that may have contributed to his fatty liver and subsequent cirrhosis. An upper endoscopy will be scheduled to check for esophageal varices. He is advised to continue his Lasix and Aldactone regimen and report any worsening symptoms. He should avoid alcohol and other substances that could potentially harm the liver, including Advil, ibuprofen, naproxen, and herbal remedies such as kava kava. Tylenol can be used, but the maximum dose should not exceed 2000 mg per day. A low sodium diet is recommended, with intake not exceeding 2000 mg per day. A consultation with a dietitian will be arranged. He is encouraged to consume a high-protein snack before bedtime. Lactulose will be prescribed to facilitate 2 to 3 bowel movements per day. His ammonia levels will be checked today. If his bilirubin levels remain elevated, an MRCP may be considered.    Follow-up  The patient will follow up in 3 months.      * Surgery not found *      Follow Up        Follow Up   Return in about 3 months (around 7/8/2025) for Elevated Liver Enzymes.    Hepatic lab workup ordered to determine cause of fatty liver. Differential diagnosis include but are not limited to viral hepatitis, autoimmune hepatitis, NAFLD, toxic hepatitis, vascular causes such as Budd Chiari or CHF, or hereditary causes such as hemochromatosis, alpha 1 antitrypsin deficiency, or Pepe disease.     Advised patient to maintain a healthy lifestyle: weight loss of 10%, cutting back on carbs, sugars, and fried fatty foods, limiting intake of soda and sugary drinks, and abstain from alcohol. Recommend 2 to 3 cups of black coffee a day. Advise  patient to go on daily walks with 10,000 steps daily (as recommended for patients with NAFLD/RIVERA).     Treatment for Cirrhosis involves slow or reverse the cause of the liver disease. Prevent, identify, and treat the complications of cirrhosis. Protect the liver from other sources of damage. Manage symptoms and blood abnormalities. Determine if and when a liver transplant is needed.    Patient should follow a heart healthy lifestyle related to cirrhosis.  If smoking, stop smoking. Abstain from alcohol.  Restrict dietary sodium to less than than 2 g/day as this should decrease ascites development.  Continue lasix and aldactone, notify office if patient experiences increasing extremity or abdominal swelling. Continue lactulose, titrating to a goal of 3 bowel movements per day. Explained that increased clearance of colonic gregorio should reduce encephalopathy/ammonia level.  HCC every 6 months with right upper quadrant ultrasound and serum alpha-fetoprotein tumor marker. Awaiting ordered labs to Calculate updated MELD score and Child Blanchard Score  Colon cancer screening: No history  Last EGD to check for Esophageal varices: No history.    Patient was given instructions and counseling regarding his condition or for health maintenance advice. Please see specific information pulled into the AVS if appropriate.

## 2025-04-08 NOTE — PATIENT INSTRUCTIONS
Nutrition is very important. High protein night time snacks.    PREVENT FURTHER DAMAGE TO THE LIVER  Vaccines to protect the liver -- Vaccines against hepatitis A and B for those who are not already immune can help prevent further damage to the liver. Because infections can be especially hard on people with ?irrh??is, it's also important to get other vaccines, including vaccines to protect against diseases such as the flu (once a year), pneumonia (at least once), diphtheria and tetanus (once every 10 years), and pertussis (once during adulthood).    Avoid alcohol and other drugs that could harm the liver -- People with ?irrh??i? should avoid all substances that are known to damage the liver. This includes: Alcohol, Nonsteroidal antiinflammatory drugs such as advil, ibuprofen, and naproxen, Some supplements and herbal remedies, such as kava kava.   Also, people with ?irrh??i? who take acetaminophen (sample brand name: Tylenol) should not take more than 2,000 milligrams per day

## 2025-04-09 ENCOUNTER — RESULTS FOLLOW-UP (OUTPATIENT)
Dept: CARDIOLOGY | Facility: CLINIC | Age: 40
End: 2025-04-09
Payer: COMMERCIAL

## 2025-04-09 LAB
ALBUMIN SERPL-MCNC: 3.3 G/DL (ref 3.5–5.2)
ALBUMIN/GLOB SERPL: 0.8 G/DL
ALP SERPL-CCNC: 73 U/L (ref 39–117)
ALT SERPL W P-5'-P-CCNC: 313 U/L (ref 1–41)
ANION GAP SERPL CALCULATED.3IONS-SCNC: 15.2 MMOL/L (ref 5–15)
AST SERPL-CCNC: 249 U/L (ref 1–40)
BILIRUB SERPL-MCNC: 1.6 MG/DL (ref 0–1.2)
BUN SERPL-MCNC: 16 MG/DL (ref 6–20)
BUN/CREAT SERPL: 11.9 (ref 7–25)
CALCIUM SPEC-SCNC: 9.4 MG/DL (ref 8.6–10.5)
CERULOPLASMIN SERPL-MCNC: 44 MG/DL (ref 16–31)
CHLORIDE SERPL-SCNC: 93 MMOL/L (ref 98–107)
CO2 SERPL-SCNC: 20.8 MMOL/L (ref 22–29)
CREAT SERPL-MCNC: 1.35 MG/DL (ref 0.76–1.27)
EGFRCR SERPLBLD CKD-EPI 2021: 67.6 ML/MIN/1.73
FERRITIN SERPL-MCNC: 135 NG/ML (ref 30–400)
GLOBULIN UR ELPH-MCNC: 3.9 GM/DL
GLUCOSE SERPL-MCNC: 80 MG/DL (ref 65–99)
IRON 24H UR-MRATE: 41 MCG/DL (ref 59–158)
IRON SATN MFR SERPL: 8 % (ref 20–50)
POTASSIUM SERPL-SCNC: 4 MMOL/L (ref 3.5–5.2)
PROT SERPL-MCNC: 7.2 G/DL (ref 6–8.5)
SODIUM SERPL-SCNC: 129 MMOL/L (ref 136–145)
TIBC SERPL-MCNC: 533 MCG/DL (ref 298–536)
TRANSFERRIN SERPL-MCNC: 358 MG/DL (ref 200–360)

## 2025-04-10 ENCOUNTER — RESULTS FOLLOW-UP (OUTPATIENT)
Dept: LAB | Facility: HOSPITAL | Age: 40
End: 2025-04-10
Payer: COMMERCIAL

## 2025-04-10 DIAGNOSIS — E80.6 HYPERBILIRUBINEMIA: ICD-10-CM

## 2025-04-10 DIAGNOSIS — K74.60 CIRRHOSIS OF LIVER WITH ASCITES, UNSPECIFIED HEPATIC CIRRHOSIS TYPE: Primary | ICD-10-CM

## 2025-04-10 DIAGNOSIS — R18.8 CIRRHOSIS OF LIVER WITH ASCITES, UNSPECIFIED HEPATIC CIRRHOSIS TYPE: Primary | ICD-10-CM

## 2025-04-10 DIAGNOSIS — E87.1 HYPONATREMIA: ICD-10-CM

## 2025-04-10 LAB
ANA SER QL: NEGATIVE
MITOCHONDRIA M2 IGG SER-ACNC: <20 UNITS (ref 0–20)
SMA IGG SER-ACNC: 7 UNITS (ref 0–19)

## 2025-04-10 NOTE — TELEPHONE ENCOUNTER
Patient's bilirubin remains elevated at 1.6. Direct bilirubin is elevated at 0.6. Orders for MRI MRCP placed for urgent within 1 week. Advise patient to use hydrocortisone cream for itching. If patient starts experiencing abdominal pain, worsening jaundice, advised patient to go to the ER if he gets worse. Awaiting the rest of the liver workup labs.  Orders placed for a paracentesis also would get paracentesis with labs from fluid to rule out SBP. Placed orders for labs as well.

## 2025-04-11 ENCOUNTER — PATIENT ROUNDING (BHMG ONLY) (OUTPATIENT)
Dept: GASTROENTEROLOGY | Facility: CLINIC | Age: 40
End: 2025-04-11
Payer: COMMERCIAL

## 2025-04-11 ENCOUNTER — TELEPHONE (OUTPATIENT)
Dept: GASTROENTEROLOGY | Facility: CLINIC | Age: 40
End: 2025-04-11
Payer: COMMERCIAL

## 2025-04-11 LAB
ALBUMIN SERPL ELPH-MCNC: 3.4 G/DL (ref 2.9–4.4)
ALBUMIN/GLOB SERPL: 1 {RATIO} (ref 0.7–1.7)
ALPHA1 GLOB SERPL ELPH-MCNC: 0.4 G/DL (ref 0–0.4)
ALPHA2 GLOB SERPL ELPH-MCNC: 0.7 G/DL (ref 0.4–1)
B-GLOBULIN SERPL ELPH-MCNC: 1.1 G/DL (ref 0.7–1.3)
COPPER SERPL-MCNC: 173 UG/DL (ref 69–132)
GAMMA GLOB SERPL ELPH-MCNC: 1.4 G/DL (ref 0.4–1.8)
GLOBULIN SER-MCNC: 3.6 G/DL (ref 2.2–3.9)
IGA SERPL-MCNC: 234 MG/DL (ref 90–386)
IGG SERPL-MCNC: 1520 MG/DL (ref 603–1613)
IGM SERPL-MCNC: 71 MG/DL (ref 20–172)
INTERPRETATION SERPL IEP-IMP: NORMAL
LABORATORY COMMENT REPORT: NORMAL
M PROTEIN SERPL ELPH-MCNC: NORMAL G/DL
PROT SERPL-MCNC: 7 G/DL (ref 6–8.5)

## 2025-04-11 NOTE — TELEPHONE ENCOUNTER
Pt contacted the office wondering if his results were back and pt was wondering if he would need to proceed with MRI. He stated that was a possibility that was discussed at OV depending on results.     Please advise.

## 2025-04-11 NOTE — PROGRESS NOTES
"4/11/2025      Hello, may I speak with Luiz Christianson     My name is Edward. I am calling from [facility]. I show that you had a recent visit with ENRIKE Crisostomo.    Before we get started may I verify your date of birth? 1985    I am calling to officially welcome you to our practice and ask about your recent visit. Is this a good time to talk? Yes    Tell me about your visit with us. What things went well? \"I appreciated Brooke's approach. She was nice.\"     We strive to ensure that we protect your safety and privacy. Is there anything we could have done to improve this during your visit? No       We're always looking for ways to make our patients' experiences even better. Do you have recommendations on ways we may improve? No    Overall were you satisfied with your first visit to our practice? Yes    I appreciate you taking the time to speak with me today. Is there anything else I can do for you? No    I am glad to hear that you had a very good visit and I appreciate you taking the time to provide feedback on this call. We would greatly appreciate you filling out a survey if you receive one in the mail, email or text. This is a great opportunity to provide any additional feedback that you may think of after this call as well.       Thank you, and have a great day.  "

## 2025-04-12 ENCOUNTER — HOSPITAL ENCOUNTER (OUTPATIENT)
Facility: HOSPITAL | Age: 40
End: 2025-04-14
Attending: EMERGENCY MEDICINE | Admitting: EMERGENCY MEDICINE
Payer: COMMERCIAL

## 2025-04-12 ENCOUNTER — APPOINTMENT (OUTPATIENT)
Dept: GENERAL RADIOLOGY | Facility: HOSPITAL | Age: 40
End: 2025-04-12
Payer: COMMERCIAL

## 2025-04-12 DIAGNOSIS — R07.9 CHEST PAIN, UNSPECIFIED TYPE: ICD-10-CM

## 2025-04-12 DIAGNOSIS — R60.0 BILATERAL LOWER EXTREMITY EDEMA: ICD-10-CM

## 2025-04-12 DIAGNOSIS — I50.21 ACUTE SYSTOLIC CHF (CONGESTIVE HEART FAILURE): ICD-10-CM

## 2025-04-12 DIAGNOSIS — I50.23 ACUTE ON CHRONIC SYSTOLIC CONGESTIVE HEART FAILURE: Primary | ICD-10-CM

## 2025-04-12 LAB
ALBUMIN SERPL-MCNC: 3.7 G/DL (ref 3.5–5.2)
ALBUMIN/GLOB SERPL: 1.1 G/DL
ALP SERPL-CCNC: 131 U/L (ref 39–117)
ALT SERPL W P-5'-P-CCNC: 200 U/L (ref 1–41)
ANION GAP SERPL CALCULATED.3IONS-SCNC: 14.7 MMOL/L (ref 5–15)
AST SERPL-CCNC: 122 U/L (ref 1–40)
BASOPHILS # BLD AUTO: 0.15 10*3/MM3 (ref 0–0.2)
BASOPHILS NFR BLD AUTO: 1.2 % (ref 0–1.5)
BILIRUB SERPL-MCNC: 1.5 MG/DL (ref 0–1.2)
BUN SERPL-MCNC: 16 MG/DL (ref 6–20)
BUN/CREAT SERPL: 14.3 (ref 7–25)
CALCIUM SPEC-SCNC: 9.8 MG/DL (ref 8.6–10.5)
CHLORIDE SERPL-SCNC: 98 MMOL/L (ref 98–107)
CO2 SERPL-SCNC: 21.3 MMOL/L (ref 22–29)
CREAT SERPL-MCNC: 1.12 MG/DL (ref 0.76–1.27)
DEPRECATED RDW RBC AUTO: 48.6 FL (ref 37–54)
EGFRCR SERPLBLD CKD-EPI 2021: 84.6 ML/MIN/1.73
EOSINOPHIL # BLD AUTO: 0.21 10*3/MM3 (ref 0–0.4)
EOSINOPHIL NFR BLD AUTO: 1.7 % (ref 0.3–6.2)
ERYTHROCYTE [DISTWIDTH] IN BLOOD BY AUTOMATED COUNT: 14.9 % (ref 12.3–15.4)
GLOBULIN UR ELPH-MCNC: 3.5 GM/DL
GLUCOSE SERPL-MCNC: 119 MG/DL (ref 65–99)
HCT VFR BLD AUTO: 47.4 % (ref 37.5–51)
HGB BLD-MCNC: 14.8 G/DL (ref 13–17.7)
HOLD SPECIMEN: NORMAL
HOLD SPECIMEN: NORMAL
IMM GRANULOCYTES # BLD AUTO: 0.06 10*3/MM3 (ref 0–0.05)
IMM GRANULOCYTES NFR BLD AUTO: 0.5 % (ref 0–0.5)
LIPASE SERPL-CCNC: 80 U/L (ref 13–60)
LYMPHOCYTES # BLD AUTO: 3.44 10*3/MM3 (ref 0.7–3.1)
LYMPHOCYTES NFR BLD AUTO: 28.5 % (ref 19.6–45.3)
MAGNESIUM SERPL-MCNC: 1.7 MG/DL (ref 1.6–2.6)
MCH RBC QN AUTO: 28.1 PG (ref 26.6–33)
MCHC RBC AUTO-ENTMCNC: 31.2 G/DL (ref 31.5–35.7)
MCV RBC AUTO: 89.9 FL (ref 79–97)
MONOCYTES # BLD AUTO: 1.18 10*3/MM3 (ref 0.1–0.9)
MONOCYTES NFR BLD AUTO: 9.8 % (ref 5–12)
NEUTROPHILS NFR BLD AUTO: 58.3 % (ref 42.7–76)
NEUTROPHILS NFR BLD AUTO: 7.01 10*3/MM3 (ref 1.7–7)
NRBC BLD AUTO-RTO: 0 /100 WBC (ref 0–0.2)
NT-PROBNP SERPL-MCNC: 2220 PG/ML (ref 0–450)
PLATELET # BLD AUTO: 323 10*3/MM3 (ref 140–450)
PMV BLD AUTO: 10.4 FL (ref 6–12)
POTASSIUM SERPL-SCNC: 4.1 MMOL/L (ref 3.5–5.2)
PROT SERPL-MCNC: 7.2 G/DL (ref 6–8.5)
RBC # BLD AUTO: 5.27 10*6/MM3 (ref 4.14–5.8)
SODIUM SERPL-SCNC: 134 MMOL/L (ref 136–145)
TROPONIN T SERPL HS-MCNC: 47 NG/L
WBC NRBC COR # BLD AUTO: 12.05 10*3/MM3 (ref 3.4–10.8)
WHOLE BLOOD HOLD COAG: NORMAL
WHOLE BLOOD HOLD SPECIMEN: NORMAL

## 2025-04-12 PROCEDURE — 84484 ASSAY OF TROPONIN QUANT: CPT | Performed by: EMERGENCY MEDICINE

## 2025-04-12 PROCEDURE — 93005 ELECTROCARDIOGRAM TRACING: CPT

## 2025-04-12 PROCEDURE — 96374 THER/PROPH/DIAG INJ IV PUSH: CPT

## 2025-04-12 PROCEDURE — 80053 COMPREHEN METABOLIC PANEL: CPT | Performed by: EMERGENCY MEDICINE

## 2025-04-12 PROCEDURE — 25010000002 FUROSEMIDE PER 20 MG: Performed by: EMERGENCY MEDICINE

## 2025-04-12 PROCEDURE — 93010 ELECTROCARDIOGRAM REPORT: CPT | Performed by: INTERNAL MEDICINE

## 2025-04-12 PROCEDURE — 99285 EMERGENCY DEPT VISIT HI MDM: CPT

## 2025-04-12 PROCEDURE — G0378 HOSPITAL OBSERVATION PER HR: HCPCS

## 2025-04-12 PROCEDURE — 83735 ASSAY OF MAGNESIUM: CPT | Performed by: EMERGENCY MEDICINE

## 2025-04-12 PROCEDURE — 71045 X-RAY EXAM CHEST 1 VIEW: CPT

## 2025-04-12 PROCEDURE — 83690 ASSAY OF LIPASE: CPT | Performed by: EMERGENCY MEDICINE

## 2025-04-12 PROCEDURE — 93005 ELECTROCARDIOGRAM TRACING: CPT | Performed by: EMERGENCY MEDICINE

## 2025-04-12 PROCEDURE — 85025 COMPLETE CBC W/AUTO DIFF WBC: CPT

## 2025-04-12 PROCEDURE — 83880 ASSAY OF NATRIURETIC PEPTIDE: CPT | Performed by: EMERGENCY MEDICINE

## 2025-04-12 RX ORDER — ASPIRIN 81 MG/1
324 TABLET, CHEWABLE ORAL ONCE
Status: DISCONTINUED | OUTPATIENT
Start: 2025-04-12 | End: 2025-04-14 | Stop reason: HOSPADM

## 2025-04-12 RX ORDER — SODIUM CHLORIDE 0.9 % (FLUSH) 0.9 %
10 SYRINGE (ML) INJECTION AS NEEDED
Status: DISCONTINUED | OUTPATIENT
Start: 2025-04-12 | End: 2025-04-14 | Stop reason: HOSPADM

## 2025-04-12 RX ORDER — FUROSEMIDE 10 MG/ML
60 INJECTION INTRAMUSCULAR; INTRAVENOUS ONCE
Status: COMPLETED | OUTPATIENT
Start: 2025-04-12 | End: 2025-04-12

## 2025-04-12 RX ADMIN — FUROSEMIDE 60 MG: 10 INJECTION, SOLUTION INTRAMUSCULAR; INTRAVENOUS at 23:19

## 2025-04-12 NOTE — Clinical Note
A 6 fr sheath was successfully inserted into the right radial artery. Sheath insertion not delayed. Declines

## 2025-04-13 ENCOUNTER — APPOINTMENT (OUTPATIENT)
Dept: CT IMAGING | Facility: HOSPITAL | Age: 40
End: 2025-04-13
Payer: COMMERCIAL

## 2025-04-13 PROBLEM — I50.21 ACUTE SYSTOLIC CHF (CONGESTIVE HEART FAILURE): Status: ACTIVE | Noted: 2025-04-13

## 2025-04-13 PROBLEM — I50.9 ACUTE EXACERBATION OF CHF (CONGESTIVE HEART FAILURE): Status: RESOLVED | Noted: 2025-04-13 | Resolved: 2025-04-13

## 2025-04-13 PROBLEM — I50.9 ACUTE EXACERBATION OF CHF (CONGESTIVE HEART FAILURE): Status: ACTIVE | Noted: 2025-04-13

## 2025-04-13 LAB
ALBUMIN SERPL-MCNC: 3.8 G/DL (ref 3.5–5.2)
ALBUMIN/GLOB SERPL: 1.1 G/DL
ALP SERPL-CCNC: 123 U/L (ref 39–117)
ALT SERPL W P-5'-P-CCNC: 183 U/L (ref 1–41)
ANION GAP SERPL CALCULATED.3IONS-SCNC: 13.7 MMOL/L (ref 5–15)
AST SERPL-CCNC: 109 U/L (ref 1–40)
BASOPHILS # BLD MANUAL: 0.24 10*3/MM3 (ref 0–0.2)
BASOPHILS NFR BLD MANUAL: 2 % (ref 0–1.5)
BILIRUB SERPL-MCNC: 1.7 MG/DL (ref 0–1.2)
BUN SERPL-MCNC: 17 MG/DL (ref 6–20)
BUN/CREAT SERPL: 15.7 (ref 7–25)
CALCIUM SPEC-SCNC: 9.5 MG/DL (ref 8.6–10.5)
CHLORIDE SERPL-SCNC: 100 MMOL/L (ref 98–107)
CO2 SERPL-SCNC: 22.3 MMOL/L (ref 22–29)
CREAT SERPL-MCNC: 1.08 MG/DL (ref 0.76–1.27)
DEPRECATED RDW RBC AUTO: 48.6 FL (ref 37–54)
EGFRCR SERPLBLD CKD-EPI 2021: 88.4 ML/MIN/1.73
EOSINOPHIL # BLD MANUAL: 0.48 10*3/MM3 (ref 0–0.4)
EOSINOPHIL NFR BLD MANUAL: 4 % (ref 0.3–6.2)
ERYTHROCYTE [DISTWIDTH] IN BLOOD BY AUTOMATED COUNT: 15 % (ref 12.3–15.4)
GEN 5 1HR TROPONIN T REFLEX: 49 NG/L
GLOBULIN UR ELPH-MCNC: 3.4 GM/DL
GLUCOSE BLDC GLUCOMTR-MCNC: 127 MG/DL (ref 70–99)
GLUCOSE BLDC GLUCOMTR-MCNC: 129 MG/DL (ref 70–99)
GLUCOSE SERPL-MCNC: 98 MG/DL (ref 65–99)
HCT VFR BLD AUTO: 46.3 % (ref 37.5–51)
HGB BLD-MCNC: 14.6 G/DL (ref 13–17.7)
LYMPHOCYTES # BLD MANUAL: 2.77 10*3/MM3 (ref 0.7–3.1)
LYMPHOCYTES NFR BLD MANUAL: 6 % (ref 5–12)
MCH RBC QN AUTO: 28.1 PG (ref 26.6–33)
MCHC RBC AUTO-ENTMCNC: 31.5 G/DL (ref 31.5–35.7)
MCV RBC AUTO: 89.2 FL (ref 79–97)
MONOCYTES # BLD: 0.72 10*3/MM3 (ref 0.1–0.9)
NEUTROPHILS # BLD AUTO: 7.84 10*3/MM3 (ref 1.7–7)
NEUTROPHILS NFR BLD MANUAL: 64 % (ref 42.7–76)
NEUTS BAND NFR BLD MANUAL: 1 % (ref 0–5)
PLAT MORPH BLD: NORMAL
PLATELET # BLD AUTO: 342 10*3/MM3 (ref 140–450)
PMV BLD AUTO: 10.3 FL (ref 6–12)
POTASSIUM SERPL-SCNC: 3.9 MMOL/L (ref 3.5–5.2)
PROT SERPL-MCNC: 7.2 G/DL (ref 6–8.5)
QT INTERVAL: 349 MS
QT INTERVAL: 361 MS
QTC INTERVAL: 467 MS
QTC INTERVAL: 476 MS
RBC # BLD AUTO: 5.19 10*6/MM3 (ref 4.14–5.8)
RBC MORPH BLD: NORMAL
SODIUM SERPL-SCNC: 136 MMOL/L (ref 136–145)
T4 FREE SERPL-MCNC: 1.46 NG/DL (ref 0.92–1.68)
TROPONIN T % DELTA: 4
TROPONIN T NUMERIC DELTA: 2 NG/L
TSH SERPL DL<=0.05 MIU/L-ACNC: 7.14 UIU/ML (ref 0.27–4.2)
VARIANT LYMPHS NFR BLD MANUAL: 1 % (ref 0–5)
VARIANT LYMPHS NFR BLD MANUAL: 22 % (ref 19.6–45.3)
WBC MORPH BLD: NORMAL
WBC NRBC COR # BLD AUTO: 12.06 10*3/MM3 (ref 3.4–10.8)

## 2025-04-13 PROCEDURE — G0378 HOSPITAL OBSERVATION PER HR: HCPCS

## 2025-04-13 PROCEDURE — 84439 ASSAY OF FREE THYROXINE: CPT | Performed by: STUDENT IN AN ORGANIZED HEALTH CARE EDUCATION/TRAINING PROGRAM

## 2025-04-13 PROCEDURE — 99222 1ST HOSP IP/OBS MODERATE 55: CPT | Performed by: STUDENT IN AN ORGANIZED HEALTH CARE EDUCATION/TRAINING PROGRAM

## 2025-04-13 PROCEDURE — 87040 BLOOD CULTURE FOR BACTERIA: CPT | Performed by: STUDENT IN AN ORGANIZED HEALTH CARE EDUCATION/TRAINING PROGRAM

## 2025-04-13 PROCEDURE — 96376 TX/PRO/DX INJ SAME DRUG ADON: CPT

## 2025-04-13 PROCEDURE — 25010000002 FUROSEMIDE PER 20 MG: Performed by: STUDENT IN AN ORGANIZED HEALTH CARE EDUCATION/TRAINING PROGRAM

## 2025-04-13 PROCEDURE — 80050 GENERAL HEALTH PANEL: CPT | Performed by: STUDENT IN AN ORGANIZED HEALTH CARE EDUCATION/TRAINING PROGRAM

## 2025-04-13 PROCEDURE — 99214 OFFICE O/P EST MOD 30 MIN: CPT | Performed by: INTERNAL MEDICINE

## 2025-04-13 PROCEDURE — 71275 CT ANGIOGRAPHY CHEST: CPT

## 2025-04-13 PROCEDURE — 36415 COLL VENOUS BLD VENIPUNCTURE: CPT | Performed by: STUDENT IN AN ORGANIZED HEALTH CARE EDUCATION/TRAINING PROGRAM

## 2025-04-13 PROCEDURE — 25010000002 MORPHINE PER 10 MG: Performed by: STUDENT IN AN ORGANIZED HEALTH CARE EDUCATION/TRAINING PROGRAM

## 2025-04-13 PROCEDURE — 85007 BL SMEAR W/DIFF WBC COUNT: CPT | Performed by: STUDENT IN AN ORGANIZED HEALTH CARE EDUCATION/TRAINING PROGRAM

## 2025-04-13 PROCEDURE — 96375 TX/PRO/DX INJ NEW DRUG ADDON: CPT

## 2025-04-13 PROCEDURE — 94799 UNLISTED PULMONARY SVC/PX: CPT

## 2025-04-13 PROCEDURE — 93005 ELECTROCARDIOGRAM TRACING: CPT | Performed by: EMERGENCY MEDICINE

## 2025-04-13 PROCEDURE — 25510000001 IOPAMIDOL PER 1 ML: Performed by: EMERGENCY MEDICINE

## 2025-04-13 PROCEDURE — 94761 N-INVAS EAR/PLS OXIMETRY MLT: CPT

## 2025-04-13 PROCEDURE — 84484 ASSAY OF TROPONIN QUANT: CPT | Performed by: EMERGENCY MEDICINE

## 2025-04-13 PROCEDURE — 82948 REAGENT STRIP/BLOOD GLUCOSE: CPT

## 2025-04-13 RX ORDER — IBUPROFEN 600 MG/1
1 TABLET ORAL
Status: DISCONTINUED | OUTPATIENT
Start: 2025-04-13 | End: 2025-04-14 | Stop reason: HOSPADM

## 2025-04-13 RX ORDER — FUROSEMIDE 10 MG/ML
40 INJECTION INTRAMUSCULAR; INTRAVENOUS EVERY 12 HOURS
Status: DISCONTINUED | OUTPATIENT
Start: 2025-04-13 | End: 2025-04-14 | Stop reason: HOSPADM

## 2025-04-13 RX ORDER — DEXTROSE MONOHYDRATE 25 G/50ML
25 INJECTION, SOLUTION INTRAVENOUS
Status: DISCONTINUED | OUTPATIENT
Start: 2025-04-13 | End: 2025-04-14 | Stop reason: HOSPADM

## 2025-04-13 RX ORDER — METOPROLOL SUCCINATE 25 MG/1
25 TABLET, EXTENDED RELEASE ORAL EVERY 12 HOURS SCHEDULED
Status: DISCONTINUED | OUTPATIENT
Start: 2025-04-13 | End: 2025-04-14 | Stop reason: HOSPADM

## 2025-04-13 RX ORDER — NITROGLYCERIN 0.4 MG/1
0.4 TABLET SUBLINGUAL
Status: DISCONTINUED | OUTPATIENT
Start: 2025-04-13 | End: 2025-04-14

## 2025-04-13 RX ORDER — IOPAMIDOL 755 MG/ML
100 INJECTION, SOLUTION INTRAVASCULAR
Status: COMPLETED | OUTPATIENT
Start: 2025-04-13 | End: 2025-04-13

## 2025-04-13 RX ORDER — NICOTINE POLACRILEX 4 MG
15 LOZENGE BUCCAL
Status: DISCONTINUED | OUTPATIENT
Start: 2025-04-13 | End: 2025-04-14 | Stop reason: HOSPADM

## 2025-04-13 RX ORDER — INSULIN LISPRO 100 [IU]/ML
2-9 INJECTION, SOLUTION INTRAVENOUS; SUBCUTANEOUS
Status: DISCONTINUED | OUTPATIENT
Start: 2025-04-13 | End: 2025-04-14 | Stop reason: HOSPADM

## 2025-04-13 RX ORDER — ATORVASTATIN CALCIUM 40 MG/1
80 TABLET, FILM COATED ORAL DAILY
Status: DISCONTINUED | OUTPATIENT
Start: 2025-04-13 | End: 2025-04-14 | Stop reason: HOSPADM

## 2025-04-13 RX ORDER — MORPHINE SULFATE 2 MG/ML
2 INJECTION, SOLUTION INTRAMUSCULAR; INTRAVENOUS EVERY 4 HOURS PRN
Status: DISCONTINUED | OUTPATIENT
Start: 2025-04-13 | End: 2025-04-14

## 2025-04-13 RX ORDER — LACTULOSE 10 G/15ML
20 SOLUTION ORAL 2 TIMES DAILY
Status: DISCONTINUED | OUTPATIENT
Start: 2025-04-13 | End: 2025-04-14 | Stop reason: HOSPADM

## 2025-04-13 RX ADMIN — MORPHINE SULFATE 2 MG: 2 INJECTION, SOLUTION INTRAMUSCULAR; INTRAVENOUS at 06:10

## 2025-04-13 RX ADMIN — Medication 10 ML: at 09:48

## 2025-04-13 RX ADMIN — METOPROLOL SUCCINATE 25 MG: 25 TABLET, EXTENDED RELEASE ORAL at 21:16

## 2025-04-13 RX ADMIN — LACTULOSE 20 G: 10 SOLUTION ORAL at 20:40

## 2025-04-13 RX ADMIN — METOPROLOL SUCCINATE 25 MG: 25 TABLET, EXTENDED RELEASE ORAL at 14:27

## 2025-04-13 RX ADMIN — FUROSEMIDE 40 MG: 10 INJECTION, SOLUTION INTRAMUSCULAR; INTRAVENOUS at 21:16

## 2025-04-13 RX ADMIN — IOPAMIDOL 100 ML: 755 INJECTION, SOLUTION INTRAVENOUS at 01:04

## 2025-04-13 RX ADMIN — FUROSEMIDE 40 MG: 10 INJECTION, SOLUTION INTRAMUSCULAR; INTRAVENOUS at 09:48

## 2025-04-13 RX ADMIN — ATORVASTATIN CALCIUM 80 MG: 40 TABLET, FILM COATED ORAL at 14:27

## 2025-04-13 NOTE — CASE MANAGEMENT/SOCIAL WORK
Discharge Planning Assessment   Tami     Patient Name: Luiz Christianson  MRN: [id number]  Today's Date: 4/13/2025    Admit Date: 4/12/2025    Plan: SW spoke with Pt, Pt lives with wife, usually independent. Pt works from home full-time. PCP: SESAR White Pharm: [facility]. Pt does not have DME in home at this time. Pt is open to HHC or rehab if needed. Pt states he believes they are going to run some test before he discharges. Pt asked about short term disability, SW explained this process, he has started process with his PCP and will need to continue with PCP. SW did explain FMLA if needed for this hospitalization. Pt denies financial stressors, no concerns with transportation, wife will transport at time of discharge. WONG/PONCE to follow for needs.   Discharge Needs Assessment       Row Name 04/13/25 1045       Living Environment    People in Home spouse    Current Living Arrangements home    Potentially Unsafe Housing Conditions none    In the past 12 months has the electric, gas, oil, or water company threatened to shut off services in your home? No    Primary Care Provided by self    Provides Primary Care For no one    Family Caregiver if Needed spouse    Quality of Family Relationships helpful;involved;supportive    Able to Return to Prior Arrangements yes       Resource/Environmental Concerns    Resource/Environmental Concerns none    Transportation Concerns none       Transportation Needs    In the past 12 months, has lack of transportation kept you from medical appointments or from getting medications? no    In the past 12 months, has lack of transportation kept you from meetings, work, or from getting things needed for daily living? No       Food Insecurity    Within the past 12 months, you worried that your food would run out before you got the money to buy more. Never true    Within the past 12 months, the food you bought just didn't last and you didn't have money to get more. Never true       Transition  Planning    Patient/Family Anticipates Transition to home with family;home    Patient/Family Anticipated Services at Transition none    Transportation Anticipated family or friend will provide       Discharge Needs Assessment    Readmission Within the Last 30 Days no previous admission in last 30 days    Equipment Currently Used at Home none    Concerns to be Addressed discharge planning    Do you want help finding or keeping work or a job? I do not need or want help    Do you want help with school or training? For example, starting or completing job training or getting a high school diploma, GED or equivalent No    Anticipated Changes Related to Illness none    Equipment Needed After Discharge none    Discharge Coordination/Progress SW spoke with Pt, Pt lives with wife, usually independent. Pt works from home full-time. PCP: SESAR White Pharm: [facility]. Pt does not have DME in home at this time. Pt is open to HHC or rehab if needed. Pt states he believes they are going to run some test before he discharges. Pt asked about short term disability, SW explained this process, he has started process with his PCP and will need to continue with PCP. SW did explain FMLA if needed for this hospitalization. Pt denies financial stressors, no concerns with transportation, wife will transport at time of discharge. SW/CM to follow for needs.                   Discharge Plan       Row Name 04/13/25 1052       Plan    Plan SW spoke with Pt, Pt lives with wife, usually independent. Pt works from home full-time. PCP: SESAR White Pharm: [facility]. Pt does not have DME in home at this time. Pt is open to HHC or rehab if needed. Pt states he believes they are going to run some test before he discharges. Pt asked about short term disability, SW explained this process, he has started process with his PCP and will need to continue with PCP. SW did explain FMLA if needed for this hospitalization. Pt denies financial stressors, no concerns  with transportation, wife will transport at time of discharge. SW/CM to follow for needs.                       Demographic Summary       Row Name 04/13/25 1043       General Information    Admission Type observation    Arrived From emergency department    Referral Source admission list    Reason for Consult discharge planning    Preferred Language English       Contact Information    Permission Granted to Share Info With permission denied                   Functional Status       Row Name 04/13/25 1043       Functional Status    Usual Activity Tolerance good    Current Activity Tolerance good       Physical Activity    On average, how many days per week do you engage in moderate to strenuous exercise (like a brisk walk)? 0 days    On average, how many minutes do you engage in exercise at this level? 0 min    Number of minutes of exercise per week 0       Assessment of Health Literacy    How often do you have someone help you read hospital materials? Never    How often do you have problems learning about your medical condition because of difficulty understanding written information? Never    How often do you have a problem understanding what is told to you about your medical condition? Never    How confident are you filling out medical forms by yourself? Quite a bit    Health Literacy Good       Functional Status, IADL    Medications independent    Meal Preparation independent    Housekeeping independent    Laundry independent    Shopping independent    If for any reason you need help with day-to-day activities such as bathing, preparing meals, shopping, managing finances, etc., do you get the help you need? I don't need any help       Mental Status    General Appearance WDL WDL       Mental Status Summary    Recent Changes in Mental Status/Cognitive Functioning no changes       Employment/    Employment Status employed full-time                   Psychosocial    No documentation.                   Abuse/Neglect    No documentation.                  Legal       Row Name 04/13/25 1044       Financial Resource Strain    How hard is it for you to pay for the very basics like food, housing, medical care, and heating? Not hard       Financial/Legal    Source of Income salary/wages    Financial/Environmental Concerns none    Application for Public Assistance not applied       Legal    Criminal Activity/Legal Involvement none                   Substance Abuse    No documentation.                  Patient Forms    No documentation.                     Iris Rodriguez

## 2025-04-13 NOTE — PLAN OF CARE
Goal Outcome Evaluation:              Outcome Evaluation: Patient alert and oriented x4, VSS. Patient denied any pain upon arrival to the floor, but since 0500am has been requesting pain medication. Awaiting response from MD. Patient denies any needs at this time. Will continue to monitor plan of care.

## 2025-04-13 NOTE — PLAN OF CARE
Goal Outcome Evaluation:              Outcome Evaluation: Patient very anxious throughout shift. Declined nurses offer to get PRN anxiety meds ordered. Cardiology consulted with plans for stress test tomorrow. Patient had 5 beat run of VTach. No new orders received. +2 edema to bilateral feet noted.    Daily Care Plan Summary: Heart Failure    Diuretic in use (IV or PO):   IV        Daily weight (up or down):    loss: 14lbs      Output > Intake (yes/no):Yes      O2 Requirements (current, any change?): room air      Symptoms noted with Activity (Respiratory Tolerance, functional state):     Elevated HR and SOA. Pt reports improvement since arrival.      Anticipated Discharge Plans:     Plans for stress test tomorrow.

## 2025-04-13 NOTE — H&P
[facility]   HOSPITALIST HISTORY AND PHYSICAL  Date: 2025   Patient Name: Luiz Christianson  : 1985  MRN: [id number]  Primary Care Physician:  Luz Maria White, ENRIKE  Date of admission: 2025    Subjective   Subjective     Chief Complaint: chest pain, shortness of breath    HPI:    Luiz Christianson is a 39 y.o. male sig PMHx of HFrEF 18% on echo 2025, Afib on eliquis, DM, HTN, HLD, Depression presented to the ED complaining of chest pain and shortness of breath. Patient reprots that symptoms started a couple of days ago but has been progressively worsening to the point where he cannot lay flat, patient also mentions that he has experiencing worsening bilateral lower extremity edema and also his abdomen feels swollen, he also reports a mild dry cough with no sputum production fever, patient was recently treated with antibiotics for a UTI.  Reports that symptoms has resolved since then.  The ER patient noted to have a troponin of 47, white blood cell count 12, BNP 2200, /, CT angiogram showed cardiomegaly with small pericardial effusion, anasarca and small amount of ascites.  Patient will be admitted for further management evaluation.      Personal History     Past Medical History:  Past Medical History:   Diagnosis Date    Chronic diastolic congestive heart failure 2020    Diabetes mellitus, type 2 2020    Diastolic CHF, chronic  2020    Emotional depression     Essential hypertension 2020    Finger numbness 2021    Hyperlipidemia     Left wrist pain 2021    Migraine 2020       Past Surgical History:  History reviewed. No pertinent surgical history.    Family History:   Family History   Problem Relation Age of Onset    Diabetes Mother     Diabetes Father     Heart disease Other         AUNT OR UNCLE    Diabetes Other     Colon cancer Neg Hx        Social History:   Social History     Socioeconomic History    Marital status:     Tobacco Use    Smoking status: Never     Passive exposure: Past    Smokeless tobacco: Never   Vaping Use    Vaping status: Never Used   Substance and Sexual Activity    Alcohol use: Not Currently    Drug use: Never    Sexual activity: Yes     Partners: Female     Birth control/protection: Injection       Home Medications:  SITagliptin, apixaban, atorvastatin, furosemide, lactulose, losartan, metFORMIN, metoprolol succinate XL, ondansetron ODT, phenazopyridine, polyethylene glycol, and spironolactone    Allergies:  Allergies   Allergen Reactions    Farxiga [Dapagliflozin] Other (See Comments)     Bladder irritation (persistant)       Review of Systems   All systems were reviewed and negative except for: as indicated on HPI    Objective   Objective     Vitals:   Temp:  [99 °F (37.2 °C)] 99 °F (37.2 °C)  Heart Rate:  [106-133] 133  Resp:  [16] 16  BP: ()/(66-77) 98/77    Physical Exam    Constitutional: Awake, alert, no acute distress   Eyes: Pupils equal, sclerae anicteric, no conjunctival injection   HENT: NCAT, mucous membranes moist   Neck: Supple, no thyromegaly, no lymphadenopathy, trachea midline   Respiratory: Clear to auscultation bilaterally, nonlabored respirations    Cardiovascular: RRR, no murmurs, rubs, or gallops, palpable pedal pulses bilaterally   Gastrointestinal: Positive bowel sounds, soft, nontender, nondistended   Musculoskeletal: bilateral LE pitting edema, no clubbing or cyanosis to extremities   Psychiatric: Appropriate affect, cooperative   Neurologic: Oriented x 3, strength symmetric in all extremities, Cranial Nerves grossly intact to confrontation, speech clear   Skin: No rashes     Result Review    Result Review:  I have personally reviewed the results from the time of this admission to 4/13/2025 01:54 EDT and agree with these findings:  [x]  Laboratory  [x]  Microbiology  [x]  Radiology  [x]  EKG/Telemetry   [x]  Cardiology/Vascular   []  Pathology  []  Old records  []   Other:      Assessment & Plan   Assessment / Plan     Assessment/Plan:   Acute chf exacerbation  Anasarca  Elevated troponin  EF of 18% on 04/08/2025  Hx of Afib  -weigth daily  -Is/Os  -cardiology consult  -Lasix 40mg BID  -obtain TSH levels  -telemetry  - trend troponin      Leukocytosis  Wbc 12, elevated on prior visits  Monitor off antiobiotics for now  Had a UTI was treated with po antibiotics  -obtain blood cutlure      Elevated LFTs  Elevated bilirubin levels  -/  decreased when compared to prior visit  -started following with Dr Irving as an outpatient  -continue to monitor    Chronic conditions:  DM  HTN  HLD  Migraine  Depression  -continue home meds for chronic conditions as feasible      VTE Prophylaxis:  No VTE prophylaxis order currently exists.        CODE STATUS:    Code Status (Patient has no pulse and is not breathing): CPR (Attempt to Resuscitate)  Medical Interventions (Patient has pulse or is breathing): Full Support      Admission Status:  I believe this patient meets observation status.    Electronically signed by Edgardo Cotton MD, 04/13/25, 1:54 AM EDT.

## 2025-04-13 NOTE — CONSULTS
Cardiology Consult Note  [address] [facility]UL[facility] SERVICES          Patient Identification:  Luiz Christianson      [id number]  39 y.o.        male  1985       Date of Consultation: April 13, 2025    Reason for Consultation: New systolic heart failure    PCP: Luz Maria White APRN  Primary cardiologist: Dr. Lowry    History of Present Illness:     39-year-old male.  He has followed with cardiology.  He has a history of hypertension and chart notes diastolic heart failure.  In January the patient had influenza.  Since then he has had persistent shortness of breath, cough, exertional intolerance.  He followed up with cardiology recently and an echo revealed new severe LV dysfunction.  Follow-up was arranged but the patient came in through the hospital with worsening shortness of breath, orthopnea, lower extremity edema.  His BNP was elevated, troponin was elevated.  Liver enzymes elevated.  Findings consistent with acute decompensated systolic heart failure. He is diabetic.    Past History:  Past Medical History:   Diagnosis Date    Chronic diastolic congestive heart failure 11/18/2020    Diabetes mellitus, type 2 11/18/2020    Diastolic CHF, chronic  11/18/2020    Emotional depression     Essential hypertension 11/18/2020    Finger numbness 02/24/2021    Hyperlipidemia     Left wrist pain 02/24/2021    Migraine 12/07/2020     History reviewed. No pertinent surgical history.  Allergies   Allergen Reactions    Farxiga [Dapagliflozin] Other (See Comments)     Bladder irritation (persistant)     Social History     Socioeconomic History    Marital status:    Tobacco Use    Smoking status: Never     Passive exposure: Past    Smokeless tobacco: Never   Vaping Use    Vaping status: Never Used   Substance and Sexual Activity    Alcohol use: Not Currently    Drug use: Never    Sexual activity: Yes     Partners: Female     Birth control/protection: Injection     Family History   Problem  Relation Age of Onset    Diabetes Mother     Diabetes Father     Heart disease Other         AUNT OR UNCLE    Diabetes Other     Colon cancer Neg Hx      Medications:  Medications Prior to Admission   Medication Sig Dispense Refill Last Dose/Taking    apixaban (ELIQUIS) 5 MG tablet tablet Take 1 tablet by mouth 2 (Two) Times a Day. 180 tablet 3     atorvastatin (LIPITOR) 80 MG tablet Take 1 tablet by mouth Daily. 90 tablet 3     [] cephalexin (KEFLEX) 500 MG capsule Take 1 capsule by mouth 4 (Four) Times a Day for 7 days. 28 capsule 0     furosemide (LASIX) 20 MG tablet TAKE 1 TABLET BY MOUTH TWICE A  tablet 3     Januvia 100 MG tablet TAKE 1 TABLET DAILY 90 tablet 3     lactulose (CHRONULAC) 10 GM/15ML solution Take 15-30 mls daily for a goal of 2-3 BM/day 900 mL 1     losartan (COZAAR) 50 MG tablet Take 1 tablet by mouth Daily. 90 tablet 3     metFORMIN (GLUCOPHAGE) 500 MG tablet TAKE 2 TABLETS 2 TIMES     DAILY WITH MEALS 360 tablet 3     metoprolol succinate XL (TOPROL-XL) 25 MG 24 hr tablet Take 1 tablet by mouth Every Night. 90 tablet 3     ondansetron ODT (ZOFRAN-ODT) 4 MG disintegrating tablet Place 1 tablet on the tongue 4 (Four) Times a Day As Needed for Nausea or Vomiting. 20 tablet 0     phenazopyridine (PYRIDIUM) 200 MG tablet Take 1 tablet by mouth 3 (Three) Times a Day As Needed for Bladder Spasms or Dysuria. 12 tablet 0     polyethylene glycol (MIRALAX) 17 g packet Take 17 g by mouth Daily. (Patient not taking: Reported on 2025) 30 each 0     spironolactone (ALDACTONE) 25 MG tablet Take 1 tablet by mouth Daily. 90 tablet 3      Current medications:  aspirin, 324 mg, Oral, Once  furosemide, 40 mg, Intravenous, Q12H      Current IV drips:       Review of Systems   Constitutional: Negative. Positive for malaise/fatigue and weight gain.   HENT: Negative.     Eyes: Negative.    Cardiovascular:  Positive for chest pain, dyspnea on exertion, leg swelling and orthopnea.   Respiratory:  "Negative.  Positive for shortness of breath.    Endocrine: Negative.    Hematologic/Lymphatic: Negative.    Skin: Negative.    Musculoskeletal: Negative.    Gastrointestinal: Negative.    Genitourinary: Negative.    Neurological: Negative.    Psychiatric/Behavioral: Negative.           Physical exam:    /79 (BP Location: Right arm, Patient Position: Lying)   Pulse 107   Temp 99.1 °F (37.3 °C) (Oral)   Resp 16   Ht 175.3 cm (69\")   Wt 123 kg (270 lb 4.5 oz)   SpO2 98%   BMI 39.91 kg/m²  Body mass index is 39.91 kg/m².    SpO2  Min: 94 %  Max: 98 %    General Appearance:   well developed  well nourished  HENT:   oropharynx moist  lips not cyanotic  Neck:  thyroid not enlarged  supple  Respiratory:  no respiratory distress  normal breath sounds  no rales  Cardiovascular:  no jugular venous distention  regular rhythm with ectopy  apical impulse normal  S1 normal, S2 normal  no S3, no S4   no murmur  no rub, no thrill  carotid pulses normal; no bruit  pedal pulses normal  lower extremity edema: Mild  Gastrointestinal:   bowel sounds normal  non-tender  no hepatomegaly, no splenomegaly  Musculoskeletal:  no clubbing of fingers.   normocephalic, head atraumatic  Skin:   warm, dry  Neuro/Psychiatric:  judgement and insight appropriate  normal mood and affect    Cardiographics:     ECG  (personally reviewed) sinus rhythm with frequent PVCs and PACs.   Telemetry:  (personally reviewed) reviewed   ECHO: Reviewed recently   CATH:     CARDIOLITE:      Lab Review:       Results from last 7 days   Lab Units 04/13/25  0436 04/12/25 2227 04/08/25  1146   WBC 10*3/mm3 12.06* 12.05* 10.97*   HEMOGLOBIN g/dL 14.6 14.8 15.0   HEMATOCRIT % 46.3 47.4 47.0            Results from last 7 days   Lab Units 04/13/25  0436 04/12/25 2227 04/08/25  1146   SODIUM mmol/L 136 134* 129*   BUN mg/dL 17 16 16   CREATININE mg/dL 1.08 1.12 1.35*   GLUCOSE mg/dL 98 119* 80      Estimated Creatinine Clearance: 116.6 mL/min (by C-G formula " "based on SCr of 1.08 mg/dL).         Invalid input(s): \"LDLCALC\"     Results from last 7 days   Lab Units 04/08/25  1146   INR  2.56*        Lab Results   Component Value Date    TSH 7.140 (H) 04/13/2025        Lab Results   Component Value Date    HGBA1C 8.40 (H) 02/12/2025           No results found for: \"DIGOXIN\"   No results found for: \"DDIMERQUAN\"     Imaging:   XR Chest 1 View  Result Date: 4/12/2025  AP PORTABLE CHEST  HISTORY: Chest pain and shortness of air.  COMPARISON: 10/11/2020  TECHNIQUE: AP portable chest x-ray.  FINDINGS: Heart is enlarged. Pulmonary vascularity is normal. The lungs are clear. No pneumothorax is identified.      Impression: No acute cardiopulmonary findings.  4/12/2025 10:37 PM by Dr. Jasen Adams MD on Workstation: [id number]          The ASCVD Risk score (Nadeen DK, et al., 2019) failed to calculate for the following reasons:    The valid total cholesterol range is 130 to 320 mg/dL      Assessment:      Hypertension, essential    Acute systolic CHF (congestive heart failure)      Initial cardiac assessment: 39-year-old male with a few months of shortness of breath, persistent cough, malaise.  More recently orthopnea, fluid weight gain and chest discomfort.  Recent echo shows severe LV dysfunction with ejection fraction some 20% which was new compared to previous echo 2023.  He apparently had documented influenza in January.        Recommendations:  1.  Possibly viral mediated cardiomyopathy based on the clinical history from January his influenza.  LV dysfunction is severe on recent echo.  Need to rule out ischemic cardiomyopathy as well given his comorbid risk factors.  2.  Will give additional IV diuretics today  3.  At home he was already on long-acting metoprolol so we will continue that, pause anticoagulation, there is actually no indication currently for anticoagulation the patient has not had documented atrial fibrillation.  It is unclear why this was started  4.  Will plan " cardiac catheterization while the patient is here to rule out obstructive CAD.  Otherwise we will try to optimize guideline directed medical therapy for new systolic heart failure.  Would consider a LifeVest in the patient's case given the severity of LV dysfunction and the marked amount of ectopy noted on monitoring      Dr. Lowry will assume care tomorrow                Hector Griffin MD  4/13/2025    12:37 EDT

## 2025-04-13 NOTE — PROGRESS NOTES
[facility]   Hospitalist Progress Note  Date: 2025  Patient Name: Luiz Christianson  : 1985  MRN: [id number]  Date of admission: 2025  Room/Bed: [facility]/1      Subjective   Subjective     Chief Complaint: LE edema     Summary:Luiz Christianson is a 39 y.o. male  sig PMHx of HFrEF 18% on echo 2025, Afib on eliquis, DM, HTN, HLD, Depression presented to the ED complaining of chest pain and shortness of breath. Patient reprots that symptoms started a couple of days ago but has been progressively worsening to the point where he cannot lay flat, patient also mentions that he has experiencing worsening bilateral lower extremity edema and also his abdomen feels swollen, he also reports a mild dry cough with no sputum production fever, patient was recently treated with antibiotics for a UTI.  Reports that symptoms has resolved since then.  The ER patient noted to have a troponin of 47, white blood cell count 12, BNP 2200, /, CT angiogram showed cardiomegaly with small pericardial effusion, anasarca and small amount of ascites.  Patient will be admitted for further management evaluation.     Interval Followup: No acute overnight events.  No acute distress.  Patient resting comfortably in bed.  He reports he has been monitoring his weights at home and noticed increased lower extremity edema and abdominal distention.  Discussed in detail new drop in EF.      Objective   Objective     Vitals:   Temp:  [98.2 °F (36.8 °C)-99.1 °F (37.3 °C)] 98.2 °F (36.8 °C)  Heart Rate:  [] 102  Resp:  [16-18] 16  BP: ()/(66-87) 113/82    Physical Exam   Gen: NAD, Alert and Oriented  Pulm: CTA b/l, no wheezing  Abd: soft, nondistended  Extremities: no pitting edema    Result Review    Result Review:  I have personally reviewed these results:  [x]  Laboratory      Lab 25  0436 25  2227 25  1146   WBC 12.06* 12.05* 10.97*   HEMOGLOBIN 14.6 14.8 15.0   HEMATOCRIT 46.3 47.4 47.0    PLATELETS 342 323 352   NEUTROS ABS 7.84* 7.01*  --    IMMATURE GRANS (ABS)  --  0.06*  --    LYMPHS ABS  --  3.44*  --    MONOS ABS  --  1.18*  --    EOS ABS 0.48* 0.21  --    MCV 89.2 89.9 88.5   PROTIME  --   --  28.9*         Lab 04/13/25 0436 04/13/25 0052 04/12/25 2227 04/08/25  1146   SODIUM 136  --  134* 129*   POTASSIUM 3.9  --  4.1 4.0   CHLORIDE 100  --  98 93*   CO2 22.3  --  21.3* 20.8*   ANION GAP 13.7  --  14.7 15.2*   BUN 17  --  16 16   CREATININE 1.08  --  1.12 1.35*   EGFR 88.4  --  84.6 67.6   GLUCOSE 98  --  119* 80   CALCIUM 9.5  --  9.8 9.4   MAGNESIUM  --   --  1.7  --    TSH  --  7.140*  --   --          Lab 04/13/25 0436 04/12/25 2227 04/08/25  1146   TOTAL PROTEIN 7.2 7.2 7.2  7.0   ALBUMIN 3.8 3.7 3.3*  3.4   GLOBULIN 3.4 3.5 3.9   GLOBULINREF  --   --  3.6   ALT (SGPT) 183* 200* 313*   AST (SGOT) 109* 122* 249*   BILIRUBIN 1.7* 1.5* 1.6*   BILIRUBIN DIRECT  --   --  0.6*   ALK PHOS 123* 131* 73   LIPASE  --  80*  --          Lab 04/13/25 0052 04/12/25 2227 04/08/25  1146   PROBNP  --  2,220.0*  --    HSTROP T 49* 47*  --    PROTIME  --   --  28.9*   INR  --   --  2.56*             Lab 04/08/25  1146   IRON 41*   IRON SATURATION (TSAT) 8*   TIBC 533   TRANSFERRIN 358   FERRITIN 135.00         Brief Urine Lab Results  (Last result in the past 365 days)        Color   Clarity   Blood   Leuk Est   Nitrite   Protein   CREAT   Urine HCG        04/04/25 1131 Dark Yellow   Clear   Negative   Small (1+)   Negative   >=300 mg/dL (3+)                 [x]  Microbiology   Microbiology Results (last 10 days)       ** No results found for the last 240 hours. **          [x]  Radiology  CT Angiogram Chest Pulmonary Embolism  Result Date: 4/13/2025  1.No pulmonary embolism is identified. 2.Cardiomegaly. Small pericardial effusion. 3.Mild groundglass changes in the lungs could be the result of hypoventilatory change and/or mild edema. 4.Anasarca. 5.Small amount of ascites. Electronically  Signed: Jasen Adams MD  4/13/2025 1:18 AM EDT  Workstation ID: [id number]    XR Chest 1 View  Result Date: 4/12/2025  No acute cardiopulmonary findings.  4/12/2025 10:37 PM by Dr. Jasen Adams MD on Workstation: [id number]      []  EKG/Telemetry   []  Cardiology/Vascular   []  Pathology  []  Old records  []  Other:    Assessment & Plan   Assessment / Plan     Assessment:  Acute combined systolic and diastolic CHF, newly reduced EF 18%  Elevated troponin secondary to volume overload/demand ischemia  Chronically elevated LFTs  Anasarca  Leukocytosis  Type 2 diabetes  Hypertension  Hyperlipidemia  New A-fib on Eliquis      Plan:  Continue inpatient admission.  Telemonitoring.  Cardiology consulted.  Pending more ischemic workup.  Patient sees outpatient cardiology with history of diastolic heart failure.  However, when discussing newly reduced EF and possible LifeVest he reports he had to wear one around 8 years ago, question if he had previously HFrEF recovered EF? Apparently lived in [place] previously.   Recent outpatient EKG with PCP which was concerning for Afib, Cardiology NP telephone message reports she switched him to metoprolol and started Eliquis at that time.   Normal EF end of 2023.  Outpatient echo on 4/8/2025 EF dropped to 18%.  Needs GDMT titration.  Already on losartan, toprol, and spironolactone outpatient.  Currently on hold losartan and spironolactone given lower blood pressures and IV diuretics.  Plan to start SGLT2 on discharge.  Continue high intensity statin  A1c 7.14%.  Accu-Cheks sliding scale insulin.  Hold home Januvia and metformin.  LFTs have been elevated outpatient and he has been seeing GI.  Possible this is all congestive hepatopathy given volume overload.  Will trend.  Cardiac diet  Hold home Eliquis for potential procedure.  Home meds reviewed and reconciled.  AM labs       Discussed with RN.    VTE Prophylaxis:  No VTE prophylaxis order currently exists.        CODE STATUS:   Code  Status (Patient has no pulse and is not breathing): CPR (Attempt to Resuscitate)  Medical Interventions (Patient has pulse or is breathing): Full Support      Electronically signed by Ly Teran DO, 4/13/2025, 13:41 EDT.

## 2025-04-13 NOTE — ED PROVIDER NOTES
Time: 10:56 PM EDT  Date of encounter:  4/12/2025  Independent Historian/Clinical History and Information was obtained by:   Patient    History is limited by: N/A    Chief Complaint: Chest pain, shortness of breath, abdominal pain      History of Present Illness:  Patient is a 39 y.o. year old diabetic male with history of CHF who presents to the emergency department for evaluation of worsening edema in the legs and abdomen, feels more short of breath, also substernal chest pain and pressure without radiation that feels worse when he lies supine but better when he sits up.    Mild dry cough but no sputum production or fever.    He just was recently treated on antibiotics for a UTI but he states the urinary symptoms have resolved.          Patient Care Team  Primary Care Provider: Luz Maria White APRN    Past Medical History:     Allergies   Allergen Reactions    Farxiga [Dapagliflozin] Other (See Comments)     Bladder irritation (persistant)     Past Medical History:   Diagnosis Date    Chronic diastolic congestive heart failure 11/18/2020    Diabetes mellitus, type 2 11/18/2020    Diastolic CHF, chronic  11/18/2020    Emotional depression     Essential hypertension 11/18/2020    Finger numbness 02/24/2021    Hyperlipidemia     Left wrist pain 02/24/2021    Migraine 12/07/2020     History reviewed. No pertinent surgical history.  Family History   Problem Relation Age of Onset    Diabetes Mother     Diabetes Father     Heart disease Other         AUNT OR UNCLE    Diabetes Other     Colon cancer Neg Hx        Home Medications:  Prior to Admission medications    Medication Sig Start Date End Date Taking? Authorizing Provider   apixaban (ELIQUIS) 5 MG tablet tablet Take 1 tablet by mouth 2 (Two) Times a Day. 3/26/25   Lily Chilel APRN   atorvastatin (LIPITOR) 80 MG tablet Take 1 tablet by mouth Daily. 6/21/24   Russell Bledsoe MD   cephalexin (KEFLEX) 500 MG capsule Take 1 capsule by mouth 4  "(Four) Times a Day for 7 days. 4/4/25 4/11/25  Yue Burkett APRN   furosemide (LASIX) 20 MG tablet TAKE 1 TABLET BY MOUTH TWICE A DAY 12/26/24   Lily Chilel APRN   Januvia 100 MG tablet TAKE 1 TABLET DAILY 11/18/24   Luz Maria White APRN   lactulose (CHRONULAC) 10 GM/15ML solution Take 15-30 mls daily for a goal of 2-3 BM/day 4/8/25   Brooke Natarajan APRN   losartan (COZAAR) 50 MG tablet Take 1 tablet by mouth Daily. 3/25/25   Russell Bledsoe MD   metFORMIN (GLUCOPHAGE) 500 MG tablet TAKE 2 TABLETS 2 TIMES     DAILY WITH MEALS 11/18/24   Luz Maria White APRN   metoprolol succinate XL (TOPROL-XL) 25 MG 24 hr tablet Take 1 tablet by mouth Every Night. 3/26/25   Lily Chilel APRN   ondansetron ODT (ZOFRAN-ODT) 4 MG disintegrating tablet Place 1 tablet on the tongue 4 (Four) Times a Day As Needed for Nausea or Vomiting. 4/4/25   Yue Burkett APRN   phenazopyridine (PYRIDIUM) 200 MG tablet Take 1 tablet by mouth 3 (Three) Times a Day As Needed for Bladder Spasms or Dysuria. 4/4/25   Yue Burkett APRN   polyethylene glycol (MIRALAX) 17 g packet Take 17 g by mouth Daily.  Patient not taking: Reported on 4/8/2025 4/6/25   Imelda Salazar APRN   spironolactone (ALDACTONE) 25 MG tablet Take 1 tablet by mouth Daily. 6/21/24   Russell Bledsoe MD        Social History:   Social History     Tobacco Use    Smoking status: Never     Passive exposure: Past    Smokeless tobacco: Never   Vaping Use    Vaping status: Never Used   Substance Use Topics    Alcohol use: Not Currently    Drug use: Never         Review of Systems:  Review of Systems   I performed a 10 point review of systems which was all negative, except for the positives found in the HPI above.  Physical Exam:  BP 98/77   Pulse 106   Temp 99 °F (37.2 °C) (Oral)   Resp 16   Ht 175.3 cm (69\")   Wt 129 kg (284 lb 2.8 oz)   SpO2 97%   BMI 41.97 kg/m²     Physical Exam   General: Awake alert and in mild " distress, looks to be feeling unwell    HEENT: Head normocephalic atraumatic, eyes PERRLA EOMI, nose normal, oropharynx normal.    Neck: Supple full range of motion, no meningismus, no lymphadenopathy    Heart: Tachycardic and irregular rhythm, no murmurs or rubs, 2+ radial pulses bilaterally    Lungs: Clear to auscultation bilaterally without wheezes or crackles, no respiratory distress    Abdomen: Soft, mildly tender diffusely throughout the abdomen but no generalized peritonitis, nondistended, no rebound or guarding    Skin: Warm, dry, no rash    Musculoskeletal: Normal range of motion, 2+ pitting bilateral symmetric lower extremity edema up to both thighs    Neurologic: Oriented x3, no motor deficits no sensory deficits    Psychiatric: Mood appears stable, no psychosis            Medical Decision Making:      Comorbidities that affect care:    Congestive Heart Failure, Hypertension    External Notes reviewed:    Previous Radiological Studies: I reviewed his most recent stress echo performed 4 days ago that shows severely decreased LV systolic function measuring only 18.6 percent with small pericardial effusion      The following orders were placed and all results were independently analyzed by me:  Orders Placed This Encounter   Procedures    XR Chest 1 View    CT Angiogram Chest Pulmonary Embolism    [place] Draw    High Sensitivity Troponin T    Comprehensive Metabolic Panel    Lipase    BNP    Magnesium    CBC Auto Differential    High Sensitivity Troponin T 1Hr    NPO Diet NPO Type: Strict NPO    Undress & Gown    Continuous Pulse Oximetry    Hospitalist (on-call MD unless specified)    Oxygen Therapy- Nasal Cannula; Titrate 1-6 LPM Per SpO2; 90 - 95%    ECG 12 Lead ED Triage Standing Order; Chest Pain    ECG 12 Lead ED Triage Standing Order; Chest Pain    Insert Peripheral IV    CBC & Differential    Green Top (Gel)    Lavender Top    Gold Top - SST    Light Blue Top       Medications Given in the Emergency  Department:  Medications   sodium chloride 0.9 % flush 10 mL (has no administration in time range)   aspirin chewable tablet 324 mg (324 mg Oral Not Given 4/12/25 2244)   furosemide (LASIX) injection 60 mg (60 mg Intravenous Given 4/12/25 2319)        ED Course:    ED Course as of 04/13/25 0106   Sat Apr 12, 2025   2304 EKG: I interpreted his twelve-lead EKG as sinus tachycardia with multiple PVCs and PACs, P waves present, minimal ST segment depressions that could represent ischemia in the lateral leads, no STEMI [VS]      ED Course User Index  [VS] Tucker Haider MD       Labs:    Lab Results (last 24 hours)       Procedure Component Value Units Date/Time    High Sensitivity Troponin T [[id number]]  (Abnormal) Collected: 04/12/25 2227    Specimen: Blood Updated: 04/12/25 2258     HS Troponin T 47 ng/L     Narrative:      High Sensitive Troponin T Reference Range:  <14.0 ng/L- Negative Female for AMI  <22.0 ng/L- Negative Male for AMI  >=14 - Abnormal Female indicating possible myocardial injury.  >=22 - Abnormal Male indicating possible myocardial injury.   Clinicians would have to utilize clinical acumen, EKG, Troponin, and serial changes to determine if it is an Acute Myocardial Infarction or myocardial injury due to an underlying chronic condition.         CBC & Differential [[id number]]  (Abnormal) Collected: 04/12/25 2227    Specimen: Blood Updated: 04/12/25 2231    Narrative:      The following orders were created for panel order CBC & Differential.  Procedure                               Abnormality         Status                     ---------                               -----------         ------                     CBC Auto Differential[[id number]]        Abnormal            Final result                 Please view results for these tests on the individual orders.    Comprehensive Metabolic Panel [[id number]]  (Abnormal) Collected: 04/12/25 2227    Specimen: Blood Updated: 04/12/25 2301     Glucose 119  mg/dL      BUN 16 mg/dL      Creatinine 1.12 mg/dL      Sodium 134 mmol/L      Potassium 4.1 mmol/L      Comment: Slight hemolysis detected by analyzer. Result may be falsely elevated.        Chloride 98 mmol/L      CO2 21.3 mmol/L      Calcium 9.8 mg/dL      Total Protein 7.2 g/dL      Albumin 3.7 g/dL      ALT (SGPT) 200 U/L      AST (SGOT) 122 U/L      Comment: Slight hemolysis detected by analyzer. Result may be falsely elevated.        Alkaline Phosphatase 131 U/L      Total Bilirubin 1.5 mg/dL      Globulin 3.5 gm/dL      A/G Ratio 1.1 g/dL      BUN/Creatinine Ratio 14.3     Anion Gap 14.7 mmol/L      eGFR 84.6 mL/min/1.73     Narrative:      GFR Categories in Chronic Kidney Disease (CKD)      GFR Category          GFR (mL/min/1.73)    Interpretation  G1                     90 or greater         Normal or high (1)  G2                      60-89                Mild decrease (1)  G3a                   45-59                Mild to moderate decrease  G3b                   30-44                Moderate to severe decrease  G4                    15-29                Severe decrease  G5                    14 or less           Kidney failure          (1)In the absence of evidence of kidney disease, neither GFR category G1 or G2 fulfill the criteria for CKD.    eGFR calculation 2021 CKD-EPI creatinine equation, which does not include race as a factor    Lipase [[id number]]  (Abnormal) Collected: 04/12/25 2227    Specimen: Blood Updated: 04/12/25 2258     Lipase 80 U/L     BNP [[id number]]  (Abnormal) Collected: 04/12/25 2227    Specimen: Blood Updated: 04/12/25 2252     proBNP 2,220.0 pg/mL     Narrative:      This assay is used as an aid in the diagnosis of individuals suspected of having heart failure. It can be used as an aid in the diagnosis of acute decompensated heart failure (ADHF) in patients presenting with signs and symptoms of ADHF to the emergency department (ED). In addition, NT-proBNP of <300 pg/mL indicates  ADHF is not likely.    Age Range Result Interpretation  NT-proBNP Concentration (pg/mL:      <50             Positive            >450                   Gray                 300-450                    Negative             <300    50-75           Positive            >900                  Gray                300-900                  Negative            <300      >75             Positive            >1800                  Gray                300-1800                  Negative            <300    Magnesium [[id number]]  (Normal) Collected: 04/12/25 2227    Specimen: Blood Updated: 04/12/25 2301     Magnesium 1.7 mg/dL     CBC Auto Differential [[id number]]  (Abnormal) Collected: 04/12/25 2227    Specimen: Blood Updated: 04/12/25 2231     WBC 12.05 10*3/mm3      RBC 5.27 10*6/mm3      Hemoglobin 14.8 g/dL      Hematocrit 47.4 %      MCV 89.9 fL      MCH 28.1 pg      MCHC 31.2 g/dL      RDW 14.9 %      RDW-SD 48.6 fl      MPV 10.4 fL      Platelets 323 10*3/mm3      Neutrophil % 58.3 %      Lymphocyte % 28.5 %      Monocyte % 9.8 %      Eosinophil % 1.7 %      Basophil % 1.2 %      Immature Grans % 0.5 %      Neutrophils, Absolute 7.01 10*3/mm3      Lymphocytes, Absolute 3.44 10*3/mm3      Monocytes, Absolute 1.18 10*3/mm3      Eosinophils, Absolute 0.21 10*3/mm3      Basophils, Absolute 0.15 10*3/mm3      Immature Grans, Absolute 0.06 10*3/mm3      nRBC 0.0 /100 WBC     High Sensitivity Troponin T 1Hr [[id number]] Collected: 04/13/25 0052    Specimen: Blood Updated: 04/13/25 0052             Imaging:    XR Chest 1 View  Result Date: 4/12/2025  AP PORTABLE CHEST  HISTORY: Chest pain and shortness of air.  COMPARISON: 10/11/2020  TECHNIQUE: AP portable chest x-ray.  FINDINGS: Heart is enlarged. Pulmonary vascularity is normal. The lungs are clear. No pneumothorax is identified.      No acute cardiopulmonary findings.  4/12/2025 10:37 PM by Dr. Jasen Adams MD on Workstation: [id number]          Differential Diagnosis and  Discussion:    Abdominal Pain: Based on the patient's signs and symptoms, I considered abdominal aortic aneurysm, small bowel obstruction, pancreatitis, acute cholecystitis, acute appendecitis, peptic ulcer disease, gastritis, colitis, endocrine disorders, irritable bowel syndrome and other differential diagnosis an etiology of the patient's abdominal pain.  Chest Pain:  Based on the patient's signs and symptoms, I considered aortic dissection, myocardial infaction, pulmonary embolism, cardiac tamponade, pericarditis, pneumothorax, musculoskeletal chest pain and other differential diagnosis as an etiology of the patient's chest pain.   Dyspnea: Differential diagnosis includes but is not limited to metabolic acidosis, neurological disorders, psychogenic, asthma, pneumothorax, upper airway obstruction, COPD, pneumonia, noncardiogenic pulmonary edema, interstitial lung disease, anemia, congestive heart failure, and pulmonary embolism    PROCEDURES:    Labs were collected in the emergency department and all labs were reviewed and interpreted by me.  X-ray were performed in the emergency department and all X-ray impressions were independently interpreted by me.  An EKG was performed and the EKG was interpreted by me.    ECG 12 Lead ED Triage Standing Order; Chest Pain   Preliminary Result   HEART ZNPY=353  bpm   RR Sgadsikp=978  ms   OH Nyuhiogm=038  ms   P Horizontal Axis=-27  deg   P Front Axis=51  deg   QRSD Mpdzlaaa=265  ms   QT Jozhylbv=755  ms   SWhC=866  ms   QRS Axis=81  deg   T Wave Axis=237  deg   - ABNORMAL ECG -   Sinus tachycardia   Paired ventricular premature complexes   Probable left atrial enlargement   Nonspecific intraventricular conduction delay   Low voltage, precordial leads   Abnormal T, consider ischemia, diffuse leads   Date and Time of Study:2025-04-12 22:18:00          Procedures    MDM     Amount and/or Complexity of Data Reviewed  Clinical lab tests: reviewed  Decide to obtain previous  medical records or to obtain history from someone other than the patient: yes             This patient is a 39-year-old male with history of liver cirrhosis, systolic heart failure with EF of 18% on most recent stress echo, now presents with worsening edema, shortness of breath, chest pain.    His chest x-ray looks okay and he is oxygenating okay on room air, but on physical exam he has pitting edema and signs of volume overload.    He has an elevated proBNP greater than 2000, mildly elevated white blood cell count of 12.    I am getting a CT pulm angiogram to rule out PE but also signs of pulmonary edema or vascular congestion, rule out any worsening pericardial effusion.    I will plan to admit him to a monitored bed for CHF management initially.  Currently performing rule out MI with second troponin now.              Patient Care Considerations:          Consultants/Shared Management Plan:    Hospitalist: I have discussed the case with the admitting hospitalist who agrees to accept the patient for admission.    Social Determinants of Health:    Patient is independent, reliable, and has access to care.       Disposition and Care Coordination:    Discharged: The patient is suitable and stable for discharge with no need for consideration of admission.    I have explained the patient´s condition, diagnoses and treatment plan based on the information available to me at this time. I have answered questions and addressed any concerns. The patient has a good  understanding of the patient´s diagnosis, condition, and treatment plan as can be expected at this point. The vital signs have been stable. The patient´s condition is stable and appropriate for discharge from the emergency department.      The patient will pursue further outpatient evaluation with the primary care physician or other designated or consulting physician as outlined in the discharge instructions. They are agreeable to this plan of care and follow-up  instructions have been explained in detail. The patient has received these instructions in written format and has expressed an understanding of the discharge instructions. The patient is aware that any significant change in condition or worsening of symptoms should prompt an immediate return to this or the closest emergency department or call to 911.  I have explained discharge medications and the need for follow up with the patient/caretakers. This was also printed in the discharge instructions. Patient was discharged with the following medications and follow up:      Medication List        ASK your doctor about these medications      cephalexin 500 MG capsule  Commonly known as: KEFLEX  Take 1 capsule by mouth 4 (Four) Times a Day for 7 days.  Ask about: Should I take this medication?           No follow-up provider specified.     Final diagnoses:   Acute on chronic systolic congestive heart failure   Bilateral lower extremity edema   Chest pain, unspecified type        ED Disposition       ED Disposition   Decision to Admit    Condition   --    Comment   --               This medical record created using voice recognition software.             Tucker Haider MD  04/13/25 0106

## 2025-04-14 ENCOUNTER — TELEPHONE (OUTPATIENT)
Dept: FAMILY MEDICINE CLINIC | Facility: CLINIC | Age: 40
End: 2025-04-14
Payer: COMMERCIAL

## 2025-04-14 ENCOUNTER — HOSPITAL ENCOUNTER (INPATIENT)
Facility: HOSPITAL | Age: 40
LOS: 11 days | Discharge: HOME | End: 2025-04-25
Attending: THORACIC SURGERY (CARDIOTHORACIC VASCULAR SURGERY) | Admitting: THORACIC SURGERY (CARDIOTHORACIC VASCULAR SURGERY)
Payer: COMMERCIAL

## 2025-04-14 VITALS
DIASTOLIC BLOOD PRESSURE: 81 MMHG | SYSTOLIC BLOOD PRESSURE: 104 MMHG | HEIGHT: 69 IN | RESPIRATION RATE: 20 BRPM | BODY MASS INDEX: 39.97 KG/M2 | HEART RATE: 72 BPM | TEMPERATURE: 97.6 F | OXYGEN SATURATION: 92 % | WEIGHT: 269.84 LBS

## 2025-04-14 DIAGNOSIS — I50.21 ACUTE SYSTOLIC CHF (CONGESTIVE HEART FAILURE): Primary | ICD-10-CM

## 2025-04-14 DIAGNOSIS — Z95.5 S/P DRUG ELUTING CORONARY STENT PLACEMENT: ICD-10-CM

## 2025-04-14 LAB
ALBUMIN SERPL-MCNC: 4 G/DL (ref 3.5–5.2)
ALBUMIN/GLOB SERPL: 1.1 G/DL
ALP SERPL-CCNC: 90 U/L (ref 39–117)
ALT SERPL W P-5'-P-CCNC: 172 U/L (ref 1–41)
ANION GAP SERPL CALCULATED.3IONS-SCNC: 15.6 MMOL/L (ref 5–15)
AST SERPL-CCNC: 104 U/L (ref 1–40)
BASOPHILS # BLD AUTO: 0.17 10*3/MM3 (ref 0–0.2)
BASOPHILS NFR BLD AUTO: 1.5 % (ref 0–1.5)
BILIRUB SERPL-MCNC: 2.3 MG/DL (ref 0–1.2)
BUN SERPL-MCNC: 21 MG/DL (ref 6–20)
BUN/CREAT SERPL: 18.1 (ref 7–25)
CALCIUM SPEC-SCNC: 9.6 MG/DL (ref 8.6–10.5)
CHLORIDE SERPL-SCNC: 96 MMOL/L (ref 98–107)
CO2 SERPL-SCNC: 23.4 MMOL/L (ref 22–29)
CREAT SERPL-MCNC: 1.16 MG/DL (ref 0.76–1.27)
DEPRECATED RDW RBC AUTO: 49.1 FL (ref 37–54)
EGFRCR SERPLBLD CKD-EPI 2021: 81.1 ML/MIN/1.73
EOSINOPHIL # BLD AUTO: 0.19 10*3/MM3 (ref 0–0.4)
EOSINOPHIL NFR BLD AUTO: 1.7 % (ref 0.3–6.2)
ERYTHROCYTE [DISTWIDTH] IN BLOOD BY AUTOMATED COUNT: 15.1 % (ref 12.3–15.4)
GLOBULIN UR ELPH-MCNC: 3.6 GM/DL
GLUCOSE BLDC GLUCOMTR-MCNC: 146 MG/DL (ref 70–99)
GLUCOSE BLDC GLUCOMTR-MCNC: 85 MG/DL (ref 70–99)
GLUCOSE SERPL-MCNC: 96 MG/DL (ref 65–99)
HCT VFR BLD AUTO: 47.3 % (ref 37.5–51)
HGB BLD-MCNC: 15.1 G/DL (ref 13–17.7)
IMM GRANULOCYTES # BLD AUTO: 0.06 10*3/MM3 (ref 0–0.05)
IMM GRANULOCYTES NFR BLD AUTO: 0.5 % (ref 0–0.5)
LYMPHOCYTES # BLD AUTO: 3.99 10*3/MM3 (ref 0.7–3.1)
LYMPHOCYTES NFR BLD AUTO: 35.3 % (ref 19.6–45.3)
MCH RBC QN AUTO: 28.4 PG (ref 26.6–33)
MCHC RBC AUTO-ENTMCNC: 31.9 G/DL (ref 31.5–35.7)
MCV RBC AUTO: 89.1 FL (ref 79–97)
MONOCYTES # BLD AUTO: 1.27 10*3/MM3 (ref 0.1–0.9)
MONOCYTES NFR BLD AUTO: 11.2 % (ref 5–12)
NEUTROPHILS NFR BLD AUTO: 49.8 % (ref 42.7–76)
NEUTROPHILS NFR BLD AUTO: 5.63 10*3/MM3 (ref 1.7–7)
NRBC BLD AUTO-RTO: 0 /100 WBC (ref 0–0.2)
PLATELET # BLD AUTO: 311 10*3/MM3 (ref 140–450)
PMV BLD AUTO: 10.1 FL (ref 6–12)
POTASSIUM SERPL-SCNC: 3.8 MMOL/L (ref 3.5–5.2)
PROT SERPL-MCNC: 7.6 G/DL (ref 6–8.5)
RBC # BLD AUTO: 5.31 10*6/MM3 (ref 4.14–5.8)
SODIUM SERPL-SCNC: 135 MMOL/L (ref 136–145)
WBC NRBC COR # BLD AUTO: 11.31 10*3/MM3 (ref 3.4–10.8)

## 2025-04-14 PROCEDURE — 25010000002 FUROSEMIDE PER 20 MG: Performed by: INTERNAL MEDICINE

## 2025-04-14 PROCEDURE — 63710000001 INSULIN GLARGINE PER 5 UNITS: Performed by: THORACIC SURGERY (CARDIOTHORACIC VASCULAR SURGERY)

## 2025-04-14 PROCEDURE — 93460 R&L HRT ART/VENTRICLE ANGIO: CPT | Performed by: INTERNAL MEDICINE

## 2025-04-14 PROCEDURE — 83605 ASSAY OF LACTIC ACID: CPT

## 2025-04-14 PROCEDURE — C1769 GUIDE WIRE: HCPCS | Performed by: INTERNAL MEDICINE

## 2025-04-14 PROCEDURE — 82948 REAGENT STRIP/BLOOD GLUCOSE: CPT

## 2025-04-14 PROCEDURE — C1894 INTRO/SHEATH, NON-LASER: HCPCS | Performed by: INTERNAL MEDICINE

## 2025-04-14 PROCEDURE — 85025 COMPLETE CBC W/AUTO DIFF WBC: CPT | Performed by: STUDENT IN AN ORGANIZED HEALTH CARE EDUCATION/TRAINING PROGRAM

## 2025-04-14 PROCEDURE — 82330 ASSAY OF CALCIUM: CPT

## 2025-04-14 PROCEDURE — 25010000002 FENTANYL CITRATE (PF) 50 MCG/ML SOLUTION: Performed by: INTERNAL MEDICINE

## 2025-04-14 PROCEDURE — 94799 UNLISTED PULMONARY SVC/PX: CPT

## 2025-04-14 PROCEDURE — 80053 COMPREHEN METABOLIC PANEL: CPT | Performed by: STUDENT IN AN ORGANIZED HEALTH CARE EDUCATION/TRAINING PROGRAM

## 2025-04-14 PROCEDURE — 36600 WITHDRAWAL OF ARTERIAL BLOOD: CPT

## 2025-04-14 PROCEDURE — 82948 REAGENT STRIP/BLOOD GLUCOSE: CPT | Performed by: STUDENT IN AN ORGANIZED HEALTH CARE EDUCATION/TRAINING PROGRAM

## 2025-04-14 PROCEDURE — 82803 BLOOD GASES ANY COMBINATION: CPT

## 2025-04-14 PROCEDURE — 25010000002 LIDOCAINE 2% SOLUTION: Performed by: INTERNAL MEDICINE

## 2025-04-14 PROCEDURE — 99214 OFFICE O/P EST MOD 30 MIN: CPT | Performed by: INTERNAL MEDICINE

## 2025-04-14 PROCEDURE — 80047 BASIC METABLC PNL IONIZED CA: CPT

## 2025-04-14 PROCEDURE — G0378 HOSPITAL OBSERVATION PER HR: HCPCS

## 2025-04-14 PROCEDURE — 99232 SBSQ HOSP IP/OBS MODERATE 35: CPT | Performed by: STUDENT IN AN ORGANIZED HEALTH CARE EDUCATION/TRAINING PROGRAM

## 2025-04-14 PROCEDURE — 85014 HEMATOCRIT: CPT

## 2025-04-14 PROCEDURE — 25510000001 IOPAMIDOL PER 1 ML: Performed by: INTERNAL MEDICINE

## 2025-04-14 PROCEDURE — C1760 CLOSURE DEV, VASC: HCPCS | Performed by: INTERNAL MEDICINE

## 2025-04-14 PROCEDURE — 94761 N-INVAS EAR/PLS OXIMETRY MLT: CPT

## 2025-04-14 PROCEDURE — 99213 OFFICE O/P EST LOW 20 MIN: CPT | Performed by: SPECIALIST

## 2025-04-14 PROCEDURE — 84132 ASSAY OF SERUM POTASSIUM: CPT

## 2025-04-14 PROCEDURE — 84295 ASSAY OF SERUM SODIUM: CPT

## 2025-04-14 PROCEDURE — 82947 ASSAY GLUCOSE BLOOD QUANT: CPT

## 2025-04-14 PROCEDURE — 25010000002 MIDAZOLAM PER 1MG: Performed by: INTERNAL MEDICINE

## 2025-04-14 RX ORDER — INSULIN LISPRO 100 [IU]/ML
1-200 INJECTION, SOLUTION INTRAVENOUS; SUBCUTANEOUS AS NEEDED
Status: DISCONTINUED | OUTPATIENT
Start: 2025-04-14 | End: 2025-04-15

## 2025-04-14 RX ORDER — SODIUM CHLORIDE 9 MG/ML
40 INJECTION, SOLUTION INTRAVENOUS AS NEEDED
Status: DISCONTINUED | OUTPATIENT
Start: 2025-04-14 | End: 2025-04-25

## 2025-04-14 RX ORDER — MORPHINE SULFATE 2 MG/ML
1 INJECTION, SOLUTION INTRAMUSCULAR; INTRAVENOUS EVERY 4 HOURS PRN
Status: DISCONTINUED | OUTPATIENT
Start: 2025-04-14 | End: 2025-04-14 | Stop reason: HOSPADM

## 2025-04-14 RX ORDER — ONDANSETRON 2 MG/ML
4 INJECTION INTRAMUSCULAR; INTRAVENOUS EVERY 6 HOURS PRN
Status: DISCONTINUED | OUTPATIENT
Start: 2025-04-14 | End: 2025-04-14

## 2025-04-14 RX ORDER — MIDAZOLAM HYDROCHLORIDE 2 MG/2ML
INJECTION, SOLUTION INTRAMUSCULAR; INTRAVENOUS
Status: DISCONTINUED | OUTPATIENT
Start: 2025-04-14 | End: 2025-04-14 | Stop reason: HOSPADM

## 2025-04-14 RX ORDER — AMOXICILLIN 250 MG
2 CAPSULE ORAL 2 TIMES DAILY PRN
Status: DISCONTINUED | OUTPATIENT
Start: 2025-04-14 | End: 2025-04-25

## 2025-04-14 RX ORDER — DEXTROSE MONOHYDRATE 25 G/50ML
10-50 INJECTION, SOLUTION INTRAVENOUS
Status: DISCONTINUED | OUTPATIENT
Start: 2025-04-14 | End: 2025-04-15

## 2025-04-14 RX ORDER — ONDANSETRON 2 MG/ML
4 INJECTION INTRAMUSCULAR; INTRAVENOUS EVERY 6 HOURS PRN
Status: DISCONTINUED | OUTPATIENT
Start: 2025-04-14 | End: 2025-04-25

## 2025-04-14 RX ORDER — FENTANYL CITRATE 50 UG/ML
INJECTION, SOLUTION INTRAMUSCULAR; INTRAVENOUS
Status: DISCONTINUED | OUTPATIENT
Start: 2025-04-14 | End: 2025-04-14 | Stop reason: HOSPADM

## 2025-04-14 RX ORDER — SODIUM CHLORIDE 0.9 % (FLUSH) 0.9 %
10 SYRINGE (ML) INJECTION EVERY 12 HOURS SCHEDULED
Status: DISCONTINUED | OUTPATIENT
Start: 2025-04-14 | End: 2025-04-25 | Stop reason: HOSPADM

## 2025-04-14 RX ORDER — NITROGLYCERIN 400 UG/1
SPRAY ORAL
Status: DISCONTINUED | OUTPATIENT
Start: 2025-04-14 | End: 2025-04-14 | Stop reason: HOSPADM

## 2025-04-14 RX ORDER — BISACODYL 5 MG/1
5 TABLET, DELAYED RELEASE ORAL DAILY PRN
Status: DISCONTINUED | OUTPATIENT
Start: 2025-04-14 | End: 2025-04-25

## 2025-04-14 RX ORDER — SODIUM CHLORIDE 0.9 % (FLUSH) 0.9 %
10 SYRINGE (ML) INJECTION AS NEEDED
Status: DISCONTINUED | OUTPATIENT
Start: 2025-04-14 | End: 2025-04-25

## 2025-04-14 RX ORDER — IOPAMIDOL 755 MG/ML
INJECTION, SOLUTION INTRAVASCULAR
Status: DISCONTINUED | OUTPATIENT
Start: 2025-04-14 | End: 2025-04-14 | Stop reason: HOSPADM

## 2025-04-14 RX ORDER — LIDOCAINE HYDROCHLORIDE 20 MG/ML
INJECTION, SOLUTION INFILTRATION; PERINEURAL
Status: DISCONTINUED | OUTPATIENT
Start: 2025-04-14 | End: 2025-04-14 | Stop reason: HOSPADM

## 2025-04-14 RX ORDER — INSULIN LISPRO 100 [IU]/ML
1-200 INJECTION, SOLUTION INTRAVENOUS; SUBCUTANEOUS
Status: DISCONTINUED | OUTPATIENT
Start: 2025-04-14 | End: 2025-04-15

## 2025-04-14 RX ORDER — ASPIRIN 81 MG/1
TABLET, CHEWABLE ORAL
Status: DISCONTINUED | OUTPATIENT
Start: 2025-04-14 | End: 2025-04-14 | Stop reason: HOSPADM

## 2025-04-14 RX ORDER — ACETAMINOPHEN 325 MG/1
650 TABLET ORAL EVERY 4 HOURS PRN
Status: DISCONTINUED | OUTPATIENT
Start: 2025-04-14 | End: 2025-04-14 | Stop reason: HOSPADM

## 2025-04-14 RX ORDER — POLYETHYLENE GLYCOL 3350 17 G/17G
17 POWDER, FOR SOLUTION ORAL DAILY PRN
Status: DISCONTINUED | OUTPATIENT
Start: 2025-04-14 | End: 2025-04-25

## 2025-04-14 RX ORDER — FUROSEMIDE 10 MG/ML
INJECTION INTRAMUSCULAR; INTRAVENOUS
Status: DISCONTINUED | OUTPATIENT
Start: 2025-04-14 | End: 2025-04-14 | Stop reason: HOSPADM

## 2025-04-14 RX ORDER — NITROGLYCERIN 0.4 MG/1
0.4 TABLET SUBLINGUAL
Status: DISCONTINUED | OUTPATIENT
Start: 2025-04-14 | End: 2025-04-24

## 2025-04-14 RX ORDER — NICOTINE POLACRILEX 4 MG
15 LOZENGE BUCCAL
Status: DISCONTINUED | OUTPATIENT
Start: 2025-04-14 | End: 2025-04-15

## 2025-04-14 RX ORDER — ONDANSETRON 2 MG/ML
4 INJECTION INTRAMUSCULAR; INTRAVENOUS EVERY 6 HOURS PRN
Status: DISCONTINUED | OUTPATIENT
Start: 2025-04-14 | End: 2025-04-14 | Stop reason: HOSPADM

## 2025-04-14 RX ORDER — BISACODYL 10 MG
10 SUPPOSITORY, RECTAL RECTAL DAILY PRN
Status: DISCONTINUED | OUTPATIENT
Start: 2025-04-14 | End: 2025-04-25

## 2025-04-14 RX ORDER — FAMOTIDINE 20 MG/1
40 TABLET, FILM COATED ORAL DAILY
Status: DISCONTINUED | OUTPATIENT
Start: 2025-04-14 | End: 2025-04-18

## 2025-04-14 RX ORDER — NALOXONE HCL 0.4 MG/ML
0.4 VIAL (ML) INJECTION
Status: DISCONTINUED | OUTPATIENT
Start: 2025-04-14 | End: 2025-04-14 | Stop reason: HOSPADM

## 2025-04-14 RX ORDER — ONDANSETRON 4 MG/1
4 TABLET, ORALLY DISINTEGRATING ORAL EVERY 6 HOURS PRN
Status: DISCONTINUED | OUTPATIENT
Start: 2025-04-14 | End: 2025-04-14 | Stop reason: HOSPADM

## 2025-04-14 RX ORDER — NITROGLYCERIN 0.4 MG/1
0.4 TABLET SUBLINGUAL
Status: DISCONTINUED | OUTPATIENT
Start: 2025-04-14 | End: 2025-04-14 | Stop reason: HOSPADM

## 2025-04-14 RX ADMIN — INSULIN GLARGINE 31 UNITS: 100 INJECTION, SOLUTION SUBCUTANEOUS at 22:40

## 2025-04-14 RX ADMIN — FAMOTIDINE 40 MG: 20 TABLET, FILM COATED ORAL at 22:38

## 2025-04-14 RX ADMIN — METOPROLOL SUCCINATE 25 MG: 25 TABLET, EXTENDED RELEASE ORAL at 20:10

## 2025-04-14 RX ADMIN — Medication 10 ML: at 22:46

## 2025-04-14 NOTE — CONSULTS
[facility]   Cardiology Progress Note      Patient Name: Luiz Christianson  : 1985  MRN: [id number]  Primary Care Physician:  Luz Maria White APRN  Referring Physician: No Known Provider  Date of admission: 2025    Subjective   Subjective     Chief Complaint: Cardiomyopathy    HPI:  Luiz Christianson is a 39 y.o. male with history of hypertension and new onset CHF with cardiomyopathy.    REVIEW OF SYSTEMS    Constitutional:    No fever, no weight loss  Skin:     No rash  Otolaryngeal:    No difficulty swallowing  Cardiovascular:  No chest pain or shortness of breath  Pulmonary:    No cough, no sputum production    Objective    Objective     Vitals:   Vitals:    25 2243 25 0315 25 0504 25 0728   BP: 114/89 109/86  (!) 80/62   BP Location: Left arm Left arm  Right arm   Patient Position: Lying Lying  Lying   Pulse: 100 93  96   Resp: 18 18  18   Temp: 98.4 °F (36.9 °C) 98.1 °F (36.7 °C)  97.9 °F (36.6 °C)   TempSrc: Oral Oral  Oral   SpO2: 97% 98%  99%   Weight:   122 kg (269 lb 13.5 oz)    Height:                Physical Exam:   Constitutional: Awake, alert, No acute distress    Eyes: PERRLA, sclerae anicteric, no conjunctival injection   HENT: NCAT, mucous membranes moist   Neck: Supple, no thyromegaly, no lymphadenopathy, trachea midline   Respiratory: Clear to auscultation bilaterally, nonlabored respirations    Cardiovascular: RRR, no murmurs, rubs, or gallops, palpable pedal pulses bilaterally   Gastrointestinal: Positive bowel sounds, soft, nontender, nondistended   Musculoskeletal: No bilateral ankle edema, no clubbing or cyanosis to extremities   Psychiatric: Appropriate affect, cooperative   Neurologic: Oriented x 3, strength symmetric in all extremities, Cranial Nerves grossly intact to confrontation, speech clear   Skin: No rashes.      Current medications:  aspirin, 324 mg, Oral, Once  atorvastatin, 80 mg, Oral, Daily  furosemide, 40 mg, Intravenous,  Q12H  insulin lispro, 2-9 Units, Subcutaneous, 4x Daily AC & at Bedtime  lactulose, 20 g, Oral, BID  metoprolol succinate XL, 25 mg, Oral, Q12H      Current IV drips:       Result Review    Result Review:  I have personally reviewed the results from the time of this admission to 4/14/2025 08:55 EDT and agree with these findings:  []  Laboratory  []  EKG/Telemetry   []  Cardiology/Vascular   []  Radiology         CBC          4/12/2025    22:27 4/13/2025    04:36 4/14/2025    08:23   CBC   WBC 12.05  12.06  11.31    RBC 5.27  5.19  5.31    Hemoglobin 14.8  14.6  15.1    Hematocrit 47.4  46.3  47.3    MCV 89.9  89.2  89.1    MCH 28.1  28.1  28.4    MCHC 31.2  31.5  31.9    RDW 14.9  15.0  15.1    Platelets 323  342  311      CMP          4/12/2025    22:27 4/13/2025    04:36 4/14/2025    08:23   CMP   Glucose 119  98  96    BUN 16  17  21    Creatinine 1.12  1.08  1.16    EGFR 84.6  88.4  81.1    Sodium 134  136  135    Potassium 4.1  3.9  3.8    Chloride 98  100  96    Calcium 9.8  9.5  9.6    Total Protein 7.2  7.2  7.6    Albumin 3.7  3.8  4.0    Globulin 3.5  3.4  3.6    Total Bilirubin 1.5  1.7  2.3    Alkaline Phosphatase 131  123  90    AST (SGOT) 122  109  104    ALT (SGPT) 200  183  172    Albumin/Globulin Ratio 1.1  1.1  1.1    BUN/Creatinine Ratio 14.3  15.7  18.1    Anion Gap 14.7  13.7  15.6      Results for orders placed during the hospital encounter of 04/08/25    Adult Transthoracic Echo Complete w/ Color, Spectral and Contrast if necessary per protocol    Interpretation Summary    Left ventricular systolic function is severely decreased. Calculated left ventricular EF = 18.6%    The left ventricular cavity is dilated.    The left atrial cavity is dilated.    Estimated right ventricular systolic pressure from tricuspid regurgitation is mildly elevated (35-45 mmHg).    There is a small (<1cm) pericardial effusion.    Mild MR and mild TR.        Lab Results   Component Value Date    PROBNP 2,220.0 (H)  04/12/2025         Telemetry reviewed     Assessment / Plan     ASSESSMENT:    Hypertension, essential    Acute systolic CHF (congestive heart failure)  Dilated cardiomyopathy probably nonischemic      PLAN:  1.  Continue guideline directed medical therapy for cardiomyopathy.  2.  IV Lasix.  Change to p.o. Lasix on discharge.  3.  Add Aldactone 25 mg once a day.  4.  Add Entresto 24/26 mg twice a day and increase if blood pressure can tolerate.  5.  Cardiac catheterization today with Dr. Barry.  Risk benefits discussed with the patient.    Electronically signed by Russell Bledsoe MD, 04/14/25, 8:55 AM EDT.

## 2025-04-14 NOTE — Clinical Note
First balloon inflation max pressure = 12 roel. Second inflation of balloon - Max pressure = 14 roel. 2nd inflation was done at 15:35 EDT.

## 2025-04-14 NOTE — Clinical Note
First balloon inflation max pressure = 14 roel. Second inflation of balloon - Max pressure = 14 roel. 2nd inflation was done at 15:39 EDT. Third inflation of balloon - Max pressure = 14 roel. 3rd inflation was done at 15:39 EDT.

## 2025-04-14 NOTE — Clinical Note
First balloon inflation max pressure = 14 roel. Second inflation of balloon - Max pressure = 16 roel. 2nd inflation was done at 15:53 EDT. Third inflation of balloon - Max pressure = 16 roel. 3rd inflation was done at 15:54 EDT.

## 2025-04-14 NOTE — TELEPHONE ENCOUNTER
Caller: Luiz Christianson    Relationship: Self    Best call back number: [phone]     What was the call regarding: PATIENT STATES THAT THE OFFICE SHOULD HAVE RECEIVED PAPERWORK FROM [employer]  REGARDING HIS SHORT TERM DISABILITY. PATIENT STATES THAT HE BELIEVES IT WAS FAXED TO THE OFFICE ON 4.12.25 BUT HE IS UNSURE.     PATIENT STATES THAT HE WOULD LIKE CONFIRMATION THAT THE OFFICE RECEIVED THIS PAPERWORK AND IT IS ABLE TO BE FILLED OUT. PATIENT STATES THAT YOU CAN LEAVE THIS INFORMATION IN HIS VOICEMAIL.     PATIENT STATES THAT HE IS CURRENTLY ADMITTED AT THE HOSPITAL AND IS WAITING TO GO BACK FOR A STRESS TEST SO IF HE DOES NOT ANSWER HE WILL CALL BACK.

## 2025-04-14 NOTE — Clinical Note
Balloon inserted in circumflex. Spoke to pt's wife. She says Dr. Benitez looked up donepezil and amiodarone and did not see any reason why they can't be given together.   When I look up in epocrates, I do not see warning either. HOwever, when I try to send to pharmacy, there is an epic warning about these two meds due to potential QTc prolongation. I explained what this means and I have reviewed his June and July 19 EKG, which both reveal prolonged QTc, carina the July 19 at 514 ms.   I have explained that donepezil could then cause a life threatening arrhythmia.  Donepezil will not cure his dementia. She understands this. She says it did seem to help his quality of life.   She requests that I order donepezil.  Again when I try to order it, I get the warning and I discussed this with her again.   She requests that I talk with dr. Benitez.  I did message him.     I will hold on ordering donepezil at this time.

## 2025-04-14 NOTE — Clinical Note
First balloon inflation max pressure = 18 roel. Second inflation of balloon - Max pressure = 20 roel. 2nd inflation was done at 15:25 EDT.

## 2025-04-14 NOTE — Clinical Note
First balloon inflation max pressure = 20 roel. First balloon inflation duration = 4 seconds. Second inflation of balloon - Max pressure = 20 roel. 2nd Inflation of balloon - Duration = 4 seconds. 2nd inflation was done at 16:07 EDT. Third inflation of balloon - Max pressure = 20 roel. 3rd Inflation of balloon - Duration = 5 seconds. 3rd inflation was done at 16:07 EDT.

## 2025-04-14 NOTE — Clinical Note
First balloon inflation max pressure = 12 roel. First balloon inflation duration = 45 seconds. Second inflation of balloon - Max pressure = 14 roel. 2nd Inflation of balloon - Duration = 35 seconds. 2nd inflation was done at 15:51 EDT.

## 2025-04-14 NOTE — LETTER
[facility]  [phone]    ACCESS CENTER STATEMENT OF DISPOSITION        I, Luiz Christianson, was assessed in the [facility] at [facility] on 4/17/2025.  I understand the recommendations below and what follow-up action is expected of me.    In-Person EMDR Therapists:    VLADISLAV Nichole, [facility]  [phone]  Colin Chow, [facility]  [phone]    VLADISLAV Hall, [facility]  [phone]  Dali Mark, [facility]  [phone]      ________________________________  Clinician Signature    4/17/2025  09:49 EDT

## 2025-04-14 NOTE — Clinical Note
First balloon inflation max pressure = 12 roel. Second inflation of balloon - Max pressure = 16 roel. 2nd inflation was done at 16:03 EDT.

## 2025-04-14 NOTE — Clinical Note
First balloon inflation max pressure = 10 roel. Second inflation of balloon - Max pressure = 8 roel. 2nd inflation was done at 15:43 EDT. Third inflation of balloon - Max pressure = 12 roel. 3rd inflation was done at 15:43 EDT.

## 2025-04-14 NOTE — Clinical Note
Hemostasis started on the right brachial vein. Manual pressure applied to vessel. Manual pressure was held by BB RT(R). Manual pressure was held for 5 min. Hemostasis achieved successfully. Closure device additional comment: tegaderm

## 2025-04-14 NOTE — PLAN OF CARE
Goal Outcome Evaluation:              Outcome Evaluation: Patient had heart cath done this shift. No intervention. Per Jan YEH, possible transfer to [place] for CABG evaluation. Orders for 24 hour bedrest received. Site to R groin is clean, dry, intact and soft to touch. Patient denies chest pain and SOA. Remains on room air. Will continue with plan of care.    Daily Care Plan Summary: Heart Failure    Diuretic in use (IV or PO):   IV        Daily weight (up or down):    loss: 0.44lbs      Output > Intake (yes/no):Yes      O2 Requirements (current, any change?): room air      Symptoms noted with Activity (Respiratory Tolerance, functional state):     SOA      Anticipated Discharge Plans:     Possible transfer to [place] for surgical intervention.

## 2025-04-14 NOTE — PROGRESS NOTES
[facility]   Hospitalist Progress Note  Date: 2025  Patient Name: Luiz Christianson  : 1985  MRN: [id number]  Date of admission: 2025  Room/Bed: [facility]/1      Subjective   Subjective     Chief Complaint: LE edema     Summary:Luiz Christianson is a 39 y.o. male  sig PMHx of HFrEF 18% on echo 2025, Afib on eliquis, DM, HTN, HLD, Depression presented to the ED complaining of chest pain and shortness of breath.  Positive for orthopnea, lower extremity edema, PND.        Patient admitted for acute CHF exacerbation.  Cardiology consulted.  Echo from 2023 EF normal, recent outpatient echo on  EF had dropped to 18%.  He also had an outpatient EKG which was concerning for A-fib, he was initiated on p.o. Eliquis at that time.  Patient initiated IV Lasix 40 mg twice daily.  CT angiogram showed cardiomegaly with small pericardial effusion, anasarca and small amount of ascites.  Cardiology planning left and right heart cath.    Interval Followup: No acute overnight events.  No acute distress.  Patient resting comfortably in bed.  Blood pressure low this morning, IV Lasix held.  Planning for heart cath today.  Discussed in detail medications and care plan.  Answered all questions and concerns.      Objective   Objective     Vitals:   Temp:  [97.5 °F (36.4 °C)-98.4 °F (36.9 °C)] 97.9 °F (36.6 °C)  Heart Rate:  [] 96  Resp:  [16-18] 18  BP: ()/(62-89) 80/62    Physical Exam   Gen: NAD, Alert and Oriented  Pulm: CTA b/l, no wheezing  Abd: soft, nondistended  Extremities: 2+ pitting edema up to the knee    Result Review    Result Review:  I have personally reviewed these results:  [x]  Laboratory      Lab 25  0823 25  0436 25  2227 25  1146   WBC 11.31* 12.06* 12.05* 10.97*   HEMOGLOBIN 15.1 14.6 14.8 15.0   HEMATOCRIT 47.3 46.3 47.4 47.0   PLATELETS 311 342 323 352   NEUTROS ABS 5.63 7.84* 7.01*  --    IMMATURE GRANS (ABS) 0.06*  --  0.06*  --    LYMPHS ABS 3.99*   --  3.44*  --    MONOS ABS 1.27*  --  1.18*  --    EOS ABS 0.19 0.48* 0.21  --    MCV 89.1 89.2 89.9 88.5   PROTIME  --   --   --  28.9*         Lab 04/14/25 0823 04/13/25 0436 04/13/25 0052 04/12/25 2227   SODIUM 135* 136  --  134*   POTASSIUM 3.8 3.9  --  4.1   CHLORIDE 96* 100  --  98   CO2 23.4 22.3  --  21.3*   ANION GAP 15.6* 13.7  --  14.7   BUN 21* 17  --  16   CREATININE 1.16 1.08  --  1.12   EGFR 81.1 88.4  --  84.6   GLUCOSE 96 98  --  119*   CALCIUM 9.6 9.5  --  9.8   MAGNESIUM  --   --   --  1.7   TSH  --   --  7.140*  --          Lab 04/14/25 0823 04/13/25 0436 04/12/25 2227 04/08/25  1146   TOTAL PROTEIN 7.6 7.2 7.2 7.2  7.0   ALBUMIN 4.0 3.8 3.7 3.3*  3.4   GLOBULIN 3.6 3.4 3.5 3.9   GLOBULINREF  --   --   --  3.6   ALT (SGPT) 172* 183* 200* 313*   AST (SGOT) 104* 109* 122* 249*   BILIRUBIN 2.3* 1.7* 1.5* 1.6*   BILIRUBIN DIRECT  --   --   --  0.6*   ALK PHOS 90 123* 131* 73   LIPASE  --   --  80*  --          Lab 04/13/25 0052 04/12/25 2227 04/08/25  1146   PROBNP  --  2,220.0*  --    HSTROP T 49* 47*  --    PROTIME  --   --  28.9*   INR  --   --  2.56*             Lab 04/08/25  1146   IRON 41*   IRON SATURATION (TSAT) 8*   TIBC 533   TRANSFERRIN 358   FERRITIN 135.00         Brief Urine Lab Results  (Last result in the past 365 days)        Color   Clarity   Blood   Leuk Est   Nitrite   Protein   CREAT   Urine HCG        04/04/25 1131 Dark Yellow   Clear   Negative   Small (1+)   Negative   >=300 mg/dL (3+)                 [x]  Microbiology   Microbiology Results (last 10 days)       Procedure Component Value - Date/Time    Blood Culture - Blood, Hand, Left [[id number]]  (Normal) Collected: 04/13/25 0436    Lab Status: Preliminary result Specimen: Blood from Hand, Left Updated: 04/14/25 0500     Blood Culture No growth at 24 hours    Blood Culture - Blood, Hand, Right [[id number]]  (Normal) Collected: 04/13/25 0436    Lab Status: Preliminary result Specimen: Blood from Hand, Right Updated:  04/14/25 0500     Blood Culture No growth at 24 hours          [x]  Radiology  CT Angiogram Chest Pulmonary Embolism  Result Date: 4/13/2025  1.No pulmonary embolism is identified. 2.Cardiomegaly. Small pericardial effusion. 3.Mild groundglass changes in the lungs could be the result of hypoventilatory change and/or mild edema. 4.Anasarca. 5.Small amount of ascites. Electronically Signed: Jasen Adams MD  4/13/2025 1:18 AM EDT  Workstation ID: [id number]    XR Chest 1 View  Result Date: 4/12/2025  No acute cardiopulmonary findings.  4/12/2025 10:37 PM by Dr. Jasen Adams MD on Workstation: [id number]      []  EKG/Telemetry   []  Cardiology/Vascular   []  Pathology  []  Old records  []  Other:    Assessment & Plan   Assessment / Plan     Assessment:  Acute combined systolic and diastolic CHF, newly reduced EF 18%  Elevated troponin secondary to volume overload/demand ischemia  Chronically elevated LFTs  Anasarca  Leukocytosis  Type 2 diabetes  Hypertension  Hyperlipidemia  New A-fib on Eliquis      Plan:  Continue inpatient admission.  Telemonitoring.  Cardiology consulted.  Planning left and right heart cath today.  Patient sees outpatient cardiology with history of diastolic heart failure.  However, when discussing newly reduced EF and possible LifeVest he reports he had to wear one around 8 years ago, question if he had previously HFrEF recovered EF? Apparently lived in [place] previously, no records available.   Recent outpatient EKG with PCP which was concerning for Afib, Cardiology NP telephone message reports she switched him to metoprolol and started Eliquis at that time.   Normal EF end of 2023.  Outpatient echo on 4/8/2025 EF dropped to 18%.  Needs GDMT titration.  Already on losartan, toprol, and spironolactone outpatient.  Currently on hold losartan and spironolactone given lower blood pressures and IV diuretics.  Plan to start SGLT2 on discharge.  Cardiology planning to change losartan to Entresto if he  can tolerate.  Continue high intensity statin  A1c 7.14%.  Accu-Cheks sliding scale insulin.  Hold home Januvia and metformin.  LFTs have been elevated outpatient and he has been seeing GI.  Possible this is all congestive hepatopathy given volume overload.  CT scan with some signs of cirrhosis.  Will trend.  Cardiac diet  Hold home Eliquis for potential procedure.  Home meds reviewed and reconciled.  AM labs       Discussed with RN.    VTE Prophylaxis:  No VTE prophylaxis order currently exists.        CODE STATUS:   Code Status (Patient has no pulse and is not breathing): CPR (Attempt to Resuscitate)  Medical Interventions (Patient has pulse or is breathing): Full Support      Electronically signed by Ly Teran DO, 4/14/2025, 11:16 EDT.

## 2025-04-14 NOTE — PLAN OF CARE
Goal Outcome Evaluation:  Plan of Care Reviewed With: patient        Progress: improving  Outcome Evaluation: A&Ox4. Vital signs stable with runs of VTach noted on the monitor occasionally. NPO after midnight for stress test on 4/14/25. Patient noted to be anxious throughout shift. No questions or complaints noted at this time. Plan of care ongoing.

## 2025-04-14 NOTE — Clinical Note
First balloon inflation max pressure = 14 roel. Second inflation of balloon - Max pressure = 18 roel. 2nd inflation was done at 15:31 EDT.

## 2025-04-14 NOTE — PROGRESS NOTES
[facility]     Cardiology Progress Note    Patient Name: Luiz Christianson  : 1985  MRN: [id number]  Primary Care Physician:  Luz Maria White APRN  Date of admission: 2025    Subjective   Subjective     Chief Complaint: Shortness of Breath    Interval HPI:    Patient with Heart Failure with severely reduced EF    Review of Systems   All systems were reviewed and negative except for: dyspnea    Objective   Objective     Vitals:   Temp:  [97.5 °F (36.4 °C)-98.4 °F (36.9 °C)] 98.1 °F (36.7 °C)  Heart Rate:  [] 72  Resp:  [16-18] 18  BP: ()/(62-89) 112/83  Physical Exam      Alert  Heart. Irregular, no gallops.  Lungs: no rales, no wheezing  LE: no edema  Neurologic. No apparent motor deficits.    Scheduled Meds:aspirin, 324 mg, Oral, Once  atorvastatin, 80 mg, Oral, Daily  furosemide, 40 mg, Intravenous, Q12H  insulin lispro, 2-9 Units, Subcutaneous, 4x Daily AC & at Bedtime  [Held by provider] lactulose, 20 g, Oral, BID  metoprolol succinate XL, 25 mg, Oral, Q12H      Continuous Infusions:        Result Review    Result Review:  I have personally reviewed the results from the time of this admission to 2025 19:03 EDT and agree with these findings:  [x]  Laboratory  []  Microbiology  [x]  Radiology  [x]  EKG/Telemetry   [x]  Cardiology/Vascular   []  Pathology  []  Old records  []  Other:  Most notable findings include:     CBC          2025    22:27 2025    04:36 2025    08:23   CBC   WBC 12.05  12.06  11.31    RBC 5.27  5.19  5.31    Hemoglobin 14.8  14.6  15.1    Hematocrit 47.4  46.3  47.3    MCV 89.9  89.2  89.1    MCH 28.1  28.1  28.4    MCHC 31.2  31.5  31.9    RDW 14.9  15.0  15.1    Platelets 323  342  311      CMP          2025    22:27 2025    04:36 2025    08:23   CMP   Glucose 119  98  96    BUN 16  17  21    Creatinine 1.12  1.08  1.16    EGFR 84.6  88.4  81.1    Sodium 134  136  135    Potassium 4.1  3.9  3.8    Chloride 98  100  96     Calcium 9.8  9.5  9.6    Total Protein 7.2  7.2  7.6    Albumin 3.7  3.8  4.0    Globulin 3.5  3.4  3.6    Total Bilirubin 1.5  1.7  2.3    Alkaline Phosphatase 131  123  90    AST (SGOT) 122  109  104    ALT (SGPT) 200  183  172    Albumin/Globulin Ratio 1.1  1.1  1.1    BUN/Creatinine Ratio 14.3  15.7  18.1    Anion Gap 14.7  13.7  15.6       CARDIAC LABS:      Lab 04/13/25  0052 04/12/25  2227 04/08/25  1146   PROBNP  --  2,220.0*  --    HSTROP T 49* 47*  --    PROTIME  --   --  28.9*   INR  --   --  2.56*        Assessment & Plan   Assessment / Plan     Brief Patient Summary:  Luiz Christianson is a 39 y.o. male with acute heart failure with severely reduced EF. He had an urgent right and left heart cath which showed elevated Filling Pressures with PCWP over 40 mmHg, CI over 2.2, Group 2 Pulmonary HTN and severe 3 Vessel CAD.    Active Hospital Problems:  Active Hospital Problems    Diagnosis     Acute systolic CHF (congestive heart failure)     Hypertension, essential            Plan:     The patient is to be transferred to [facility] for continuation of heart failure treatment and consideration for revascularization. May need evaluation for further replacement, advanced HF therapies including Transplant or LVAD. He is accepted for transfer by Dr. Stanton, CT Surgery.        CODE STATUS:   Code Status (Patient has no pulse and is not breathing): CPR (Attempt to Resuscitate)  Medical Interventions (Patient has pulse or is breathing): Full Support      Electronically signed by Aidan Barry MD, 04/14/25, 7:03 PM EDT.

## 2025-04-14 NOTE — NURSING NOTE
Report called to Nisreen GILLESPIE with [facility] at [phone].    Per Serafin Barry MD, patient will need ACLS transportation. Bed board called and made aware of transfer.

## 2025-04-14 NOTE — Clinical Note
Hemostasis started on the right radial artery. Radial compression device applied to vessel. Hemostasis achieved successfully. Closure device additional comment: Vasc band applied

## 2025-04-14 NOTE — Clinical Note
First balloon inflation max pressure = 12 roel. Second inflation of balloon - Max pressure = 12 roel. 2nd inflation was done at 15:20 EDT. Third inflation of balloon - Max pressure = 12 roel. 3rd inflation was done at 15:20 EDT.

## 2025-04-15 ENCOUNTER — APPOINTMENT (OUTPATIENT)
Dept: GENERAL RADIOLOGY | Facility: HOSPITAL | Age: 40
End: 2025-04-15
Payer: COMMERCIAL

## 2025-04-15 LAB
ALBUMIN SERPL-MCNC: 3.5 G/DL (ref 3.5–5.2)
ALBUMIN/GLOB SERPL: 1.2 G/DL
ALP SERPL-CCNC: 85 U/L (ref 39–117)
ALT SERPL W P-5'-P-CCNC: 139 U/L (ref 1–41)
ANION GAP SERPL CALCULATED.3IONS-SCNC: 12 MMOL/L (ref 5–15)
AST SERPL-CCNC: 82 U/L (ref 1–40)
BASE DEFICIT: ABNORMAL
BASE EXCESS BLDV CALC-SCNC: 0 MMOL/L (ref -2–2)
BASOPHILS # BLD AUTO: 0.12 10*3/MM3 (ref 0–0.2)
BASOPHILS NFR BLD AUTO: 1.1 % (ref 0–1.5)
BILIRUB SERPL-MCNC: 1.9 MG/DL (ref 0–1.2)
BUN BLDA-MCNC: 17 MG/DL (ref 8–23)
BUN SERPL-MCNC: 20 MG/DL (ref 6–20)
BUN/CREAT SERPL: 16.7 (ref 7–25)
CA-I BLDA-SCNC: 1.14 MMOL/L (ref 4.5–5.3)
CA-I BLDA-SCNC: 1.19 MMOL/L (ref 1.13–1.32)
CALCIUM SPEC-SCNC: 8.9 MG/DL (ref 8.6–10.5)
CHLORIDE BLDA-SCNC: 100 MMOL/L (ref 98–107)
CHLORIDE SERPL-SCNC: 101 MMOL/L (ref 98–107)
CO2 BLDA-SCNC: 23.5 MMOL/L (ref 23–27)
CO2 CONTENT VENOUS: 26 MMOL/L
CO2 SERPL-SCNC: 21 MMOL/L (ref 22–29)
CREAT BLDA-MCNC: 0.87 MG/DL (ref 0.6–1.3)
CREAT SERPL-MCNC: 1.2 MG/DL (ref 0.76–1.27)
D-LACTATE SERPL-SCNC: 1.3 MMOL/L
DEPRECATED RDW RBC AUTO: 45.5 FL (ref 37–54)
EGFRCR SERPLBLD CKD-EPI 2021: 77.9 ML/MIN/1.73
EOSINOPHIL # BLD AUTO: 0.19 10*3/MM3 (ref 0–0.4)
EOSINOPHIL NFR BLD AUTO: 1.8 % (ref 0.3–6.2)
ERYTHROCYTE [DISTWIDTH] IN BLOOD BY AUTOMATED COUNT: 13.8 % (ref 12.3–15.4)
GLOBULIN UR ELPH-MCNC: 3 GM/DL
GLUCOSE BLDC GLUCOMTR-MCNC: 122 MG/DL (ref 70–130)
GLUCOSE BLDC GLUCOMTR-MCNC: 139 MG/DL (ref 70–130)
GLUCOSE BLDC GLUCOMTR-MCNC: 144 MG/DL (ref 70–130)
GLUCOSE BLDC GLUCOMTR-MCNC: 153 MG/DL (ref 70–130)
GLUCOSE BLDC GLUCOMTR-MCNC: 83 MG/DL (ref 70–130)
GLUCOSE BLDC GLUCOMTR-MCNC: 87 MG/DL (ref 70–99)
GLUCOSE SERPL-MCNC: 172 MG/DL (ref 65–99)
HBA1C MFR BLD: 7.3 % (ref 4.8–5.6)
HCO3 BLDV-SCNC: 25.2 MMOL/L (ref 22–26)
HCT VFR BLD AUTO: 45.1 % (ref 37.5–51)
HCT VFR BLDA CALC: 45 % (ref 38–51)
HGB BLD-MCNC: 13.7 G/DL (ref 13–17.7)
HGB BLDA-MCNC: 15.3 G/DL (ref 12–17)
IMM GRANULOCYTES # BLD AUTO: 0.04 10*3/MM3 (ref 0–0.05)
IMM GRANULOCYTES NFR BLD AUTO: 0.4 % (ref 0–0.5)
LYMPHOCYTES # BLD AUTO: 3.43 10*3/MM3 (ref 0.7–3.1)
LYMPHOCYTES NFR BLD AUTO: 32.8 % (ref 19.6–45.3)
MCH RBC QN AUTO: 27.7 PG (ref 26.6–33)
MCHC RBC AUTO-ENTMCNC: 30.4 G/DL (ref 31.5–35.7)
MCV RBC AUTO: 91.1 FL (ref 79–97)
MONOCYTES # BLD AUTO: 1.27 10*3/MM3 (ref 0.1–0.9)
MONOCYTES NFR BLD AUTO: 12.2 % (ref 5–12)
NEUTROPHILS NFR BLD AUTO: 5.4 10*3/MM3 (ref 1.7–7)
NEUTROPHILS NFR BLD AUTO: 51.7 % (ref 42.7–76)
NRBC BLD AUTO-RTO: 0 /100 WBC (ref 0–0.2)
PCO2 BLDV: 41.3 MM HG (ref 41–51)
PH BLDV: 7.39 PH UNITS (ref 7.31–7.41)
PLATELET # BLD AUTO: 306 10*3/MM3 (ref 140–450)
PMV BLD AUTO: 10.3 FL (ref 6–12)
PO2 BLDV: 32 MM HG (ref 35–42)
POTASSIUM BLDA-SCNC: 3.4 MMOL/L (ref 3.5–4.9)
POTASSIUM BLDA-SCNC: 3.4 MMOL/L (ref 3.5–5)
POTASSIUM SERPL-SCNC: 3.8 MMOL/L (ref 3.5–5.2)
PROT SERPL-MCNC: 6.5 G/DL (ref 6–8.5)
RBC # BLD AUTO: 4.95 10*6/MM3 (ref 4.14–5.8)
SAO2 % BLDCOV: 60 % (ref 45–75)
SODIUM BLD-SCNC: 137 MMOL/L (ref 138–146)
SODIUM BLD-SCNC: 138 MMOL/L (ref 131–143)
SODIUM SERPL-SCNC: 134 MMOL/L (ref 136–145)
WBC NRBC COR # BLD AUTO: 10.45 10*3/MM3 (ref 3.4–10.8)

## 2025-04-15 PROCEDURE — 99221 1ST HOSP IP/OBS SF/LOW 40: CPT | Performed by: STUDENT IN AN ORGANIZED HEALTH CARE EDUCATION/TRAINING PROGRAM

## 2025-04-15 PROCEDURE — 25010000002 MAGNESIUM SULFATE IN D5W 1G/100ML (PREMIX) 1-5 GM/100ML-% SOLUTION

## 2025-04-15 PROCEDURE — 83036 HEMOGLOBIN GLYCOSYLATED A1C: CPT | Performed by: NURSE PRACTITIONER

## 2025-04-15 PROCEDURE — 71046 X-RAY EXAM CHEST 2 VIEWS: CPT

## 2025-04-15 PROCEDURE — 80053 COMPREHEN METABOLIC PANEL: CPT | Performed by: THORACIC SURGERY (CARDIOTHORACIC VASCULAR SURGERY)

## 2025-04-15 PROCEDURE — 25010000002 FUROSEMIDE PER 20 MG: Performed by: THORACIC SURGERY (CARDIOTHORACIC VASCULAR SURGERY)

## 2025-04-15 PROCEDURE — 82948 REAGENT STRIP/BLOOD GLUCOSE: CPT

## 2025-04-15 PROCEDURE — 85025 COMPLETE CBC W/AUTO DIFF WBC: CPT | Performed by: THORACIC SURGERY (CARDIOTHORACIC VASCULAR SURGERY)

## 2025-04-15 PROCEDURE — 25010000002 MILRINONE LACTATE IN DEXTROSE 20-5 MG/100ML-% SOLUTION: Performed by: THORACIC SURGERY (CARDIOTHORACIC VASCULAR SURGERY)

## 2025-04-15 PROCEDURE — 25010000002 FUROSEMIDE PER 20 MG: Performed by: STUDENT IN AN ORGANIZED HEALTH CARE EDUCATION/TRAINING PROGRAM

## 2025-04-15 PROCEDURE — 63710000001 INSULIN LISPRO (HUMAN) PER 5 UNITS: Performed by: NURSE PRACTITIONER

## 2025-04-15 RX ORDER — MAGNESIUM SULFATE 1 G/100ML
1 INJECTION INTRAVENOUS ONCE
Status: COMPLETED | OUTPATIENT
Start: 2025-04-15 | End: 2025-04-15

## 2025-04-15 RX ORDER — FUROSEMIDE 10 MG/ML
40 INJECTION INTRAMUSCULAR; INTRAVENOUS EVERY 12 HOURS
Status: DISCONTINUED | OUTPATIENT
Start: 2025-04-15 | End: 2025-04-15

## 2025-04-15 RX ORDER — TEMAZEPAM 15 MG/1
15 CAPSULE ORAL NIGHTLY PRN
Status: DISPENSED | OUTPATIENT
Start: 2025-04-15 | End: 2025-04-23

## 2025-04-15 RX ORDER — CARVEDILOL 3.12 MG/1
3.12 TABLET ORAL 2 TIMES DAILY WITH MEALS
Status: DISCONTINUED | OUTPATIENT
Start: 2025-04-15 | End: 2025-04-15

## 2025-04-15 RX ORDER — MILRINONE LACTATE 0.2 MG/ML
0.25 INJECTION, SOLUTION INTRAVENOUS CONTINUOUS
Status: DISCONTINUED | OUTPATIENT
Start: 2025-04-15 | End: 2025-04-17

## 2025-04-15 RX ORDER — ATORVASTATIN CALCIUM 80 MG/1
80 TABLET, FILM COATED ORAL DAILY
Status: DISCONTINUED | OUTPATIENT
Start: 2025-04-15 | End: 2025-04-19

## 2025-04-15 RX ORDER — ALPRAZOLAM 0.5 MG
0.5 TABLET ORAL 3 TIMES DAILY PRN
Status: DISPENSED | OUTPATIENT
Start: 2025-04-15 | End: 2025-04-23

## 2025-04-15 RX ORDER — DEXTROSE MONOHYDRATE 25 G/50ML
25 INJECTION, SOLUTION INTRAVENOUS
Status: DISCONTINUED | OUTPATIENT
Start: 2025-04-15 | End: 2025-04-25

## 2025-04-15 RX ORDER — NICOTINE POLACRILEX 4 MG
15 LOZENGE BUCCAL
Status: DISCONTINUED | OUTPATIENT
Start: 2025-04-15 | End: 2025-04-25

## 2025-04-15 RX ORDER — FUROSEMIDE 10 MG/ML
80 INJECTION INTRAMUSCULAR; INTRAVENOUS EVERY 12 HOURS
Status: DISCONTINUED | OUTPATIENT
Start: 2025-04-15 | End: 2025-04-15

## 2025-04-15 RX ORDER — ASPIRIN 81 MG/1
81 TABLET ORAL DAILY
Status: DISCONTINUED | OUTPATIENT
Start: 2025-04-15 | End: 2025-04-25 | Stop reason: HOSPADM

## 2025-04-15 RX ORDER — IBUPROFEN 600 MG/1
1 TABLET ORAL
Status: DISCONTINUED | OUTPATIENT
Start: 2025-04-15 | End: 2025-04-25

## 2025-04-15 RX ORDER — INSULIN LISPRO 100 [IU]/ML
2-9 INJECTION, SOLUTION INTRAVENOUS; SUBCUTANEOUS
Status: DISCONTINUED | OUTPATIENT
Start: 2025-04-15 | End: 2025-04-25 | Stop reason: HOSPADM

## 2025-04-15 RX ORDER — FUROSEMIDE 10 MG/ML
80 INJECTION INTRAMUSCULAR; INTRAVENOUS EVERY 12 HOURS
Status: DISCONTINUED | OUTPATIENT
Start: 2025-04-15 | End: 2025-04-16

## 2025-04-15 RX ADMIN — MAGNESIUM SULFATE IN DEXTROSE 1 G: 10 INJECTION, SOLUTION INTRAVENOUS at 08:50

## 2025-04-15 RX ADMIN — ATORVASTATIN CALCIUM 80 MG: 80 TABLET, FILM COATED ORAL at 08:51

## 2025-04-15 RX ADMIN — FAMOTIDINE 40 MG: 20 TABLET, FILM COATED ORAL at 08:52

## 2025-04-15 RX ADMIN — INSULIN LISPRO 2 UNITS: 100 INJECTION, SOLUTION INTRAVENOUS; SUBCUTANEOUS at 16:49

## 2025-04-15 RX ADMIN — Medication 10 ML: at 08:46

## 2025-04-15 RX ADMIN — FUROSEMIDE 80 MG: 10 INJECTION, SOLUTION INTRAMUSCULAR; INTRAVENOUS at 12:43

## 2025-04-15 RX ADMIN — ASPIRIN 81 MG: 81 TABLET, COATED ORAL at 08:52

## 2025-04-15 RX ADMIN — MILRINONE LACTATE 0.25 MCG/KG/MIN: 0.2 INJECTION, SOLUTION INTRAVENOUS at 16:59

## 2025-04-15 RX ADMIN — MILRINONE LACTATE 0.25 MCG/KG/MIN: 0.2 INJECTION, SOLUTION INTRAVENOUS at 06:16

## 2025-04-15 NOTE — PLAN OF CARE
Goal Outcome Evaluation:  Plan of Care Reviewed With: patient           Outcome Evaluation: Pt admitted with dx of CHF s/p heart cath.  Pt sent here for possible CABG evaluation. Pt is alert and oriented x 4. Pt is on room air with a 20g in Left AC.  Pt was started on glucommander.  Pt is on bedrest till the Dr. murdock's him.  No other distress noted at this time.  Will cont to monitor for any changes.  Pt's dressing to the right groin is C/D/I.

## 2025-04-15 NOTE — PLAN OF CARE
Goal Outcome Evaluation:  Plan of Care Reviewed With: patient        Progress: improving  Outcome Evaluation: Pt is AOx4. Pt is SR on tele with frequent PVC and PAC. BP is 95/69. On room air. Pt has no complaints of pain and no further needs at this time.

## 2025-04-15 NOTE — PAYOR COMM NOTE
"Sharmila Christianson (39 y.o. Male)          INPATIENT REQUEST FOR [id number]    UR CONTACT ABELINO HARRINGTON  P# [phone]  F# [phone]         Date of Birth   1985    Social Security Number       Address   [address] [place] [address] [address] [place] [address]    Home Phone   [phone]    MRN   [id number]       [facility]    Marital Status                               Admission Date   4/14/2025    Admission Type   Urgent    Admitting Provider   Jr Austin Stanton MD    Attending Provider   Jr Austin Stanton MD    Department, Room/Bed   [facility] CARDIOVASC UNIT, 2211/1       Discharge Date       Discharge Disposition       Discharge Destination                                 Attending Provider: Jr Austin Stanton MD    Allergies: Farxiga [Dapagliflozin]    Isolation: None   Infection: None   Code Status: CPR    Ht: 175.3 cm (69.02\")   Wt: 119 kg (263 lb 3.7 oz)    Admission Cmt: None   Principal Problem: CHF (congestive heart failure), NYHA class IV, acute, systolic [I50.21]                   Active Insurance as of 4/14/2025       Primary Coverage       Payor Plan Insurance Group Employer/Plan Group    [employer] BLUE CROSS [employer] BLUE CROSS BLUE [employer] PPO [id number]       Payor Plan Address Payor Plan Phone Number Payor Plan Fax Number Effective Dates    [address] [phone]  1/1/2021 - None Entered    [place] [address]         Subscriber Name Subscriber Birth Date Member ID       SHARMILA CHRISTIANSON 1985 [id number]                     Emergency Contacts        (Rel.) Home Phone Work Phone Mobile Phone    KEKE CHRISTIANSON (Spouse) [phone] -- --                 History & Physical        Olya Edge PA-C at 04/15/25 0740       Attestation signed by Jr Austin Stanton MD at 04/15/25 1016      I have reviewed this documentation and agree.  I have to Dr. Saldana to see him and we will treat medically for couple days and then stent.  I think this is the best " option.  His echo back in 2023 was pretty normal.  I wonder if this is a combination of coronary disease and recent flu with cardiomyopathy.                      CARDIOTHORACIC SURGERY HISTORY AND PHYSICAL      Patient Care Team:  Luz Maria White APRN as PCP - General (Family Medicine)    Chief complaint: Coronary artery disease and acute systolic heart failure     History of Present Illness    Patient is a 39 year old male with PMH of DM II, HTN, HLD, frequent PVC's with eliquis at home, and newly diagnosed LV dysfunction who presented to [facility] with complaints of shortness of breath, lower extremity edema, and chest pain. Patient was ruled in for NSTEMI with elevation in BNP and LFT's. Patient was admitted for further evaluation with Cardiology consulted. Cardiac catheterization was completed yesterday and showed patient to have severe multi-vessel CAD with acute systolic heart failure (EF of 18%). Patients most recent echo was on 4/8/25 and showed patient to have EF of approximately 18%. Patient was transferred to our facility for cardiac surgery evaluation. Patient denies every smoking and does not drink alcohol.    Review of Systems   Constitutional:  Positive for activity change and fatigue.   Respiratory:  Positive for chest tightness and shortness of breath.    Cardiovascular:  Positive for leg swelling.   All other systems reviewed and are negative.       Body mass index is 38.85 kg/m².    Past Medical History:   Diagnosis Date    Chronic diastolic congestive heart failure 11/18/2020    Diabetes mellitus, type 2 11/18/2020    Diastolic CHF, chronic  11/18/2020    Emotional depression     Essential hypertension 11/18/2020    Finger numbness 02/24/2021    Hyperlipidemia     Left wrist pain 02/24/2021    Migraine 12/07/2020     History reviewed. No pertinent surgical history.  Family History   Problem Relation Age of Onset    Diabetes Mother     Diabetes Father     Heart disease Other         AUNT  OR UNCLE    Diabetes Other     Colon cancer Neg Hx      Social History     Tobacco Use    Smoking status: Never     Passive exposure: Past    Smokeless tobacco: Never   Vaping Use    Vaping status: Never Used   Substance Use Topics    Alcohol use: Not Currently    Drug use: Never     Medications Prior to Admission   Medication Sig Dispense Refill Last Dose/Taking    apixaban (ELIQUIS) 5 MG tablet tablet Take 1 tablet by mouth 2 (Two) Times a Day. 180 tablet 3     atorvastatin (LIPITOR) 80 MG tablet Take 1 tablet by mouth Daily. 90 tablet 3     [] cephalexin (KEFLEX) 500 MG capsule Take 1 capsule by mouth 4 (Four) Times a Day for 7 days. 28 capsule 0     furosemide (LASIX) 20 MG tablet TAKE 1 TABLET BY MOUTH TWICE A  tablet 3     Januvia 100 MG tablet TAKE 1 TABLET DAILY 90 tablet 3     lactulose (CHRONULAC) 10 GM/15ML solution Take 15-30 mls daily for a goal of 2-3 BM/day (Patient taking differently: Take 15 mL by mouth 2 (Two) Times a Day. Take 15-30 mls daily for a goal of 2-3 BM/day) 900 mL 1     losartan (COZAAR) 50 MG tablet Take 1 tablet by mouth Daily. 90 tablet 3     metFORMIN (GLUCOPHAGE) 500 MG tablet TAKE 2 TABLETS 2 TIMES     DAILY WITH MEALS (Patient taking differently: Take 2 tablets by mouth 2 (Two) Times a Day With Meals.) 360 tablet 3     metoprolol succinate XL (TOPROL-XL) 25 MG 24 hr tablet Take 1 tablet by mouth Every Night. 90 tablet 3     ondansetron ODT (ZOFRAN-ODT) 4 MG disintegrating tablet Place 1 tablet on the tongue 4 (Four) Times a Day As Needed for Nausea or Vomiting. 20 tablet 0     phenazopyridine (PYRIDIUM) 200 MG tablet Take 1 tablet by mouth 3 (Three) Times a Day As Needed for Bladder Spasms or Dysuria. 12 tablet 0     spironolactone (ALDACTONE) 25 MG tablet Take 1 tablet by mouth Daily. 90 tablet 3      aspirin, 81 mg, Oral, Daily  atorvastatin, 80 mg, Oral, Daily  carvedilol, 3.125 mg, Oral, BID With Meals  famotidine, 40 mg, Oral, Daily  furosemide, 40 mg,  "Intravenous, Q12H  insulin glargine, 1-200 Units, Subcutaneous, Nightly - Glucommander  insulin lispro, 1-200 Units, Subcutaneous, 4x Daily With Meals & Nightly  sodium chloride, 10 mL, Intravenous, Q12H      Allergies:  Farxiga [dapagliflozin]    Objective     Vital Signs  Temp:  [97.6 °F (36.4 °C)-98.2 °F (36.8 °C)] 97.9 °F (36.6 °C)  Heart Rate:  [] 93  Resp:  [18-20] 19  BP: ()/(68-87) 96/68    Flowsheet Rows      Flowsheet Row First Filed Value   Admission Height 175.3 cm (69.02\") Documented at 04/14/2025 2144   Admission Weight 122 kg (269 lb 13.5 oz) Documented at 04/14/2025 2144          175.3 cm (69.02\")    Physical Exam  Vitals reviewed.   Constitutional:       General: He is not in acute distress.     Appearance: He is obese. He is not toxic-appearing.   HENT:      Head: Atraumatic.      Nose: Nose normal. No congestion or rhinorrhea.      Mouth/Throat:      Mouth: Mucous membranes are moist.      Pharynx: Oropharynx is clear.   Eyes:      Extraocular Movements: Extraocular movements intact.      Conjunctiva/sclera: Conjunctivae normal.      Pupils: Pupils are equal, round, and reactive to light.   Cardiovascular:      Rate and Rhythm: Normal rate and regular rhythm.   Pulmonary:      Effort: Pulmonary effort is normal.      Breath sounds: Normal breath sounds.   Musculoskeletal:         General: Normal range of motion.      Right lower leg: No edema.      Left lower leg: No edema.   Skin:     General: Skin is warm and dry.      Findings: No rash.   Neurological:      General: No focal deficit present.      Mental Status: He is alert and oriented to person, place, and time.   Psychiatric:         Mood and Affect: Mood normal.         Thought Content: Thought content normal.         Results Review:   Lab Results (last 24 hours)       Procedure Component Value Units Date/Time    POC Glucose Once [[id number]]  (Normal) Collected: 04/15/25 0626    Specimen: Blood Updated: 04/15/25 0632     Glucose " 122 mg/dL     Comprehensive Metabolic Panel [[id number]]  (Abnormal) Collected: 04/15/25 0333    Specimen: Blood Updated: 04/15/25 0447     Glucose 172 mg/dL      BUN 20 mg/dL      Creatinine 1.20 mg/dL      Sodium 134 mmol/L      Potassium 3.8 mmol/L      Chloride 101 mmol/L      CO2 21.0 mmol/L      Calcium 8.9 mg/dL      Total Protein 6.5 g/dL      Albumin 3.5 g/dL      ALT (SGPT) 139 U/L      AST (SGOT) 82 U/L      Alkaline Phosphatase 85 U/L      Total Bilirubin 1.9 mg/dL      Globulin 3.0 gm/dL      A/G Ratio 1.2 g/dL      BUN/Creatinine Ratio 16.7     Anion Gap 12.0 mmol/L      eGFR 77.9 mL/min/1.73     Narrative:      GFR Categories in Chronic Kidney Disease (CKD)      GFR Category          GFR (mL/min/1.73)    Interpretation  G1                     90 or greater         Normal or high (1)  G2                      60-89                Mild decrease (1)  G3a                   45-59                Mild to moderate decrease  G3b                   30-44                Moderate to severe decrease  G4                    15-29                Severe decrease  G5                    14 or less           Kidney failure          (1)In the absence of evidence of kidney disease, neither GFR category G1 or G2 fulfill the criteria for CKD.    eGFR calculation 2021 CKD-EPI creatinine equation, which does not include race as a factor    CBC & Differential [[id number]]  (Abnormal) Collected: 04/15/25 0333    Specimen: Blood Updated: 04/15/25 0425    Narrative:      The following orders were created for panel order CBC & Differential.  Procedure                               Abnormality         Status                     ---------                               -----------         ------                     CBC Auto Differential[[id number]]        Abnormal            Final result                 Please view results for these tests on the individual orders.    CBC Auto Differential [[id number]]  (Abnormal) Collected: 04/15/25 0333     Specimen: Blood Updated: 04/15/25 0425     WBC 10.45 10*3/mm3      RBC 4.95 10*6/mm3      Hemoglobin 13.7 g/dL      Hematocrit 45.1 %      MCV 91.1 fL      MCH 27.7 pg      MCHC 30.4 g/dL      RDW 13.8 %      RDW-SD 45.5 fl      MPV 10.3 fL      Platelets 306 10*3/mm3      Neutrophil % 51.7 %      Lymphocyte % 32.8 %      Monocyte % 12.2 %      Eosinophil % 1.8 %      Basophil % 1.1 %      Immature Grans % 0.4 %      Neutrophils, Absolute 5.40 10*3/mm3      Lymphocytes, Absolute 3.43 10*3/mm3      Monocytes, Absolute 1.27 10*3/mm3      Eosinophils, Absolute 0.19 10*3/mm3      Basophils, Absolute 0.12 10*3/mm3      Immature Grans, Absolute 0.04 10*3/mm3      nRBC 0.0 /100 WBC                 Assessment & Plan        CHF (congestive heart failure), NYHA class IV, acute, systolic      Assessment & Plan    - Severe multi-vessel CAD   - Acute systolic heart failure, EF approximately 18% -- milrinone started   - DM II -- hgb A1c 8.4 in February   - HTN  - HLD  - Frequent PVC's -- AC with eliquis    Dr. Stanton has reviewed studies, milrinone started at 0.25  We will consult Dr. Betancourt for high risk PCI evaluation     Olya Edge PA-C  04/15/25  07:40 EDT       Electronically signed by Jr Austin Stanton MD at 04/15/25 [facility]       Emergency Department Notes    No notes of this type exist for this encounter.         Physician Certification:    Reason for Transfer:          Potential risk of transfer:     Potential benefits of transfer:     Risks/benefits explained to:       Receiving facility notified and accepted patient, indicating capability and capacity to treat.    Accepting physician:      Accepted date/time:     Transferring physician:       Patient condition:      Physician certification: After examination of this patient, and based on information available at this time, the medical benefits reasonable expected from provision of appropriate treatment at the receiving facility, outweigh the increased risk, if any,  to the individual's medical condition and, in the case of labor, to the unborn child's medical condition from the effecting transfer.       Nursing Documentation:    Accepting hospital:        Report given to:   Report time:    Medications reviewed with next service provider:       Copies of medical records sent:     Belongings disposition:       Transport via:           Condition at transfer:    and Provider who did not respond/appear information         Physician Progress Notes (last 48 hours)  Notes from 25 1458 through 04/15/25 1458   No notes of this type exist for this encounter.          Consult Notes (last 48 hours)        Aidan Betancourt MD at 04/15/25 0844        Consult Orders    1. Inpatient Cardiology Consult [[id number]] ordered by Samia Michel APRN at 04/15/25 0752                 Date of Hospital Visit: 04/15/25  Encounter Provider: Aidan Betancourt MD  Place of Service: [facility] CARDIOLOGY  Patient Name: Luiz Christianson  :1985  Referral Provider: Jr Austin Stanton MD    Chief complaint  FOUNTAIN, acute HFrEF, multivessel CAD    History of Present Illness  39-year-old man, followed by Dr. Muir, with hypertension, hyperlipidemia, diabetes, recently diagnosed HFrEF with an EF of 18%, who presented to [facility] with dyspnea on exertion, cough, and exercise intolerance.  He underwent right and left heart catheterization yesterday which revealed elevated right and left-sided filling pressures with an RA pressure of 35 and a wedge pressure of 40.  Coronary angiography revealed a 99% stenosis of the distal circumflex (dominant, as well as a 90% stenosis of the mid LAD.  The distal circumflex is supplied via septal left to left collaterals.  He was transferred here for evaluation of CABG.  Dr. Stanton has reviewed and does not think patient is a good surgical candidate.  He has asked me to see for possible high risk PCI.    Past Medical History:   Diagnosis Date     Chronic diastolic congestive heart failure 2020    Diabetes mellitus, type 2 2020    Diastolic CHF, chronic  2020    Emotional depression     Essential hypertension 2020    Finger numbness 2021    Hyperlipidemia     Left wrist pain 2021    Migraine 2020       History reviewed. No pertinent surgical history.    Medications Prior to Admission   Medication Sig Dispense Refill Last Dose/Taking    apixaban (ELIQUIS) 5 MG tablet tablet Take 1 tablet by mouth 2 (Two) Times a Day. 180 tablet 3     atorvastatin (LIPITOR) 80 MG tablet Take 1 tablet by mouth Daily. 90 tablet 3     [] cephalexin (KEFLEX) 500 MG capsule Take 1 capsule by mouth 4 (Four) Times a Day for 7 days. 28 capsule 0     furosemide (LASIX) 20 MG tablet TAKE 1 TABLET BY MOUTH TWICE A  tablet 3     Januvia 100 MG tablet TAKE 1 TABLET DAILY 90 tablet 3     lactulose (CHRONULAC) 10 GM/15ML solution Take 15-30 mls daily for a goal of 2-3 BM/day (Patient taking differently: Take 15 mL by mouth 2 (Two) Times a Day. Take 15-30 mls daily for a goal of 2-3 BM/day) 900 mL 1     losartan (COZAAR) 50 MG tablet Take 1 tablet by mouth Daily. 90 tablet 3     metFORMIN (GLUCOPHAGE) 500 MG tablet TAKE 2 TABLETS 2 TIMES     DAILY WITH MEALS (Patient taking differently: Take 2 tablets by mouth 2 (Two) Times a Day With Meals.) 360 tablet 3     metoprolol succinate XL (TOPROL-XL) 25 MG 24 hr tablet Take 1 tablet by mouth Every Night. 90 tablet 3     ondansetron ODT (ZOFRAN-ODT) 4 MG disintegrating tablet Place 1 tablet on the tongue 4 (Four) Times a Day As Needed for Nausea or Vomiting. 20 tablet 0     phenazopyridine (PYRIDIUM) 200 MG tablet Take 1 tablet by mouth 3 (Three) Times a Day As Needed for Bladder Spasms or Dysuria. 12 tablet 0     spironolactone (ALDACTONE) 25 MG tablet Take 1 tablet by mouth Daily. 90 tablet 3        Current Meds  Scheduled Meds:aspirin, 81 mg, Oral, Daily  atorvastatin, 80 mg, Oral,  Daily  carvedilol, 3.125 mg, Oral, BID With Meals  famotidine, 40 mg, Oral, Daily  furosemide, 40 mg, Intravenous, Q12H  insulin glargine, 1-200 Units, Subcutaneous, Nightly - Glucommander  insulin lispro, 1-200 Units, Subcutaneous, 4x Daily With Meals & Nightly  magnesium sulfate, 1 g, Intravenous, Once  sodium chloride, 10 mL, Intravenous, Q12H      Continuous Infusions:milrinone, 0.25 mcg/kg/min, Last Rate: 0.25 mcg/kg/min (04/15/25 0616)      PRN Meds:.  ALPRAZolam    senna-docusate sodium **AND** polyethylene glycol **AND** bisacodyl **AND** bisacodyl    Calcium Replacement - Follow Nurse / BPA Driven Protocol    dextrose    dextrose    glucagon (human recombinant)    insulin lispro    Magnesium Cardiology Dose Replacement - Follow Nurse / BPA Driven Protocol    nitroglycerin    ondansetron    Phosphorus Replacement - Follow Nurse / BPA Driven Protocol    Potassium Replacement - Follow Nurse / BPA Driven Protocol    sodium chloride    sodium chloride    temazepam    Allergies as of 04/14/2025 - Reviewed 04/12/2025   Allergen Reaction Noted    Farxiga [dapagliflozin] Other (See Comments) 01/04/2022       Social History     Socioeconomic History    Marital status:    Tobacco Use    Smoking status: Never     Passive exposure: Past    Smokeless tobacco: Never   Vaping Use    Vaping status: Never Used   Substance and Sexual Activity    Alcohol use: Not Currently    Drug use: Never    Sexual activity: Yes     Partners: Female     Birth control/protection: Injection       Family History   Problem Relation Age of Onset    Diabetes Mother     Diabetes Father     Heart disease Other         AUNT OR UNCLE    Diabetes Other     Colon cancer Neg Hx        REVIEW OF SYSTEMS:   12 point ROS was performed and is negative except as outlined in HPI          Objective:   Temp:  [97.6 °F (36.4 °C)-98.2 °F (36.8 °C)] 97.9 °F (36.6 °C)  Heart Rate:  [] 93  Resp:  [18-20] 19  BP: ()/(68-87) 96/68  Body mass index  "is 38.85 kg/m².  Flowsheet Rows      Flowsheet Row First Filed Value   Admission Height 175.3 cm (69.02\") Documented at 04/14/2025 2144   Admission Weight 122 kg (269 lb 13.5 oz) Documented at 04/14/2025 2144          Vitals:    04/15/25 0325   BP: 96/68   Pulse: 93   Resp: 19   Temp: 97.9 °F (36.6 °C)   SpO2: 100%       GEN: no distress, alert and oriented  HEENT: NACT, EOMI, moist mucous membranes, + JVD  Lungs: CTAB, no wheezes, rales or rhonchi  CV: normal rate, regular rhythm, normal S1, S2, no murmurs, +2 radial pulses b/l, no carotid bruit  Abdomen: soft, nontender, nondistended, NABS  Extremities: 1-2+ edema  Skin: no rash, warm, dry  Heme/Lymph: no bruising  Psych: organized thought, normal behavior and affect      Lab Review:   Results from last 7 days   Lab Units 04/15/25  0333   SODIUM mmol/L 134*   POTASSIUM mmol/L 3.8   CHLORIDE mmol/L 101   CO2 mmol/L 21.0*   BUN mg/dL 20   CREATININE mg/dL 1.20   CALCIUM mg/dL 8.9   BILIRUBIN mg/dL 1.9*   ALK PHOS U/L 85   ALT (SGPT) U/L 139*   AST (SGOT) U/L 82*   GLUCOSE mg/dL 172*     Results from last 7 days   Lab Units 04/13/25  0052 04/12/25  2227   HSTROP T ng/L 49* 47*     Results from last 7 days   Lab Units 04/15/25  0333 04/14/25  1623 04/14/25  0823 04/13/25  0436   WBC 10*3/mm3 10.45  --  11.31* 12.06*   HEMOGLOBIN g/dL 13.7  --  15.1 14.6   HEMOGLOBIN, POC g/dL  --  15.3  --   --    HEMATOCRIT % 45.1  --  47.3 46.3   HEMATOCRIT POC %  --  45  --   --    PLATELETS 10*3/mm3 306  --  311 342     Results from last 7 days   Lab Units 04/08/25  1146   INR  2.56*     Results from last 7 days   Lab Units 04/12/25  2227   MAGNESIUM mg/dL 1.7         I personally viewed and interpreted the patient's EKG/Telemetry data)      CHF (congestive heart failure), NYHA class IV, acute, systolic    Assessment and Plan:  #Acute HFrEF  #Multivessel CAD  #Diabetes  #Hypertension  #Hyperlipidemia    39-year-old man, followed by Dr. Muir, with hypertension, hyperlipidemia, " diabetes, recently diagnosed HFrEF with an EF of 18%, who presented to [facility] with dyspnea on exertion, cough, and exercise intolerance.  He underwent right and left heart catheterization yesterday which revealed elevated right and left-sided filling pressures with an RA pressure of 35 and a wedge pressure of 40.  Coronary angiography revealed a 99% stenosis of the distal circumflex (dominant, as well as a 90% stenosis of the mid LAD.  The distal circumflex is supplied via septal left to left collaterals.  He was transferred here for evaluation of CABG.  Dr. Stanton has reviewed and does not think patient is a good surgical candidate.  He has asked me to see for possible high risk PCI.    After discussion with patient, we will proceed with PCI once he is euvolemic.  Will continue aggressive diuresis for now and consider PCI Thursday or Friday.    - Started on milrinone on tx, can continue for now while we diurese. Needs aggressive diuresis given filling pressures, increase Lasix to 80 mg IV twice daily. If no adequate response, will try metolazone prior to next dose  - Strict I's/O's, daily weights  - continue aspirin 81 mg daily, atorvastatin 80 mg daily  - discontinue carvedilol 3.125 mg BID  - SBP currently 90s, continue to monitor and start further GDMT as tolerated  - plan for possible PCI Thurs/Fri    Aidan Saldana MD, [facility], [facility]  04/15/25  08:44 EDT.                     Electronically signed by Aidan Betancourt MD at 04/15/25 1220            All medication doses during the admission are shown, including meds that are no longer on order.  Scheduled Meds Sorted by Name  for Luiz Christianson as of 4/15/25 through 4/15/25    1 Day 3 Days 7 Days 10 Days < Today >   Legend:       Medications 04/15/25   aspirin EC tablet 81 mg  Dose: 81 mg  Freq: Daily Route: PO  Start: 04/15/25 0900   Admin Instructions:       0852                 atorvastatin (LIPITOR) tablet 80 mg  Dose: 80 mg  Freq: Daily Route: PO  Start:  04/15/25 0900   Admin Instructions:       0851                 carvedilol (COREG) tablet 3.125 mg  Dose: 3.125 mg  Freq: 2 Times Daily With Meals Route: PO  Start: 04/15/25 0800 End: 04/15/25 1219   Admin Instructions:       (0851)   1219-D/C'd              famotidine (PEPCID) tablet 40 mg  Dose: 40 mg  Freq: Daily Route: PO  Start: 04/14/25 1845    0852                 furosemide (LASIX) injection 40 mg  Dose: 40 mg  Freq: Every 12 Hours Route: IV  Start: 04/15/25 0645 End: 04/15/25 1152    (0616) [C]   1152-D/C'd              furosemide (LASIX) injection 80 mg  Dose: 80 mg  Freq: Every 12 Hours Route: IV  Start: 04/15/25 1237    1243                 furosemide (LASIX) injection 80 mg  Dose: 80 mg  Freq: Every 12 Hours Route: IV  Start: 04/15/25 1845 End: 04/15/25 1237    1237-D/C'd               furosemide (LASIX) injection 80 mg  Dose: 80 mg  Freq: Every 12 Hours Route: IV  Start: 04/15/25 1845 End: 04/15/25 1236    1236-D/C'd               insulin glargine (LANTUS, SEMGLEE) injection 1-200 Units  Dose: 1-200 Units  Freq: Nightly - Glucommander Route: SC  Start: 04/14/25 2100 End: 04/15/25 1214   Admin Instructions:      Order specific questions:       1214-D/C'd               insulin glargine (LANTUS, SEMGLEE) injection 10 Units  Dose: 10 Units  Freq: Daily Route: SC  Start: 04/16/25 0900   Admin Instructions:          insulin lispro (humaLOG) injection 1-200 Units  Dose: 1-200 Units  Freq: 4 Times Daily With Meals & Nightly Route: SC  Start: 04/14/25 1845 End: 04/15/25 1214   Admin Instructions:       (0246) [C]   (5948)   1214-D/C'd  1224               insulin lispro (HUMALOG/ADMELOG) injection 2-9 Units  Dose: 2-9 Units  Freq: 4 Times Daily Before Meals & Nightly Route: SC  Start: 04/15/25 1300   Admin Instructions:       (2845) [id number]               magnesium sulfate in D5W 1g/100mL (PREMIX)  Dose: 1 g  Freq: Once Route: IV  Start: 04/15/25 0930 End: 04/15/25 0850    0850                 sodium  chloride 0.9 % flush 10 mL  Dose: 10 mL  Freq: Every 12 Hours Scheduled Route: IV  Start: 04/14/25 2100    0846   2100                            Continuous Meds Sorted by Name  for Luiz Christianson as of 4/15/25 through 4/15/25  Legend:       Medications 04/15/25   milrinone (PRIMACOR) 20 mg in 100 mL D5W infusion  Rate: 8.93 mL/hr Dose: 0.25 mcg/kg/min  Weight Dosing Info: 119 kg  Freq: Continuous Route: IV  Last Dose: 0.25 mcg/kg/min (04/15/25 0616)  Start: 04/15/25 0645   Admin Instructions:       0616                               PRN Meds Sorted by Name  for Luiz Christianson as of 4/15/25 through 4/15/25  Legend:       Medications 04/15/25   ALPRAZolam (XANAX) tablet 0.5 mg  Dose: 0.5 mg  Freq: 3 Times Daily PRN Route: PO  PRN Reason: Anxiety  Start: 04/15/25 0556 End: 04/20/25 0555   Admin Instructions:           sennosides-docusate (PERICOLACE) 8.6-50 MG per tablet 2 tablet  Dose: 2 tablet  Freq: 2 Times Daily PRN Route: PO  PRN Reason: Constipation  Start: 04/14/25 1748   Admin Instructions:          And   polyethylene glycol (MIRALAX) packet 17 g  Dose: 17 g  Freq: Daily PRN Route: PO  PRN Reason: Constipation  PRN Comment: Use if senna-docusate is ineffective  Start: 04/14/25 1748   Admin Instructions:          And   bisacodyl (DULCOLAX) EC tablet 5 mg  Dose: 5 mg  Freq: Daily PRN Route: PO  PRN Reason: Constipation  PRN Comment: Use if polyethylene glycol is ineffective  Start: 04/14/25 1748   Admin Instructions:          And   bisacodyl (DULCOLAX) suppository 10 mg  Dose: 10 mg  Freq: Daily PRN Route: RE  PRN Reason: Constipation  PRN Comment: Use if bisacodyl oral is ineffective  Start: 04/14/25 1748   Admin Instructions:          Calcium Replacement - Follow Nurse / BPA Driven Protocol  Freq: As Needed Route: XX  PRN Reason: Other  Start: 04/14/25 1748   Admin Instructions:          dextrose (D50W) (25 g/50 mL) IV injection 10-50 mL  Dose: 10-50 mL  Freq: Every 15 Minutes PRN Route: IV  PRN Reason: Low  Blood Sugar  PRN Comment: Per Glucommander™  Start: 04/14/25 1750 End: 04/15/25 1214   Admin Instructions:       1214-D/C'd               dextrose (D50W) (25 g/50 mL) IV injection 25 g  Dose: 25 g  Freq: Every 15 Minutes PRN Route: IV  PRN Reason: Low Blood Sugar  PRN Comment: Blood Sugar Less Than 70  Start: 04/15/25 1214   Admin Instructions:          dextrose (GLUTOSE) oral gel 15 g  Dose: 15 g  Freq: Every 15 Minutes PRN Route: PO  PRN Reason: Low Blood Sugar  PRN Comment: Blood sugar less than 70  Start: 04/15/25 1214   Admin Instructions:          dextrose (GLUTOSE) oral gel 15 g  Dose: 15 g  Freq: Every 15 Minutes PRN Route: PO  PRN Reason: Low Blood Sugar  PRN Comment: Per Glucommander™  Start: 04/14/25 1750 End: 04/15/25 1214   Admin Instructions:       1214-D/C'd               glucagon (GLUCAGEN) injection 1 mg  Dose: 1 mg  Freq: Every 15 Minutes PRN Route: IM  PRN Reason: Low Blood Sugar  PRN Comment: Blood Glucose Less Than 70  Start: 04/15/25 1214   Admin Instructions:          glucagon (human recombinant) (GLUCAGEN DIAGNOSTIC) injection 1 mg  Dose: 1 mg  Freq: Every 15 Minutes PRN Route: IM  PRN Reason: Low Blood Sugar  PRN Comment: Per Glucommander™  Start: 04/14/25 1750 End: 04/15/25 1214   Admin Instructions:       1214-D/C'd               insulin lispro (humaLOG) injection 1-200 Units  Dose: 1-200 Units  Freq: As Needed Route: SC  PRN Reason: High Blood Sugar  PRN Comment: Per Glucommander  Start: 04/14/25 1750 End: 04/15/25 1214   Admin Instructions:       1214-D/C'd               Magnesium Cardiology Dose Replacement - Follow Nurse / BPA Driven Protocol  Freq: As Needed Route: XX  PRN Reason: Other  Start: 04/14/25 1748   Admin Instructions:          nitroglycerin (NITROSTAT) SL tablet 0.4 mg  Dose: 0.4 mg  Freq: Every 5 Minutes PRN Route: SL  PRN Reason: Chest Pain  Start: 04/14/25 1746   Admin Instructions:          ondansetron (ZOFRAN) injection 4 mg  Dose: 4 mg  Freq: Every 6 Hours PRN Route:  IV  PRN Reasons: Nausea,Vomiting  Start: 04/14/25 1748   Admin Instructions:          Phosphorus Replacement - Follow Nurse / BPA Driven Protocol  Freq: As Needed Route: XX  PRN Reason: Other  Start: 04/14/25 1748   Admin Instructions:          Potassium Replacement - Follow Nurse / BPA Driven Protocol  Freq: As Needed Route: XX  PRN Reason: Other  Start: 04/14/25 1748   Admin Instructions:          sodium chloride 0.9 % flush 10 mL  Dose: 10 mL  Freq: As Needed Route: IV  PRN Reason: Line Care  Start: 04/14/25 1745       sodium chloride 0.9 % infusion 40 mL  Dose: 40 mL  Freq: As Needed Route: IV  PRN Reason: Line Care  Start: 04/14/25 1745   Admin Instructions:          temazepam (RESTORIL) capsule 15 mg  Dose: 15 mg  Freq: Nightly PRN Route: PO  PRN Reason: Sleep  Start: 04/15/25 0557 End: 04/20/25 0556   Admin Instructions:

## 2025-04-15 NOTE — H&P
CARDIOTHORACIC SURGERY HISTORY AND PHYSICAL      Patient Care Team:  Luz Maria White APRN as PCP - General (Family Medicine)    Chief complaint: Coronary artery disease and acute systolic heart failure     History of Present Illness    Patient is a 39 year old male with PMH of DM II, HTN, HLD, frequent PVC's with eliquis at home, and newly diagnosed LV dysfunction who presented to [facility] with complaints of shortness of breath, lower extremity edema, and chest pain. Patient was ruled in for NSTEMI with elevation in BNP and LFT's. Patient was admitted for further evaluation with Cardiology consulted. Cardiac catheterization was completed yesterday and showed patient to have severe multi-vessel CAD with acute systolic heart failure (EF of 18%). Patients most recent echo was on 4/8/25 and showed patient to have EF of approximately 18%. Patient was transferred to our facility for cardiac surgery evaluation. Patient denies every smoking and does not drink alcohol.    Review of Systems   Constitutional:  Positive for activity change and fatigue.   Respiratory:  Positive for chest tightness and shortness of breath.    Cardiovascular:  Positive for leg swelling.   All other systems reviewed and are negative.       Body mass index is 38.85 kg/m².    Past Medical History:   Diagnosis Date    Chronic diastolic congestive heart failure 11/18/2020    Diabetes mellitus, type 2 11/18/2020    Diastolic CHF, chronic  11/18/2020    Emotional depression     Essential hypertension 11/18/2020    Finger numbness 02/24/2021    Hyperlipidemia     Left wrist pain 02/24/2021    Migraine 12/07/2020     History reviewed. No pertinent surgical history.  Family History   Problem Relation Age of Onset    Diabetes Mother     Diabetes Father     Heart disease Other         AUNT OR UNCLE    Diabetes Other     Colon cancer Neg Hx      Social History     Tobacco Use    Smoking status: Never     Passive exposure: Past    Smokeless tobacco:  Never   Vaping Use    Vaping status: Never Used   Substance Use Topics    Alcohol use: Not Currently    Drug use: Never     Medications Prior to Admission   Medication Sig Dispense Refill Last Dose/Taking    apixaban (ELIQUIS) 5 MG tablet tablet Take 1 tablet by mouth 2 (Two) Times a Day. 180 tablet 3     atorvastatin (LIPITOR) 80 MG tablet Take 1 tablet by mouth Daily. 90 tablet 3     [] cephalexin (KEFLEX) 500 MG capsule Take 1 capsule by mouth 4 (Four) Times a Day for 7 days. 28 capsule 0     furosemide (LASIX) 20 MG tablet TAKE 1 TABLET BY MOUTH TWICE A  tablet 3     Januvia 100 MG tablet TAKE 1 TABLET DAILY 90 tablet 3     lactulose (CHRONULAC) 10 GM/15ML solution Take 15-30 mls daily for a goal of 2-3 BM/day (Patient taking differently: Take 15 mL by mouth 2 (Two) Times a Day. Take 15-30 mls daily for a goal of 2-3 BM/day) 900 mL 1     losartan (COZAAR) 50 MG tablet Take 1 tablet by mouth Daily. 90 tablet 3     metFORMIN (GLUCOPHAGE) 500 MG tablet TAKE 2 TABLETS 2 TIMES     DAILY WITH MEALS (Patient taking differently: Take 2 tablets by mouth 2 (Two) Times a Day With Meals.) 360 tablet 3     metoprolol succinate XL (TOPROL-XL) 25 MG 24 hr tablet Take 1 tablet by mouth Every Night. 90 tablet 3     ondansetron ODT (ZOFRAN-ODT) 4 MG disintegrating tablet Place 1 tablet on the tongue 4 (Four) Times a Day As Needed for Nausea or Vomiting. 20 tablet 0     phenazopyridine (PYRIDIUM) 200 MG tablet Take 1 tablet by mouth 3 (Three) Times a Day As Needed for Bladder Spasms or Dysuria. 12 tablet 0     spironolactone (ALDACTONE) 25 MG tablet Take 1 tablet by mouth Daily. 90 tablet 3      aspirin, 81 mg, Oral, Daily  atorvastatin, 80 mg, Oral, Daily  carvedilol, 3.125 mg, Oral, BID With Meals  famotidine, 40 mg, Oral, Daily  furosemide, 40 mg, Intravenous, Q12H  insulin glargine, 1-200 Units, Subcutaneous, Nightly - Glucommander  insulin lispro, 1-200 Units, Subcutaneous, 4x Daily With Meals & Nightly  sodium  "chloride, 10 mL, Intravenous, Q12H      Allergies:  Farxiga [dapagliflozin]    Objective      Vital Signs  Temp:  [97.6 °F (36.4 °C)-98.2 °F (36.8 °C)] 97.9 °F (36.6 °C)  Heart Rate:  [] 93  Resp:  [18-20] 19  BP: ()/(68-87) 96/68    Flowsheet Rows      Flowsheet Row First Filed Value   Admission Height 175.3 cm (69.02\") Documented at 04/14/2025 2144   Admission Weight 122 kg (269 lb 13.5 oz) Documented at 04/14/2025 2144          175.3 cm (69.02\")    Physical Exam  Vitals reviewed.   Constitutional:       General: He is not in acute distress.     Appearance: He is obese. He is not toxic-appearing.   HENT:      Head: Atraumatic.      Nose: Nose normal. No congestion or rhinorrhea.      Mouth/Throat:      Mouth: Mucous membranes are moist.      Pharynx: Oropharynx is clear.   Eyes:      Extraocular Movements: Extraocular movements intact.      Conjunctiva/sclera: Conjunctivae normal.      Pupils: Pupils are equal, round, and reactive to light.   Cardiovascular:      Rate and Rhythm: Normal rate and regular rhythm.   Pulmonary:      Effort: Pulmonary effort is normal.      Breath sounds: Normal breath sounds.   Musculoskeletal:         General: Normal range of motion.      Right lower leg: No edema.      Left lower leg: No edema.   Skin:     General: Skin is warm and dry.      Findings: No rash.   Neurological:      General: No focal deficit present.      Mental Status: He is alert and oriented to person, place, and time.   Psychiatric:         Mood and Affect: Mood normal.         Thought Content: Thought content normal.         Results Review:   Lab Results (last 24 hours)       Procedure Component Value Units Date/Time    POC Glucose Once [[id number]]  (Normal) Collected: 04/15/25 0626    Specimen: Blood Updated: 04/15/25 0632     Glucose 122 mg/dL     Comprehensive Metabolic Panel [[id number]]  (Abnormal) Collected: 04/15/25 0333    Specimen: Blood Updated: 04/15/25 0447     Glucose 172 mg/dL      BUN 20 " mg/dL      Creatinine 1.20 mg/dL      Sodium 134 mmol/L      Potassium 3.8 mmol/L      Chloride 101 mmol/L      CO2 21.0 mmol/L      Calcium 8.9 mg/dL      Total Protein 6.5 g/dL      Albumin 3.5 g/dL      ALT (SGPT) 139 U/L      AST (SGOT) 82 U/L      Alkaline Phosphatase 85 U/L      Total Bilirubin 1.9 mg/dL      Globulin 3.0 gm/dL      A/G Ratio 1.2 g/dL      BUN/Creatinine Ratio 16.7     Anion Gap 12.0 mmol/L      eGFR 77.9 mL/min/1.73     Narrative:      GFR Categories in Chronic Kidney Disease (CKD)      GFR Category          GFR (mL/min/1.73)    Interpretation  G1                     90 or greater         Normal or high (1)  G2                      60-89                Mild decrease (1)  G3a                   45-59                Mild to moderate decrease  G3b                   30-44                Moderate to severe decrease  G4                    15-29                Severe decrease  G5                    14 or less           Kidney failure          (1)In the absence of evidence of kidney disease, neither GFR category G1 or G2 fulfill the criteria for CKD.    eGFR calculation 2021 CKD-EPI creatinine equation, which does not include race as a factor    CBC & Differential [[id number]]  (Abnormal) Collected: 04/15/25 0333    Specimen: Blood Updated: 04/15/25 0425    Narrative:      The following orders were created for panel order CBC & Differential.  Procedure                               Abnormality         Status                     ---------                               -----------         ------                     CBC Auto Differential[[id number]]        Abnormal            Final result                 Please view results for these tests on the individual orders.    CBC Auto Differential [[id number]]  (Abnormal) Collected: 04/15/25 0333    Specimen: Blood Updated: 04/15/25 0425     WBC 10.45 10*3/mm3      RBC 4.95 10*6/mm3      Hemoglobin 13.7 g/dL      Hematocrit 45.1 %      MCV 91.1 fL      MCH 27.7 pg       MCHC 30.4 g/dL      RDW 13.8 %      RDW-SD 45.5 fl      MPV 10.3 fL      Platelets 306 10*3/mm3      Neutrophil % 51.7 %      Lymphocyte % 32.8 %      Monocyte % 12.2 %      Eosinophil % 1.8 %      Basophil % 1.1 %      Immature Grans % 0.4 %      Neutrophils, Absolute 5.40 10*3/mm3      Lymphocytes, Absolute 3.43 10*3/mm3      Monocytes, Absolute 1.27 10*3/mm3      Eosinophils, Absolute 0.19 10*3/mm3      Basophils, Absolute 0.12 10*3/mm3      Immature Grans, Absolute 0.04 10*3/mm3      nRBC 0.0 /100 WBC                 Assessment & Plan         CHF (congestive heart failure), NYHA class IV, acute, systolic      Assessment & Plan    - Severe multi-vessel CAD   - Acute systolic heart failure, EF approximately 18% -- milrinone started   - DM II -- hgb A1c 8.4 in February   - HTN  - HLD  - Frequent PVC's -- AC with eliquis    Dr. Stanton has reviewed studies, milrinone started at 0.25  We will consult Dr. Betancourt for high risk PCI evaluation     Olya Edge PA-C  04/15/25  07:40 EDT

## 2025-04-15 NOTE — TELEPHONE ENCOUNTER
Spoke with patient and let him know we have not received it yet. I provided him with our alternate fax number of [phone]. He will have [employer] refax it to this number.

## 2025-04-15 NOTE — NURSING NOTE
Pt A&Ox4. VSS on room air. Glucose 149. Bedrest enforced.   Pt notified his spouse of transfer to [facility]. Transfer form completed. House Supervisor, Jazmine, notified. Report and documents given to  EMS. Pt verbalizes all belongings accounted for. Tele box removed. LAC IV remains in place.   2100 PO metoprolol given.

## 2025-04-15 NOTE — CONSULTS
Date of Hospital Visit: 04/15/25  Encounter Provider: Aidan Betancourt MD  Place of Service: [facility] CARDIOLOGY  Patient Name: Luiz Christianson  :1985  Referral Provider: Jr Austin Stanton MD    Chief complaint  FOUNTAIN, acute HFrEF, multivessel CAD    History of Present Illness  39-year-old man, followed by Dr. Muir, with hypertension, hyperlipidemia, diabetes, recently diagnosed HFrEF with an EF of 18%, who presented to [facility] with dyspnea on exertion, cough, and exercise intolerance.  He underwent right and left heart catheterization yesterday which revealed elevated right and left-sided filling pressures with an RA pressure of 35 and a wedge pressure of 40.  Coronary angiography revealed a 99% stenosis of the distal circumflex (dominant, as well as a 90% stenosis of the mid LAD.  The distal circumflex is supplied via septal left to left collaterals.  He was transferred here for evaluation of CABG.  Dr. Stanton has reviewed and does not think patient is a good surgical candidate.  He has asked me to see for possible high risk PCI.    Past Medical History:   Diagnosis Date    Chronic diastolic congestive heart failure 2020    Diabetes mellitus, type 2 2020    Diastolic CHF, chronic  2020    Emotional depression     Essential hypertension 2020    Finger numbness 2021    Hyperlipidemia     Left wrist pain 2021    Migraine 2020       History reviewed. No pertinent surgical history.    Medications Prior to Admission   Medication Sig Dispense Refill Last Dose/Taking    apixaban (ELIQUIS) 5 MG tablet tablet Take 1 tablet by mouth 2 (Two) Times a Day. 180 tablet 3     atorvastatin (LIPITOR) 80 MG tablet Take 1 tablet by mouth Daily. 90 tablet 3     [] cephalexin (KEFLEX) 500 MG capsule Take 1 capsule by mouth 4 (Four) Times a Day for 7 days. 28 capsule 0     furosemide (LASIX) 20 MG tablet TAKE 1 TABLET BY MOUTH TWICE A   tablet 3     Januvia 100 MG tablet TAKE 1 TABLET DAILY 90 tablet 3     lactulose (CHRONULAC) 10 GM/15ML solution Take 15-30 mls daily for a goal of 2-3 BM/day (Patient taking differently: Take 15 mL by mouth 2 (Two) Times a Day. Take 15-30 mls daily for a goal of 2-3 BM/day) 900 mL 1     losartan (COZAAR) 50 MG tablet Take 1 tablet by mouth Daily. 90 tablet 3     metFORMIN (GLUCOPHAGE) 500 MG tablet TAKE 2 TABLETS 2 TIMES     DAILY WITH MEALS (Patient taking differently: Take 2 tablets by mouth 2 (Two) Times a Day With Meals.) 360 tablet 3     metoprolol succinate XL (TOPROL-XL) 25 MG 24 hr tablet Take 1 tablet by mouth Every Night. 90 tablet 3     ondansetron ODT (ZOFRAN-ODT) 4 MG disintegrating tablet Place 1 tablet on the tongue 4 (Four) Times a Day As Needed for Nausea or Vomiting. 20 tablet 0     phenazopyridine (PYRIDIUM) 200 MG tablet Take 1 tablet by mouth 3 (Three) Times a Day As Needed for Bladder Spasms or Dysuria. 12 tablet 0     spironolactone (ALDACTONE) 25 MG tablet Take 1 tablet by mouth Daily. 90 tablet 3        Current Meds  Scheduled Meds:aspirin, 81 mg, Oral, Daily  atorvastatin, 80 mg, Oral, Daily  carvedilol, 3.125 mg, Oral, BID With Meals  famotidine, 40 mg, Oral, Daily  furosemide, 40 mg, Intravenous, Q12H  insulin glargine, 1-200 Units, Subcutaneous, Nightly - Glucommander  insulin lispro, 1-200 Units, Subcutaneous, 4x Daily With Meals & Nightly  magnesium sulfate, 1 g, Intravenous, Once  sodium chloride, 10 mL, Intravenous, Q12H      Continuous Infusions:milrinone, 0.25 mcg/kg/min, Last Rate: 0.25 mcg/kg/min (04/15/25 0616)      PRN Meds:.  ALPRAZolam    senna-docusate sodium **AND** polyethylene glycol **AND** bisacodyl **AND** bisacodyl    Calcium Replacement - Follow Nurse / BPA Driven Protocol    dextrose    dextrose    glucagon (human recombinant)    insulin lispro    Magnesium Cardiology Dose Replacement - Follow Nurse / BPA Driven Protocol    nitroglycerin    ondansetron     "Phosphorus Replacement - Follow Nurse / BPA Driven Protocol    Potassium Replacement - Follow Nurse / BPA Driven Protocol    sodium chloride    sodium chloride    temazepam    Allergies as of 04/14/2025 - Reviewed 04/12/2025   Allergen Reaction Noted    Farxiga [dapagliflozin] Other (See Comments) 01/04/2022       Social History     Socioeconomic History    Marital status:    Tobacco Use    Smoking status: Never     Passive exposure: Past    Smokeless tobacco: Never   Vaping Use    Vaping status: Never Used   Substance and Sexual Activity    Alcohol use: Not Currently    Drug use: Never    Sexual activity: Yes     Partners: Female     Birth control/protection: Injection       Family History   Problem Relation Age of Onset    Diabetes Mother     Diabetes Father     Heart disease Other         AUNT OR UNCLE    Diabetes Other     Colon cancer Neg Hx        REVIEW OF SYSTEMS:   12 point ROS was performed and is negative except as outlined in HPI          Objective:   Temp:  [97.6 °F (36.4 °C)-98.2 °F (36.8 °C)] 97.9 °F (36.6 °C)  Heart Rate:  [] 93  Resp:  [18-20] 19  BP: ()/(68-87) 96/68  Body mass index is 38.85 kg/m².  Flowsheet Rows      Flowsheet Row First Filed Value   Admission Height 175.3 cm (69.02\") Documented at 04/14/2025 2144   Admission Weight 122 kg (269 lb 13.5 oz) Documented at 04/14/2025 2144          Vitals:    04/15/25 0325   BP: 96/68   Pulse: 93   Resp: 19   Temp: 97.9 °F (36.6 °C)   SpO2: 100%       GEN: no distress, alert and oriented  HEENT: NACT, EOMI, moist mucous membranes, + JVD  Lungs: CTAB, no wheezes, rales or rhonchi  CV: normal rate, regular rhythm, normal S1, S2, no murmurs, +2 radial pulses b/l, no carotid bruit  Abdomen: soft, nontender, nondistended, NABS  Extremities: 1-2+ edema  Skin: no rash, warm, dry  Heme/Lymph: no bruising  Psych: organized thought, normal behavior and affect      Lab Review:   Results from last 7 days   Lab Units 04/15/25  0333   SODIUM " mmol/L 134*   POTASSIUM mmol/L 3.8   CHLORIDE mmol/L 101   CO2 mmol/L 21.0*   BUN mg/dL 20   CREATININE mg/dL 1.20   CALCIUM mg/dL 8.9   BILIRUBIN mg/dL 1.9*   ALK PHOS U/L 85   ALT (SGPT) U/L 139*   AST (SGOT) U/L 82*   GLUCOSE mg/dL 172*     Results from last 7 days   Lab Units 04/13/25  0052 04/12/25  2227   HSTROP T ng/L 49* 47*     Results from last 7 days   Lab Units 04/15/25  0333 04/14/25  1623 04/14/25  0823 04/13/25  0436   WBC 10*3/mm3 10.45  --  11.31* 12.06*   HEMOGLOBIN g/dL 13.7  --  15.1 14.6   HEMOGLOBIN, POC g/dL  --  15.3  --   --    HEMATOCRIT % 45.1  --  47.3 46.3   HEMATOCRIT POC %  --  45  --   --    PLATELETS 10*3/mm3 306  --  311 342     Results from last 7 days   Lab Units 04/08/25  1146   INR  2.56*     Results from last 7 days   Lab Units 04/12/25  2227   MAGNESIUM mg/dL 1.7         I personally viewed and interpreted the patient's EKG/Telemetry data)      CHF (congestive heart failure), NYHA class IV, acute, systolic    Assessment and Plan:  #Acute HFrEF  #Multivessel CAD  #Diabetes  #Hypertension  #Hyperlipidemia    39-year-old man, followed by Dr. Muir, with hypertension, hyperlipidemia, diabetes, recently diagnosed HFrEF with an EF of 18%, who presented to [facility] with dyspnea on exertion, cough, and exercise intolerance.  He underwent right and left heart catheterization yesterday which revealed elevated right and left-sided filling pressures with an RA pressure of 35 and a wedge pressure of 40.  Coronary angiography revealed a 99% stenosis of the distal circumflex (dominant, as well as a 90% stenosis of the mid LAD.  The distal circumflex is supplied via septal left to left collaterals.  He was transferred here for evaluation of CABG.  Dr. Stanton has reviewed and does not think patient is a good surgical candidate.  He has asked me to see for possible high risk PCI.    After discussion with patient, we will proceed with PCI once he is euvolemic.  Will continue aggressive  diuresis for now and consider PCI Thursday or Friday.    - Started on milrinone on tx, can continue for now while we diurese. Needs aggressive diuresis given filling pressures, increase Lasix to 80 mg IV twice daily. If no adequate response, will try metolazone prior to next dose  - Strict I's/O's, daily weights  - continue aspirin 81 mg daily, atorvastatin 80 mg daily  - discontinue carvedilol 3.125 mg BID  - SBP currently 90s, continue to monitor and start further GDMT as tolerated  - plan for possible PCI Thurs/Fri    Aidan Saldana MD, FACC, [facility]  04/15/25  08:44 EDT.

## 2025-04-15 NOTE — CASE MANAGEMENT/SOCIAL WORK
Discharge Planning Assessment  [facility]     Patient Name: Luiz Christianson  MRN: [id number]  Today's Date: 4/15/2025    Admit Date: 4/14/2025    Plan: Home w/ family; Denies needs.   Discharge Needs Assessment       Row Name 04/15/25 1625       Living Environment    People in Home spouse    Name(s) of People in Home Ladi Christianson/wife; children- Luiz 10yo and Sherri 5yo    Current Living Arrangements home    Potentially Unsafe Housing Conditions none    In the past 12 months has the electric, gas, oil, or water company threatened to shut off services in your home? No    Primary Care Provided by self    Provides Primary Care For child(jacob)    Family Caregiver if Needed spouse    Family Caregiver Names LDAI    Quality of Family Relationships helpful;involved;supportive    Able to Return to Prior Arrangements yes       Resource/Environmental Concerns    Resource/Environmental Concerns none    Transportation Concerns none       Transportation Needs    In the past 12 months, has lack of transportation kept you from medical appointments or from getting medications? no    In the past 12 months, has lack of transportation kept you from meetings, work, or from getting things needed for daily living? No       Food Insecurity    Within the past 12 months, you worried that your food would run out before you got the money to buy more. Never true    Within the past 12 months, the food you bought just didn't last and you didn't have money to get more. Never true       Transition Planning    Patient/Family Anticipates Transition to home with family    Patient/Family Anticipated Services at Transition none    Transportation Anticipated family or friend will provide       Discharge Needs Assessment    Readmission Within the Last 30 Days planned readmission    Equipment Currently Used at Home scales    Concerns to be Addressed no discharge needs identified;denies needs/concerns at this time    Do you want help with school or training?  For example, starting or completing job training or getting a high school diploma, GED or equivalent No    Anticipated Changes Related to Illness none    Equipment Needed After Discharge none    Provided Post Acute Provider List? N/A    Provided Post Acute Provider Quality & Resource List? N/A                   Discharge Plan       Row Name 04/15/25 3144       Plan    Plan Home w/ family; Denies needs.    Plan Comments CCP spoke with pleasant patient at bedside.  Explained role, verified face sheet, and discussed discharge plan.  Patient stated he is IADL's, works full-time desk job and drives.  Patient lives with spouse/Ladi Christianson and two dependent children ages 4 & 11.  Patient lives two level home with four steps to enter front door.  Patients PCP confirmed as, Luz Maria White and home pharmacy is [facility].  Patient has the following DME- scale.  Patient denies use of past home health or going to a sub-acute rehab.  Patient plans to return home at discharge and spouse will transport.  Patient denies current discharge needs.  CCP will continue to follow….…Alena MELARA /STEPHANIE.                    Expected Discharge Date and Time       Expected Discharge Date Expected Discharge Time    Apr 22, 2025            Demographic Summary       Row Name 04/15/25 6294       General Information    Admission Type inpatient    Arrived From hospital  [facility]    Referral Source admission list    Reason for Consult discharge planning    Preferred Language English       Contact Information    Permission Granted to Share Info With                    Functional Status       Row Name 04/15/25 1622       Functional Status    Usual Activity Tolerance good    Current Activity Tolerance good       Physical Activity    On average, how many days per week do you engage in moderate to strenuous exercise (like a brisk walk)? 0 days    On average, how many minutes do you engage in exercise at this level? 0 min    Number  of minutes of exercise per week 0       Assessment of Health Literacy    How often do you have someone help you read hospital materials? Never    How often do you have problems learning about your medical condition because of difficulty understanding written information? Never    How often do you have a problem understanding what is told to you about your medical condition? Never    How confident are you filling out medical forms by yourself? Quite a bit    Health Literacy Good       Functional Status, IADL    Medications independent    Meal Preparation independent    Housekeeping independent    Laundry independent    Shopping independent    If for any reason you need help with day-to-day activities such as bathing, preparing meals, shopping, managing finances, etc., do you get the help you need? I don't need any help       Mental Status    General Appearance WDL WDL       Mental Status Summary    Recent Changes in Mental Status/Cognitive Functioning no changes       Employment/    Employment Status employed full-time    Current or Previous Occupation desk job                   Psychosocial    No documentation.                  Abuse/Neglect    No documentation.                  Legal    No documentation.                  Substance Abuse    No documentation.                  Patient Forms    No documentation.                     Alena Marin RN

## 2025-04-16 LAB
ALBUMIN SERPL-MCNC: 3.2 G/DL (ref 3.5–5.2)
ALBUMIN/GLOB SERPL: 1 G/DL
ALP SERPL-CCNC: 81 U/L (ref 39–117)
ALT SERPL W P-5'-P-CCNC: 104 U/L (ref 1–41)
ANION GAP SERPL CALCULATED.3IONS-SCNC: 13.6 MMOL/L (ref 5–15)
AST SERPL-CCNC: 60 U/L (ref 1–40)
BASOPHILS # BLD AUTO: 0.12 10*3/MM3 (ref 0–0.2)
BASOPHILS NFR BLD AUTO: 1.4 % (ref 0–1.5)
BILIRUB SERPL-MCNC: 2.3 MG/DL (ref 0–1.2)
BUN SERPL-MCNC: 18 MG/DL (ref 6–20)
BUN/CREAT SERPL: 16.5 (ref 7–25)
CALCIUM SPEC-SCNC: 8.6 MG/DL (ref 8.6–10.5)
CHLORIDE SERPL-SCNC: 98 MMOL/L (ref 98–107)
CO2 SERPL-SCNC: 25.4 MMOL/L (ref 22–29)
CREAT SERPL-MCNC: 1.09 MG/DL (ref 0.76–1.27)
DEPRECATED RDW RBC AUTO: 43.7 FL (ref 37–54)
EGFRCR SERPLBLD CKD-EPI 2021: 87.4 ML/MIN/1.73
EOSINOPHIL # BLD AUTO: 0.25 10*3/MM3 (ref 0–0.4)
EOSINOPHIL NFR BLD AUTO: 2.9 % (ref 0.3–6.2)
ERYTHROCYTE [DISTWIDTH] IN BLOOD BY AUTOMATED COUNT: 13.6 % (ref 12.3–15.4)
GLOBULIN UR ELPH-MCNC: 3.1 GM/DL
GLUCOSE BLDC GLUCOMTR-MCNC: 124 MG/DL (ref 70–130)
GLUCOSE BLDC GLUCOMTR-MCNC: 132 MG/DL (ref 70–130)
GLUCOSE BLDC GLUCOMTR-MCNC: 152 MG/DL (ref 70–130)
GLUCOSE BLDC GLUCOMTR-MCNC: 167 MG/DL (ref 70–130)
GLUCOSE BLDC GLUCOMTR-MCNC: 208 MG/DL (ref 70–130)
GLUCOSE SERPL-MCNC: 102 MG/DL (ref 65–99)
HCT VFR BLD AUTO: 40.9 % (ref 37.5–51)
HGB BLD-MCNC: 12.8 G/DL (ref 13–17.7)
IMM GRANULOCYTES # BLD AUTO: 0.04 10*3/MM3 (ref 0–0.05)
IMM GRANULOCYTES NFR BLD AUTO: 0.5 % (ref 0–0.5)
LYMPHOCYTES # BLD AUTO: 2.48 10*3/MM3 (ref 0.7–3.1)
LYMPHOCYTES NFR BLD AUTO: 28.6 % (ref 19.6–45.3)
MCH RBC QN AUTO: 27.8 PG (ref 26.6–33)
MCHC RBC AUTO-ENTMCNC: 31.3 G/DL (ref 31.5–35.7)
MCV RBC AUTO: 88.7 FL (ref 79–97)
MONOCYTES # BLD AUTO: 1.02 10*3/MM3 (ref 0.1–0.9)
MONOCYTES NFR BLD AUTO: 11.8 % (ref 5–12)
NEUTROPHILS NFR BLD AUTO: 4.76 10*3/MM3 (ref 1.7–7)
NEUTROPHILS NFR BLD AUTO: 54.8 % (ref 42.7–76)
NRBC BLD AUTO-RTO: 0 /100 WBC (ref 0–0.2)
NT-PROBNP SERPL-MCNC: 1693 PG/ML (ref 0–450)
PLATELET # BLD AUTO: 249 10*3/MM3 (ref 140–450)
PMV BLD AUTO: 10.1 FL (ref 6–12)
POTASSIUM SERPL-SCNC: 3 MMOL/L (ref 3.5–5.2)
POTASSIUM SERPL-SCNC: 4.2 MMOL/L (ref 3.5–5.2)
PROT SERPL-MCNC: 6.3 G/DL (ref 6–8.5)
QT INTERVAL: 345 MS
QT INTERVAL: 353 MS
QTC INTERVAL: 448 MS
QTC INTERVAL: 473 MS
RBC # BLD AUTO: 4.61 10*6/MM3 (ref 4.14–5.8)
SODIUM SERPL-SCNC: 137 MMOL/L (ref 136–145)
WBC NRBC COR # BLD AUTO: 8.67 10*3/MM3 (ref 3.4–10.8)

## 2025-04-16 PROCEDURE — 93010 ELECTROCARDIOGRAM REPORT: CPT | Performed by: INTERNAL MEDICINE

## 2025-04-16 PROCEDURE — 84132 ASSAY OF SERUM POTASSIUM: CPT | Performed by: STUDENT IN AN ORGANIZED HEALTH CARE EDUCATION/TRAINING PROGRAM

## 2025-04-16 PROCEDURE — 83880 ASSAY OF NATRIURETIC PEPTIDE: CPT | Performed by: STUDENT IN AN ORGANIZED HEALTH CARE EDUCATION/TRAINING PROGRAM

## 2025-04-16 PROCEDURE — 25010000002 FUROSEMIDE PER 20 MG: Performed by: STUDENT IN AN ORGANIZED HEALTH CARE EDUCATION/TRAINING PROGRAM

## 2025-04-16 PROCEDURE — 85025 COMPLETE CBC W/AUTO DIFF WBC: CPT | Performed by: THORACIC SURGERY (CARDIOTHORACIC VASCULAR SURGERY)

## 2025-04-16 PROCEDURE — 63710000001 INSULIN LISPRO (HUMAN) PER 5 UNITS: Performed by: NURSE PRACTITIONER

## 2025-04-16 PROCEDURE — 82948 REAGENT STRIP/BLOOD GLUCOSE: CPT

## 2025-04-16 PROCEDURE — 80053 COMPREHEN METABOLIC PANEL: CPT | Performed by: THORACIC SURGERY (CARDIOTHORACIC VASCULAR SURGERY)

## 2025-04-16 PROCEDURE — 93005 ELECTROCARDIOGRAM TRACING: CPT | Performed by: STUDENT IN AN ORGANIZED HEALTH CARE EDUCATION/TRAINING PROGRAM

## 2025-04-16 PROCEDURE — 99232 SBSQ HOSP IP/OBS MODERATE 35: CPT | Performed by: STUDENT IN AN ORGANIZED HEALTH CARE EDUCATION/TRAINING PROGRAM

## 2025-04-16 PROCEDURE — 93005 ELECTROCARDIOGRAM TRACING: CPT | Performed by: THORACIC SURGERY (CARDIOTHORACIC VASCULAR SURGERY)

## 2025-04-16 PROCEDURE — 25010000002 MILRINONE LACTATE IN DEXTROSE 20-5 MG/100ML-% SOLUTION: Performed by: THORACIC SURGERY (CARDIOTHORACIC VASCULAR SURGERY)

## 2025-04-16 PROCEDURE — 63710000001 INSULIN GLARGINE PER 5 UNITS: Performed by: NURSE PRACTITIONER

## 2025-04-16 RX ORDER — FUROSEMIDE 10 MG/ML
80 INJECTION INTRAMUSCULAR; INTRAVENOUS
Status: DISCONTINUED | OUTPATIENT
Start: 2025-04-16 | End: 2025-04-16

## 2025-04-16 RX ORDER — FUROSEMIDE 10 MG/ML
80 INJECTION INTRAMUSCULAR; INTRAVENOUS 3 TIMES DAILY
Status: DISCONTINUED | OUTPATIENT
Start: 2025-04-16 | End: 2025-04-20

## 2025-04-16 RX ORDER — POTASSIUM CHLORIDE 1500 MG/1
40 TABLET, EXTENDED RELEASE ORAL EVERY 4 HOURS
Status: COMPLETED | OUTPATIENT
Start: 2025-04-16 | End: 2025-04-16

## 2025-04-16 RX ADMIN — MILRINONE LACTATE 0.25 MCG/KG/MIN: 0.2 INJECTION, SOLUTION INTRAVENOUS at 02:44

## 2025-04-16 RX ADMIN — POTASSIUM CHLORIDE 40 MEQ: 1500 TABLET, EXTENDED RELEASE ORAL at 05:21

## 2025-04-16 RX ADMIN — INSULIN LISPRO 4 UNITS: 100 INJECTION, SOLUTION INTRAVENOUS; SUBCUTANEOUS at 06:51

## 2025-04-16 RX ADMIN — MILRINONE LACTATE 0.25 MCG/KG/MIN: 0.2 INJECTION, SOLUTION INTRAVENOUS at 13:06

## 2025-04-16 RX ADMIN — POTASSIUM CHLORIDE 40 MEQ: 1500 TABLET, EXTENDED RELEASE ORAL at 12:17

## 2025-04-16 RX ADMIN — FUROSEMIDE 80 MG: 10 INJECTION, SOLUTION INTRAMUSCULAR; INTRAVENOUS at 20:07

## 2025-04-16 RX ADMIN — ATORVASTATIN CALCIUM 80 MG: 80 TABLET, FILM COATED ORAL at 08:25

## 2025-04-16 RX ADMIN — ASPIRIN 81 MG: 81 TABLET, COATED ORAL at 08:25

## 2025-04-16 RX ADMIN — POTASSIUM CHLORIDE 40 MEQ: 1500 TABLET, EXTENDED RELEASE ORAL at 08:25

## 2025-04-16 RX ADMIN — INSULIN GLARGINE 10 UNITS: 100 INJECTION, SOLUTION SUBCUTANEOUS at 08:25

## 2025-04-16 RX ADMIN — POTASSIUM CHLORIDE 30 MEQ: 1500 TABLET, EXTENDED RELEASE ORAL at 09:33

## 2025-04-16 RX ADMIN — FUROSEMIDE 80 MG: 10 INJECTION, SOLUTION INTRAMUSCULAR; INTRAVENOUS at 08:25

## 2025-04-16 RX ADMIN — FAMOTIDINE 40 MG: 20 TABLET, FILM COATED ORAL at 08:25

## 2025-04-16 RX ADMIN — Medication 10 ML: at 23:06

## 2025-04-16 RX ADMIN — INSULIN LISPRO 2 UNITS: 100 INJECTION, SOLUTION INTRAVENOUS; SUBCUTANEOUS at 16:51

## 2025-04-16 RX ADMIN — FUROSEMIDE 80 MG: 10 INJECTION, SOLUTION INTRAMUSCULAR; INTRAVENOUS at 14:07

## 2025-04-16 RX ADMIN — Medication 10 ML: at 08:11

## 2025-04-16 RX ADMIN — POTASSIUM CHLORIDE 30 MEQ: 1500 TABLET, EXTENDED RELEASE ORAL at 16:51

## 2025-04-16 NOTE — PLAN OF CARE
Goal Outcome Evaluation:  Plan of Care Reviewed With: patient        Progress: improving  Outcome Evaluation: Pt is Aox4. ST on tele with frequent PVCs, MD notified. HR 100s. /77. On room air. Pt has no complaints of pain. Pt has no further needs at this time.

## 2025-04-16 NOTE — PLAN OF CARE
Goal Outcome Evaluation:  Plan of Care Reviewed With: patient        Progress: no change  Outcome Evaluation: S/P R&LHC, right groin and brachial sites, SR with PVCs, VSS, milnirone infusing, continue plan of care.

## 2025-04-16 NOTE — PROGRESS NOTES
"    Patient Name: Luiz Christianson  :1985  39 y.o.      Patient Care Team:  Luz Maria White APRN as PCP - General (Family Medicine)    Chief Complaint:   Acute HFrEF, CAD    Interval History:   NAEO, adequate UOP.     Objective   Vital Signs  Temp:  [97.4 °F (36.3 °C)-98.4 °F (36.9 °C)] 98.3 °F (36.8 °C)  Heart Rate:  [] 106  Resp:  [18] 18  BP: ()/(57-82) 101/65    Intake/Output Summary (Last 24 hours) at 2025 0844  Last data filed at 4/15/2025 2327  Gross per 24 hour   Intake 480 ml   Output 2350 ml   Net -1870 ml     Flowsheet Rows      Flowsheet Row First Filed Value   Admission Height 175.3 cm (69.02\") Documented at 2025   Admission Weight 122 kg (269 lb 13.5 oz) Documented at 2025            GEN: no distress, alert and oriented  HEENT: NACT, EOMI, moist mucous membranes  Lungs: CTAB, no wheezes, rales or rhonchi  CV: normal rate, regular rhythm, normal S1, S2, no murmurs, +2 radial pulses b/l, JVD to mandible while sitting at 90 deg, 2-3+ edema  Abdomen: soft, nontender, nondistended, NABS  Extremities: 2-3+ edema  Skin: no rash, warm, dry  Heme/Lymph: no bruising  Psych: organized thought, normal behavior and affect    Results Review:    Results from last 7 days   Lab Units 25  0235   SODIUM mmol/L 137   POTASSIUM mmol/L 3.0*   CHLORIDE mmol/L 98   CO2 mmol/L 25.4   BUN mg/dL 18   CREATININE mg/dL 1.09   GLUCOSE mg/dL 102*   CALCIUM mg/dL 8.6     Results from last 7 days   Lab Units 25  0052 25  2227   HSTROP T ng/L 49* 47*     Results from last 7 days   Lab Units 25  0235   WBC 10*3/mm3 8.67   HEMOGLOBIN g/dL 12.8*   HEMATOCRIT % 40.9   PLATELETS 10*3/mm3 249         Results from last 7 days   Lab Units 25  2227   MAGNESIUM mg/dL 1.7                   Medication Review:   aspirin, 81 mg, Oral, Daily  atorvastatin, 80 mg, Oral, Daily  famotidine, 40 mg, Oral, Daily  furosemide, 80 mg, Intravenous, Q12H  insulin glargine, 10 " Units, Subcutaneous, Daily  insulin lispro, 2-9 Units, Subcutaneous, 4x Daily AC & at Bedtime  potassium chloride ER, 40 mEq, Oral, Q4H  sodium chloride, 10 mL, Intravenous, Q12H         milrinone, 0.25 mcg/kg/min, Last Rate: 0.25 mcg/kg/min (04/16/25 0207)        Assessment & Plan   #Acute HFrEF  #Multivessel CAD  #Diabetes  #Hypertension  #Hyperlipidemia     39-year-old man, followed by Dr. Muir, with hypertension, hyperlipidemia, diabetes, recently diagnosed HFrEF with an EF of 18%, who presented to [facility] with dyspnea on exertion, cough, and exercise intolerance.  He underwent right and left heart catheterization yesterday which revealed elevated right and left-sided filling pressures with an RA pressure of 35 and a wedge pressure of 40.  Coronary angiography revealed a 99% stenosis of the distal circumflex (dominant, as well as a 90% stenosis of the mid LAD.  The distal circumflex is supplied via septal left to left collaterals.  He was transferred here for evaluation of CABG.  Dr. Stanton has reviewed and does not think patient is a good surgical candidate.  He has asked me to see for possible high risk PCI. After discussion with patient, we will proceed with PCI once he is euvolemic.      Net neg 1870    I think he has about 20-25 more pounds to diurese given his exam and filling pressures. Given the degree of volume overload, I expect him to stay and diurese over the weekend, with plan for multivessel PCI on Monday    - Started on milrinone on transfer, can continue for now while we diurese. Needs aggressive diuresis given filling pressures, increase Lasix to 80 mg IV TID. If no adequate response, will try metolazone prior to next dose  - K > 4, Mg > 2  - Strict I's/O's, daily weights  - continue aspirin 81 mg daily, atorvastatin 80 mg daily  - SBP currently low 100s, continue to monitor and start further GDMT as tolerated    Aidan Saldana MD, FACC, [facility]  04/16/25  08:44 EDT

## 2025-04-17 LAB
ANION GAP SERPL CALCULATED.3IONS-SCNC: 12 MMOL/L (ref 5–15)
ARTERIAL PATENCY WRIST A: NORMAL
ATMOSPHERIC PRESS: 737.2 MMHG
BASE EXCESS BLDA CALC-SCNC: -0.4 MMOL/L (ref -2–2)
BDY SITE: NORMAL
BODY TEMPERATURE: 98.1
BUN SERPL-MCNC: 15 MG/DL (ref 6–20)
BUN/CREAT SERPL: 14.9 (ref 7–25)
CALCIUM SPEC-SCNC: 8.8 MG/DL (ref 8.6–10.5)
CHLORIDE SERPL-SCNC: 98 MMOL/L (ref 98–107)
CO2 SERPL-SCNC: 25 MMOL/L (ref 22–29)
CREAT SERPL-MCNC: 1.01 MG/DL (ref 0.76–1.27)
DEPRECATED RDW RBC AUTO: 48.1 FL (ref 37–54)
EGFRCR SERPLBLD CKD-EPI 2021: 95.8 ML/MIN/1.73
ERYTHROCYTE [DISTWIDTH] IN BLOOD BY AUTOMATED COUNT: 14 % (ref 12.3–15.4)
GLUCOSE BLDC GLUCOMTR-MCNC: 103 MG/DL (ref 70–130)
GLUCOSE BLDC GLUCOMTR-MCNC: 135 MG/DL (ref 70–130)
GLUCOSE BLDC GLUCOMTR-MCNC: 175 MG/DL (ref 70–130)
GLUCOSE BLDC GLUCOMTR-MCNC: 253 MG/DL (ref 70–130)
GLUCOSE SERPL-MCNC: 263 MG/DL (ref 65–99)
HCO3 BLDA-SCNC: 24.4 MMOL/L (ref 22–26)
HCT VFR BLD AUTO: 48.7 % (ref 37.5–51)
HCT VFR BLD CALC: 44 % (ref 38–51)
HEMODILUTION: NO
HGB BLD-MCNC: 14.5 G/DL (ref 13–17.7)
HGB BLDA-MCNC: 14.9 G/DL (ref 12–18)
MCH RBC QN AUTO: 27.8 PG (ref 26.6–33)
MCHC RBC AUTO-ENTMCNC: 29.8 G/DL (ref 31.5–35.7)
MCV RBC AUTO: 93.3 FL (ref 79–97)
MODALITY: NORMAL
PCO2 BLDA: 39.7 MM HG (ref 35–45)
PCO2 TEMP ADJ BLD: 39.2 MM HG (ref 35–48)
PH BLDA: 7.4 PH UNITS (ref 7.35–7.45)
PH, TEMP CORRECTED: 7.4 PH UNITS
PLATELET # BLD AUTO: 272 10*3/MM3 (ref 140–450)
PMV BLD AUTO: 10 FL (ref 6–12)
PO2 BLDA: 89.8 MM HG (ref 80–100)
PO2 TEMP ADJ BLD: 88.3 MM HG (ref 83–108)
POTASSIUM SERPL-SCNC: 3.7 MMOL/L (ref 3.5–5.2)
POTASSIUM SERPL-SCNC: 3.8 MMOL/L (ref 3.5–5.2)
RBC # BLD AUTO: 5.22 10*6/MM3 (ref 4.14–5.8)
SAO2 % BLDCOA: 96.9 % (ref 95–99)
SODIUM SERPL-SCNC: 135 MMOL/L (ref 136–145)
WBC NRBC COR # BLD AUTO: 9.35 10*3/MM3 (ref 3.4–10.8)

## 2025-04-17 PROCEDURE — 93005 ELECTROCARDIOGRAM TRACING: CPT | Performed by: THORACIC SURGERY (CARDIOTHORACIC VASCULAR SURGERY)

## 2025-04-17 PROCEDURE — 63710000001 INSULIN GLARGINE PER 5 UNITS: Performed by: NURSE PRACTITIONER

## 2025-04-17 PROCEDURE — 93010 ELECTROCARDIOGRAM REPORT: CPT | Performed by: INTERNAL MEDICINE

## 2025-04-17 PROCEDURE — 25010000002 FUROSEMIDE PER 20 MG: Performed by: STUDENT IN AN ORGANIZED HEALTH CARE EDUCATION/TRAINING PROGRAM

## 2025-04-17 PROCEDURE — 84132 ASSAY OF SERUM POTASSIUM: CPT | Performed by: STUDENT IN AN ORGANIZED HEALTH CARE EDUCATION/TRAINING PROGRAM

## 2025-04-17 PROCEDURE — 80048 BASIC METABOLIC PNL TOTAL CA: CPT | Performed by: STUDENT IN AN ORGANIZED HEALTH CARE EDUCATION/TRAINING PROGRAM

## 2025-04-17 PROCEDURE — 82948 REAGENT STRIP/BLOOD GLUCOSE: CPT

## 2025-04-17 PROCEDURE — 85027 COMPLETE CBC AUTOMATED: CPT | Performed by: STUDENT IN AN ORGANIZED HEALTH CARE EDUCATION/TRAINING PROGRAM

## 2025-04-17 PROCEDURE — 90791 PSYCH DIAGNOSTIC EVALUATION: CPT

## 2025-04-17 PROCEDURE — 25010000002 MILRINONE LACTATE IN DEXTROSE 20-5 MG/100ML-% SOLUTION: Performed by: THORACIC SURGERY (CARDIOTHORACIC VASCULAR SURGERY)

## 2025-04-17 PROCEDURE — 99232 SBSQ HOSP IP/OBS MODERATE 35: CPT | Performed by: STUDENT IN AN ORGANIZED HEALTH CARE EDUCATION/TRAINING PROGRAM

## 2025-04-17 PROCEDURE — 63710000001 INSULIN LISPRO (HUMAN) PER 5 UNITS: Performed by: NURSE PRACTITIONER

## 2025-04-17 RX ORDER — ACETAMINOPHEN 325 MG/1
650 TABLET ORAL EVERY 6 HOURS PRN
Status: DISCONTINUED | OUTPATIENT
Start: 2025-04-17 | End: 2025-04-25

## 2025-04-17 RX ORDER — POTASSIUM CHLORIDE 1500 MG/1
20 TABLET, EXTENDED RELEASE ORAL ONCE
Status: COMPLETED | OUTPATIENT
Start: 2025-04-17 | End: 2025-04-17

## 2025-04-17 RX ORDER — METOPROLOL TARTRATE 25 MG/1
12.5 TABLET, FILM COATED ORAL EVERY 12 HOURS SCHEDULED
Status: DISCONTINUED | OUTPATIENT
Start: 2025-04-17 | End: 2025-04-20

## 2025-04-17 RX ADMIN — Medication 10 ML: at 10:10

## 2025-04-17 RX ADMIN — FUROSEMIDE 80 MG: 10 INJECTION, SOLUTION INTRAMUSCULAR; INTRAVENOUS at 10:14

## 2025-04-17 RX ADMIN — FUROSEMIDE 80 MG: 10 INJECTION, SOLUTION INTRAMUSCULAR; INTRAVENOUS at 21:47

## 2025-04-17 RX ADMIN — METOPROLOL TARTRATE 5 MG: 5 INJECTION INTRAVENOUS at 12:15

## 2025-04-17 RX ADMIN — INSULIN LISPRO 2 UNITS: 100 INJECTION, SOLUTION INTRAVENOUS; SUBCUTANEOUS at 21:47

## 2025-04-17 RX ADMIN — FAMOTIDINE 40 MG: 20 TABLET, FILM COATED ORAL at 10:03

## 2025-04-17 RX ADMIN — POTASSIUM CHLORIDE 30 MEQ: 1500 TABLET, EXTENDED RELEASE ORAL at 10:02

## 2025-04-17 RX ADMIN — MILRINONE LACTATE 0.25 MCG/KG/MIN: 0.2 INJECTION, SOLUTION INTRAVENOUS at 00:39

## 2025-04-17 RX ADMIN — POTASSIUM CHLORIDE 30 MEQ: 1500 TABLET, EXTENDED RELEASE ORAL at 21:48

## 2025-04-17 RX ADMIN — ASPIRIN 81 MG: 81 TABLET, COATED ORAL at 10:03

## 2025-04-17 RX ADMIN — METOPROLOL TARTRATE 12.5 MG: 25 TABLET, FILM COATED ORAL at 21:47

## 2025-04-17 RX ADMIN — POTASSIUM CHLORIDE 20 MEQ: 1500 TABLET, EXTENDED RELEASE ORAL at 15:52

## 2025-04-17 RX ADMIN — INSULIN GLARGINE 10 UNITS: 100 INJECTION, SOLUTION SUBCUTANEOUS at 10:08

## 2025-04-17 RX ADMIN — INSULIN LISPRO 6 UNITS: 100 INJECTION, SOLUTION INTRAVENOUS; SUBCUTANEOUS at 12:11

## 2025-04-17 RX ADMIN — FUROSEMIDE 80 MG: 10 INJECTION, SOLUTION INTRAMUSCULAR; INTRAVENOUS at 15:52

## 2025-04-17 RX ADMIN — ATORVASTATIN CALCIUM 80 MG: 80 TABLET, FILM COATED ORAL at 10:03

## 2025-04-17 RX ADMIN — Medication 10 ML: at 21:49

## 2025-04-17 NOTE — PROGRESS NOTES
"    Patient Name: Luiz Christianson  :1985  39 y.o.      Patient Care Team:  Luz Maria White APRN as PCP - General (Family Medicine)    Chief Complaint:   Acute HFrEF, CAD    Interval History:   NAEO, increased UOP.     Objective   Vital Signs  Temp:  [97.7 °F (36.5 °C)-98.5 °F (36.9 °C)] 98.5 °F (36.9 °C)  Heart Rate:  [105-112] 105  Resp:  [16-18] 16  BP: (104-121)/(76-80) 121/80    Intake/Output Summary (Last 24 hours) at 2025 0755  Last data filed at 2025 0350  Gross per 24 hour   Intake --   Output 7775 ml   Net -7775 ml     Flowsheet Rows      Flowsheet Row First Filed Value   Admission Height 175.3 cm (69.02\") Documented at 2025   Admission Weight 122 kg (269 lb 13.5 oz) Documented at 2025            GEN: no distress, alert and oriented  HEENT: NACT, EOMI, moist mucous membranes  Lungs: CTAB, no wheezes, rales or rhonchi  CV: normal rate, regular rhythm, normal S1, S2, no murmurs, +2 radial pulses b/l, JVD to mandible  Abdomen: soft, nontender, nondistended, NABS  Extremities: 2+ edema  Skin: no rash, warm, dry  Heme/Lymph: no bruising  Psych: organized thought, normal behavior and affect    Results Review:    Results from last 7 days   Lab Units 25  0038 25  1755 25  0235   SODIUM mmol/L  --   --  137   POTASSIUM mmol/L 3.7   < > 3.0*   CHLORIDE mmol/L  --   --  98   CO2 mmol/L  --   --  25.4   BUN mg/dL  --   --  18   CREATININE mg/dL  --   --  1.09   GLUCOSE mg/dL  --   --  102*   CALCIUM mg/dL  --   --  8.6    < > = values in this interval not displayed.     Results from last 7 days   Lab Units 25  0052 25  2227   HSTROP T ng/L 49* 47*     Results from last 7 days   Lab Units 25  0235   WBC 10*3/mm3 8.67   HEMOGLOBIN g/dL 12.8*   HEMATOCRIT % 40.9   PLATELETS 10*3/mm3 249         Results from last 7 days   Lab Units 25  2227   MAGNESIUM mg/dL 1.7                   Medication Review:   aspirin, 81 mg, Oral, " Daily  atorvastatin, 80 mg, Oral, Daily  famotidine, 40 mg, Oral, Daily  furosemide, 80 mg, Intravenous, TID  insulin glargine, 10 Units, Subcutaneous, Daily  insulin lispro, 2-9 Units, Subcutaneous, 4x Daily AC & at Bedtime  potassium chloride, 30 mEq, Oral, BID With Meals  sodium chloride, 10 mL, Intravenous, Q12H         milrinone, 0.25 mcg/kg/min, Last Rate: 0.25 mcg/kg/min (04/17/25 0039)        Assessment & Plan   #Acute HFrEF  #Multivessel CAD  #Diabetes  #Hypertension  #Hyperlipidemia     39-year-old man, followed by Dr. Muir, with hypertension, hyperlipidemia, diabetes, recently diagnosed HFrEF with an EF of 18%, who presented to [facility] with dyspnea on exertion, cough, and exercise intolerance.  He underwent right and left heart catheterization yesterday which revealed elevated right and left-sided filling pressures with an RA pressure of 35 and a wedge pressure of 40.  Coronary angiography revealed a 99% stenosis of the distal circumflex (dominant, as well as a 90% stenosis of the mid LAD.  The distal circumflex is supplied via septal left to left collaterals.  He was transferred here for evaluation of CABG.  Dr. Stanton has reviewed and does not think patient is a good surgical candidate.  He has asked me to see for possible high risk PCI. After discussion with patient, we will proceed with PCI once he is euvolemic.      Had 7.7L of UOP, remains overloaded.    Having a lot of NSVT, will discontinue milrinone    - BMP  - continue lasix 80 mg IV TID, if adequately diuresed by tomorrow, may consider PCI  - K > 4, Mg > 2  - Strict I's/O's, daily weights  - continue aspirin 81 mg daily, atorvastatin 80 mg daily  - SBP currently low 100s, continue to monitor and start further GDMT as tolerated    Aidan Saldana MD, FAC, [facility]  04/17/25  07:55 EDT
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
## 2025-04-17 NOTE — PROGRESS NOTES
" LOS: 3 days   Patient Care Team:  Luz Maria White APRN as PCP - General (Family Medicine)    Chief Complaint: CAD    Subjective  \"Feeling okay\"    Vital Signs  Temp:  [97.7 °F (36.5 °C)-98.5 °F (36.9 °C)] 98.5 °F (36.9 °C)  Heart Rate:  [105-112] 105  Resp:  [16-18] 16  BP: (104-121)/(76-80) 110/78  Body mass index is 38.02 kg/m².    Intake/Output Summary (Last 24 hours) at 4/17/2025 0804  Last data filed at 4/17/2025 0750  Gross per 24 hour   Intake --   Output 7975 ml   Net -7975 ml     I/O this shift:  In: -   Out: 200 [Urine:200]           04/16/25  0324 04/16/25  0525 04/17/25  0600   Weight: 120 kg (264 lb 5.3 oz) 121 kg (266 lb 9.6 oz) 117 kg (257 lb 9.6 oz)         Objective:  Vital signs: (most recent): Blood pressure 110/78, pulse 105, temperature 98.5 °F (36.9 °C), temperature source Oral, resp. rate 16, height 175.3 cm (69.02\"), weight 117 kg (257 lb 9.6 oz), SpO2 98%.                Physical Exam:   General Appearance: awake and alert, no acute distress   Lungs: clear to auscultation, respirations regular, respirations even, and respirations unlabored,     Heart: tachycardic   Abdomen: soft or nontender, active bowel sounds    Skin: clean and dry   Neuro: alert and oriented, no focal deficits.     Results Review:        WBC WBC   Date Value Ref Range Status   04/16/2025 8.67 3.40 - 10.80 10*3/mm3 Final   04/15/2025 10.45 3.40 - 10.80 10*3/mm3 Final   04/14/2025 11.31 (H) 3.40 - 10.80 10*3/mm3 Final      HGB Hemoglobin   Date Value Ref Range Status   04/16/2025 12.8 (L) 13.0 - 17.7 g/dL Final   04/15/2025 13.7 13.0 - 17.7 g/dL Final   04/14/2025 15.3 12.0 - 17.0 g/dL Final   04/14/2025 15.1 13.0 - 17.7 g/dL Final      HCT Hematocrit   Date Value Ref Range Status   04/16/2025 40.9 37.5 - 51.0 % Final   04/15/2025 45.1 37.5 - 51.0 % Final   04/14/2025 45 38 - 51 % Final   04/14/2025 47.3 37.5 - 51.0 % Final      Platelets Platelets   Date Value Ref Range Status   04/16/2025 249 140 - 450 10*3/mm3 " "Final   04/15/2025 306 140 - 450 10*3/mm3 Final   04/14/2025 311 140 - 450 10*3/mm3 Final        PT/INR:  No results found for: \"PROTIME\"/No results found for: \"INR\"    Sodium Sodium   Date Value Ref Range Status   04/16/2025 137 136 - 145 mmol/L Final   04/15/2025 134 (L) 136 - 145 mmol/L Final   04/14/2025 135 (L) 136 - 145 mmol/L Final      Potassium Potassium   Date Value Ref Range Status   04/17/2025 3.7 3.5 - 5.2 mmol/L Final   04/16/2025 4.2 3.5 - 5.2 mmol/L Final   04/16/2025 3.0 (L) 3.5 - 5.2 mmol/L Final   04/15/2025 3.8 3.5 - 5.2 mmol/L Final   04/14/2025 3.8 3.5 - 5.2 mmol/L Final      Chloride Chloride   Date Value Ref Range Status   04/16/2025 98 98 - 107 mmol/L Final   04/15/2025 101 98 - 107 mmol/L Final   04/14/2025 96 (L) 98 - 107 mmol/L Final      Bicarbonate CO2   Date Value Ref Range Status   04/16/2025 25.4 22.0 - 29.0 mmol/L Final   04/15/2025 21.0 (L) 22.0 - 29.0 mmol/L Final   04/14/2025 23.4 22.0 - 29.0 mmol/L Final      BUN BUN   Date Value Ref Range Status   04/16/2025 18 6 - 20 mg/dL Final   04/15/2025 20 6 - 20 mg/dL Final   04/14/2025 21 (H) 6 - 20 mg/dL Final      Creatinine Creatinine   Date Value Ref Range Status   04/16/2025 1.09 0.76 - 1.27 mg/dL Final   04/15/2025 1.20 0.76 - 1.27 mg/dL Final   04/14/2025 0.87 0.60 - 1.30 mg/dL Final   04/14/2025 1.16 0.76 - 1.27 mg/dL Final      Calcium Calcium   Date Value Ref Range Status   04/16/2025 8.6 8.6 - 10.5 mg/dL Final   04/15/2025 8.9 8.6 - 10.5 mg/dL Final   04/14/2025 9.6 8.6 - 10.5 mg/dL Final      Magnesium No results found for: \"MG\"       aspirin, 81 mg, Oral, Daily  atorvastatin, 80 mg, Oral, Daily  famotidine, 40 mg, Oral, Daily  furosemide, 80 mg, Intravenous, TID  insulin glargine, 10 Units, Subcutaneous, Daily  insulin lispro, 2-9 Units, Subcutaneous, 4x Daily AC & at Bedtime  potassium chloride, 30 mEq, Oral, BID With Meals  sodium chloride, 10 mL, Intravenous, Q12H      milrinone, 0.25 mcg/kg/min, Last Rate: 0.25 " mcg/kg/min (04/17/25 0039)              CHF (congestive heart failure), NYHA class IV, acute, systolic      Assessment & Plan    - Severe multi-vessel CAD   - Acute systolic heart failure, EF approximately 18% -- milrinone started   - DM II -- hgb A1c 8.4 in February   - HTN  - HLD  - Frequent PVC's -- AC with eliquis     Tearful this morning. Missing his children.    Milrinone discontinued due to NSVT.  Great response to diuresis down nearly 9 lbs.  Plan to continue to medically optimize prior to high risk PCI.    Samia Neal, ENRIKE  04/17/25  08:04 EDT

## 2025-04-17 NOTE — PLAN OF CARE
Goal Outcome Evaluation:  Plan of Care Reviewed With: patient        Progress: no change  Outcome Evaluation: pt aox4, on RA, Sinus tach on monitor with PVCs. HR 100s-120s, all other VSS. No c/o pain, some c/o cramping in legs self resolved per patient, potassium checked, day shift nurse notified. ambulating independently. Continue plan of care.

## 2025-04-17 NOTE — THERAPY DISCHARGE NOTE
[facility]  [phone]    ACCESS CENTER STATEMENT OF DISPOSITION        I, Luiz Christianson, was [facility] in the [facility] at [facility] on 4/17/2025.  I understand the recommendations below and what follow-up action is expected of me.      In-Person EMDR Therapists:    VLADISLAV Nichole, [facility]  [phone]  Colin Chow, [facility]  [phone]    VLADISLAV Hall, [facility]  [phone]  Dali Mark, [facility]  [phone]        ________________________________  Clinician Signature    4/17/2025  10:02 EDT

## 2025-04-18 LAB
A1AT PHENOTYP SERPL IFE: ABNORMAL
A1AT SERPL-MCNC: 256 MG/DL (ref 101–187)
ANION GAP SERPL CALCULATED.3IONS-SCNC: 14.5 MMOL/L (ref 5–15)
BACTERIA SPEC AEROBE CULT: NORMAL
BACTERIA SPEC AEROBE CULT: NORMAL
BUN SERPL-MCNC: 19 MG/DL (ref 6–20)
BUN/CREAT SERPL: 18.1 (ref 7–25)
CALCIUM SPEC-SCNC: 8.9 MG/DL (ref 8.6–10.5)
CHLORIDE SERPL-SCNC: 102 MMOL/L (ref 98–107)
CO2 SERPL-SCNC: 20.5 MMOL/L (ref 22–29)
CREAT SERPL-MCNC: 1.05 MG/DL (ref 0.76–1.27)
EGFRCR SERPLBLD CKD-EPI 2021: 91.5 ML/MIN/1.73
GLUCOSE BLDC GLUCOMTR-MCNC: 105 MG/DL (ref 70–130)
GLUCOSE BLDC GLUCOMTR-MCNC: 146 MG/DL (ref 70–130)
GLUCOSE BLDC GLUCOMTR-MCNC: 152 MG/DL (ref 70–130)
GLUCOSE BLDC GLUCOMTR-MCNC: 204 MG/DL (ref 70–130)
GLUCOSE SERPL-MCNC: 134 MG/DL (ref 65–99)
POTASSIUM SERPL-SCNC: 4.3 MMOL/L (ref 3.5–5.2)
QT INTERVAL: 356 MS
QTC INTERVAL: 488 MS
SODIUM SERPL-SCNC: 137 MMOL/L (ref 136–145)

## 2025-04-18 PROCEDURE — 82948 REAGENT STRIP/BLOOD GLUCOSE: CPT

## 2025-04-18 PROCEDURE — 80048 BASIC METABOLIC PNL TOTAL CA: CPT | Performed by: STUDENT IN AN ORGANIZED HEALTH CARE EDUCATION/TRAINING PROGRAM

## 2025-04-18 PROCEDURE — 63710000001 INSULIN LISPRO (HUMAN) PER 5 UNITS: Performed by: NURSE PRACTITIONER

## 2025-04-18 PROCEDURE — 25010000002 MILRINONE LACTATE IN DEXTROSE 20-5 MG/100ML-% SOLUTION: Performed by: STUDENT IN AN ORGANIZED HEALTH CARE EDUCATION/TRAINING PROGRAM

## 2025-04-18 PROCEDURE — 63710000001 INSULIN GLARGINE PER 5 UNITS: Performed by: NURSE PRACTITIONER

## 2025-04-18 PROCEDURE — 99232 SBSQ HOSP IP/OBS MODERATE 35: CPT | Performed by: STUDENT IN AN ORGANIZED HEALTH CARE EDUCATION/TRAINING PROGRAM

## 2025-04-18 PROCEDURE — 25010000002 FUROSEMIDE PER 20 MG: Performed by: STUDENT IN AN ORGANIZED HEALTH CARE EDUCATION/TRAINING PROGRAM

## 2025-04-18 RX ORDER — BENZONATATE 100 MG/1
100 CAPSULE ORAL 3 TIMES DAILY PRN
Status: DISCONTINUED | OUTPATIENT
Start: 2025-04-18 | End: 2025-04-25

## 2025-04-18 RX ORDER — METOLAZONE 5 MG/1
5 TABLET ORAL ONCE
Status: COMPLETED | OUTPATIENT
Start: 2025-04-18 | End: 2025-04-18

## 2025-04-18 RX ORDER — FAMOTIDINE 20 MG/1
20 TABLET, FILM COATED ORAL
Status: DISCONTINUED | OUTPATIENT
Start: 2025-04-18 | End: 2025-04-25 | Stop reason: HOSPADM

## 2025-04-18 RX ORDER — MILRINONE LACTATE 0.2 MG/ML
0.12 INJECTION, SOLUTION INTRAVENOUS CONTINUOUS
Status: DISCONTINUED | OUTPATIENT
Start: 2025-04-18 | End: 2025-04-20

## 2025-04-18 RX ADMIN — METOPROLOL TARTRATE 12.5 MG: 25 TABLET, FILM COATED ORAL at 21:45

## 2025-04-18 RX ADMIN — MILRINONE LACTATE 0.25 MCG/KG/MIN: 0.2 INJECTION, SOLUTION INTRAVENOUS at 15:08

## 2025-04-18 RX ADMIN — FAMOTIDINE 20 MG: 20 TABLET, FILM COATED ORAL at 17:01

## 2025-04-18 RX ADMIN — POTASSIUM CHLORIDE 30 MEQ: 1500 TABLET, EXTENDED RELEASE ORAL at 08:50

## 2025-04-18 RX ADMIN — BENZONATATE 100 MG: 100 CAPSULE ORAL at 10:01

## 2025-04-18 RX ADMIN — INSULIN GLARGINE 10 UNITS: 100 INJECTION, SOLUTION SUBCUTANEOUS at 08:43

## 2025-04-18 RX ADMIN — FUROSEMIDE 80 MG: 10 INJECTION, SOLUTION INTRAMUSCULAR; INTRAVENOUS at 15:09

## 2025-04-18 RX ADMIN — ASPIRIN 81 MG: 81 TABLET, COATED ORAL at 08:44

## 2025-04-18 RX ADMIN — MILRINONE LACTATE 0.25 MCG/KG/MIN: 0.2 INJECTION, SOLUTION INTRAVENOUS at 09:04

## 2025-04-18 RX ADMIN — INSULIN LISPRO 4 UNITS: 100 INJECTION, SOLUTION INTRAVENOUS; SUBCUTANEOUS at 21:44

## 2025-04-18 RX ADMIN — TEMAZEPAM 15 MG: 15 CAPSULE ORAL at 00:56

## 2025-04-18 RX ADMIN — FUROSEMIDE 80 MG: 10 INJECTION, SOLUTION INTRAMUSCULAR; INTRAVENOUS at 08:42

## 2025-04-18 RX ADMIN — FUROSEMIDE 80 MG: 10 INJECTION, SOLUTION INTRAMUSCULAR; INTRAVENOUS at 21:45

## 2025-04-18 RX ADMIN — INSULIN LISPRO 2 UNITS: 100 INJECTION, SOLUTION INTRAVENOUS; SUBCUTANEOUS at 17:01

## 2025-04-18 RX ADMIN — Medication 10 ML: at 08:45

## 2025-04-18 RX ADMIN — METOPROLOL TARTRATE 12.5 MG: 25 TABLET, FILM COATED ORAL at 08:43

## 2025-04-18 RX ADMIN — ATORVASTATIN CALCIUM 80 MG: 80 TABLET, FILM COATED ORAL at 08:43

## 2025-04-18 RX ADMIN — POTASSIUM CHLORIDE 30 MEQ: 1500 TABLET, EXTENDED RELEASE ORAL at 17:01

## 2025-04-18 RX ADMIN — Medication 10 ML: at 21:51

## 2025-04-18 RX ADMIN — METOLAZONE 5 MG: 5 TABLET ORAL at 17:02

## 2025-04-18 RX ADMIN — FAMOTIDINE 40 MG: 20 TABLET, FILM COATED ORAL at 08:43

## 2025-04-18 RX ADMIN — BENZONATATE 100 MG: 100 CAPSULE ORAL at 01:54

## 2025-04-18 NOTE — PROGRESS NOTES
" LOS: 4 days   Patient Care Team:  Luz Maria White APRN as PCP - General (Family Medicine)    Chief Complaint: CAD    Subjective  \"Feeling okay\"    Vital Signs  Temp:  [98.2 °F (36.8 °C)-98.7 °F (37.1 °C)] 98.4 °F (36.9 °C)  Heart Rate:  [] 107  Resp:  [16-18] 16  BP: (102-122)/(62-90) 105/66  Body mass index is 38.01 kg/m².    Intake/Output Summary (Last 24 hours) at 4/18/2025 1008  Last data filed at 4/18/2025 0900  Gross per 24 hour   Intake 480 ml   Output 2060 ml   Net -1580 ml     I/O this shift:  In: 120 [P.O.:120]  Out: -            04/16/25  0525 04/17/25  0600 04/18/25  0522   Weight: 121 kg (266 lb 9.6 oz) 117 kg (257 lb 9.6 oz) 117 kg (257 lb 8 oz)         Objective:  Vital signs: (most recent): Blood pressure 105/66, pulse 107, temperature 98.4 °F (36.9 °C), temperature source Oral, resp. rate 16, height 175.3 cm (69.02\"), weight 117 kg (257 lb 8 oz), SpO2 99%.                Physical Exam:   General Appearance: awake and alert, no acute distress   Lungs: clear to auscultation, respirations regular, respirations even, and respirations unlabored,     Heart: tachycardic   Abdomen: soft or nontender, active bowel sounds    Skin: clean and dry   Neuro: alert and oriented, no focal deficits.     Results Review:        WBC WBC   Date Value Ref Range Status   04/17/2025 9.35 3.40 - 10.80 10*3/mm3 Final   04/16/2025 8.67 3.40 - 10.80 10*3/mm3 Final      HGB Hemoglobin   Date Value Ref Range Status   04/17/2025 14.5 13.0 - 17.7 g/dL Final   04/16/2025 12.8 (L) 13.0 - 17.7 g/dL Final      HCT Hematocrit   Date Value Ref Range Status   04/17/2025 48.7 37.5 - 51.0 % Final   04/16/2025 40.9 37.5 - 51.0 % Final      Platelets Platelets   Date Value Ref Range Status   04/17/2025 272 140 - 450 10*3/mm3 Final   04/16/2025 249 140 - 450 10*3/mm3 Final        PT/INR:  No results found for: \"PROTIME\"/No results found for: \"INR\"    Sodium Sodium   Date Value Ref Range Status   04/18/2025 137 136 - 145 mmol/L " "Final   04/17/2025 135 (L) 136 - 145 mmol/L Final   04/16/2025 137 136 - 145 mmol/L Final      Potassium Potassium   Date Value Ref Range Status   04/18/2025 4.3 3.5 - 5.2 mmol/L Final   04/17/2025 3.8 3.5 - 5.2 mmol/L Final   04/17/2025 3.7 3.5 - 5.2 mmol/L Final   04/16/2025 4.2 3.5 - 5.2 mmol/L Final   04/16/2025 3.0 (L) 3.5 - 5.2 mmol/L Final      Chloride Chloride   Date Value Ref Range Status   04/18/2025 102 98 - 107 mmol/L Final   04/17/2025 98 98 - 107 mmol/L Final   04/16/2025 98 98 - 107 mmol/L Final      Bicarbonate CO2   Date Value Ref Range Status   04/18/2025 20.5 (L) 22.0 - 29.0 mmol/L Final   04/17/2025 25.0 22.0 - 29.0 mmol/L Final   04/16/2025 25.4 22.0 - 29.0 mmol/L Final      BUN BUN   Date Value Ref Range Status   04/18/2025 19 6 - 20 mg/dL Final   04/17/2025 15 6 - 20 mg/dL Final   04/16/2025 18 6 - 20 mg/dL Final      Creatinine Creatinine   Date Value Ref Range Status   04/18/2025 1.05 0.76 - 1.27 mg/dL Final   04/17/2025 1.01 0.76 - 1.27 mg/dL Final   04/16/2025 1.09 0.76 - 1.27 mg/dL Final      Calcium Calcium   Date Value Ref Range Status   04/18/2025 8.9 8.6 - 10.5 mg/dL Final   04/17/2025 8.8 8.6 - 10.5 mg/dL Final   04/16/2025 8.6 8.6 - 10.5 mg/dL Final      Magnesium No results found for: \"MG\"       aspirin, 81 mg, Oral, Daily  atorvastatin, 80 mg, Oral, Daily  famotidine, 40 mg, Oral, Daily  furosemide, 80 mg, Intravenous, TID  insulin glargine, 10 Units, Subcutaneous, Daily  insulin lispro, 2-9 Units, Subcutaneous, 4x Daily AC & at Bedtime  metOLazone, 5 mg, Oral, Once  metoprolol tartrate, 12.5 mg, Oral, Q12H  potassium chloride, 30 mEq, Oral, BID With Meals  sodium chloride, 10 mL, Intravenous, Q12H      milrinone, 0.25 mcg/kg/min, Last Rate: 0.25 mcg/kg/min (04/18/25 0904)              CHF (congestive heart failure), NYHA class IV, acute, systolic      Assessment & Plan    - Severe multi-vessel CAD   - Acute systolic heart failure, EF approximately 18% -- milrinone started   - DM " II -- hgb A1c 8.4 in February   - HTN  - HLD  - Frequent PVC's -- AC with eliquis     Plan to continue to medically optimize prior to high risk PCI per cardiology.  We do not have much to add, will ask internal medicine to manage him while he is here since surgery is not recommended.     Samia Neal, ENRIKE  04/18/25  10:08 EDT

## 2025-04-18 NOTE — NURSING NOTE
[facility] note. Spoke with RN who reports patient would like to forgo Access follow-up at this time related to previous visit being too emotional.  RN reports patient mood is improved. He had visits from family and friends which has been helpful. He was provided with outpatient mental health resources yesterday.       Access will sign off, can re-consult at patient request.

## 2025-04-18 NOTE — PLAN OF CARE
Goal Outcome Evaluation: Milrinone drip restarted per Dr. Betancourt, NSVT runs reported to Renetta, HR remained below 110 most of shift, continue to monitor per Renetta.  Tessalon given for cough, improved.  Lasix & K given, Metolazone added.  1,675 urine out during shift, BM today.  Pt required 2 units insulin prior to dinner d/t 152 blood sugar.  Hospitalist consulted.  Pt asked that Access not return, he feels it stirs up too many emotions and is exhausting to him, he asked that spiritual care come instead in celebration of [facility], consult ordered per protocol, Access signed off.  No visitors today.  Pt A&O, RA, up ad giovana.

## 2025-04-18 NOTE — PLAN OF CARE
Goal Outcome Evaluation:  Plan of Care Reviewed With: patient        Progress: no change  Outcome Evaluation: Pt continuing with diuresis, output documented in flowsheet. Complained of cough, called for PRN cough medication. Had 6 beat run of v-tach once during night shift. Morning weight obtained. No acute concerns at this time. Will update POC as needed

## 2025-04-18 NOTE — PROGRESS NOTES
"    Patient Name: Luiz Christianson  :1985  39 y.o.      Patient Care Team:  Luz Maria White APRN as PCP - General (Family Medicine)    Chief Complaint:   Acute HFrEF, CAD    Interval History:   Milrinone discontinued given frequent ectopy. Rates improved, less ectopy, however UOP has also decreased.    Objective   Vital Signs  Temp:  [98.2 °F (36.8 °C)-98.7 °F (37.1 °C)] 98.7 °F (37.1 °C)  Heart Rate:  [] 101  Resp:  [16-18] 16  BP: (102-122)/(62-90) 110/62    Intake/Output Summary (Last 24 hours) at 2025 0808  Last data filed at 2025 0522  Gross per 24 hour   Intake 360 ml   Output 2060 ml   Net -1700 ml     Flowsheet Rows      Flowsheet Row First Filed Value   Admission Height 175.3 cm (69.02\") Documented at 2025   Admission Weight 122 kg (269 lb 13.5 oz) Documented at 2025            GEN: no distress, alert and oriented  HEENT: NACT, EOMI, moist mucous membranes  Lungs: CTAB, no wheezes, rales or rhonchi  CV: normal rate, regular rhythm, normal S1, S2, no murmurs, +2 radial pulses b/l, JVD to mandible  Abdomen: soft, nontender, nondistended, NABS  Extremities: 2-3+ edema  Skin: no rash, warm, dry  Heme/Lymph: no bruising  Psych: organized thought, normal behavior and affect    Results Review:    Results from last 7 days   Lab Units 25  0309   SODIUM mmol/L 137   POTASSIUM mmol/L 4.3   CHLORIDE mmol/L 102   CO2 mmol/L 20.5*   BUN mg/dL 19   CREATININE mg/dL 1.05   GLUCOSE mg/dL 134*   CALCIUM mg/dL 8.9     Results from last 7 days   Lab Units 25  0052 25  2227   HSTROP T ng/L 49* 47*     Results from last 7 days   Lab Units 25  0947   WBC 10*3/mm3 9.35   HEMOGLOBIN g/dL 14.5   HEMATOCRIT % 48.7   PLATELETS 10*3/mm3 272         Results from last 7 days   Lab Units 25  2227   MAGNESIUM mg/dL 1.7                   Medication Review:   aspirin, 81 mg, Oral, Daily  atorvastatin, 80 mg, Oral, Daily  famotidine, 40 mg, Oral, " Daily  furosemide, 80 mg, Intravenous, TID  insulin glargine, 10 Units, Subcutaneous, Daily  insulin lispro, 2-9 Units, Subcutaneous, 4x Daily AC & at Bedtime  metoprolol tartrate, 12.5 mg, Oral, Q12H  potassium chloride, 30 mEq, Oral, BID With Meals  sodium chloride, 10 mL, Intravenous, Q12H                Assessment & Plan   #Acute HFrEF  #Multivessel CAD  #Diabetes  #Hypertension  #Hyperlipidemia     39-year-old man, followed by Dr. Muir, with hypertension, hyperlipidemia, diabetes, recently diagnosed HFrEF with an EF of 18%, who presented to [facility] with dyspnea on exertion, cough, and exercise intolerance.  He underwent right and left heart catheterization yesterday which revealed elevated right and left-sided filling pressures with an RA pressure of 35 and a wedge pressure of 40.  Coronary angiography revealed a 99% stenosis of the distal circumflex (dominant, as well as a 90% stenosis of the mid LAD.  The distal circumflex is supplied via septal left to left collaterals.  He was transferred here for evaluation of CABG.  Dr. Stanton has reviewed and does not think patient is a good surgical candidate.  He has asked me to see for possible high risk PCI. After discussion with patient, we will proceed with PCI once he is euvolemic.      Net neg 1900    He is still significantly volume overloaded.  We discontinued milrinone due to significant ectopy yesterday however his urine output has decreased.  Now that he is on beta-blocker, I would restart the milrinone in order to hopefully increase his urine output.  I would want to rest of the diurese over the weekend to hopefully have him okay for PCI on Monday.    - restart milrinone 0.25 mcg/kg/min  - metolazone 5 mg p.o. x 1, continue lasix 80 mg IV TID\  - K > 4, Mg > 2  - Strict I's/O's, daily weights  - continue aspirin 81 mg daily, atorvastatin 80 mg daily  - Continue metoprolol tartrate 25 mg twice daily    Aidan Saldana MD, FACC, [facility]  04/18/25  08:08 EDT

## 2025-04-18 NOTE — CASE MANAGEMENT/SOCIAL WORK
Continued Stay Note  [facility]     Patient Name: Luiz Christianson  MRN: [id number]  Today's Date: 4/18/2025    Admit Date: 4/14/2025    Plan: Home; Denies needs.   Discharge Plan       Row Name 04/18/25 1627       Plan    Plan Home; Denies needs.    Plan Comments CTS has evaluated and patient is not a surgical candidate.  Patient with Acute systolic heart failure, EF approximately 18%.  Per MD notes, he is still significantly volume overloaded & milrinone gtt re-started.  Plan to continue to medically optimize prior to high risk PCI per cardiology.  CCP will continue to follow..........Alena MELARA/ELÍAS                   Discharge Codes    No documentation.                 Expected Discharge Date and Time       Expected Discharge Date Expected Discharge Time    Apr 23, 2025               Alena Marin RN

## 2025-04-18 NOTE — CONSULTS
"Internal medicine consult    Referring physician      Chief complaint  Shortness of breath    Reason for consult  Follow medical problems    History of present illness  39-year-old white male with history of chronic congestive heart failure diabetes mellitus hypertension hyperlipidemia admitted to cardiology service from [facility] where he was admitted with shortness of breath and found to have severe LV dysfunction and multivessel coronary artery disease.  I am asked to follow patient for medical problems.  At the time of examination he has denies any chest pain shortness of breath palpitation but feeling very weak.    Past Medical History:              Diagnosis Date    Chronic diastolic congestive heart failure 11/18/2020    Diabetes mellitus, type 2 11/18/2020    Diastolic CHF, chronic  11/18/2020    Emotional depression      Essential hypertension 11/18/2020    Finger numbness 02/24/2021    Hyperlipidemia      Left wrist pain 02/24/2021    Migraine 12/07/2020         Surgical History   No pertinent surgical history.           Family History   Problem Relation Age of Onset    Diabetes Mother      Diabetes Father      Heart disease Other           AUNT OR UNCLE    Diabetes Other      Colon cancer Neg Hx                   Allergies   Allergen Reactions    Farxiga [Dapagliflozin] Other (See Comments)       Bladder irritation (persistant)        Home Medications: Reviewed    Social History:                  Tobacco Use    Smoking status: Never       Passive exposure: Past    Smokeless tobacco: Never   Vaping Use    Vaping status: Never Used   Substance Use Topics    Alcohol use: Not Currently    Drug use: Never         Review of Systems:  Per history of present illness    Physical Exam:  Blood pressure 105/66, pulse 104, temperature 98.4 °F (36.9 °C), temperature source Oral, resp. rate 16, height 175.3 cm (69.02\"), weight 117 kg (257 lb 8 oz), SpO2 98%.    General: Awake alert and in no " distress  HEENT: Unremarkable  Neck: Supple  Heart: Tachycardic and irregular rhythm, no murmurs or rubs, 2+ radial pulses bilaterally  Lungs: Clear to auscultation bilaterally without wheezes or crackles, no respiratory distress  Abdomen: Soft, mildly tender diffusely throughout the abdomen but no generalized peritonitis, nondistended, no rebound or guarding  Skin: Warm, dry, no rash  Musculoskeletal: Normal range of motion, 2+ pitting bilateral symmetric lower extremity edema up to both thighs  Neurologic: Oriented x3, no motor deficits no sensory deficit  Psychiatric: Mood appears stable, no psychosis    LABS  Lab Results (last 24 hours)       Procedure Component Value Units Date/Time    POC Glucose Once [[id number]]  (Abnormal) Collected: 04/18/25 1130    Specimen: Blood Updated: 04/18/25 1134     Glucose 146 mg/dL     POC Glucose Once [[id number]]  (Normal) Collected: 04/18/25 0623    Specimen: Blood Updated: 04/18/25 0624     Glucose 105 mg/dL     Basic Metabolic Panel [[id number]]  (Abnormal) Collected: 04/18/25 0309    Specimen: Blood Updated: 04/18/25 0435     Glucose 134 mg/dL      BUN 19 mg/dL      Creatinine 1.05 mg/dL      Sodium 137 mmol/L      Potassium 4.3 mmol/L      Chloride 102 mmol/L      CO2 20.5 mmol/L      Calcium 8.9 mg/dL      BUN/Creatinine Ratio 18.1     Anion Gap 14.5 mmol/L      eGFR 91.5 mL/min/1.73     Narrative:      GFR Categories in Chronic Kidney Disease (CKD)      GFR Category          GFR (mL/min/1.73)    Interpretation  G1                     90 or greater         Normal or high (1)  G2                      60-89                Mild decrease (1)  G3a                   45-59                Mild to moderate decrease  G3b                   30-44                Moderate to severe decrease  G4                    15-29                Severe decrease  G5                    14 or less           Kidney failure          (1)In the absence of evidence of kidney disease, neither GFR category G1  or G2 fulfill the criteria for CKD.    eGFR calculation 2021 CKD-EPI creatinine equation, which does not include race as a factor    POC Glucose Once [[id number]]  (Abnormal) Collected: 04/17/25 1953    Specimen: Blood Updated: 04/17/25 1954     Glucose 175 mg/dL     POC Glucose Once [[id number]]  (Abnormal) Collected: 04/17/25 1557    Specimen: Blood Updated: 04/17/25 1559     Glucose 135 mg/dL           Study Result  Narrative & Impression   XR CHEST PA AND LATERAL-     Clinical: CHF     FINDINGS: Cardiac enlargement similar to the previous examination. No  pleural effusion or acute airspace disease has developed. No gross  vascular congestion. The could be mild central vascular congestion.  Remainder is unremarkable.        Scan on 4/17/2025 1635 by [facility], [place]: ECG 12-LEAD         Author: -- Service: -- Author Type: --   Filed: Date of Service: Creation Time:   Status: (Other)   HEART RHCK=592  bpm  RR Lexoogfq=517  ms  ND Aohrfnol=417  ms  P Horizontal Axis=-58  deg  P Front Axis=83  deg  QRSD Pkblylee=868  ms  QT Ztvpynwt=996  ms  DZlY=173  ms  QRS Axis=93  deg  T Wave Axis=192  deg  - ABNORMAL ECG -  Sinus tachycardia  Ventricular tachycardia, unsustained  Borderline right axis deviation  Anteroseptal infarct, old  Abnormal T, consider ischemia, diffuse leads          Current Facility-Administered Medications:     acetaminophen (TYLENOL) tablet 650 mg, 650 mg, Oral, Q6H PRN, Aidan Betancourt MD    ALPRAZolam (XANAX) tablet 0.5 mg, 0.5 mg, Oral, TID PRN, Jr Austin Stanton MD    aspirin EC tablet 81 mg, 81 mg, Oral, Daily, Jr Austin Stanton MD, 81 mg at 04/18/25 0844    atorvastatin (LIPITOR) tablet 80 mg, 80 mg, Oral, Daily, Jr Austin Stanton MD, 80 mg at 04/18/25 0843    benzonatate (TESSALON) capsule 100 mg, 100 mg, Oral, TID PRN, Kim Armstrong, APRN, 100 mg at 04/18/25 1001    sennosides-docusate (PERICOLACE) 8.6-50 MG per tablet 2 tablet, 2 tablet, Oral, BID PRN **AND** polyethylene glycol  (MIRALAX) packet 17 g, 17 g, Oral, Daily PRN **AND** bisacodyl (DULCOLAX) EC tablet 5 mg, 5 mg, Oral, Daily PRN **AND** bisacodyl (DULCOLAX) suppository 10 mg, 10 mg, Rectal, Daily PRN, Jr Austin Stanton MD    Calcium Replacement - Follow Nurse / BPA Driven Protocol, , Not Applicable, PRN, Jr Austin Stanton MD    dextrose (D50W) (25 g/50 mL) IV injection 25 g, 25 g, Intravenous, Q15 Min PRN, Samia Michel APRN    dextrose (GLUTOSE) oral gel 15 g, 15 g, Oral, Q15 Min PRN, Samia Michel APRN    famotidine (PEPCID) tablet 40 mg, 40 mg, Oral, Daily, Jr Austin Stanton MD, 40 mg at 04/18/25 0843    furosemide (LASIX) injection 80 mg, 80 mg, Intravenous, TID, Aidan Betancourt MD, 80 mg at 04/18/25 0842    glucagon (GLUCAGEN) injection 1 mg, 1 mg, Intramuscular, Q15 Min PRN, Samia Michel APRN    insulin glargine (LANTUS, SEMGLEE) injection 10 Units, 10 Units, Subcutaneous, Daily, Samia Michel APRN, 10 Units at 04/18/25 0843    insulin lispro (HUMALOG/ADMELOG) injection 2-9 Units, 2-9 Units, Subcutaneous, 4x Daily AC & at Bedtime, Samia Michel APRN, 2 Units at 04/17/25 2147    Magnesium Cardiology Dose Replacement - Follow Nurse / BPA Driven Protocol, , Not Applicable, PRN, Jr Austin Stanton MD    metOLazone (ZAROXOLYN) tablet 5 mg, 5 mg, Oral, Once, Aidan Betancourt MD    metoprolol tartrate (LOPRESSOR) tablet 12.5 mg, 12.5 mg, Oral, Q12H, Aidan Betancourt MD, 12.5 mg at 04/18/25 0843    milrinone (PRIMACOR) 20 mg in 100 mL D5W infusion, 0.25 mcg/kg/min, Intravenous, Continuous, Aidan Betancourt MD, Last Rate: 8.78 mL/hr at 04/18/25 0904, 0.25 mcg/kg/min at 04/18/25 0904    nitroglycerin (NITROSTAT) SL tablet 0.4 mg, 0.4 mg, Sublingual, Q5 Min PRN, Jr Austin Stanton MD    ondansetron (ZOFRAN) injection 4 mg, 4 mg, Intravenous, Q6H PRN, Jr Austin Stanton MD    Phosphorus Replacement - Follow Nurse / BPA Driven Protocol, , Not Applicable, PRN, Jr Austin Stanton MD    potassium chloride (KLOR-CON  M10) CR tablet 30 mEq, 30 mEq, Oral, BID With Meals, Samia Michel, APRN, 30 mEq at 04/18/25 0850    Potassium Replacement - Follow Nurse / BPA Driven Protocol, , Not Applicable, PRN, Jr Austin Stanton MD    sodium chloride 0.9 % flush 10 mL, 10 mL, Intravenous, Q12H, Jr Austin Stanton MD, 10 mL at 04/18/25 0845    sodium chloride 0.9 % flush 10 mL, 10 mL, Intravenous, PRN, Jr Austin Stanton MD    sodium chloride 0.9 % infusion 40 mL, 40 mL, Intravenous, PRN, Jr Austin Stanton MD    temazepam (RESTORIL) capsule 15 mg, 15 mg, Oral, Nightly PRN, Jr Austin Stanton MD, 15 mg at 04/18/25 0056     ASSESSMENT  Multivessel coronary artery disease  Severe LV dysfunction  Diabetes mellitus  Hypertension  Hyperlipidemia  Morbidly obese  Anxiety disorder  Frequent PVCs on Eliquis    PLAN  Agree with current care  Primacor per cardiology  IV diuresis  PCI on Monday  Accu-Chek sliding scale insulin  Stress ulcer DVT prophylaxis  Supportive care  Patient is full code  Discussed with nursing staff  Will follow with cardiac and further recommendation current hospital course    TRINIDAD STEPHENS MD

## 2025-04-19 LAB
ALBUMIN SERPL-MCNC: 3.6 G/DL (ref 3.5–5.2)
ALBUMIN/GLOB SERPL: 1.2 G/DL
ALP SERPL-CCNC: 88 U/L (ref 39–117)
ALT SERPL W P-5'-P-CCNC: 213 U/L (ref 1–41)
ANION GAP SERPL CALCULATED.3IONS-SCNC: 13 MMOL/L (ref 5–15)
ANION GAP SERPL CALCULATED.3IONS-SCNC: 16 MMOL/L (ref 5–15)
AST SERPL-CCNC: 201 U/L (ref 1–40)
BASOPHILS # BLD AUTO: 0.12 10*3/MM3 (ref 0–0.2)
BASOPHILS NFR BLD AUTO: 1.4 % (ref 0–1.5)
BILIRUB SERPL-MCNC: 2.2 MG/DL (ref 0–1.2)
BUN SERPL-MCNC: 22 MG/DL (ref 6–20)
BUN SERPL-MCNC: 23 MG/DL (ref 6–20)
BUN/CREAT SERPL: 21.6 (ref 7–25)
BUN/CREAT SERPL: 21.9 (ref 7–25)
CALCIUM SPEC-SCNC: 8.6 MG/DL (ref 8.6–10.5)
CALCIUM SPEC-SCNC: 9.1 MG/DL (ref 8.6–10.5)
CHLORIDE SERPL-SCNC: 91 MMOL/L (ref 98–107)
CHLORIDE SERPL-SCNC: 95 MMOL/L (ref 98–107)
CHOLEST SERPL-MCNC: 82 MG/DL (ref 0–200)
CO2 SERPL-SCNC: 25 MMOL/L (ref 22–29)
CO2 SERPL-SCNC: 26 MMOL/L (ref 22–29)
CREAT SERPL-MCNC: 1.02 MG/DL (ref 0.76–1.27)
CREAT SERPL-MCNC: 1.05 MG/DL (ref 0.76–1.27)
DEPRECATED RDW RBC AUTO: 44.3 FL (ref 37–54)
EGFRCR SERPLBLD CKD-EPI 2021: 91.5 ML/MIN/1.73
EGFRCR SERPLBLD CKD-EPI 2021: 94.7 ML/MIN/1.73
EOSINOPHIL # BLD AUTO: 0.23 10*3/MM3 (ref 0–0.4)
EOSINOPHIL NFR BLD AUTO: 2.7 % (ref 0.3–6.2)
ERYTHROCYTE [DISTWIDTH] IN BLOOD BY AUTOMATED COUNT: 13.9 % (ref 12.3–15.4)
GLOBULIN UR ELPH-MCNC: 3.1 GM/DL
GLUCOSE BLDC GLUCOMTR-MCNC: 133 MG/DL (ref 70–130)
GLUCOSE BLDC GLUCOMTR-MCNC: 175 MG/DL (ref 70–130)
GLUCOSE BLDC GLUCOMTR-MCNC: 177 MG/DL (ref 70–130)
GLUCOSE BLDC GLUCOMTR-MCNC: 180 MG/DL (ref 70–130)
GLUCOSE SERPL-MCNC: 171 MG/DL (ref 65–99)
GLUCOSE SERPL-MCNC: 197 MG/DL (ref 65–99)
HCT VFR BLD AUTO: 43.4 % (ref 37.5–51)
HDLC SERPL-MCNC: 16 MG/DL (ref 40–60)
HGB BLD-MCNC: 13.7 G/DL (ref 13–17.7)
IMM GRANULOCYTES # BLD AUTO: 0.04 10*3/MM3 (ref 0–0.05)
IMM GRANULOCYTES NFR BLD AUTO: 0.5 % (ref 0–0.5)
LDLC SERPL CALC-MCNC: 49 MG/DL (ref 0–100)
LDLC/HDLC SERPL: 3.14 {RATIO}
LYMPHOCYTES # BLD AUTO: 3.08 10*3/MM3 (ref 0.7–3.1)
LYMPHOCYTES NFR BLD AUTO: 36.1 % (ref 19.6–45.3)
MAGNESIUM SERPL-MCNC: 2.1 MG/DL (ref 1.6–2.6)
MCH RBC QN AUTO: 27.8 PG (ref 26.6–33)
MCHC RBC AUTO-ENTMCNC: 31.6 G/DL (ref 31.5–35.7)
MCV RBC AUTO: 88 FL (ref 79–97)
MONOCYTES # BLD AUTO: 1.06 10*3/MM3 (ref 0.1–0.9)
MONOCYTES NFR BLD AUTO: 12.4 % (ref 5–12)
NEUTROPHILS NFR BLD AUTO: 4.01 10*3/MM3 (ref 1.7–7)
NEUTROPHILS NFR BLD AUTO: 46.9 % (ref 42.7–76)
NRBC BLD AUTO-RTO: 0 /100 WBC (ref 0–0.2)
PLATELET # BLD AUTO: 234 10*3/MM3 (ref 140–450)
PMV BLD AUTO: 10.1 FL (ref 6–12)
POTASSIUM SERPL-SCNC: 3 MMOL/L (ref 3.5–5.2)
POTASSIUM SERPL-SCNC: 3.6 MMOL/L (ref 3.5–5.2)
POTASSIUM SERPL-SCNC: 3.6 MMOL/L (ref 3.5–5.2)
POTASSIUM SERPL-SCNC: 4 MMOL/L (ref 3.5–5.2)
PROT SERPL-MCNC: 6.7 G/DL (ref 6–8.5)
RBC # BLD AUTO: 4.93 10*6/MM3 (ref 4.14–5.8)
SODIUM SERPL-SCNC: 132 MMOL/L (ref 136–145)
SODIUM SERPL-SCNC: 134 MMOL/L (ref 136–145)
TRIGL SERPL-MCNC: 79 MG/DL (ref 0–150)
TSH SERPL DL<=0.05 MIU/L-ACNC: 5.58 UIU/ML (ref 0.27–4.2)
VLDLC SERPL-MCNC: 17 MG/DL (ref 5–40)
WBC NRBC COR # BLD AUTO: 8.54 10*3/MM3 (ref 3.4–10.8)

## 2025-04-19 PROCEDURE — 93010 ELECTROCARDIOGRAM REPORT: CPT | Performed by: INTERNAL MEDICINE

## 2025-04-19 PROCEDURE — 63710000001 INSULIN GLARGINE PER 5 UNITS: Performed by: NURSE PRACTITIONER

## 2025-04-19 PROCEDURE — 84132 ASSAY OF SERUM POTASSIUM: CPT | Performed by: STUDENT IN AN ORGANIZED HEALTH CARE EDUCATION/TRAINING PROGRAM

## 2025-04-19 PROCEDURE — 82948 REAGENT STRIP/BLOOD GLUCOSE: CPT

## 2025-04-19 PROCEDURE — 99232 SBSQ HOSP IP/OBS MODERATE 35: CPT | Performed by: STUDENT IN AN ORGANIZED HEALTH CARE EDUCATION/TRAINING PROGRAM

## 2025-04-19 PROCEDURE — 80048 BASIC METABOLIC PNL TOTAL CA: CPT | Performed by: STUDENT IN AN ORGANIZED HEALTH CARE EDUCATION/TRAINING PROGRAM

## 2025-04-19 PROCEDURE — 63710000001 INSULIN LISPRO (HUMAN) PER 5 UNITS: Performed by: NURSE PRACTITIONER

## 2025-04-19 PROCEDURE — 25010000002 FUROSEMIDE PER 20 MG: Performed by: STUDENT IN AN ORGANIZED HEALTH CARE EDUCATION/TRAINING PROGRAM

## 2025-04-19 PROCEDURE — 25010000002 MILRINONE LACTATE IN DEXTROSE 20-5 MG/100ML-% SOLUTION: Performed by: STUDENT IN AN ORGANIZED HEALTH CARE EDUCATION/TRAINING PROGRAM

## 2025-04-19 PROCEDURE — 80061 LIPID PANEL: CPT | Performed by: HOSPITALIST

## 2025-04-19 PROCEDURE — 25010000002 MAGNESIUM SULFATE 2 GM/50ML SOLUTION: Performed by: STUDENT IN AN ORGANIZED HEALTH CARE EDUCATION/TRAINING PROGRAM

## 2025-04-19 PROCEDURE — 93005 ELECTROCARDIOGRAM TRACING: CPT | Performed by: HOSPITALIST

## 2025-04-19 PROCEDURE — 83735 ASSAY OF MAGNESIUM: CPT | Performed by: STUDENT IN AN ORGANIZED HEALTH CARE EDUCATION/TRAINING PROGRAM

## 2025-04-19 PROCEDURE — 80050 GENERAL HEALTH PANEL: CPT | Performed by: HOSPITALIST

## 2025-04-19 RX ORDER — POTASSIUM CHLORIDE 1500 MG/1
40 TABLET, EXTENDED RELEASE ORAL EVERY 4 HOURS
Status: COMPLETED | OUTPATIENT
Start: 2025-04-19 | End: 2025-04-19

## 2025-04-19 RX ORDER — MAGNESIUM SULFATE HEPTAHYDRATE 40 MG/ML
2 INJECTION, SOLUTION INTRAVENOUS ONCE
Status: COMPLETED | OUTPATIENT
Start: 2025-04-19 | End: 2025-04-19

## 2025-04-19 RX ORDER — MAGNESIUM SULFATE 1 G/100ML
1 INJECTION INTRAVENOUS ONCE
Status: DISCONTINUED | OUTPATIENT
Start: 2025-04-19 | End: 2025-04-19

## 2025-04-19 RX ORDER — POTASSIUM CHLORIDE 750 MG/1
10 TABLET, EXTENDED RELEASE ORAL ONCE
Status: COMPLETED | OUTPATIENT
Start: 2025-04-19 | End: 2025-04-19

## 2025-04-19 RX ORDER — ATORVASTATIN CALCIUM 20 MG/1
40 TABLET, FILM COATED ORAL NIGHTLY
Status: DISCONTINUED | OUTPATIENT
Start: 2025-04-20 | End: 2025-04-23

## 2025-04-19 RX ADMIN — POTASSIUM CHLORIDE 40 MEQ: 1500 TABLET, EXTENDED RELEASE ORAL at 05:39

## 2025-04-19 RX ADMIN — POTASSIUM CHLORIDE 30 MEQ: 1500 TABLET, EXTENDED RELEASE ORAL at 09:02

## 2025-04-19 RX ADMIN — POTASSIUM CHLORIDE 30 MEQ: 1500 TABLET, EXTENDED RELEASE ORAL at 17:05

## 2025-04-19 RX ADMIN — FUROSEMIDE 80 MG: 10 INJECTION, SOLUTION INTRAMUSCULAR; INTRAVENOUS at 09:04

## 2025-04-19 RX ADMIN — ALPRAZOLAM 0.5 MG: 0.5 TABLET ORAL at 16:37

## 2025-04-19 RX ADMIN — INSULIN LISPRO 2 UNITS: 100 INJECTION, SOLUTION INTRAVENOUS; SUBCUTANEOUS at 17:05

## 2025-04-19 RX ADMIN — FAMOTIDINE 20 MG: 20 TABLET, FILM COATED ORAL at 06:28

## 2025-04-19 RX ADMIN — INSULIN LISPRO 2 UNITS: 100 INJECTION, SOLUTION INTRAVENOUS; SUBCUTANEOUS at 12:27

## 2025-04-19 RX ADMIN — METOPROLOL TARTRATE 12.5 MG: 25 TABLET, FILM COATED ORAL at 09:03

## 2025-04-19 RX ADMIN — ASPIRIN 81 MG: 81 TABLET, COATED ORAL at 09:03

## 2025-04-19 RX ADMIN — ATORVASTATIN CALCIUM 80 MG: 80 TABLET, FILM COATED ORAL at 09:03

## 2025-04-19 RX ADMIN — MAGNESIUM SULFATE IN WATER FOR 2 G: 40 INJECTION INTRAVENOUS at 12:28

## 2025-04-19 RX ADMIN — INSULIN GLARGINE 10 UNITS: 100 INJECTION, SOLUTION SUBCUTANEOUS at 09:04

## 2025-04-19 RX ADMIN — Medication 10 ML: at 21:53

## 2025-04-19 RX ADMIN — MILRINONE LACTATE 0.25 MCG/KG/MIN: 0.2 INJECTION, SOLUTION INTRAVENOUS at 01:47

## 2025-04-19 RX ADMIN — ACETAMINOPHEN 650 MG: 325 TABLET, FILM COATED ORAL at 16:37

## 2025-04-19 RX ADMIN — POTASSIUM CHLORIDE 10 MEQ: 750 TABLET, EXTENDED RELEASE ORAL at 18:12

## 2025-04-19 RX ADMIN — Medication 10 ML: at 11:10

## 2025-04-19 RX ADMIN — METOPROLOL TARTRATE 12.5 MG: 25 TABLET, FILM COATED ORAL at 21:50

## 2025-04-19 RX ADMIN — POTASSIUM CHLORIDE 40 MEQ: 1500 TABLET, EXTENDED RELEASE ORAL at 14:26

## 2025-04-19 RX ADMIN — BENZONATATE 100 MG: 100 CAPSULE ORAL at 02:42

## 2025-04-19 RX ADMIN — FAMOTIDINE 20 MG: 20 TABLET, FILM COATED ORAL at 16:37

## 2025-04-19 RX ADMIN — FUROSEMIDE 80 MG: 10 INJECTION, SOLUTION INTRAMUSCULAR; INTRAVENOUS at 18:11

## 2025-04-19 RX ADMIN — POTASSIUM CHLORIDE 40 MEQ: 1500 TABLET, EXTENDED RELEASE ORAL at 12:27

## 2025-04-19 RX ADMIN — INSULIN LISPRO 2 UNITS: 100 INJECTION, SOLUTION INTRAVENOUS; SUBCUTANEOUS at 21:51

## 2025-04-19 NOTE — PLAN OF CARE
Goal Outcome Evaluation:     Outcome Evaluation: Pt on room air with stable b/p. ST on the tele monitor. Frequent PVCs with12 and 13 beat runs of VT. IV Lasix given with 3200cc urine output overnight. Potassium 3.0 treated per protocol. Milrinone gtt infusing per order. No complaints of pain. Will continue to monitor and update as needed.

## 2025-04-19 NOTE — PROGRESS NOTES
"    Patient Name: Luiz Christianson  :1985  39 y.o.      Patient Care Team:  Luz Maria White APRN as PCP - General (Family Medicine)    Chief Complaint:   Acute HFrEF, CAD    Interval History:   -5 L overnight.  Patient reports improvement in edema.  Still has frequent ectopy on the milrinone.    Objective   Vital Signs  Temp:  [97.8 °F (36.6 °C)-98.2 °F (36.8 °C)] 97.8 °F (36.6 °C)  Heart Rate:  [] 92  Resp:  [16-18] 18  BP: ()/(67-93) 103/73    Intake/Output Summary (Last 24 hours) at 2025 0826  Last data filed at 2025 0800  Gross per 24 hour   Intake 580 ml   Output 5375 ml   Net -4795 ml     Flowsheet Rows      Flowsheet Row First Filed Value   Admission Height 175.3 cm (69.02\") Documented at 2025   Admission Weight 122 kg (269 lb 13.5 oz) Documented at 2025            GEN: no distress, alert and oriented  HEENT: NACT, EOMI, moist mucous membranes  Lungs: CTAB, no wheezes, rales or rhonchi  CV: normal rate, regular rhythm, normal S1, S2, no murmurs, +2 radial pulses b/l  Abdomen: soft, nontender, nondistended, NABS  Extremities: 2-3+ edema  Skin: no rash, warm, dry  Heme/Lymph: no bruising  Psych: organized thought, normal behavior and affect    Results Review:    Results from last 7 days   Lab Units 25  0432   SODIUM mmol/L 134*   POTASSIUM mmol/L 3.0*   CHLORIDE mmol/L 95*   CO2 mmol/L 26.0   BUN mg/dL 22*   CREATININE mg/dL 1.02   GLUCOSE mg/dL 171*   CALCIUM mg/dL 8.6     Results from last 7 days   Lab Units 25  0052 257   HSTROP T ng/L 49* 47*     Results from last 7 days   Lab Units 25  0432   WBC 10*3/mm3 8.54   HEMOGLOBIN g/dL 13.7   HEMATOCRIT % 43.4   PLATELETS 10*3/mm3 234         Results from last 7 days   Lab Units 25  2227   MAGNESIUM mg/dL 1.7     Results from last 7 days   Lab Units 25  0432   CHOLESTEROL mg/dL 82   TRIGLYCERIDES mg/dL 79   HDL CHOL mg/dL 16*   LDL CHOL mg/dL 49             "   Medication Review:   aspirin, 81 mg, Oral, Daily  atorvastatin, 80 mg, Oral, Daily  famotidine, 20 mg, Oral, BID AC  furosemide, 80 mg, Intravenous, TID  insulin glargine, 10 Units, Subcutaneous, Daily  insulin lispro, 2-9 Units, Subcutaneous, 4x Daily AC & at Bedtime  metoprolol tartrate, 12.5 mg, Oral, Q12H  potassium chloride, 30 mEq, Oral, BID With Meals  potassium chloride ER, 40 mEq, Oral, Q4H  sodium chloride, 10 mL, Intravenous, Q12H         milrinone, 0.25 mcg/kg/min, Last Rate: 0.25 mcg/kg/min (04/19/25 0147)          Assessment & Plan   #Acute HFrEF  #Multivessel CAD  #Diabetes  #Hypertension  #Hyperlipidemia     39-year-old man, followed by Dr. Muir, with hypertension, hyperlipidemia, diabetes, recently diagnosed HFrEF with an EF of 18%, who presented to [facility] with dyspnea on exertion, cough, and exercise intolerance.  He underwent right and left heart catheterization which revealed elevated right and left-sided filling pressures with an RA pressure of 35 and a wedge pressure of 40.  Coronary angiography revealed a 99% stenosis of the distal circumflex (dominant, as well as a 90% stenosis of the mid LAD.  The distal circumflex is supplied via septal left to left collaterals.  He was transferred here for evaluation of CABG.  Dr. Stanton has reviewed and does not think patient is a good surgical candidate.  He has asked me to see for possible high risk PCI. After discussion with patient, plan for PCI with Dr. Betancourt once he is euvolemic.      He has been on and off midodrine due to significant ectopy.  He was restarted yesterday and given Lasix 3 times daily with metolazone and had robust urine output, -5 L.  Weight down several pounds.  He is continuing to have significant NSVT.  I will trial using the milrinone 0.125 and continue Lasix.  See how he does on that    -Reduce milrinone to0.125 mcg/kg/min  - continue lasix 80 mg IV TID  - K > 4, Mg > 2. K 3.0 this am, being repleted, will  reassess this afternoon.   - Strict I's/O's, daily weights  - continue aspirin 81 mg daily, atorvastatin 80 mg daily  - Continue metoprolol tartrate 25 mg twice daily    Mendoza Jacobson MD  [facility]  04/19/25  08:26 EDT

## 2025-04-19 NOTE — PROGRESS NOTES
"Daily progress note    Referring physician      Subjective  Awake and alert and feeling same like yesterday with no new complaints and denies any chest pain shortness of breath palpitation.    History of present illness  39-year-old white male with history of chronic congestive heart failure diabetes mellitus hypertension hyperlipidemia admitted to cardiology service from [facility] where he was admitted with shortness of breath and found to have severe LV dysfunction and multivessel coronary artery disease.  I am asked to follow patient for medical problems.  At the time of examination he has denies any chest pain shortness of breath palpitation but feeling very weak.    Review of Systems:  Per history of present illness    Physical Exam:  Blood pressure (!) 144/121, pulse 99, temperature 97.6 °F (36.4 °C), temperature source Oral, resp. rate 18, height 175.3 cm (69.02\"), weight 115 kg (253 lb 11.2 oz), SpO2 95%.    General: Awake alert and in no distress  HEENT: Unremarkable  Neck: Supple  Heart: Tachycardic and irregular rhythm, no murmurs or rubs, 2+ radial pulses bilaterally  Lungs: Clear to auscultation bilaterally without wheezes or crackles, no respiratory distress  Abdomen: Soft, mildly tender diffusely throughout the abdomen but no generalized peritonitis, nondistended, no rebound or guarding  Skin: Warm, dry, no rash  Musculoskeletal: Normal range of motion, 2+ pitting bilateral symmetric lower extremity edema up to both thighs  Neurologic: Oriented x3, no motor deficits no sensory deficit  Psychiatric: Mood appears stable, no psychosis    LABS  Lab Results (last 24 hours)       Procedure Component Value Units Date/Time    Basic Metabolic Panel [[id number]] Updated: 04/19/25 1431    Specimen: Blood from Arm, Right     Magnesium [[id number]] Updated: 04/19/25 1431    Specimen: Blood from Arm, Right     POC Glucose Once [[id number]]  (Abnormal) Collected: 04/19/25 1213    Specimen: Blood " Updated: 04/19/25 1214     Glucose 177 mg/dL     POC Glucose Once [[id number]]  (Abnormal) Collected: 04/19/25 0624    Specimen: Blood Updated: 04/19/25 0627     Glucose 133 mg/dL     TSH [[id number]]  (Abnormal) Collected: 04/19/25 0432    Specimen: Blood Updated: 04/19/25 0524     TSH 5.580 uIU/mL     Comprehensive Metabolic Panel [[id number]]  (Abnormal) Collected: 04/19/25 0432    Specimen: Blood Updated: 04/19/25 0518     Glucose 171 mg/dL      BUN 22 mg/dL      Creatinine 1.02 mg/dL      Sodium 134 mmol/L      Potassium 3.0 mmol/L      Chloride 95 mmol/L      CO2 26.0 mmol/L      Calcium 8.6 mg/dL      Total Protein 6.7 g/dL      Albumin 3.6 g/dL      ALT (SGPT) 213 U/L      AST (SGOT) 201 U/L      Alkaline Phosphatase 88 U/L      Total Bilirubin 2.2 mg/dL      Globulin 3.1 gm/dL      A/G Ratio 1.2 g/dL      BUN/Creatinine Ratio 21.6     Anion Gap 13.0 mmol/L      eGFR 94.7 mL/min/1.73     Narrative:      GFR Categories in Chronic Kidney Disease (CKD)      GFR Category          GFR (mL/min/1.73)    Interpretation  G1                     90 or greater         Normal or high (1)  G2                      60-89                Mild decrease (1)  G3a                   45-59                Mild to moderate decrease  G3b                   30-44                Moderate to severe decrease  G4                    15-29                Severe decrease  G5                    14 or less           Kidney failure          (1)In the absence of evidence of kidney disease, neither GFR category G1 or G2 fulfill the criteria for CKD.    eGFR calculation 2021 CKD-EPI creatinine equation, which does not include race as a factor    Lipid Panel [[id number]]  (Abnormal) Collected: 04/19/25 0432    Specimen: Blood Updated: 04/19/25 0517     Total Cholesterol 82 mg/dL      Triglycerides 79 mg/dL      HDL Cholesterol 16 mg/dL      LDL Cholesterol  49 mg/dL      VLDL Cholesterol 17 mg/dL      LDL/HDL Ratio 3.14    Narrative:      Cholesterol  Reference Ranges  (U.S. Department of Health and Human Services ATP III Classifications)    Desirable          <200 mg/dL  Borderline High    200-239 mg/dL  High Risk          >240 mg/dL      Triglyceride Reference Ranges  (U.S. Department of Health and Human Services ATP III Classifications)    Normal           <150 mg/dL  Borderline High  150-199 mg/dL  High             200-499 mg/dL  Very High        >500 mg/dL    HDL Reference Ranges  (U.S. Department of Health and Human Services ATP III Classifications)    Low     <40 mg/dl (major risk factor for CHD)  High    >60 mg/dl ('negative' risk factor for CHD)        LDL Reference Ranges  (U.S. Department of Health and Human Services ATP III Classifications)    Optimal          <100 mg/dL  Near Optimal     100-129 mg/dL  Borderline High  130-159 mg/dL  High             160-189 mg/dL  Very High        >189 mg/dL    LDL is calculated using the NIH LDL-C calculation.      CBC & Differential [[id number]]  (Abnormal) Collected: 04/19/25 0432    Specimen: Blood Updated: 04/19/25 0459    Narrative:      The following orders were created for panel order CBC & Differential.  Procedure                               Abnormality         Status                     ---------                               -----------         ------                     CBC Auto Differential[[id number]]        Abnormal            Final result                 Please view results for these tests on the individual orders.    CBC Auto Differential [[id number]]  (Abnormal) Collected: 04/19/25 0432    Specimen: Blood Updated: 04/19/25 0459     WBC 8.54 10*3/mm3      RBC 4.93 10*6/mm3      Hemoglobin 13.7 g/dL      Hematocrit 43.4 %      MCV 88.0 fL      MCH 27.8 pg      MCHC 31.6 g/dL      RDW 13.9 %      RDW-SD 44.3 fl      MPV 10.1 fL      Platelets 234 10*3/mm3      Neutrophil % 46.9 %      Lymphocyte % 36.1 %      Monocyte % 12.4 %      Eosinophil % 2.7 %      Basophil % 1.4 %      Immature Grans % 0.5 %       Neutrophils, Absolute 4.01 10*3/mm3      Lymphocytes, Absolute 3.08 10*3/mm3      Monocytes, Absolute 1.06 10*3/mm3      Eosinophils, Absolute 0.23 10*3/mm3      Basophils, Absolute 0.12 10*3/mm3      Immature Grans, Absolute 0.04 10*3/mm3      nRBC 0.0 /100 WBC     POC Glucose Once [[id number]]  (Abnormal) Collected: 04/18/25 2019    Specimen: Blood Updated: 04/18/25 2021     Glucose 204 mg/dL     POC Glucose Once [[id number]]  (Abnormal) Collected: 04/18/25 1638    Specimen: Blood Updated: 04/18/25 1640     Glucose 152 mg/dL           Study Result  Narrative & Impression   XR CHEST PA AND LATERAL-     Clinical: CHF     FINDINGS: Cardiac enlargement similar to the previous examination. No  pleural effusion or acute airspace disease has developed. No gross  vascular congestion. The could be mild central vascular congestion.  Remainder is unremarkable.        Scan on 4/17/2025 1635 by [facility], [place]: ECG 12-LEAD         Author: -- Service: -- Author Type: --   Filed: Date of Service: Creation Time:   Status: (Other)   HEART HXCP=281  bpm  RR Pzuvpknv=626  ms  MT Lxiywuly=123  ms  P Horizontal Axis=-58  deg  P Front Axis=83  deg  QRSD Oizeitpv=039  ms  QT Vrekdjsy=367  ms  ZUwL=252  ms  QRS Axis=93  deg  T Wave Axis=192  deg  - ABNORMAL ECG -  Sinus tachycardia  Ventricular tachycardia, unsustained  Borderline right axis deviation  Anteroseptal infarct, old  Abnormal T, consider ischemia, diffuse leads          Current Facility-Administered Medications:     acetaminophen (TYLENOL) tablet 650 mg, 650 mg, Oral, Q6H PRN, Aidan Betancourt MD    ALPRAZolam (XANAX) tablet 0.5 mg, 0.5 mg, Oral, TID PRN, Billy Quijano MD    aspirin EC tablet 81 mg, 81 mg, Oral, Daily, Jr Austin Stanton MD, 81 mg at 04/19/25 0903    atorvastatin (LIPITOR) tablet 80 mg, 80 mg, Oral, Daily, Jr Austin Stanton MD, 80 mg at 04/19/25 0903    benzonatate (TESSALON) capsule 100 mg, 100 mg, Oral, TID PRN, Kim Armstrong APRN, 100 mg at  04/19/25 0242    sennosides-docusate (PERICOLACE) 8.6-50 MG per tablet 2 tablet, 2 tablet, Oral, BID PRN **AND** polyethylene glycol (MIRALAX) packet 17 g, 17 g, Oral, Daily PRN **AND** bisacodyl (DULCOLAX) EC tablet 5 mg, 5 mg, Oral, Daily PRN **AND** bisacodyl (DULCOLAX) suppository 10 mg, 10 mg, Rectal, Daily PRN, Jr Austin Stanton MD    Calcium Replacement - Follow Nurse / BPA Driven Protocol, , Not Applicable, PRN, Jr Austin Stanton MD    dextrose (D50W) (25 g/50 mL) IV injection 25 g, 25 g, Intravenous, Q15 Min PRN, Samia Michel APRN    dextrose (GLUTOSE) oral gel 15 g, 15 g, Oral, Q15 Min PRN, Samia Michel APRN    famotidine (PEPCID) tablet 20 mg, 20 mg, Oral, BID AC, Billy Quijano MD, 20 mg at 04/19/25 0628    furosemide (LASIX) injection 80 mg, 80 mg, Intravenous, TID, Aidan Betancourt MD, 80 mg at 04/19/25 0904    glucagon (GLUCAGEN) injection 1 mg, 1 mg, Intramuscular, Q15 Min PRN, Samia Michel APRN    insulin glargine (LANTUS, SEMGLEE) injection 10 Units, 10 Units, Subcutaneous, Daily, Samia Michel APRN, 10 Units at 04/19/25 0904    insulin lispro (HUMALOG/ADMELOG) injection 2-9 Units, 2-9 Units, Subcutaneous, 4x Daily AC & at Bedtime, Samia Michel APRN, 2 Units at 04/19/25 1227    Magnesium Cardiology Dose Replacement - Follow Nurse / BPA Driven Protocol, , Not Applicable, PRN, Jr Austin Stanton MD    metoprolol tartrate (LOPRESSOR) tablet 12.5 mg, 12.5 mg, Oral, Q12H, Aidan Betancourt MD, 12.5 mg at 04/19/25 0903    milrinone (PRIMACOR) 20 mg in 100 mL D5W infusion, 0.125 mcg/kg/min, Intravenous, Continuous, Mendoza Jacobson MD, Stopped at 04/19/25 1358    nitroglycerin (NITROSTAT) SL tablet 0.4 mg, 0.4 mg, Sublingual, Q5 Min PRN, Jr Austin Stanton MD    ondansetron (ZOFRAN) injection 4 mg, 4 mg, Intravenous, Q6H PRN, Jr Austin Stanton MD    Phosphorus Replacement - Follow Nurse / BPA Driven Protocol, , Not Applicable, PRN, Jr Austin Stanton MD    potassium  chloride (KLOR-CON M10) CR tablet 30 mEq, 30 mEq, Oral, BID With Meals, MichelRaeganSamia, APRN, 30 mEq at 04/19/25 0902    Potassium Replacement - Follow Nurse / BPA Driven Protocol, , Not Applicable, PRN, Jr Austin Stanton MD    sodium chloride 0.9 % flush 10 mL, 10 mL, Intravenous, Q12H, Jr Austin Stanton MD, 10 mL at 04/19/25 1110    sodium chloride 0.9 % flush 10 mL, 10 mL, Intravenous, PRN, Jr Austin Stanton MD    sodium chloride 0.9 % infusion 40 mL, 40 mL, Intravenous, PRN, Jr Austin Stanton MD    temazepam (RESTORIL) capsule 15 mg, 15 mg, Oral, Nightly PRN, Trinidad Quijano MD, 15 mg at 04/18/25 0056     ASSESSMENT  Multivessel coronary artery disease  Severe LV dysfunction  Diabetes mellitus  Hypertension  Hyperlipidemia  Morbidly obese  Anxiety disorder  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

## 2025-04-19 NOTE — PLAN OF CARE
Goal Outcome Evaluation:  Plan of Care Reviewed With: patient        Progress: improving  Outcome Evaluation: Pt is AOx4. SR/ST on tele, frequent PVC, and 21 beat run of VT, MD notified, see new orders. Pt has no complaints of pain at this time. BP is 121/86. Pt has no further needs at this time.                              Render Post-Care Instructions In Note?: yes Detail Level: Detailed Additional Anesthesia Volume In Cc (Will Not Render If 0): 0 Post-Care Instructions: Patient is to keep the biopsy site dry overnight, and then wash daily with mild soap and water, apply petroleum jelly, and cover with a bandaid once daily until healed, usually 1-2 weeks. Path Notes (To The Dermatopathologist): . Lab: -[id number] Cryotherapy Text: The wound bed was treated with cryotherapy after the biopsy was performed. Wound Care: Vaseline Biopsy Type: H and E Dressing: bandage Bill [id number] For Specimen Handling/Conveyance To Laboratory?: no Anesthesia Volume In Cc (Will Not Render If 0): 0.3 Electrodesiccation And Curettage Text: The wound bed was treated with electrodesiccation and curettage after the biopsy was performed. Curettage Text: The wound bed was treated with curettage after the biopsy was done. Silver Nitrate Text: The wound bed was treated with silver nitrate after the biopsy was performed. Biopsy Method: Acu-Razor Billing Type: Third-Party Bill Electrodesiccation Text: The wound bed was treated with electrodesiccation after the biopsy was performed. Depth Of Biopsy: dermis Notification Instructions: Patient will be notified of biopsy results. However, patient instructed to call the office if not contacted within 2 weeks. Consent: Verbal consent was obtained and risks were reviewed including but not limited to scarring, infection, bleeding, scabbing, incomplete removal, nerve damage and allergy to anesthesia. Also risk that hair will not grow through area. Anesthesia Type: 1% lidocaine without epinephrine Type Of Destruction Used: Curettage Lab Facility: [id number] Hemostasis: Electrodesiccation

## 2025-04-20 LAB
ANION GAP SERPL CALCULATED.3IONS-SCNC: 14 MMOL/L (ref 5–15)
ANION GAP SERPL CALCULATED.3IONS-SCNC: 17 MMOL/L (ref 5–15)
BASOPHILS # BLD AUTO: 0.12 10*3/MM3 (ref 0–0.2)
BASOPHILS NFR BLD AUTO: 1.4 % (ref 0–1.5)
BUN SERPL-MCNC: 24 MG/DL (ref 6–20)
BUN SERPL-MCNC: 25 MG/DL (ref 6–20)
BUN/CREAT SERPL: 22.9 (ref 7–25)
BUN/CREAT SERPL: 23.4 (ref 7–25)
CALCIUM SPEC-SCNC: 9 MG/DL (ref 8.6–10.5)
CALCIUM SPEC-SCNC: 9.2 MG/DL (ref 8.6–10.5)
CHLORIDE SERPL-SCNC: 91 MMOL/L (ref 98–107)
CHLORIDE SERPL-SCNC: 91 MMOL/L (ref 98–107)
CO2 SERPL-SCNC: 26 MMOL/L (ref 22–29)
CO2 SERPL-SCNC: 28 MMOL/L (ref 22–29)
CREAT SERPL-MCNC: 1.05 MG/DL (ref 0.76–1.27)
CREAT SERPL-MCNC: 1.07 MG/DL (ref 0.76–1.27)
DEPRECATED RDW RBC AUTO: 44 FL (ref 37–54)
EGFRCR SERPLBLD CKD-EPI 2021: 89.4 ML/MIN/1.73
EGFRCR SERPLBLD CKD-EPI 2021: 91.5 ML/MIN/1.73
EOSINOPHIL # BLD AUTO: 0.26 10*3/MM3 (ref 0–0.4)
EOSINOPHIL NFR BLD AUTO: 3 % (ref 0.3–6.2)
ERYTHROCYTE [DISTWIDTH] IN BLOOD BY AUTOMATED COUNT: 13.8 % (ref 12.3–15.4)
GLUCOSE BLDC GLUCOMTR-MCNC: 103 MG/DL (ref 70–130)
GLUCOSE BLDC GLUCOMTR-MCNC: 161 MG/DL (ref 70–130)
GLUCOSE BLDC GLUCOMTR-MCNC: 168 MG/DL (ref 70–130)
GLUCOSE BLDC GLUCOMTR-MCNC: 182 MG/DL (ref 70–130)
GLUCOSE SERPL-MCNC: 117 MG/DL (ref 65–99)
GLUCOSE SERPL-MCNC: 131 MG/DL (ref 65–99)
HCT VFR BLD AUTO: 47.1 % (ref 37.5–51)
HGB BLD-MCNC: 14.5 G/DL (ref 13–17.7)
IMM GRANULOCYTES # BLD AUTO: 0.05 10*3/MM3 (ref 0–0.05)
IMM GRANULOCYTES NFR BLD AUTO: 0.6 % (ref 0–0.5)
LYMPHOCYTES # BLD AUTO: 2.86 10*3/MM3 (ref 0.7–3.1)
LYMPHOCYTES NFR BLD AUTO: 32.7 % (ref 19.6–45.3)
MAGNESIUM SERPL-MCNC: 1.9 MG/DL (ref 1.6–2.6)
MCH RBC QN AUTO: 27.5 PG (ref 26.6–33)
MCHC RBC AUTO-ENTMCNC: 30.8 G/DL (ref 31.5–35.7)
MCV RBC AUTO: 89.4 FL (ref 79–97)
MONOCYTES # BLD AUTO: 1.05 10*3/MM3 (ref 0.1–0.9)
MONOCYTES NFR BLD AUTO: 12 % (ref 5–12)
NEUTROPHILS NFR BLD AUTO: 4.4 10*3/MM3 (ref 1.7–7)
NEUTROPHILS NFR BLD AUTO: 50.3 % (ref 42.7–76)
NRBC BLD AUTO-RTO: 0 /100 WBC (ref 0–0.2)
PLATELET # BLD AUTO: 242 10*3/MM3 (ref 140–450)
PMV BLD AUTO: 10.6 FL (ref 6–12)
POTASSIUM SERPL-SCNC: 3.1 MMOL/L (ref 3.5–5.2)
POTASSIUM SERPL-SCNC: 3.1 MMOL/L (ref 3.5–5.2)
POTASSIUM SERPL-SCNC: 3.3 MMOL/L (ref 3.5–5.2)
POTASSIUM SERPL-SCNC: 4.6 MMOL/L (ref 3.5–5.2)
RBC # BLD AUTO: 5.27 10*6/MM3 (ref 4.14–5.8)
SODIUM SERPL-SCNC: 133 MMOL/L (ref 136–145)
SODIUM SERPL-SCNC: 134 MMOL/L (ref 136–145)
WBC NRBC COR # BLD AUTO: 8.74 10*3/MM3 (ref 3.4–10.8)

## 2025-04-20 PROCEDURE — 63710000001 INSULIN LISPRO (HUMAN) PER 5 UNITS: Performed by: NURSE PRACTITIONER

## 2025-04-20 PROCEDURE — 25010000002 FUROSEMIDE PER 20 MG: Performed by: STUDENT IN AN ORGANIZED HEALTH CARE EDUCATION/TRAINING PROGRAM

## 2025-04-20 PROCEDURE — 25010000002 MAGNESIUM SULFATE 4 GM/100ML SOLUTION: Performed by: STUDENT IN AN ORGANIZED HEALTH CARE EDUCATION/TRAINING PROGRAM

## 2025-04-20 PROCEDURE — 80048 BASIC METABOLIC PNL TOTAL CA: CPT | Performed by: HOSPITALIST

## 2025-04-20 PROCEDURE — 93010 ELECTROCARDIOGRAM REPORT: CPT | Performed by: INTERNAL MEDICINE

## 2025-04-20 PROCEDURE — 93005 ELECTROCARDIOGRAM TRACING: CPT | Performed by: STUDENT IN AN ORGANIZED HEALTH CARE EDUCATION/TRAINING PROGRAM

## 2025-04-20 PROCEDURE — 93005 ELECTROCARDIOGRAM TRACING: CPT | Performed by: HOSPITALIST

## 2025-04-20 PROCEDURE — 82948 REAGENT STRIP/BLOOD GLUCOSE: CPT

## 2025-04-20 PROCEDURE — 63710000001 INSULIN GLARGINE PER 5 UNITS: Performed by: HOSPITALIST

## 2025-04-20 PROCEDURE — 80048 BASIC METABOLIC PNL TOTAL CA: CPT | Performed by: STUDENT IN AN ORGANIZED HEALTH CARE EDUCATION/TRAINING PROGRAM

## 2025-04-20 PROCEDURE — 84132 ASSAY OF SERUM POTASSIUM: CPT | Performed by: STUDENT IN AN ORGANIZED HEALTH CARE EDUCATION/TRAINING PROGRAM

## 2025-04-20 PROCEDURE — 85025 COMPLETE CBC W/AUTO DIFF WBC: CPT | Performed by: HOSPITALIST

## 2025-04-20 PROCEDURE — 83735 ASSAY OF MAGNESIUM: CPT | Performed by: STUDENT IN AN ORGANIZED HEALTH CARE EDUCATION/TRAINING PROGRAM

## 2025-04-20 PROCEDURE — 99232 SBSQ HOSP IP/OBS MODERATE 35: CPT | Performed by: STUDENT IN AN ORGANIZED HEALTH CARE EDUCATION/TRAINING PROGRAM

## 2025-04-20 RX ORDER — POTASSIUM CHLORIDE 1500 MG/1
40 TABLET, EXTENDED RELEASE ORAL
Status: DISCONTINUED | OUTPATIENT
Start: 2025-04-20 | End: 2025-04-25 | Stop reason: HOSPADM

## 2025-04-20 RX ORDER — MAGNESIUM SULFATE HEPTAHYDRATE 40 MG/ML
4 INJECTION, SOLUTION INTRAVENOUS ONCE
Status: COMPLETED | OUTPATIENT
Start: 2025-04-20 | End: 2025-04-20

## 2025-04-20 RX ORDER — FUROSEMIDE 10 MG/ML
80 INJECTION INTRAMUSCULAR; INTRAVENOUS 3 TIMES DAILY
Status: COMPLETED | OUTPATIENT
Start: 2025-04-20 | End: 2025-04-20

## 2025-04-20 RX ORDER — POTASSIUM CHLORIDE 1500 MG/1
40 TABLET, EXTENDED RELEASE ORAL EVERY 4 HOURS
Status: COMPLETED | OUTPATIENT
Start: 2025-04-20 | End: 2025-04-20

## 2025-04-20 RX ORDER — METOPROLOL SUCCINATE 25 MG/1
25 TABLET, EXTENDED RELEASE ORAL DAILY
Status: DISCONTINUED | OUTPATIENT
Start: 2025-04-20 | End: 2025-04-22

## 2025-04-20 RX ADMIN — INSULIN LISPRO 2 UNITS: 100 INJECTION, SOLUTION INTRAVENOUS; SUBCUTANEOUS at 21:43

## 2025-04-20 RX ADMIN — POTASSIUM CHLORIDE 30 MEQ: 1500 TABLET, EXTENDED RELEASE ORAL at 08:57

## 2025-04-20 RX ADMIN — ALPRAZOLAM 0.5 MG: 0.5 TABLET ORAL at 16:44

## 2025-04-20 RX ADMIN — MAGNESIUM SULFATE HEPTAHYDRATE 4 G: 4 INJECTION, SOLUTION INTRAVENOUS at 05:49

## 2025-04-20 RX ADMIN — Medication 10 ML: at 20:21

## 2025-04-20 RX ADMIN — FAMOTIDINE 20 MG: 20 TABLET, FILM COATED ORAL at 05:49

## 2025-04-20 RX ADMIN — METOPROLOL SUCCINATE 25 MG: 25 TABLET, EXTENDED RELEASE ORAL at 08:57

## 2025-04-20 RX ADMIN — POTASSIUM CHLORIDE 40 MEQ: 1500 TABLET, EXTENDED RELEASE ORAL at 05:48

## 2025-04-20 RX ADMIN — BENZONATATE 100 MG: 100 CAPSULE ORAL at 14:32

## 2025-04-20 RX ADMIN — FUROSEMIDE 80 MG: 10 INJECTION, SOLUTION INTRAMUSCULAR; INTRAVENOUS at 20:21

## 2025-04-20 RX ADMIN — INSULIN GLARGINE 15 UNITS: 100 INJECTION, SOLUTION SUBCUTANEOUS at 08:56

## 2025-04-20 RX ADMIN — ATORVASTATIN CALCIUM 40 MG: 20 TABLET, FILM COATED ORAL at 20:20

## 2025-04-20 RX ADMIN — Medication 10 ML: at 08:57

## 2025-04-20 RX ADMIN — POTASSIUM CHLORIDE 40 MEQ: 1500 TABLET, EXTENDED RELEASE ORAL at 13:31

## 2025-04-20 RX ADMIN — FAMOTIDINE 20 MG: 20 TABLET, FILM COATED ORAL at 16:44

## 2025-04-20 RX ADMIN — POTASSIUM CHLORIDE 40 MEQ: 1500 TABLET, EXTENDED RELEASE ORAL at 11:23

## 2025-04-20 RX ADMIN — INSULIN LISPRO 2 UNITS: 100 INJECTION, SOLUTION INTRAVENOUS; SUBCUTANEOUS at 11:59

## 2025-04-20 RX ADMIN — BENZONATATE 100 MG: 100 CAPSULE ORAL at 00:39

## 2025-04-20 RX ADMIN — FUROSEMIDE 80 MG: 10 INJECTION, SOLUTION INTRAMUSCULAR; INTRAVENOUS at 11:23

## 2025-04-20 RX ADMIN — INSULIN LISPRO 2 UNITS: 100 INJECTION, SOLUTION INTRAVENOUS; SUBCUTANEOUS at 17:05

## 2025-04-20 RX ADMIN — ASPIRIN 81 MG: 81 TABLET, COATED ORAL at 08:57

## 2025-04-20 RX ADMIN — FUROSEMIDE 80 MG: 10 INJECTION, SOLUTION INTRAMUSCULAR; INTRAVENOUS at 00:39

## 2025-04-20 RX ADMIN — POTASSIUM CHLORIDE 40 MEQ: 1500 TABLET, EXTENDED RELEASE ORAL at 16:44

## 2025-04-20 NOTE — PROGRESS NOTES
"Daily progress note    Referring physician      Subjective  Doing same with no new complaints but anxious about angioplasty in the morning    History of present illness  39-year-old white male with history of chronic congestive heart failure diabetes mellitus hypertension hyperlipidemia admitted to cardiology service from [facility] where he was admitted with shortness of breath and found to have severe LV dysfunction and multivessel coronary artery disease.  I am asked to follow patient for medical problems.  At the time of examination he has denies any chest pain shortness of breath palpitation but feeling very weak.    Review of Systems:  Unremarkable    Physical Exam:  Blood pressure 100/86, pulse 92, temperature 97.7 °F (36.5 °C), temperature source Oral, resp. rate 18, height 175.3 cm (69.02\"), weight 111 kg (244 lb 14.4 oz), SpO2 99%.    General: Awake alert and in no distress  HEENT: Unremarkable  Neck: Supple  Heart: Tachycardic and irregular rhythm, no murmurs or rubs, 2+ radial pulses bilaterally  Lungs: Clear to auscultation bilaterally without wheezes or crackles, no respiratory distress  Abdomen: Soft, mildly tender diffusely throughout the abdomen but no generalized peritonitis, nondistended, no rebound or guarding  Skin: Warm, dry, no rash  Musculoskeletal: Normal range of motion, 2+ pitting bilateral symmetric lower extremity edema up to both thighs  Neurologic: Oriented x3, no motor deficits no sensory deficit  Psychiatric: Mood appears stable, no psychosis    LABS  Lab Results (last 24 hours)       Procedure Component Value Units Date/Time    POC Glucose Once [[id number]]  (Abnormal) Collected: 04/20/25 1142    Specimen: Blood Updated: 04/20/25 1155     Glucose 161 mg/dL     Basic Metabolic Panel [[id number]]  (Abnormal) Collected: 04/20/25 0327    Specimen: Blood Updated: 04/20/25 0718     Glucose 131 mg/dL      BUN 25 mg/dL      Creatinine 1.07 mg/dL      Sodium 134 mmol/L     "  Potassium 3.1 mmol/L      Chloride 91 mmol/L      CO2 26.0 mmol/L      Calcium 9.2 mg/dL      BUN/Creatinine Ratio 23.4     Anion Gap 17.0 mmol/L      eGFR 89.4 mL/min/1.73     Narrative:      GFR Categories in Chronic Kidney Disease (CKD)      GFR Category          GFR (mL/min/1.73)    Interpretation  G1                     90 or greater         Normal or high (1)  G2                      60-89                Mild decrease (1)  G3a                   45-59                Mild to moderate decrease  G3b                   30-44                Moderate to severe decrease  G4                    15-29                Severe decrease  G5                    14 or less           Kidney failure          (1)In the absence of evidence of kidney disease, neither GFR category G1 or G2 fulfill the criteria for CKD.    eGFR calculation 2021 CKD-EPI creatinine equation, which does not include race as a factor    Basic Metabolic Panel [[id number]]  (Abnormal) Collected: 04/20/25 0605    Specimen: Blood Updated: 04/20/25 0700     Glucose 117 mg/dL      BUN 24 mg/dL      Creatinine 1.05 mg/dL      Sodium 133 mmol/L      Potassium 3.3 mmol/L      Chloride 91 mmol/L      CO2 28.0 mmol/L      Calcium 9.0 mg/dL      BUN/Creatinine Ratio 22.9     Anion Gap 14.0 mmol/L      eGFR 91.5 mL/min/1.73     Narrative:      GFR Categories in Chronic Kidney Disease (CKD)      GFR Category          GFR (mL/min/1.73)    Interpretation  G1                     90 or greater         Normal or high (1)  G2                      60-89                Mild decrease (1)  G3a                   45-59                Mild to moderate decrease  G3b                   30-44                Moderate to severe decrease  G4                    15-29                Severe decrease  G5                    14 or less           Kidney failure          (1)In the absence of evidence of kidney disease, neither GFR category G1 or G2 fulfill the criteria for CKD.    eGFR calculation  2021 CKD-EPI creatinine equation, which does not include race as a factor    POC Glucose Once [[id number]]  (Normal) Collected: 04/20/25 0624    Specimen: Blood Updated: 04/20/25 0626     Glucose 103 mg/dL     Potassium [[id number]]  (Abnormal) Collected: 04/20/25 0327    Specimen: Blood Updated: 04/20/25 0511     Potassium 3.1 mmol/L     Magnesium [[id number]]  (Normal) Collected: 04/20/25 0327    Specimen: Blood Updated: 04/20/25 0511     Magnesium 1.9 mg/dL     CBC & Differential [[id number]]  (Abnormal) Collected: 04/20/25 0327    Specimen: Blood Updated: 04/20/25 0501    Narrative:      The following orders were created for panel order CBC & Differential.  Procedure                               Abnormality         Status                     ---------                               -----------         ------                     CBC Auto Differential[[id number]]        Abnormal            Final result                 Please view results for these tests on the individual orders.    CBC Auto Differential [[id number]]  (Abnormal) Collected: 04/20/25 0327    Specimen: Blood Updated: 04/20/25 0501     WBC 8.74 10*3/mm3      RBC 5.27 10*6/mm3      Hemoglobin 14.5 g/dL      Hematocrit 47.1 %      MCV 89.4 fL      MCH 27.5 pg      MCHC 30.8 g/dL      RDW 13.8 %      RDW-SD 44.0 fl      MPV 10.6 fL      Platelets 242 10*3/mm3      Neutrophil % 50.3 %      Lymphocyte % 32.7 %      Monocyte % 12.0 %      Eosinophil % 3.0 %      Basophil % 1.4 %      Immature Grans % 0.6 %      Neutrophils, Absolute 4.40 10*3/mm3      Lymphocytes, Absolute 2.86 10*3/mm3      Monocytes, Absolute 1.05 10*3/mm3      Eosinophils, Absolute 0.26 10*3/mm3      Basophils, Absolute 0.12 10*3/mm3      Immature Grans, Absolute 0.05 10*3/mm3      nRBC 0.0 /100 WBC     POC Glucose Once [[id number]]  (Abnormal) Collected: 04/19/25 2022    Specimen: Blood Updated: 04/19/25 2024     Glucose 175 mg/dL     Potassium [[id number]]  (Normal) Collected: 04/19/25 1838     Specimen: Blood from Hand, Right Updated: 04/19/25 1901     Potassium 4.0 mmol/L     Basic Metabolic Panel [[id number]]  (Abnormal) Collected: 04/19/25 1613    Specimen: Blood from Arm, Right Updated: 04/19/25 1731     Glucose 197 mg/dL      BUN 23 mg/dL      Creatinine 1.05 mg/dL      Sodium 132 mmol/L      Potassium 3.6 mmol/L      Chloride 91 mmol/L      CO2 25.0 mmol/L      Calcium 9.1 mg/dL      BUN/Creatinine Ratio 21.9     Anion Gap 16.0 mmol/L      eGFR 91.5 mL/min/1.73     Narrative:      GFR Categories in Chronic Kidney Disease (CKD)      GFR Category          GFR (mL/min/1.73)    Interpretation  G1                     90 or greater         Normal or high (1)  G2                      60-89                Mild decrease (1)  G3a                   45-59                Mild to moderate decrease  G3b                   30-44                Moderate to severe decrease  G4                    15-29                Severe decrease  G5                    14 or less           Kidney failure          (1)In the absence of evidence of kidney disease, neither GFR category G1 or G2 fulfill the criteria for CKD.    eGFR calculation 2021 CKD-EPI creatinine equation, which does not include race as a factor    Magnesium [[id number]]  (Normal) Collected: 04/19/25 1613    Specimen: Blood from Arm, Right Updated: 04/19/25 1731     Magnesium 2.1 mg/dL     Potassium [[id number]]  (Normal) Collected: 04/19/25 1612    Specimen: Blood Updated: 04/19/25 1726     Potassium 3.6 mmol/L     POC Glucose Once [[id number]]  (Abnormal) Collected: 04/19/25 1548    Specimen: Blood Updated: 04/19/25 1549     Glucose 180 mg/dL           Study Result  Narrative & Impression   XR CHEST PA AND LATERAL-     Clinical: CHF     FINDINGS: Cardiac enlargement similar to the previous examination. No  pleural effusion or acute airspace disease has developed. No gross  vascular congestion. The could be mild central vascular congestion.  Remainder is  unremarkable.        Scan on 4/17/2025 1635 by [facility], [place]: ECG 12-LEAD         Author: -- Service: -- Author Type: --   Filed: Date of Service: Creation Time:   Status: (Other)   HEART HRVT=611  bpm  RR Dwidsaos=494  ms  MA Ltnttomk=072  ms  P Horizontal Axis=-58  deg  P Front Axis=83  deg  QRSD Flbrkbex=433  ms  QT Hnruwakm=992  ms  RPbV=726  ms  QRS Axis=93  deg  T Wave Axis=192  deg  - ABNORMAL ECG -  Sinus tachycardia  Ventricular tachycardia, unsustained  Borderline right axis deviation  Anteroseptal infarct, old  Abnormal T, consider ischemia, diffuse leads          Current Facility-Administered Medications:     acetaminophen (TYLENOL) tablet 650 mg, 650 mg, Oral, Q6H PRN, Aidan Betancourt MD, 650 mg at 04/19/25 1637    ALPRAZolam (XANAX) tablet 0.5 mg, 0.5 mg, Oral, TID PRN, Billy Quijano MD, 0.5 mg at 04/19/25 1637    aspirin EC tablet 81 mg, 81 mg, Oral, Daily, Jr Austin Stanton MD, 81 mg at 04/20/25 0857    atorvastatin (LIPITOR) tablet 40 mg, 40 mg, Oral, Nightly, Billy Quijano MD    benzonatate (TESSALON) capsule 100 mg, 100 mg, Oral, TID PRN, Kim Armstrong APRN, 100 mg at 04/20/25 1432    sennosides-docusate (PERICOLACE) 8.6-50 MG per tablet 2 tablet, 2 tablet, Oral, BID PRN **AND** polyethylene glycol (MIRALAX) packet 17 g, 17 g, Oral, Daily PRN **AND** bisacodyl (DULCOLAX) EC tablet 5 mg, 5 mg, Oral, Daily PRN **AND** bisacodyl (DULCOLAX) suppository 10 mg, 10 mg, Rectal, Daily PRN, Jr Austin Stanton MD    Calcium Replacement - Follow Nurse / BPA Driven Protocol, , Not Applicable, PRN, Jr Austin Stanton MD    dextrose (D50W) (25 g/50 mL) IV injection 25 g, 25 g, Intravenous, Q15 Min PRN, Samia Michel APRN    dextrose (GLUTOSE) oral gel 15 g, 15 g, Oral, Q15 Min PRN, Samia Michel APRN    famotidine (PEPCID) tablet 20 mg, 20 mg, Oral, BID AC, Billy Quijano MD, 20 mg at 04/20/25 0549    furosemide (LASIX) injection 80 mg, 80 mg, Intravenous, TID, Mendoza Jacobson MD, 80 mg at  04/20/25 1123    glucagon (GLUCAGEN) injection 1 mg, 1 mg, Intramuscular, Q15 Min PRN, Samia Michel APRN    insulin glargine (LANTUS, SEMGLEE) injection 15 Units, 15 Units, Subcutaneous, Daily, Billy Quijano MD, 15 Units at 04/20/25 0856    insulin lispro (HUMALOG/ADMELOG) injection 2-9 Units, 2-9 Units, Subcutaneous, 4x Daily AC & at Bedtime, Samia Michel APRN, 2 Units at 04/20/25 1159    Magnesium Cardiology Dose Replacement - Follow Nurse / BPA Driven Protocol, , Not Applicable, PRN, Jr Austin Stanton MD    metoprolol succinate XL (TOPROL-XL) 24 hr tablet 25 mg, 25 mg, Oral, Daily, Mendoza Jacobson MD, 25 mg at 04/20/25 0857    nitroglycerin (NITROSTAT) SL tablet 0.4 mg, 0.4 mg, Sublingual, Q5 Min PRN, Jr Austin Stanton MD    ondansetron (ZOFRAN) injection 4 mg, 4 mg, Intravenous, Q6H PRN, Jr Austin Stanton MD    Phosphorus Replacement - Follow Nurse / BPA Driven Protocol, , Not Applicable, PRN, Jr Austin Stanton MD    potassium chloride (KLOR-CON M10) CR tablet 30 mEq, 30 mEq, Oral, TID With Meals, Billy Quijano MD, 30 mEq at 04/20/25 0857    Potassium Replacement - Follow Nurse / BPA Driven Protocol, , Not Applicable, PRN, Jr Austin Stanton MD    sodium chloride 0.9 % flush 10 mL, 10 mL, Intravenous, Q12H, Jr Austin Stanton MD, 10 mL at 04/20/25 0857    sodium chloride 0.9 % flush 10 mL, 10 mL, Intravenous, PRN, Jr Austin Stanton MD    sodium chloride 0.9 % infusion 40 mL, 40 mL, Intravenous, PRN, Jr Austin Stanton MD    temazepam (RESTORIL) capsule 15 mg, 15 mg, Oral, Nightly PRN, Billy Quijano MD, 15 mg at 04/18/25 0056     ASSESSMENT  Multivessel coronary artery disease  Severe LV dysfunction  Diabetes mellitus  Hypertension  Hyperlipidemia  Morbidly obese  Anxiety disorder  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  Accu-Chek sliding scale insulin  Stress ulcer DVT prophylaxis  Supportive care  Discussed  with nursing staff  Follow closely and further recommendation according to hospital    TRINIDAD STEPHENS MD    Copied text in this note has been reviewed and is accurate as of 04/20/25

## 2025-04-20 NOTE — PLAN OF CARE
Goal Outcome Evaluation:  Plan of Care Reviewed With: patient     Progress: improving    Outcome Evaluation: Pt VSS on room air. SR with frequent PVCs on the tele monitor. IV Lasix given per order with urine output documented. Potassium and Magnesium given per protocol this morning. No complaints of pain. Will continue to monitor and update as needed.

## 2025-04-20 NOTE — CONSULTS
Chap visited with pt.  Pt was watching [employer] service online and asked for prayer.  Chap offered supportive presence and prayer.  Chap remains available.

## 2025-04-20 NOTE — PROGRESS NOTES
"    Patient Name: Luiz Christianson  :1985  39 y.o.      Patient Care Team:  Luz Maria White APRN as PCP - General (Family Medicine)    Chief Complaint:   Acute HFrEF, CAD    Interval History:   Midday yesterday had significant NSVT.  Milrinone stopped.  He still had excellent urine output after that.  Ectopy has quieted down overnight but still having frequent PVCs.  He is down nearly 20 pounds just over this weekend.  Still has some lower extremity edema but much improved.  No chest pain.  Objective   Vital Signs  Temp:  [97.6 °F (36.4 °C)-98.5 °F (36.9 °C)] 98.5 °F (36.9 °C)  Heart Rate:  [] 102  Resp:  [16-18] 16  BP: ()/() 108/83    Intake/Output Summary (Last 24 hours) at 2025 0752  Last data filed at 2025 0537  Gross per 24 hour   Intake 1800 ml   Output 6100 ml   Net -4300 ml     Flowsheet Rows      Flowsheet Row First Filed Value   Admission Height 175.3 cm (69.02\") Documented at 2025   Admission Weight 122 kg (269 lb 13.5 oz) Documented at 2025            GEN: no distress, alert and oriented  HEENT: NACT, EOMI, moist mucous membranes  Lungs: CTAB, no wheezes, rales or rhonchi  CV: normal rate, regular rhythm, normal S1, S2, no murmurs, +2 radial pulses b/l  Abdomen: soft, nontender, nondistended, NABS  Extremities: 2-3+ edema  Skin: no rash, warm, dry  Heme/Lymph: no bruising  Psych: organized thought, normal behavior and affect    Results Review:    Results from last 7 days   Lab Units 25  0605   SODIUM mmol/L 133*   POTASSIUM mmol/L 3.3*   CHLORIDE mmol/L 91*   CO2 mmol/L 28.0   BUN mg/dL 24*   CREATININE mg/dL 1.05   GLUCOSE mg/dL 117*   CALCIUM mg/dL 9.0           Results from last 7 days   Lab Units 25  0327   WBC 10*3/mm3 8.74   HEMOGLOBIN g/dL 14.5   HEMATOCRIT % 47.1   PLATELETS 10*3/mm3 242         Results from last 7 days   Lab Units 25  0327   MAGNESIUM mg/dL 1.9     Results from last 7 days   Lab Units " 04/19/25  0432   CHOLESTEROL mg/dL 82   TRIGLYCERIDES mg/dL 79   HDL CHOL mg/dL 16*   LDL CHOL mg/dL 49               Medication Review:   aspirin, 81 mg, Oral, Daily  atorvastatin, 40 mg, Oral, Nightly  famotidine, 20 mg, Oral, BID AC  [Held by provider] furosemide, 80 mg, Intravenous, TID  insulin glargine, 15 Units, Subcutaneous, Daily  insulin lispro, 2-9 Units, Subcutaneous, 4x Daily AC & at Bedtime  magnesium sulfate, 4 g, Intravenous, Once  metoprolol succinate XL, 25 mg, Oral, Daily  potassium chloride, 30 mEq, Oral, TID With Meals  potassium chloride ER, 40 mEq, Oral, Q4H  sodium chloride, 10 mL, Intravenous, Q12H                  Assessment & Plan   #Acute HFrEF  #Multivessel CAD  #Diabetes  #Hypertension  #Hyperlipidemia     39-year-old man, followed by Dr. Muir, with hypertension, hyperlipidemia, diabetes, recently diagnosed HFrEF with an EF of 18%, who presented to [facility] with dyspnea on exertion, cough, and exercise intolerance.  He underwent right and left heart catheterization which revealed elevated right and left-sided filling pressures with an RA pressure of 35 and a wedge pressure of 40.  Coronary angiography revealed a 99% stenosis of the distal circumflex (dominant, as well as a 90% stenosis of the mid LAD.  The distal circumflex is supplied via septal left to left collaterals.  He was transferred here for evaluation of CABG.  Dr. Stanton has reviewed and does not think patient is a good surgical candidate.  He has asked me to see for possible high risk PCI. After discussion with patient, plan for PCI with Dr. Betancourt once he is euvolemic.        He was taken off milrinone again yesterday due to significant ectopy.  He continues to diurese well.  He has had issues with hypokalemia due to significant urine output.  This is associated with significant ectopy.  He has had excellent urine output and has lost over 20 pounds just over the weekend.  Getting close to euvolemic.  I noticed  diuretic this morning to allow for electrolyte repletion.  Will give a p.m. dose.  N.p.o. at midnight for assessment by Dr. Saldana in the morning.    - continue lasix 80 mg IV TID  - K > 4, Mg > 2. K 3.0 this am, being repleted, will reassess this afternoon.   - Strict I's/O's, daily weights  - continue aspirin 81 mg daily, atorvastatin 80 mg daily  -Transition metoprolol to XL formulation. Will optimize GDMT after [facility].     Mendoza Jacobson MD  [facility]  04/20/25  07:52 EDT

## 2025-04-21 LAB
ANION GAP SERPL CALCULATED.3IONS-SCNC: 11.4 MMOL/L (ref 5–15)
BASOPHILS # BLD AUTO: 0.14 10*3/MM3 (ref 0–0.2)
BASOPHILS NFR BLD AUTO: 1.6 % (ref 0–1.5)
BUN SERPL-MCNC: 27 MG/DL (ref 6–20)
BUN/CREAT SERPL: 23.5 (ref 7–25)
CALCIUM SPEC-SCNC: 9.1 MG/DL (ref 8.6–10.5)
CHLORIDE SERPL-SCNC: 92 MMOL/L (ref 98–107)
CO2 SERPL-SCNC: 29.6 MMOL/L (ref 22–29)
CREAT SERPL-MCNC: 1.15 MG/DL (ref 0.76–1.27)
DEPRECATED RDW RBC AUTO: 44.3 FL (ref 37–54)
EGFRCR SERPLBLD CKD-EPI 2021: 82 ML/MIN/1.73
EOSINOPHIL # BLD AUTO: 0.23 10*3/MM3 (ref 0–0.4)
EOSINOPHIL NFR BLD AUTO: 2.7 % (ref 0.3–6.2)
ERYTHROCYTE [DISTWIDTH] IN BLOOD BY AUTOMATED COUNT: 14.1 % (ref 12.3–15.4)
GLUCOSE BLDC GLUCOMTR-MCNC: 107 MG/DL (ref 70–130)
GLUCOSE BLDC GLUCOMTR-MCNC: 135 MG/DL (ref 70–130)
GLUCOSE BLDC GLUCOMTR-MCNC: 214 MG/DL (ref 70–130)
GLUCOSE BLDC GLUCOMTR-MCNC: 87 MG/DL (ref 70–130)
GLUCOSE SERPL-MCNC: 121 MG/DL (ref 65–99)
HCT VFR BLD AUTO: 45.9 % (ref 37.5–51)
HGB BLD-MCNC: 14.3 G/DL (ref 13–17.7)
IMM GRANULOCYTES # BLD AUTO: 0.03 10*3/MM3 (ref 0–0.05)
IMM GRANULOCYTES NFR BLD AUTO: 0.3 % (ref 0–0.5)
LYMPHOCYTES # BLD AUTO: 3.54 10*3/MM3 (ref 0.7–3.1)
LYMPHOCYTES NFR BLD AUTO: 41 % (ref 19.6–45.3)
MAGNESIUM SERPL-MCNC: 2.1 MG/DL (ref 1.6–2.6)
MCH RBC QN AUTO: 27.4 PG (ref 26.6–33)
MCHC RBC AUTO-ENTMCNC: 31.2 G/DL (ref 31.5–35.7)
MCV RBC AUTO: 87.9 FL (ref 79–97)
MONOCYTES # BLD AUTO: 1.02 10*3/MM3 (ref 0.1–0.9)
MONOCYTES NFR BLD AUTO: 11.8 % (ref 5–12)
NEUTROPHILS NFR BLD AUTO: 3.68 10*3/MM3 (ref 1.7–7)
NEUTROPHILS NFR BLD AUTO: 42.6 % (ref 42.7–76)
NRBC BLD AUTO-RTO: 0 /100 WBC (ref 0–0.2)
PLATELET # BLD AUTO: 240 10*3/MM3 (ref 140–450)
PMV BLD AUTO: 10.2 FL (ref 6–12)
POTASSIUM SERPL-SCNC: 3.6 MMOL/L (ref 3.5–5.2)
POTASSIUM SERPL-SCNC: 4.3 MMOL/L (ref 3.5–5.2)
QT INTERVAL: 376 MS
QT INTERVAL: 381 MS
QT INTERVAL: 383 MS
QT INTERVAL: 406 MS
QTC INTERVAL: 485 MS
QTC INTERVAL: 494 MS
QTC INTERVAL: 499 MS
QTC INTERVAL: 500 MS
RBC # BLD AUTO: 5.22 10*6/MM3 (ref 4.14–5.8)
SODIUM SERPL-SCNC: 133 MMOL/L (ref 136–145)
WBC NRBC COR # BLD AUTO: 8.64 10*3/MM3 (ref 3.4–10.8)

## 2025-04-21 PROCEDURE — C1887 CATHETER, GUIDING: HCPCS | Performed by: STUDENT IN AN ORGANIZED HEALTH CARE EDUCATION/TRAINING PROGRAM

## 2025-04-21 PROCEDURE — C1725 CATH, TRANSLUMIN NON-LASER: HCPCS | Performed by: STUDENT IN AN ORGANIZED HEALTH CARE EDUCATION/TRAINING PROGRAM

## 2025-04-21 PROCEDURE — 93451 RIGHT HEART CATH: CPT | Performed by: STUDENT IN AN ORGANIZED HEALTH CARE EDUCATION/TRAINING PROGRAM

## 2025-04-21 PROCEDURE — 25510000001 IOPAMIDOL PER 1 ML: Performed by: STUDENT IN AN ORGANIZED HEALTH CARE EDUCATION/TRAINING PROGRAM

## 2025-04-21 PROCEDURE — B2111ZZ FLUOROSCOPY OF MULTIPLE CORONARY ARTERIES USING LOW OSMOLAR CONTRAST: ICD-10-PCS | Performed by: THORACIC SURGERY (CARDIOTHORACIC VASCULAR SURGERY)

## 2025-04-21 PROCEDURE — B241ZZ3 ULTRASONOGRAPHY OF MULTIPLE CORONARY ARTERIES, INTRAVASCULAR: ICD-10-PCS | Performed by: THORACIC SURGERY (CARDIOTHORACIC VASCULAR SURGERY)

## 2025-04-21 PROCEDURE — 25010000002 MIDAZOLAM PER 1 MG: Performed by: STUDENT IN AN ORGANIZED HEALTH CARE EDUCATION/TRAINING PROGRAM

## 2025-04-21 PROCEDURE — 85018 HEMOGLOBIN: CPT

## 2025-04-21 PROCEDURE — 25010000002 FENTANYL CITRATE (PF) 50 MCG/ML SOLUTION: Performed by: STUDENT IN AN ORGANIZED HEALTH CARE EDUCATION/TRAINING PROGRAM

## 2025-04-21 PROCEDURE — 99152 MOD SED SAME PHYS/QHP 5/>YRS: CPT | Performed by: STUDENT IN AN ORGANIZED HEALTH CARE EDUCATION/TRAINING PROGRAM

## 2025-04-21 PROCEDURE — C9607 PERC D-E COR REVASC CHRO SIN: HCPCS | Performed by: STUDENT IN AN ORGANIZED HEALTH CARE EDUCATION/TRAINING PROGRAM

## 2025-04-21 PROCEDURE — 85014 HEMATOCRIT: CPT

## 2025-04-21 PROCEDURE — C1769 GUIDE WIRE: HCPCS | Performed by: STUDENT IN AN ORGANIZED HEALTH CARE EDUCATION/TRAINING PROGRAM

## 2025-04-21 PROCEDURE — 63710000001 INSULIN LISPRO (HUMAN) PER 5 UNITS: Performed by: STUDENT IN AN ORGANIZED HEALTH CARE EDUCATION/TRAINING PROGRAM

## 2025-04-21 PROCEDURE — 92979 ENDOLUMINL IVUS OCT C EA: CPT | Performed by: STUDENT IN AN ORGANIZED HEALTH CARE EDUCATION/TRAINING PROGRAM

## 2025-04-21 PROCEDURE — 92928 PRQ TCAT PLMT NTRAC ST 1 LES: CPT | Performed by: STUDENT IN AN ORGANIZED HEALTH CARE EDUCATION/TRAINING PROGRAM

## 2025-04-21 PROCEDURE — C1874 STENT, COATED/COV W/DEL SYS: HCPCS | Performed by: STUDENT IN AN ORGANIZED HEALTH CARE EDUCATION/TRAINING PROGRAM

## 2025-04-21 PROCEDURE — 82810 BLOOD GASES O2 SAT ONLY: CPT

## 2025-04-21 PROCEDURE — 4A023N6 MEASUREMENT OF CARDIAC SAMPLING AND PRESSURE, RIGHT HEART, PERCUTANEOUS APPROACH: ICD-10-PCS | Performed by: THORACIC SURGERY (CARDIOTHORACIC VASCULAR SURGERY)

## 2025-04-21 PROCEDURE — 25010000002 NICARDIPINE 2.5 MG/ML SOLUTION: Performed by: STUDENT IN AN ORGANIZED HEALTH CARE EDUCATION/TRAINING PROGRAM

## 2025-04-21 PROCEDURE — 25010000002 LIDOCAINE 2% SOLUTION: Performed by: STUDENT IN AN ORGANIZED HEALTH CARE EDUCATION/TRAINING PROGRAM

## 2025-04-21 PROCEDURE — C1894 INTRO/SHEATH, NON-LASER: HCPCS | Performed by: STUDENT IN AN ORGANIZED HEALTH CARE EDUCATION/TRAINING PROGRAM

## 2025-04-21 PROCEDURE — 92978 ENDOLUMINL IVUS OCT C 1ST: CPT | Performed by: STUDENT IN AN ORGANIZED HEALTH CARE EDUCATION/TRAINING PROGRAM

## 2025-04-21 PROCEDURE — C1753 CATH, INTRAVAS ULTRASOUND: HCPCS | Performed by: STUDENT IN AN ORGANIZED HEALTH CARE EDUCATION/TRAINING PROGRAM

## 2025-04-21 PROCEDURE — 027135Z DILATION OF CORONARY ARTERY, TWO ARTERIES WITH TWO DRUG-ELUTING INTRALUMINAL DEVICES, PERCUTANEOUS APPROACH: ICD-10-PCS | Performed by: THORACIC SURGERY (CARDIOTHORACIC VASCULAR SURGERY)

## 2025-04-21 PROCEDURE — 99232 SBSQ HOSP IP/OBS MODERATE 35: CPT | Performed by: STUDENT IN AN ORGANIZED HEALTH CARE EDUCATION/TRAINING PROGRAM

## 2025-04-21 PROCEDURE — 99153 MOD SED SAME PHYS/QHP EA: CPT | Performed by: STUDENT IN AN ORGANIZED HEALTH CARE EDUCATION/TRAINING PROGRAM

## 2025-04-21 PROCEDURE — 80048 BASIC METABOLIC PNL TOTAL CA: CPT | Performed by: HOSPITALIST

## 2025-04-21 PROCEDURE — 83735 ASSAY OF MAGNESIUM: CPT | Performed by: HOSPITALIST

## 2025-04-21 PROCEDURE — 85347 COAGULATION TIME ACTIVATED: CPT

## 2025-04-21 PROCEDURE — C9600 PERC DRUG-EL COR STENT SING: HCPCS | Performed by: STUDENT IN AN ORGANIZED HEALTH CARE EDUCATION/TRAINING PROGRAM

## 2025-04-21 PROCEDURE — 25010000002 HEPARIN (PORCINE) PER 1000 UNITS: Performed by: STUDENT IN AN ORGANIZED HEALTH CARE EDUCATION/TRAINING PROGRAM

## 2025-04-21 PROCEDURE — 25010000002 FUROSEMIDE PER 20 MG: Performed by: STUDENT IN AN ORGANIZED HEALTH CARE EDUCATION/TRAINING PROGRAM

## 2025-04-21 PROCEDURE — 84132 ASSAY OF SERUM POTASSIUM: CPT | Performed by: STUDENT IN AN ORGANIZED HEALTH CARE EDUCATION/TRAINING PROGRAM

## 2025-04-21 PROCEDURE — 82948 REAGENT STRIP/BLOOD GLUCOSE: CPT

## 2025-04-21 PROCEDURE — 82803 BLOOD GASES ANY COMBINATION: CPT

## 2025-04-21 PROCEDURE — 92943 PRQ TRLUML REVSC CH OCC ANT: CPT | Performed by: STUDENT IN AN ORGANIZED HEALTH CARE EDUCATION/TRAINING PROGRAM

## 2025-04-21 PROCEDURE — 85025 COMPLETE CBC W/AUTO DIFF WBC: CPT | Performed by: HOSPITALIST

## 2025-04-21 DEVICE — XIENCE SKYPOINT™ EVEROLIMUS ELUTING CORONARY STENT SYSTEM 2.50 MM X 18 MM / RAPID-EXCHANGE
Type: IMPLANTABLE DEVICE | Site: CORONARY | Status: FUNCTIONAL
Brand: XIENCE SKYPOINT™

## 2025-04-21 DEVICE — XIENCE SKYPOINT™ EVEROLIMUS ELUTING CORONARY STENT SYSTEM 3.00 MM X 23 MM / RAPID-EXCHANGE
Type: IMPLANTABLE DEVICE | Site: CORONARY | Status: FUNCTIONAL
Brand: XIENCE SKYPOINT™

## 2025-04-21 RX ORDER — POTASSIUM CHLORIDE 1500 MG/1
40 TABLET, EXTENDED RELEASE ORAL EVERY 4 HOURS
Status: COMPLETED | OUTPATIENT
Start: 2025-04-21 | End: 2025-04-21

## 2025-04-21 RX ORDER — LIDOCAINE HYDROCHLORIDE 20 MG/ML
INJECTION, SOLUTION INFILTRATION; PERINEURAL
Status: DISCONTINUED | OUTPATIENT
Start: 2025-04-21 | End: 2025-04-21 | Stop reason: HOSPADM

## 2025-04-21 RX ORDER — ACETAMINOPHEN 325 MG/1
650 TABLET ORAL EVERY 4 HOURS PRN
Status: DISCONTINUED | OUTPATIENT
Start: 2025-04-21 | End: 2025-04-24

## 2025-04-21 RX ORDER — CLOPIDOGREL BISULFATE 75 MG/1
TABLET ORAL
Status: DISCONTINUED | OUTPATIENT
Start: 2025-04-21 | End: 2025-04-21 | Stop reason: HOSPADM

## 2025-04-21 RX ORDER — IOPAMIDOL 755 MG/ML
INJECTION, SOLUTION INTRAVASCULAR
Status: DISCONTINUED | OUTPATIENT
Start: 2025-04-21 | End: 2025-04-21 | Stop reason: HOSPADM

## 2025-04-21 RX ORDER — FUROSEMIDE 10 MG/ML
INJECTION INTRAMUSCULAR; INTRAVENOUS
Status: DISCONTINUED | OUTPATIENT
Start: 2025-04-21 | End: 2025-04-21 | Stop reason: HOSPADM

## 2025-04-21 RX ORDER — HEPARIN SODIUM 1000 [USP'U]/ML
INJECTION, SOLUTION INTRAVENOUS; SUBCUTANEOUS
Status: DISCONTINUED | OUTPATIENT
Start: 2025-04-21 | End: 2025-04-21 | Stop reason: HOSPADM

## 2025-04-21 RX ORDER — ASPIRIN 325 MG
TABLET ORAL
Status: DISCONTINUED | OUTPATIENT
Start: 2025-04-21 | End: 2025-04-21 | Stop reason: HOSPADM

## 2025-04-21 RX ORDER — NITROGLYCERIN 0.4 MG/1
0.4 TABLET SUBLINGUAL
Status: DISCONTINUED | OUTPATIENT
Start: 2025-04-21 | End: 2025-04-24

## 2025-04-21 RX ORDER — FUROSEMIDE 10 MG/ML
80 INJECTION INTRAMUSCULAR; INTRAVENOUS 3 TIMES DAILY
Status: DISPENSED | OUTPATIENT
Start: 2025-04-21 | End: 2025-04-22

## 2025-04-21 RX ORDER — MIDAZOLAM HYDROCHLORIDE 1 MG/ML
INJECTION, SOLUTION INTRAMUSCULAR; INTRAVENOUS
Status: DISCONTINUED | OUTPATIENT
Start: 2025-04-21 | End: 2025-04-21 | Stop reason: HOSPADM

## 2025-04-21 RX ORDER — FENTANYL CITRATE 50 UG/ML
INJECTION, SOLUTION INTRAMUSCULAR; INTRAVENOUS
Status: DISCONTINUED | OUTPATIENT
Start: 2025-04-21 | End: 2025-04-21 | Stop reason: HOSPADM

## 2025-04-21 RX ORDER — NICARDIPINE HYDROCHLORIDE 2.5 MG/ML
INJECTION INTRAVENOUS
Status: DISCONTINUED | OUTPATIENT
Start: 2025-04-21 | End: 2025-04-21 | Stop reason: HOSPADM

## 2025-04-21 RX ADMIN — FAMOTIDINE 20 MG: 20 TABLET, FILM COATED ORAL at 17:09

## 2025-04-21 RX ADMIN — POTASSIUM CHLORIDE 40 MEQ: 1500 TABLET, EXTENDED RELEASE ORAL at 21:17

## 2025-04-21 RX ADMIN — FAMOTIDINE 20 MG: 20 TABLET, FILM COATED ORAL at 06:51

## 2025-04-21 RX ADMIN — BENZONATATE 100 MG: 100 CAPSULE ORAL at 05:52

## 2025-04-21 RX ADMIN — POTASSIUM CHLORIDE 40 MEQ: 1500 TABLET, EXTENDED RELEASE ORAL at 16:40

## 2025-04-21 RX ADMIN — INSULIN LISPRO 4 UNITS: 100 INJECTION, SOLUTION INTRAVENOUS; SUBCUTANEOUS at 21:17

## 2025-04-21 RX ADMIN — Medication 10 ML: at 21:18

## 2025-04-21 RX ADMIN — POTASSIUM CHLORIDE 40 MEQ: 1500 TABLET, EXTENDED RELEASE ORAL at 08:37

## 2025-04-21 RX ADMIN — METOPROLOL SUCCINATE 25 MG: 25 TABLET, EXTENDED RELEASE ORAL at 08:37

## 2025-04-21 RX ADMIN — TEMAZEPAM 15 MG: 15 CAPSULE ORAL at 00:38

## 2025-04-21 RX ADMIN — ATORVASTATIN CALCIUM 40 MG: 20 TABLET, FILM COATED ORAL at 21:17

## 2025-04-21 RX ADMIN — Medication 10 ML: at 08:39

## 2025-04-21 RX ADMIN — ASPIRIN 81 MG: 81 TABLET, COATED ORAL at 08:37

## 2025-04-21 NOTE — PROGRESS NOTES
"    Patient Name: Luiz Christianson  :1985  39 y.o.      Patient Care Team:  Luz Maria White APRN as PCP - General (Family Medicine)    Chief Complaint:   Acute HFrEF, CAD    Interval History:   Significant diuresis over the weekend.  Milrinone again had to be stopped due to NSVT. He feels great.    Objective   Vital Signs  Temp:  [97.4 °F (36.3 °C)-98 °F (36.7 °C)] 97.7 °F (36.5 °C)  Heart Rate:  [] 95  Resp:  [16-18] 18  BP: (100-116)/(61-97) 103/84    Intake/Output Summary (Last 24 hours) at 2025 0818  Last data filed at 2025 0700  Gross per 24 hour   Intake 1100 ml   Output 2700 ml   Net -1600 ml     Flowsheet Rows      Flowsheet Row First Filed Value   Admission Height 175.3 cm (69.02\") Documented at 2025   Admission Weight 122 kg (269 lb 13.5 oz) Documented at 2025            GEN: no distress, alert and oriented  HEENT: NACT, EOMI, moist mucous membranes  Lungs: CTAB, no wheezes, rales or rhonchi  CV: normal rate, regular rhythm, normal S1, S2, no murmurs, +2 radial pulses b/l  Abdomen: soft, nontender, nondistended, NABS  Extremities: trace - 1+ edema of left leg  Skin: no rash, warm, dry  Heme/Lymph: no bruising  Psych: organized thought, normal behavior and affect    Results Review:    Results from last 7 days   Lab Units 25  0619   SODIUM mmol/L 133*   POTASSIUM mmol/L 3.6   CHLORIDE mmol/L 92*   CO2 mmol/L 29.6*   BUN mg/dL 27*   CREATININE mg/dL 1.15   GLUCOSE mg/dL 121*   CALCIUM mg/dL 9.1           Results from last 7 days   Lab Units 25  0619   WBC 10*3/mm3 8.64   HEMOGLOBIN g/dL 14.3   HEMATOCRIT % 45.9   PLATELETS 10*3/mm3 240         Results from last 7 days   Lab Units 25  0619   MAGNESIUM mg/dL 2.1     Results from last 7 days   Lab Units 25  0432   CHOLESTEROL mg/dL 82   TRIGLYCERIDES mg/dL 79   HDL CHOL mg/dL 16*   LDL CHOL mg/dL 49               Medication Review:   aspirin, 81 mg, Oral, Daily  atorvastatin, 40 mg, Oral, " Nightly  famotidine, 20 mg, Oral, BID AC  insulin glargine, 15 Units, Subcutaneous, Daily  insulin lispro, 2-9 Units, Subcutaneous, 4x Daily AC & at Bedtime  metoprolol succinate XL, 25 mg, Oral, Daily  potassium chloride, 40 mEq, Oral, TID With Meals  potassium chloride ER, 40 mEq, Oral, Q4H  sodium chloride, 10 mL, Intravenous, Q12H                Assessment & Plan   #Acute HFrEF  #Multivessel CAD  #Diabetes  #Hypertension  #Hyperlipidemia     39-year-old man, followed by Dr. Muir, with hypertension, hyperlipidemia, diabetes, recently diagnosed HFrEF with an EF of 18%, who presented to [facility] with dyspnea on exertion, cough, and exercise intolerance.  He underwent right and left heart catheterization yesterday which revealed elevated right and left-sided filling pressures with an RA pressure of 35 and a wedge pressure of 40.  Coronary angiography revealed a 99% stenosis of the distal circumflex (dominant, as well as a 90% stenosis of the mid LAD.  The distal circumflex is supplied via septal left to left collaterals.  He was transferred here for evaluation of CABG.  Dr. Stanton has reviewed and does not think patient is a good surgical candidate.  He has asked me to see for possible high risk PCI.    Patient has diuresed over 25 pounds over the last week or so.  He has mild residual left lower extremity edema though the right lower extremity looks great.    - Will proceed with right heart catheterization to help decide on further diuretic management   - Will also proceed with PCI  - K > 4, Mg > 2  - Strict I's/O's, daily weights  - continue aspirin 81 mg daily, atorvastatin 80 mg daily  - Continue Toprol 25 mg daily    Aidan Saldana MD, FAC, [facility]  04/21/25  08:18 EDT

## 2025-04-21 NOTE — PROGRESS NOTES
"Daily progress note    Referring physician      Subjective  Doing same with no new complaints and going for heart catheterization    History of present illness  39-year-old white male with history of chronic congestive heart failure diabetes mellitus hypertension hyperlipidemia admitted to cardiology service from [facility] where he was admitted with shortness of breath and found to have severe LV dysfunction and multivessel coronary artery disease.  I am asked to follow patient for medical problems.  At the time of examination he has denies any chest pain shortness of breath palpitation but feeling very weak.    Review of Systems:  Unremarkable    Physical Exam:  Blood pressure 96/72, pulse 101, temperature 98.4 °F (36.9 °C), temperature source Oral, resp. rate 18, height 175.3 cm (69.02\"), weight 110 kg (242 lb 12.8 oz), SpO2 95%.    General: Awake alert and in no distress  HEENT: Unremarkable  Neck: Supple  Heart: Tachycardic and irregular rhythm, no murmurs or rubs, 2+ radial pulses bilaterally  Lungs: Clear to auscultation bilaterally without wheezes or crackles, no respiratory distress  Abdomen: Soft, mildly tender diffusely throughout the abdomen but no generalized peritonitis, nondistended, no rebound or guarding  Skin: Warm, dry, no rash  Musculoskeletal: Normal range of motion, 2+ pitting bilateral symmetric lower extremity edema up to both thighs  Neurologic: Oriented x3, no motor deficits no sensory deficit  Psychiatric: Mood appears stable, no psychosis    LABS  Lab Results (last 24 hours)       Procedure Component Value Units Date/Time    POC Glucose Once [[id number]]  (Normal) Collected: 04/21/25 1059    Specimen: Blood Updated: 04/21/25 1100     Glucose 87 mg/dL     Basic Metabolic Panel [[id number]]  (Abnormal) Collected: 04/21/25 0619    Specimen: Blood Updated: 04/21/25 0738     Glucose 121 mg/dL      BUN 27 mg/dL      Creatinine 1.15 mg/dL      Sodium 133 mmol/L      Potassium " 3.6 mmol/L      Chloride 92 mmol/L      CO2 29.6 mmol/L      Calcium 9.1 mg/dL      BUN/Creatinine Ratio 23.5     Anion Gap 11.4 mmol/L      eGFR 82.0 mL/min/1.73     Narrative:      GFR Categories in Chronic Kidney Disease (CKD)      GFR Category          GFR (mL/min/1.73)    Interpretation  G1                     90 or greater         Normal or high (1)  G2                      60-89                Mild decrease (1)  G3a                   45-59                Mild to moderate decrease  G3b                   30-44                Moderate to severe decrease  G4                    15-29                Severe decrease  G5                    14 or less           Kidney failure          (1)In the absence of evidence of kidney disease, neither GFR category G1 or G2 fulfill the criteria for CKD.    eGFR calculation 2021 CKD-EPI creatinine equation, which does not include race as a factor    Magnesium [[id number]]  (Normal) Collected: 04/21/25 0619    Specimen: Blood Updated: 04/21/25 0738     Magnesium 2.1 mg/dL     CBC & Differential [[id number]]  (Abnormal) Collected: 04/21/25 0619    Specimen: Blood Updated: 04/21/25 0716    Narrative:      The following orders were created for panel order CBC & Differential.  Procedure                               Abnormality         Status                     ---------                               -----------         ------                     CBC Auto Differential[[id number]]        Abnormal            Final result                 Please view results for these tests on the individual orders.    CBC Auto Differential [[id number]]  (Abnormal) Collected: 04/21/25 0619    Specimen: Blood Updated: 04/21/25 0716     WBC 8.64 10*3/mm3      RBC 5.22 10*6/mm3      Hemoglobin 14.3 g/dL      Hematocrit 45.9 %      MCV 87.9 fL      MCH 27.4 pg      MCHC 31.2 g/dL      RDW 14.1 %      RDW-SD 44.3 fl      MPV 10.2 fL      Platelets 240 10*3/mm3      Neutrophil % 42.6 %      Lymphocyte % 41.0 %       Monocyte % 11.8 %      Eosinophil % 2.7 %      Basophil % 1.6 %      Immature Grans % 0.3 %      Neutrophils, Absolute 3.68 10*3/mm3      Lymphocytes, Absolute 3.54 10*3/mm3      Monocytes, Absolute 1.02 10*3/mm3      Eosinophils, Absolute 0.23 10*3/mm3      Basophils, Absolute 0.14 10*3/mm3      Immature Grans, Absolute 0.03 10*3/mm3      nRBC 0.0 /100 WBC     POC Glucose Once [[id number]]  (Normal) Collected: 04/21/25 0620    Specimen: Blood Updated: 04/21/25 0621     Glucose 107 mg/dL     POC Glucose Once [[id number]]  (Abnormal) Collected: 04/20/25 2024    Specimen: Blood Updated: 04/20/25 2026     Glucose 182 mg/dL     Potassium [[id number]]  (Normal) Collected: 04/20/25 1835    Specimen: Blood from Arm, Left Updated: 04/20/25 1908     Potassium 4.6 mmol/L     POC Glucose Once [[id number]]  (Abnormal) Collected: 04/20/25 1540    Specimen: Blood Updated: 04/20/25 1542     Glucose 168 mg/dL           Study Result  Narrative & Impression   XR CHEST PA AND LATERAL-     Clinical: CHF     FINDINGS: Cardiac enlargement similar to the previous examination. No  pleural effusion or acute airspace disease has developed. No gross  vascular congestion. The could be mild central vascular congestion.  Remainder is unremarkable.        Scan on 4/17/2025 1635 by [facility], [place]: ECG 12-LEAD         Author: -- Service: -- Author Type: --   Filed: Date of Service: Creation Time:   Status: (Other)   HEART DTPV=199  bpm  RR Wkreozdh=404  ms  OR Cpouugyi=161  ms  P Horizontal Axis=-58  deg  P Front Axis=83  deg  QRSD Obhbsesr=034  ms  QT Qmebenll=109  ms  CFuS=540  ms  QRS Axis=93  deg  T Wave Axis=192  deg  - ABNORMAL ECG -  Sinus tachycardia  Ventricular tachycardia, unsustained  Borderline right axis deviation  Anteroseptal infarct, old  Abnormal T, consider ischemia, diffuse leads          Current Facility-Administered Medications:     [Transfer Hold] acetaminophen (TYLENOL) tablet 650 mg, 650 mg, Oral, Q6H PRN, Aidan Betancourt MD,  650 mg at 04/19/25 1637    [Transfer Hold] ALPRAZolam (XANAX) tablet 0.5 mg, 0.5 mg, Oral, TID PRN, Billy Quijano MD, 0.5 mg at 04/20/25 1644    [Transfer Hold] aspirin EC tablet 81 mg, 81 mg, Oral, Daily, Jr Austin Stanton MD, 81 mg at 04/21/25 0837    [Transfer Hold] atorvastatin (LIPITOR) tablet 40 mg, 40 mg, Oral, Nightly, Billy Quijano MD, 40 mg at 04/20/25 2020    [Transfer Hold] benzonatate (TESSALON) capsule 100 mg, 100 mg, Oral, TID PRN, Kim Armstrong APRN, 100 mg at 04/21/25 0552    [Transfer Hold] sennosides-docusate (PERICOLACE) 8.6-50 MG per tablet 2 tablet, 2 tablet, Oral, BID PRN **AND** [Transfer Hold] polyethylene glycol (MIRALAX) packet 17 g, 17 g, Oral, Daily PRN **AND** [Transfer Hold] bisacodyl (DULCOLAX) EC tablet 5 mg, 5 mg, Oral, Daily PRN **AND** [Transfer Hold] bisacodyl (DULCOLAX) suppository 10 mg, 10 mg, Rectal, Daily PRN, Jr Austin Stanton MD    [Transfer Hold] Calcium Replacement - Follow Nurse / BPA Driven Protocol, , Not Applicable, PRN, Jr Austin Stanton MD    [Transfer Hold] dextrose (D50W) (25 g/50 mL) IV injection 25 g, 25 g, Intravenous, Q15 Min PRN, aSmia Michel APRN    [Transfer Hold] dextrose (GLUTOSE) oral gel 15 g, 15 g, Oral, Q15 Min PRN, Samia Michel APRN    famotidine (PEPCID) tablet 20 mg, 20 mg, Oral, BID AC, Billy Quijano MD, 20 mg at 04/21/25 0651    [Transfer Hold] glucagon (GLUCAGEN) injection 1 mg, 1 mg, Intramuscular, Q15 Min PRN, Samia Michel APRN    [Transfer Hold] insulin glargine (LANTUS, SEMGLEE) injection 15 Units, 15 Units, Subcutaneous, Daily, Billy Quijano MD, 15 Units at 04/20/25 0856    [Transfer Hold] insulin lispro (HUMALOG/ADMELOG) injection 2-9 Units, 2-9 Units, Subcutaneous, 4x Daily AC & at Bedtime, Samia Michel APRN, 2 Units at 04/20/25 2143    [Transfer Hold] Magnesium Cardiology Dose Replacement - Follow Nurse / BPA Driven Protocol, , Not Applicable, PRN, Jr Austin Stanton MD    metoprolol succinate XL  (TOPROL-XL) 24 hr tablet 25 mg, 25 mg, Oral, Daily, Mendoza Jacobson MD, 25 mg at 04/21/25 0837    [Transfer Hold] nitroglycerin (NITROSTAT) SL tablet 0.4 mg, 0.4 mg, Sublingual, Q5 Min PRN, Jr Austin Stanton MD    [Transfer Hold] ondansetron (ZOFRAN) injection 4 mg, 4 mg, Intravenous, Q6H PRN, Jr Austin Stanton MD    [Transfer Hold] Phosphorus Replacement - Follow Nurse / BPA Driven Protocol, , Not Applicable, PRN, Jr Austin Stanton MD    potassium chloride (KLOR-CON M20) CR tablet 40 mEq, 40 mEq, Oral, TID With Meals, Trinidad Quijano MD, 40 mEq at 04/20/25 1644    potassium chloride (KLOR-CON M20) CR tablet 40 mEq, 40 mEq, Oral, Q4H, Trinidad Quijano MD, 40 mEq at 04/21/25 0837    Potassium Replacement - Follow Nurse / BPA Driven Protocol, , Not Applicable, PRN, Jr Austin Stanton MD    [Transfer Hold] sodium chloride 0.9 % flush 10 mL, 10 mL, Intravenous, Q12H, Jr Austin Stanton MD, 10 mL at 04/21/25 0839    [Transfer Hold] sodium chloride 0.9 % flush 10 mL, 10 mL, Intravenous, PRN, Jr Austin Stanton MD    [Transfer Hold] sodium chloride 0.9 % infusion 40 mL, 40 mL, Intravenous, PRN, Jr Austin Stanton MD    [Transfer Hold] temazepam (RESTORIL) capsule 15 mg, 15 mg, Oral, Nightly PRN, Trinidad Quijano MD, 15 mg at 04/21/25 0038     ASSESSMENT  Multivessel coronary artery disease  Severe LV dysfunction  Diabetes mellitus  Hypertension  Hyperlipidemia  Morbidly obese  Anxiety disorder  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

## 2025-04-21 NOTE — PROGRESS NOTES
Nutrition Services    Patient Name:  Luiz Christianson  YOB: 1985  MRN: [id number]  Admit Date:  4/14/2025    PROGRESS NOTE      Encounter Information: LOS x 7 days       PMH:  CHF, DM2, depression, HTN, HLD       Narrative: 39 y.o. male admitted with chest pain, SOB.  CT angiogram with cardiomegaly with small pericardial effusion, anasarca and small amount of ascites.  Diuresed over 25 lb over the last week or so.  Heart cath today.         PO Diet: Diet: Cardiac; Healthy Heart (2-3 Na+); Fluid Consistency: Thin (IDDSI 0)   PO Supplements: n/a   PO Intake:  %       Current nutrition support: -   Nutrition support review: -       Weight: Weight: 110 kg (242 lb 12.8 oz) (04/21/25 0542)       Medications: reviewed   Labs: reviewed        GI Function:  non-distended , last bowel movement: 4/20   Edema: Trace/mild generalized + BLE   Skin: B=22, bruised       Nutrition Intervention Updates: Eating well, % at meals.  Diuresis.  S/p heart cath today.      Monitor/encourage PO intake.    RD to continue to follow.     Results from last 7 days   Lab Units 04/21/25  0619 04/20/25  1835 04/20/25  0605 04/20/25  0327 04/19/25  1612 04/19/25  0432 04/16/25  1755 04/16/25  0235 04/15/25  0333   SODIUM mmol/L 133*  --  133* 134*   < > 134*   < > 137 134*   POTASSIUM mmol/L 3.6 4.6 3.3* 3.1*  3.1*   < > 3.0*   < > 3.0* 3.8   CHLORIDE mmol/L 92*  --  91* 91*   < > 95*   < > 98 101   CO2 mmol/L 29.6*  --  28.0 26.0   < > 26.0   < > 25.4 21.0*   BUN mg/dL 27*  --  24* 25*   < > 22*   < > 18 20   CREATININE mg/dL 1.15  --  1.05 1.07   < > 1.02   < > 1.09 1.20   CALCIUM mg/dL 9.1  --  9.0 9.2   < > 8.6   < > 8.6 8.9   BILIRUBIN mg/dL  --   --   --   --   --  2.2*  --  2.3* 1.9*   ALK PHOS U/L  --   --   --   --   --  88  --  81 85   ALT (SGPT) U/L  --   --   --   --   --  213*  --  104* 139*   AST (SGOT) U/L  --   --   --   --   --  201*  --  60* 82*   GLUCOSE mg/dL 121*  --  117* 131*   < > 171*   < > 102* 172*  "   < > = values in this interval not displayed.     Results from last 7 days   Lab Units 04/21/25  0619 04/20/25  0327 04/19/25  1613 04/19/25  0432   MAGNESIUM mg/dL 2.1 1.9 2.1  --    HEMOGLOBIN g/dL 14.3 14.5  --  13.7   HEMATOCRIT % 45.9 47.1  --  43.4   TRIGLYCERIDES mg/dL  --   --   --  79     No results found for: \"COVID19\"  Lab Results   Component Value Date    HGBA1C 7.30 (H) 04/15/2025       RD to follow up per protocol.    Electronically signed by:  Karley Major RD  04/21/25 16:54 EDT  "

## 2025-04-21 NOTE — PAYOR COMM NOTE
"Sharmila Christianson (39 y.o. Male)                       ATTENTION CONTINUED CLINICALS CASE [id number]                       REPLY TO UR DEPT  [phone] OR CALL            Date of Birth   1985    Social Security Number       Address   [address] [place] [address] [place] [address]    Home Phone   [phone]    MRN   [id number]       [facility]    Marital Status                               Admission Date   4/14/2025    Admission Type   Urgent    Admitting Provider   Jr Austin Stanton MD    Attending Provider   Billy Quijano MD    Department, Room/Bed   [facility] CARDIOVASC UNIT, 2211/1       Discharge Date       Discharge Disposition       Discharge Destination                                 Attending Provider: Billy Quijano MD    Allergies: Farxiga [Dapagliflozin]    Isolation: None   Infection: None   Code Status: CPR    Ht: 175.3 cm (69.02\")   Wt: 110 kg (242 lb 12.8 oz)    Admission Cmt: None   Principal Problem: CHF (congestive heart failure), NYHA class IV, acute, systolic [I50.21]                   Active Insurance as of 4/14/2025       Primary Coverage       Payor Plan Insurance Group Employer/Plan Group    [employer] BLUE CROSS [employer] BLUE CROSS BLUE [employer]IELD PPO [id number]       Payor Plan Address Payor Plan Phone Number Payor Plan Fax Number Effective Dates    [address] [phone]  1/1/2021 - None Entered    [place] [address]         Subscriber Name Subscriber Birth Date Member ID       SHARMILA CHRISTIANSON 1985 [id number]                     Emergency Contacts        (Rel.) Home Phone Work Phone Mobile Phone    KEKE CHRISTIANSON (Spouse) [phone] -- --                Orders (last 72 hrs)        Start     Ordered    04/21/25 1648  Potassium  Timed         04/21/25 0748    04/21/25 0845  potassium chloride (KLOR-CON M20) CR tablet 40 mEq  Every 4 Hours         04/21/25 0748    04/21/25 0622  POC Glucose Once  PROCEDURE ONCE      "   Comments: Complete no more than 45 minutes prior to patient eating      04/21/25 0620    04/21/25 0600  Magnesium  Morning Draw         04/20/25 0633    04/21/25 0600  CBC & Differential  Morning Draw         04/20/25 1519    04/21/25 0600  Basic Metabolic Panel  Morning Draw         04/20/25 1519    04/21/25 0600  CBC Auto Differential  PROCEDURE ONCE         04/20/25 2201    04/21/25 0001  NPO Diet NPO Type: Strict NPO  Diet Effective Midnight         04/20/25 0619    04/20/25 2100  atorvastatin (LIPITOR) tablet 40 mg  Nightly         04/19/25 1524    04/20/25 2027  POC Glucose Once  PROCEDURE ONCE        Comments: Complete no more than 45 minutes prior to patient eating      04/20/25 2024    04/20/25 1836  Potassium  Timed         04/20/25 0535    04/20/25 1800  potassium chloride (KLOR-CON M20) CR tablet 40 mEq  3 Times Daily With Meals         04/20/25 1518    04/20/25 1717  ECG 12 Lead Bradycardia  Once         04/20/25 1716    04/20/25 1543  POC Glucose Once  PROCEDURE ONCE        Comments: Complete no more than 45 minutes prior to patient eating      04/20/25 1540    04/20/25 1333  ECG 12 Lead Rhythm Change  Once         04/20/25 1332    04/20/25 1200  Basic Metabolic Panel  Once         04/20/25 0619    04/20/25 1200  furosemide (LASIX) injection 80 mg  3 times daily         04/20/25 0756    04/20/25 1156  POC Glucose Once  PROCEDURE ONCE        Comments: Complete no more than 45 minutes prior to patient eating      04/20/25 1142    04/20/25 0900  insulin glargine (LANTUS, SEMGLEE) injection 15 Units  Daily         04/19/25 1523    04/20/25 0900  metoprolol succinate XL (TOPROL-XL) 24 hr tablet 25 mg  Daily         04/20/25 0618    04/20/25 0630  magnesium sulfate 4g/100mL (PREMIX) infusion  Once         04/20/25 0535    04/20/25 0630  potassium chloride (KLOR-CON M20) CR tablet 40 mEq  Every 4 Hours         04/20/25 0535    04/20/25 0626  POC Glucose Once  PROCEDURE ONCE        Comments: Complete no more  than 45 minutes prior to patient eating      04/20/25 0624    04/20/25 0608  Magnesium  Morning Draw,   Status:  Canceled         04/20/25 0535    04/20/25 0603  Basic Metabolic Panel  Morning Draw         04/20/25 0604    04/20/25 0600  CBC & Differential  Morning Draw         04/19/25 1524    04/20/25 0600  CBC Auto Differential  PROCEDURE ONCE         04/19/25 2201    04/20/25 0048  Magnesium  STAT         04/20/25 0047    04/20/25 0044  ECG 12 Lead Rhythm Change  Once         04/20/25 0044    04/20/25 0043  Potassium  STAT         04/20/25 0044    04/19/25 2024  POC Glucose Once  PROCEDURE ONCE        Comments: Complete no more than 45 minutes prior to patient eating      04/19/25 2022    04/19/25 1834  Potassium  Timed         04/19/25 0533    04/19/25 1830  potassium chloride (KLOR-CON M10) CR tablet 10 mEq  Once         04/19/25 1730    04/19/25 1800  potassium chloride (KLOR-CON M10) CR tablet 30 mEq  3 Times Daily With Meals,   Status:  Discontinued         04/19/25 1523    04/19/25 1600  Potassium  Timed         04/19/25 1400    04/19/25 1550  POC Glucose Once  PROCEDURE ONCE        Comments: Complete no more than 45 minutes prior to patient eating      04/19/25 1548    04/19/25 1546  ECG 12 Lead Chest Pain  STAT         04/19/25 1546    04/19/25 1431  Basic Metabolic Panel  Once         04/19/25 0829    04/19/25 1431  Magnesium  Once         04/19/25 0829    04/19/25 1245  magnesium sulfate 2g/50 mL (PREMIX) infusion  Once         04/19/25 1147    04/19/25 1215  POC Glucose Once  PROCEDURE ONCE        Comments: Complete no more than 45 minutes prior to patient eating      04/19/25 1213    04/19/25 1145  magnesium sulfate in D5W 1g/100mL (PREMIX)  Once,   Status:  Discontinued         04/19/25 1055    04/19/25 0630  potassium chloride (KLOR-CON M20) CR tablet 40 mEq  Every 4 Hours         04/19/25 0533    04/19/25 0628  POC Glucose Once  PROCEDURE ONCE        Comments: Complete no more than 45 minutes  prior to patient eating      04/19/25 0624    04/19/25 0600  Basic Metabolic Panel  Morning Draw,   Status:  Canceled         04/18/25 1405    04/19/25 0600  CBC & Differential  Morning Draw         04/18/25 1406    04/19/25 0600  Comprehensive Metabolic Panel  Morning Draw         04/18/25 1406    04/19/25 0600  TSH  Morning Draw         04/18/25 1406    04/19/25 0600  Lipid Panel  Morning Draw         04/18/25 1406    04/19/25 0600  Hemoglobin A1c  Morning Draw,   Status:  Canceled         04/18/25 1406    04/19/25 0600  CBC Auto Differential  PROCEDURE ONCE         04/18/25 2201    04/18/25 2022  POC Glucose Once  PROCEDURE ONCE        Comments: Complete no more than 45 minutes prior to patient eating      04/18/25 2019    04/18/25 1840  Inpatient Spiritual Care Consult  Once        Comments: Pt is hoping to have spiritual care on Easter Sunday, pt is Amish   Provider:  (Not yet assigned)    04/18/25 1840    04/18/25 1730  famotidine (PEPCID) tablet 20 mg  2 Times Daily Before Meals         04/18/25 1406    04/18/25 1641  POC Glucose Once  PROCEDURE ONCE        Comments: Complete no more than 45 minutes prior to patient eating      04/18/25 1638    04/18/25 1430  metOLazone (ZAROXOLYN) tablet 5 mg  Once        Note to Pharmacy: Give 30 minutes prior to next dose of Lasix    04/18/25 0913    04/18/25 1135  POC Glucose Once  PROCEDURE ONCE        Comments: Complete no more than 45 minutes prior to patient eating      04/18/25 1130    04/18/25 0945  milrinone (PRIMACOR) 20 mg in 100 mL D5W infusion  Continuous,   Status:  Discontinued         04/18/25 0858    04/18/25 0107  benzonatate (TESSALON) capsule 100 mg  3 Times Daily PRN         04/18/25 0111    04/17/25 2100  metoprolol tartrate (LOPRESSOR) tablet 12.5 mg  Every 12 Hours Scheduled,   Status:  Discontinued         04/17/25 1621    04/17/25 1620  acetaminophen (TYLENOL) tablet 650 mg  Every 6 Hours PRN         04/17/25 1621    04/16/25 1500  furosemide  (LASIX) injection 80 mg  3 times daily,   Status:  Discontinued         04/16/25 1032    04/16/25 1015  potassium chloride (KLOR-CON M10) CR tablet 30 mEq  2 Times Daily With Meals,   Status:  Discontinued         04/16/25 0927    04/16/25 0900  insulin glargine (LANTUS, SEMGLEE) injection 10 Units  Daily,   Status:  Discontinued         04/15/25 1214    04/15/25 1700  POC Glucose 4x Daily Before Meals & at Bedtime  4 Times Daily Before Meals & at Bedtime      Comments: Complete no more than 45 minutes prior to patient eating      04/15/25 1214    04/15/25 1300  insulin lispro (HUMALOG/ADMELOG) injection 2-9 Units  4 Times Daily Before Meals & Nightly         04/15/25 1214    04/15/25 1214  dextrose (GLUTOSE) oral gel 15 g  Every 15 Minutes PRN         04/15/25 1214    04/15/25 1214  dextrose (D50W) (25 g/50 mL) IV injection 25 g  Every 15 Minutes PRN         04/15/25 1214    04/15/25 1214  glucagon (GLUCAGEN) injection 1 mg  Every 15 Minutes PRN         04/15/25 1214    04/15/25 0900  atorvastatin (LIPITOR) tablet 80 mg  Daily,   Status:  Discontinued         04/15/25 0555    04/15/25 0900  aspirin EC tablet 81 mg  Daily         04/15/25 0556    04/15/25 0557  temazepam (RESTORIL) capsule 15 mg  Nightly PRN         04/15/25 0558    04/15/25 0556  ALPRAZolam (XANAX) tablet 0.5 mg  3 Times Daily PRN         04/15/25 0558    04/14/25 2100  sodium chloride 0.9 % flush 10 mL  Every 12 Hours Scheduled         04/14/25 1749 04/14/25 2000  Vital Signs  Every 4 Hours       04/14/25 1749 04/14/25 1845  famotidine (PEPCID) tablet 40 mg  Daily,   Status:  Discontinued         04/14/25 1749 04/14/25 1800  Oral Care  2 Times Daily       04/14/25 1749 04/14/25 1800  Strict Intake & Output  Every Hour       04/14/25 1749 04/14/25 1750  Daily Weights  Daily       04/14/25 1749 04/14/25 1748  ondansetron (ZOFRAN) injection 4 mg  Every 6 Hours PRN         04/14/25 1749 04/14/25 1748  sennosides-docusate  "(PERICOLACE) 8.6-50 MG per tablet 2 tablet  2 Times Daily PRN        Placed in \"And\" Linked Group    04/14/25 1749    04/14/25 1748  polyethylene glycol (MIRALAX) packet 17 g  Daily PRN        Placed in \"And\" Linked Group    04/14/25 1749    04/14/25 1748  bisacodyl (DULCOLAX) EC tablet 5 mg  Daily PRN        Placed in \"And\" Linked Group    04/14/25 1749    04/14/25 1748  bisacodyl (DULCOLAX) suppository 10 mg  Daily PRN        Placed in \"And\" Linked Group    04/14/25 1749    04/14/25 1748  Potassium Replacement - Follow Nurse / BPA Driven Protocol  As Needed         04/14/25 1749 04/14/25 1748  Magnesium Cardiology Dose Replacement - Follow Nurse / BPA Driven Protocol  As Needed         04/14/25 1749 04/14/25 1748  Phosphorus Replacement - Follow Nurse / BPA Driven Protocol  As Needed         04/14/25 1749 04/14/25 1748  Calcium Replacement - Follow Nurse / BPA Driven Protocol  As Needed         04/14/25 1749 04/14/25 1746  nitroglycerin (NITROSTAT) SL tablet 0.4 mg  Every 5 Minutes PRN         04/14/25 1749 04/14/25 1746  Intake & Output  Every Shift       04/14/25 1749 04/14/25 1745  sodium chloride 0.9 % flush 10 mL  As Needed         04/14/25 1749 04/14/25 1745  sodium chloride 0.9 % infusion 40 mL  As Needed         04/14/25 1749    Unscheduled  Oxygen Therapy- Nasal Cannula; Titrate 1-6 LPM Per SpO2; 90 - 95%  Continuous PRN       04/14/25 1749    Unscheduled  Follow Hypoglycemia Standing Orders For Blood Glucose <70 & Notify Provider of Treatment  As Needed      Comments: Follow Hypoglycemia Orders As Outlined in Process Instructions (Open Order Report to View Full Instructions)  Notify Provider Any Time Hypoglycemia Treatment is Administered    04/15/25 1214                     Physician Progress Notes (last 4 days)        Billy Quijano MD at 04/20/25 0917          Daily progress note    Referring physician      Subjective  Doing same with no new complaints but anxious about " "angioplasty in the morning    History of present illness  39-year-old white male with history of chronic congestive heart failure diabetes mellitus hypertension hyperlipidemia admitted to cardiology service from [facility] where he was admitted with shortness of breath and found to have severe LV dysfunction and multivessel coronary artery disease.  I am asked to follow patient for medical problems.  At the time of examination he has denies any chest pain shortness of breath palpitation but feeling very weak.    Review of Systems:  Unremarkable    Physical Exam:  Blood pressure 100/86, pulse 92, temperature 97.7 °F (36.5 °C), temperature source Oral, resp. rate 18, height 175.3 cm (69.02\"), weight 111 kg (244 lb 14.4 oz), SpO2 99%.    General: Awake alert and in no distress  HEENT: Unremarkable  Neck: Supple  Heart: Tachycardic and irregular rhythm, no murmurs or rubs, 2+ radial pulses bilaterally  Lungs: Clear to auscultation bilaterally without wheezes or crackles, no respiratory distress  Abdomen: Soft, mildly tender diffusely throughout the abdomen but no generalized peritonitis, nondistended, no rebound or guarding  Skin: Warm, dry, no rash  Musculoskeletal: Normal range of motion, 2+ pitting bilateral symmetric lower extremity edema up to both thighs  Neurologic: Oriented x3, no motor deficits no sensory deficit  Psychiatric: Mood appears stable, no psychosis    LABS  Lab Results (last 24 hours)       Procedure Component Value Units Date/Time    POC Glucose Once [[id number]]  (Abnormal) Collected: 04/20/25 1142    Specimen: Blood Updated: 04/20/25 1155     Glucose 161 mg/dL     Basic Metabolic Panel [[id number]]  (Abnormal) Collected: 04/20/25 0327    Specimen: Blood Updated: 04/20/25 0718     Glucose 131 mg/dL      BUN 25 mg/dL      Creatinine 1.07 mg/dL      Sodium 134 mmol/L      Potassium 3.1 mmol/L      Chloride 91 mmol/L      CO2 26.0 mmol/L      Calcium 9.2 mg/dL      BUN/Creatinine Ratio " 23.4     Anion Gap 17.0 mmol/L      eGFR 89.4 mL/min/1.73     Narrative:      GFR Categories in Chronic Kidney Disease (CKD)      GFR Category          GFR (mL/min/1.73)    Interpretation  G1                     90 or greater         Normal or high (1)  G2                      60-89                Mild decrease (1)  G3a                   45-59                Mild to moderate decrease  G3b                   30-44                Moderate to severe decrease  G4                    15-29                Severe decrease  G5                    14 or less           Kidney failure          (1)In the absence of evidence of kidney disease, neither GFR category G1 or G2 fulfill the criteria for CKD.    eGFR calculation 2021 CKD-EPI creatinine equation, which does not include race as a factor    Basic Metabolic Panel [[id number]]  (Abnormal) Collected: 04/20/25 0605    Specimen: Blood Updated: 04/20/25 0700     Glucose 117 mg/dL      BUN 24 mg/dL      Creatinine 1.05 mg/dL      Sodium 133 mmol/L      Potassium 3.3 mmol/L      Chloride 91 mmol/L      CO2 28.0 mmol/L      Calcium 9.0 mg/dL      BUN/Creatinine Ratio 22.9     Anion Gap 14.0 mmol/L      eGFR 91.5 mL/min/1.73     Narrative:      GFR Categories in Chronic Kidney Disease (CKD)      GFR Category          GFR (mL/min/1.73)    Interpretation  G1                     90 or greater         Normal or high (1)  G2                      60-89                Mild decrease (1)  G3a                   45-59                Mild to moderate decrease  G3b                   30-44                Moderate to severe decrease  G4                    15-29                Severe decrease  G5                    14 or less           Kidney failure          (1)In the absence of evidence of kidney disease, neither GFR category G1 or G2 fulfill the criteria for CKD.    eGFR calculation 2021 CKD-EPI creatinine equation, which does not include race as a factor    POC Glucose Once [[id number]]  (Normal)  Collected: 04/20/25 0624    Specimen: Blood Updated: 04/20/25 0626     Glucose 103 mg/dL     Potassium [[id number]]  (Abnormal) Collected: 04/20/25 0327    Specimen: Blood Updated: 04/20/25 0511     Potassium 3.1 mmol/L     Magnesium [[id number]]  (Normal) Collected: 04/20/25 0327    Specimen: Blood Updated: 04/20/25 0511     Magnesium 1.9 mg/dL     CBC & Differential [[id number]]  (Abnormal) Collected: 04/20/25 0327    Specimen: Blood Updated: 04/20/25 0501    Narrative:      The following orders were created for panel order CBC & Differential.  Procedure                               Abnormality         Status                     ---------                               -----------         ------                     CBC Auto Differential[[id number]]        Abnormal            Final result                 Please view results for these tests on the individual orders.    CBC Auto Differential [[id number]]  (Abnormal) Collected: 04/20/25 0327    Specimen: Blood Updated: 04/20/25 0501     WBC 8.74 10*3/mm3      RBC 5.27 10*6/mm3      Hemoglobin 14.5 g/dL      Hematocrit 47.1 %      MCV 89.4 fL      MCH 27.5 pg      MCHC 30.8 g/dL      RDW 13.8 %      RDW-SD 44.0 fl      MPV 10.6 fL      Platelets 242 10*3/mm3      Neutrophil % 50.3 %      Lymphocyte % 32.7 %      Monocyte % 12.0 %      Eosinophil % 3.0 %      Basophil % 1.4 %      Immature Grans % 0.6 %      Neutrophils, Absolute 4.40 10*3/mm3      Lymphocytes, Absolute 2.86 10*3/mm3      Monocytes, Absolute 1.05 10*3/mm3      Eosinophils, Absolute 0.26 10*3/mm3      Basophils, Absolute 0.12 10*3/mm3      Immature Grans, Absolute 0.05 10*3/mm3      nRBC 0.0 /100 WBC     POC Glucose Once [[id number]]  (Abnormal) Collected: 04/19/25 2022    Specimen: Blood Updated: 04/19/25 2024     Glucose 175 mg/dL     Potassium [[id number]]  (Normal) Collected: 04/19/25 1838    Specimen: Blood from Hand, Right Updated: 04/19/25 1901     Potassium 4.0 mmol/L     Basic Metabolic Panel  [[id number]]  (Abnormal) Collected: 04/19/25 1613    Specimen: Blood from Arm, Right Updated: 04/19/25 1731     Glucose 197 mg/dL      BUN 23 mg/dL      Creatinine 1.05 mg/dL      Sodium 132 mmol/L      Potassium 3.6 mmol/L      Chloride 91 mmol/L      CO2 25.0 mmol/L      Calcium 9.1 mg/dL      BUN/Creatinine Ratio 21.9     Anion Gap 16.0 mmol/L      eGFR 91.5 mL/min/1.73     Narrative:      GFR Categories in Chronic Kidney Disease (CKD)      GFR Category          GFR (mL/min/1.73)    Interpretation  G1                     90 or greater         Normal or high (1)  G2                      60-89                Mild decrease (1)  G3a                   45-59                Mild to moderate decrease  G3b                   30-44                Moderate to severe decrease  G4                    15-29                Severe decrease  G5                    14 or less           Kidney failure          (1)In the absence of evidence of kidney disease, neither GFR category G1 or G2 fulfill the criteria for CKD.    eGFR calculation 2021 CKD-EPI creatinine equation, which does not include race as a factor    Magnesium [[id number]]  (Normal) Collected: 04/19/25 1613    Specimen: Blood from Arm, Right Updated: 04/19/25 1731     Magnesium 2.1 mg/dL     Potassium [[id number]]  (Normal) Collected: 04/19/25 1612    Specimen: Blood Updated: 04/19/25 1726     Potassium 3.6 mmol/L     POC Glucose Once [[id number]]  (Abnormal) Collected: 04/19/25 1548    Specimen: Blood Updated: 04/19/25 1549     Glucose 180 mg/dL           Study Result  Narrative & Impression   XR CHEST PA AND LATERAL-     Clinical: CHF     FINDINGS: Cardiac enlargement similar to the previous examination. No  pleural effusion or acute airspace disease has developed. No gross  vascular congestion. The could be mild central vascular congestion.  Remainder is unremarkable.        Scan on 4/17/2025 1635 by [facility], [place]: ECG 12-LEAD         Author: -- Service: -- Author  Type: --   Filed: Date of Service: Creation Time:   Status: (Other)   HEART KGHU=654  bpm  RR Bwkbjuve=578  ms  TX Vextuqlx=406  ms  P Horizontal Axis=-58  deg  P Front Axis=83  deg  QRSD Nbktkoxv=916  ms  QT Svbbhdyc=802  ms  NGsH=949  ms  QRS Axis=93  deg  T Wave Axis=192  deg  - ABNORMAL ECG -  Sinus tachycardia  Ventricular tachycardia, unsustained  Borderline right axis deviation  Anteroseptal infarct, old  Abnormal T, consider ischemia, diffuse leads          Current Facility-Administered Medications:     acetaminophen (TYLENOL) tablet 650 mg, 650 mg, Oral, Q6H PRN, Aidan Betancourt MD, 650 mg at 04/19/25 1637    ALPRAZolam (XANAX) tablet 0.5 mg, 0.5 mg, Oral, TID PRN, Billy Quijano MD, 0.5 mg at 04/19/25 1637    aspirin EC tablet 81 mg, 81 mg, Oral, Daily, Jr Austin Stanton MD, 81 mg at 04/20/25 0857    atorvastatin (LIPITOR) tablet 40 mg, 40 mg, Oral, Nightly, Billy Quijano MD    benzonatate (TESSALON) capsule 100 mg, 100 mg, Oral, TID PRN, Kim Armstrong APRN, 100 mg at 04/20/25 1432    sennosides-docusate (PERICOLACE) 8.6-50 MG per tablet 2 tablet, 2 tablet, Oral, BID PRN **AND** polyethylene glycol (MIRALAX) packet 17 g, 17 g, Oral, Daily PRN **AND** bisacodyl (DULCOLAX) EC tablet 5 mg, 5 mg, Oral, Daily PRN **AND** bisacodyl (DULCOLAX) suppository 10 mg, 10 mg, Rectal, Daily PRN, Jr Austin Stanton MD    Calcium Replacement - Follow Nurse / BPA Driven Protocol, , Not Applicable, PRN, Jr Austin Stanton MD    dextrose (D50W) (25 g/50 mL) IV injection 25 g, 25 g, Intravenous, Q15 Min PRN, Samia Michel APRN    dextrose (GLUTOSE) oral gel 15 g, 15 g, Oral, Q15 Min PRN, Samia Michel APRN    famotidine (PEPCID) tablet 20 mg, 20 mg, Oral, BID AC, Billy Quijano MD, 20 mg at 04/20/25 0549    furosemide (LASIX) injection 80 mg, 80 mg, Intravenous, TID, Mendoza Jacobson MD, 80 mg at 04/20/25 1123    glucagon (GLUCAGEN) injection 1 mg, 1 mg, Intramuscular, Q15 Min PRN, Samia Michel, APRN     insulin glargine (LANTUS, SEMGLEE) injection 15 Units, 15 Units, Subcutaneous, Daily, Trinidad Quijano MD, 15 Units at 04/20/25 0856    insulin lispro (HUMALOG/ADMELOG) injection 2-9 Units, 2-9 Units, Subcutaneous, 4x Daily AC & at Bedtime, Michel, Samia, APRN, 2 Units at 04/20/25 1159    Magnesium Cardiology Dose Replacement - Follow Nurse / BPA Driven Protocol, , Not Applicable, PRN, Jr Austin Stanton MD    metoprolol succinate XL (TOPROL-XL) 24 hr tablet 25 mg, 25 mg, Oral, Daily, Mendoza Jacobson MD, 25 mg at 04/20/25 0857    nitroglycerin (NITROSTAT) SL tablet 0.4 mg, 0.4 mg, Sublingual, Q5 Min PRN, Jr Austin Stanton MD    ondansetron (ZOFRAN) injection 4 mg, 4 mg, Intravenous, Q6H PRN, Jr Austin Stanton MD    Phosphorus Replacement - Follow Nurse / BPA Driven Protocol, , Not Applicable, PRN, Jr Austin Stanton MD    potassium chloride (KLOR-CON M10) CR tablet 30 mEq, 30 mEq, Oral, TID With Meals, Trinidad Quijano MD, 30 mEq at 04/20/25 0857    Potassium Replacement - Follow Nurse / BPA Driven Protocol, , Not Applicable, Maximino BRIGGS Jr Samuel B, MD    sodium chloride 0.9 % flush 10 mL, 10 mL, Intravenous, Q12H, Jr Austin Stanton MD, 10 mL at 04/20/25 0857    sodium chloride 0.9 % flush 10 mL, 10 mL, Intravenous, PRN, Jr Austin Stanton MD    sodium chloride 0.9 % infusion 40 mL, 40 mL, Intravenous, PRN, Jr Austin Stanton MD    temazepam (RESTORIL) capsule 15 mg, 15 mg, Oral, Nightly PRN, Trinidad Quijano MD, 15 mg at 04/18/25 0056     ASSESSMENT  Multivessel coronary artery disease  Severe LV dysfunction  Diabetes mellitus  Hypertension  Hyperlipidemia  Morbidly obese  Anxiety disorder  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  Accu-Chek sliding scale insulin  Stress ulcer DVT prophylaxis  Supportive care  Discussed with nursing staff  Follow closely and further recommendation according to hospital    TRINIDAD QUIJANO MD    Copied  "text in this note has been reviewed and is accurate as of 25        Electronically signed by Billy Quijano MD at 25 1518       Mendoza Jacobson MD at 25 0752              Patient Name: Luiz Christianson  :1985  39 y.o.      Patient Care Team:  Luz Maria White APRN as PCP - General (Family Medicine)    Chief Complaint:   Acute HFrEF, CAD    Interval History:   Midday yesterday had significant NSVT.  Milrinone stopped.  He still had excellent urine output after that.  Ectopy has quieted down overnight but still having frequent PVCs.  He is down nearly 20 pounds just over this weekend.  Still has some lower extremity edema but much improved.  No chest pain.  Objective   Vital Signs  Temp:  [97.6 °F (36.4 °C)-98.5 °F (36.9 °C)] 98.5 °F (36.9 °C)  Heart Rate:  [] 102  Resp:  [16-18] 16  BP: ()/() 108/83    Intake/Output Summary (Last 24 hours) at 2025 0752  Last data filed at 2025 0537  Gross per 24 hour   Intake 1800 ml   Output 6100 ml   Net -4300 ml     Flowsheet Rows      Flowsheet Row First Filed Value   Admission Height 175.3 cm (69.02\") Documented at 2025   Admission Weight 122 kg (269 lb 13.5 oz) Documented at 2025            GEN: no distress, alert and oriented  HEENT: NACT, EOMI, moist mucous membranes  Lungs: CTAB, no wheezes, rales or rhonchi  CV: normal rate, regular rhythm, normal S1, S2, no murmurs, +2 radial pulses b/l  Abdomen: soft, nontender, nondistended, NABS  Extremities: 2-3+ edema  Skin: no rash, warm, dry  Heme/Lymph: no bruising  Psych: organized thought, normal behavior and affect    Results Review:    Results from last 7 days   Lab Units 25  0605   SODIUM mmol/L 133*   POTASSIUM mmol/L 3.3*   CHLORIDE mmol/L 91*   CO2 mmol/L 28.0   BUN mg/dL 24*   CREATININE mg/dL 1.05   GLUCOSE mg/dL 117*   CALCIUM mg/dL 9.0           Results from last 7 days   Lab Units 25  0327   WBC 10*3/mm3 8.74   HEMOGLOBIN g/dL " 14.5   HEMATOCRIT % 47.1   PLATELETS 10*3/mm3 242         Results from last 7 days   Lab Units 04/20/25  0327   MAGNESIUM mg/dL 1.9     Results from last 7 days   Lab Units 04/19/25  0432   CHOLESTEROL mg/dL 82   TRIGLYCERIDES mg/dL 79   HDL CHOL mg/dL 16*   LDL CHOL mg/dL 49               Medication Review:   aspirin, 81 mg, Oral, Daily  atorvastatin, 40 mg, Oral, Nightly  famotidine, 20 mg, Oral, BID AC  [Held by provider] furosemide, 80 mg, Intravenous, TID  insulin glargine, 15 Units, Subcutaneous, Daily  insulin lispro, 2-9 Units, Subcutaneous, 4x Daily AC & at Bedtime  magnesium sulfate, 4 g, Intravenous, Once  metoprolol succinate XL, 25 mg, Oral, Daily  potassium chloride, 30 mEq, Oral, TID With Meals  potassium chloride ER, 40 mEq, Oral, Q4H  sodium chloride, 10 mL, Intravenous, Q12H                  Assessment & Plan   #Acute HFrEF  #Multivessel CAD  #Diabetes  #Hypertension  #Hyperlipidemia     39-year-old man, followed by Dr. Muir, with hypertension, hyperlipidemia, diabetes, recently diagnosed HFrEF with an EF of 18%, who presented to [facility] with dyspnea on exertion, cough, and exercise intolerance.  He underwent right and left heart catheterization which revealed elevated right and left-sided filling pressures with an RA pressure of 35 and a wedge pressure of 40.  Coronary angiography revealed a 99% stenosis of the distal circumflex (dominant, as well as a 90% stenosis of the mid LAD.  The distal circumflex is supplied via septal left to left collaterals.  He was transferred here for evaluation of CABG.  Dr. Stanton has reviewed and does not think patient is a good surgical candidate.  He has asked me to see for possible high risk PCI. After discussion with patient, plan for PCI with Dr. Betancourt once he is euvolemic.        He was taken off milrinone again yesterday due to significant ectopy.  He continues to diurese well.  He has had issues with hypokalemia due to significant urine output.   "This is associated with significant ectopy.  He has had excellent urine output and has lost over 20 pounds just over the weekend.  Getting close to euvolemic.  I noticed diuretic this morning to allow for electrolyte repletion.  Will give a p.m. dose.  N.p.o. at midnight for assessment by Dr. Saldana in the morning.    - continue lasix 80 mg IV TID  - K > 4, Mg > 2. K 3.0 this am, being repleted, will reassess this afternoon.   - Strict I's/O's, daily weights  - continue aspirin 81 mg daily, atorvastatin 80 mg daily  -Transition metoprolol to XL formulation. Will optimize GDMT after [facility].     Mendoza Jacobson MD  [facility]  04/20/25  07:52 EDT      Electronically signed by Mendoza Jacobson MD at 04/20/25 7579       Billy Quijano MD at 04/19/25 0973          Daily progress note    Referring physician      Subjective  Awake and alert and feeling same like yesterday with no new complaints and denies any chest pain shortness of breath palpitation.    History of present illness  39-year-old white male with history of chronic congestive heart failure diabetes mellitus hypertension hyperlipidemia admitted to cardiology service from [facility] where he was admitted with shortness of breath and found to have severe LV dysfunction and multivessel coronary artery disease.  I am asked to follow patient for medical problems.  At the time of examination he has denies any chest pain shortness of breath palpitation but feeling very weak.    Review of Systems:  Per history of present illness    Physical Exam:  Blood pressure (!) 144/121, pulse 99, temperature 97.6 °F (36.4 °C), temperature source Oral, resp. rate 18, height 175.3 cm (69.02\"), weight 115 kg (253 lb 11.2 oz), SpO2 95%.    General: Awake alert and in no distress  HEENT: Unremarkable  Neck: Supple  Heart: Tachycardic and irregular rhythm, no murmurs or rubs, 2+ radial pulses bilaterally  Lungs: Clear to auscultation bilaterally without " wheezes or crackles, no respiratory distress  Abdomen: Soft, mildly tender diffusely throughout the abdomen but no generalized peritonitis, nondistended, no rebound or guarding  Skin: Warm, dry, no rash  Musculoskeletal: Normal range of motion, 2+ pitting bilateral symmetric lower extremity edema up to both thighs  Neurologic: Oriented x3, no motor deficits no sensory deficit  Psychiatric: Mood appears stable, no psychosis    LABS  Lab Results (last 24 hours)       Procedure Component Value Units Date/Time    Basic Metabolic Panel [[id number]] Updated: 04/19/25 1431    Specimen: Blood from Arm, Right     Magnesium [[id number]] Updated: 04/19/25 1431    Specimen: Blood from Arm, Right     POC Glucose Once [[id number]]  (Abnormal) Collected: 04/19/25 1213    Specimen: Blood Updated: 04/19/25 1214     Glucose 177 mg/dL     POC Glucose Once [[id number]]  (Abnormal) Collected: 04/19/25 0624    Specimen: Blood Updated: 04/19/25 0627     Glucose 133 mg/dL     TSH [[id number]]  (Abnormal) Collected: 04/19/25 0432    Specimen: Blood Updated: 04/19/25 0524     TSH 5.580 uIU/mL     Comprehensive Metabolic Panel [[id number]]  (Abnormal) Collected: 04/19/25 0432    Specimen: Blood Updated: 04/19/25 0518     Glucose 171 mg/dL      BUN 22 mg/dL      Creatinine 1.02 mg/dL      Sodium 134 mmol/L      Potassium 3.0 mmol/L      Chloride 95 mmol/L      CO2 26.0 mmol/L      Calcium 8.6 mg/dL      Total Protein 6.7 g/dL      Albumin 3.6 g/dL      ALT (SGPT) 213 U/L      AST (SGOT) 201 U/L      Alkaline Phosphatase 88 U/L      Total Bilirubin 2.2 mg/dL      Globulin 3.1 gm/dL      A/G Ratio 1.2 g/dL      BUN/Creatinine Ratio 21.6     Anion Gap 13.0 mmol/L      eGFR 94.7 mL/min/1.73     Narrative:      GFR Categories in Chronic Kidney Disease (CKD)      GFR Category          GFR (mL/min/1.73)    Interpretation  G1                     90 or greater         Normal or high (1)  G2                      60-89                Mild decrease (1)  G3a                    45-59                Mild to moderate decrease  G3b                   30-44                Moderate to severe decrease  G4                    15-29                Severe decrease  G5                    14 or less           Kidney failure          (1)In the absence of evidence of kidney disease, neither GFR category G1 or G2 fulfill the criteria for CKD.    eGFR calculation 2021 CKD-EPI creatinine equation, which does not include race as a factor    Lipid Panel [[id number]]  (Abnormal) Collected: 04/19/25 0432    Specimen: Blood Updated: 04/19/25 0517     Total Cholesterol 82 mg/dL      Triglycerides 79 mg/dL      HDL Cholesterol 16 mg/dL      LDL Cholesterol  49 mg/dL      VLDL Cholesterol 17 mg/dL      LDL/HDL Ratio 3.14    Narrative:      Cholesterol Reference Ranges  (U.S. Department of Health and Human Services ATP III Classifications)    Desirable          <200 mg/dL  Borderline High    200-239 mg/dL  High Risk          >240 mg/dL      Triglyceride Reference Ranges  (U.S. Department of Health and Human Services ATP III Classifications)    Normal           <150 mg/dL  Borderline High  150-199 mg/dL  High             200-499 mg/dL  Very High        >500 mg/dL    HDL Reference Ranges  (U.S. Department of Health and Human Services ATP III Classifications)    Low     <40 mg/dl (major risk factor for CHD)  High    >60 mg/dl ('negative' risk factor for CHD)        LDL Reference Ranges  (U.S. Department of Health and Human Services ATP III Classifications)    Optimal          <100 mg/dL  Near Optimal     100-129 mg/dL  Borderline High  130-159 mg/dL  High             160-189 mg/dL  Very High        >189 mg/dL    LDL is calculated using the NIH LDL-C calculation.      CBC & Differential [[id number]]  (Abnormal) Collected: 04/19/25 0432    Specimen: Blood Updated: 04/19/25 0459    Narrative:      The following orders were created for panel order CBC & Differential.  Procedure                                Abnormality         Status                     ---------                               -----------         ------                     CBC Auto Differential[[id number]]        Abnormal            Final result                 Please view results for these tests on the individual orders.    CBC Auto Differential [[id number]]  (Abnormal) Collected: 04/19/25 0432    Specimen: Blood Updated: 04/19/25 0459     WBC 8.54 10*3/mm3      RBC 4.93 10*6/mm3      Hemoglobin 13.7 g/dL      Hematocrit 43.4 %      MCV 88.0 fL      MCH 27.8 pg      MCHC 31.6 g/dL      RDW 13.9 %      RDW-SD 44.3 fl      MPV 10.1 fL      Platelets 234 10*3/mm3      Neutrophil % 46.9 %      Lymphocyte % 36.1 %      Monocyte % 12.4 %      Eosinophil % 2.7 %      Basophil % 1.4 %      Immature Grans % 0.5 %      Neutrophils, Absolute 4.01 10*3/mm3      Lymphocytes, Absolute 3.08 10*3/mm3      Monocytes, Absolute 1.06 10*3/mm3      Eosinophils, Absolute 0.23 10*3/mm3      Basophils, Absolute 0.12 10*3/mm3      Immature Grans, Absolute 0.04 10*3/mm3      nRBC 0.0 /100 WBC     POC Glucose Once [[id number]]  (Abnormal) Collected: 04/18/25 2019    Specimen: Blood Updated: 04/18/25 2021     Glucose 204 mg/dL     POC Glucose Once [[id number]]  (Abnormal) Collected: 04/18/25 1638    Specimen: Blood Updated: 04/18/25 1640     Glucose 152 mg/dL           Study Result  Narrative & Impression   XR CHEST PA AND LATERAL-     Clinical: CHF     FINDINGS: Cardiac enlargement similar to the previous examination. No  pleural effusion or acute airspace disease has developed. No gross  vascular congestion. The could be mild central vascular congestion.  Remainder is unremarkable.        Scan on 4/17/2025 1635 by [place], [place]: ECG 12-LEAD         Author: -- Service: -- Author Type: --   Filed: Date of Service: Creation Time:   Status: (Other)   HEART BBFC=074  bpm  RR Ohoycrbf=360  ms  PA Tzcsiuqb=694  ms  P Horizontal Axis=-58  deg  P Front Axis=83  deg  QRSD Czndkgnb=155   ms  QT Azbealuh=701  ms  HPpX=568  ms  QRS Axis=93  deg  T Wave Axis=192  deg  - ABNORMAL ECG -  Sinus tachycardia  Ventricular tachycardia, unsustained  Borderline right axis deviation  Anteroseptal infarct, old  Abnormal T, consider ischemia, diffuse leads          Current Facility-Administered Medications:     acetaminophen (TYLENOL) tablet 650 mg, 650 mg, Oral, Q6H PRN, Aidan Betancourt MD    ALPRAZolam (XANAX) tablet 0.5 mg, 0.5 mg, Oral, TID PRN, Billy Quijano MD    aspirin EC tablet 81 mg, 81 mg, Oral, Daily, Jr Austin Stanton MD, 81 mg at 04/19/25 0903    atorvastatin (LIPITOR) tablet 80 mg, 80 mg, Oral, Daily, Jr Austin Stanton MD, 80 mg at 04/19/25 0903    benzonatate (TESSALON) capsule 100 mg, 100 mg, Oral, TID PRN, Kim Armstrong, APRN, 100 mg at 04/19/25 0242    sennosides-docusate (PERICOLACE) 8.6-50 MG per tablet 2 tablet, 2 tablet, Oral, BID PRN **AND** polyethylene glycol (MIRALAX) packet 17 g, 17 g, Oral, Daily PRN **AND** bisacodyl (DULCOLAX) EC tablet 5 mg, 5 mg, Oral, Daily PRN **AND** bisacodyl (DULCOLAX) suppository 10 mg, 10 mg, Rectal, Daily PRN, Jr Austin Stanton MD    Calcium Replacement - Follow Nurse / BPA Driven Protocol, , Not Applicable, PRN, Jr Austin Stanton MD    dextrose (D50W) (25 g/50 mL) IV injection 25 g, 25 g, Intravenous, Q15 Min PRN, Samia Michel APRN    dextrose (GLUTOSE) oral gel 15 g, 15 g, Oral, Q15 Min PRN, Samia Michel APRN    famotidine (PEPCID) tablet 20 mg, 20 mg, Oral, BID AC, Billy Quijano MD, 20 mg at 04/19/25 0628    furosemide (LASIX) injection 80 mg, 80 mg, Intravenous, TID, Aidan Betancourt MD, 80 mg at 04/19/25 0904    glucagon (GLUCAGEN) injection 1 mg, 1 mg, Intramuscular, Q15 Min PRN, Samia Michel APRN    insulin glargine (LANTUS, SEMGLEE) injection 10 Units, 10 Units, Subcutaneous, Daily, Samia Michel APRN, 10 Units at 04/19/25 0904    insulin lispro (HUMALOG/ADMELOG) injection 2-9 Units, 2-9 Units, Subcutaneous, 4x Daily AC &  at Bedtime, Samia Michel, APRN, 2 Units at 04/19/25 1227    Magnesium Cardiology Dose Replacement - Follow Nurse / BPA Driven Protocol, , Not Applicable, PRAGUILA, Jr Austin Stanton MD    metoprolol tartrate (LOPRESSOR) tablet 12.5 mg, 12.5 mg, Oral, Q12H, Aidan Betancourt MD, 12.5 mg at 04/19/25 0903    milrinone (PRIMACOR) 20 mg in 100 mL D5W infusion, 0.125 mcg/kg/min, Intravenous, Continuous, Mendoza Jacobson MD, Stopped at 04/19/25 1358    nitroglycerin (NITROSTAT) SL tablet 0.4 mg, 0.4 mg, Sublingual, Q5 Min PRN, Jr Austin Stanton MD    ondansetron (ZOFRAN) injection 4 mg, 4 mg, Intravenous, Q6H PRN, Jr Austin Stanton MD    Phosphorus Replacement - Follow Nurse / BPA Driven Protocol, , Not Applicable, PRN, Jr Austin Stanton MD    potassium chloride (KLOR-CON M10) CR tablet 30 mEq, 30 mEq, Oral, BID With Meals, MichelSamia bourne, APRN, 30 mEq at 04/19/25 0902    Potassium Replacement - Follow Nurse / BPA Driven Protocol, , Not Applicable, Maximino BRIGGS Jr Samuel B, MD    sodium chloride 0.9 % flush 10 mL, 10 mL, Intravenous, Q12H, Jr Austin Stanton MD, 10 mL at 04/19/25 1110    sodium chloride 0.9 % flush 10 mL, 10 mL, Intravenous, PRN, Jr Austin Stanton MD    sodium chloride 0.9 % infusion 40 mL, 40 mL, Intravenous, PRN, Jr Austin Stanton MD    temazepam (RESTORIL) capsule 15 mg, 15 mg, Oral, Nightly PRN, Trinidad Quijano MD, 15 mg at 04/18/25 0056     ASSESSMENT  Multivessel coronary artery disease  Severe LV dysfunction  Diabetes mellitus  Hypertension  Hyperlipidemia  Morbidly obese  Anxiety disorder  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      Patient Care Team:  Luz Maria White APRN as PCP - General (Family Medicine)    Chief Complaint:   Acute HFrEF, CAD    Interval History:   -5 L overnight.  Patient reports improvement in edema.  Still has frequent ectopy on the milrinone.    Objective   Vital Signs  Temp:  [97.8 °F (36.6 °C)-98.2 °F (36.8 °C)] 97.8 °F (36.6 °C)  Heart Rate:  [] 92  Resp:  [16-18] 18  BP: ()/(67-93) 103/73    Intake/Output Summary (Last 24 hours) at 2025 0826  Last data filed at 2025 0800  Gross per 24 hour   Intake 580 ml   Output 5375 ml   Net -4795 ml     Flowsheet Rows      Flowsheet Row First Filed Value   Admission Height 175.3 cm (69.02\") Documented at 2025   Admission Weight 122 kg (269 lb 13.5 oz) Documented at 2025            GEN: no distress, alert and oriented  HEENT: NACT, EOMI, moist mucous membranes  Lungs: CTAB, no wheezes, rales or rhonchi  CV: normal rate, regular rhythm, normal S1, S2, no murmurs, +2 radial pulses b/l  Abdomen: soft, nontender, nondistended, NABS  Extremities: 2-3+ edema  Skin: no rash, warm, dry  Heme/Lymph: no bruising  Psych: organized thought, normal behavior and affect    Results Review:    Results from last 7 days   Lab Units 25  0432   SODIUM mmol/L 134*   POTASSIUM mmol/L 3.0*   CHLORIDE mmol/L 95*   CO2 mmol/L 26.0   BUN mg/dL 22*   CREATININE mg/dL 1.02   GLUCOSE mg/dL 171*   CALCIUM mg/dL 8.6     Results from last 7 days   Lab Units 25  0052 25  2227   HSTROP T ng/L 49* 47*     Results from last 7 days   Lab Units 25  0432   WBC 10*3/mm3 8.54   HEMOGLOBIN g/dL 13.7   HEMATOCRIT % 43.4   PLATELETS 10*3/mm3 234         Results from last 7 days   Lab Units 25  2227   MAGNESIUM mg/dL 1.7     Results from last 7 days   Lab Units 25  0432   CHOLESTEROL " mg/dL 82   TRIGLYCERIDES mg/dL 79   HDL CHOL mg/dL 16*   LDL CHOL mg/dL 49               Medication Review:   aspirin, 81 mg, Oral, Daily  atorvastatin, 80 mg, Oral, Daily  famotidine, 20 mg, Oral, BID AC  furosemide, 80 mg, Intravenous, TID  insulin glargine, 10 Units, Subcutaneous, Daily  insulin lispro, 2-9 Units, Subcutaneous, 4x Daily AC & at Bedtime  metoprolol tartrate, 12.5 mg, Oral, Q12H  potassium chloride, 30 mEq, Oral, BID With Meals  potassium chloride ER, 40 mEq, Oral, Q4H  sodium chloride, 10 mL, Intravenous, Q12H         milrinone, 0.25 mcg/kg/min, Last Rate: 0.25 mcg/kg/min (04/19/25 0147)          Assessment & Plan   #Acute HFrEF  #Multivessel CAD  #Diabetes  #Hypertension  #Hyperlipidemia     39-year-old man, followed by Dr. Muir, with hypertension, hyperlipidemia, diabetes, recently diagnosed HFrEF with an EF of 18%, who presented to [facility] with dyspnea on exertion, cough, and exercise intolerance.  He underwent right and left heart catheterization which revealed elevated right and left-sided filling pressures with an RA pressure of 35 and a wedge pressure of 40.  Coronary angiography revealed a 99% stenosis of the distal circumflex (dominant, as well as a 90% stenosis of the mid LAD.  The distal circumflex is supplied via septal left to left collaterals.  He was transferred here for evaluation of CABG.  Dr. Stanton has reviewed and does not think patient is a good surgical candidate.  He has asked me to see for possible high risk PCI. After discussion with patient, plan for PCI with Dr. Betancourt once he is euvolemic.      He has been on and off midodrine due to significant ectopy.  He was restarted yesterday and given Lasix 3 times daily with metolazone and had robust urine output, -5 L.  Weight down several pounds.  He is continuing to have significant NSVT.  I will trial using the milrinone 0.125 and continue Lasix.  See how he does on that    -Reduce milrinone to0.125 mcg/kg/min  -  "continue lasix 80 mg IV TID  - K > 4, Mg > 2. K 3.0 this am, being repleted, will reassess this afternoon.   - Strict I's/O's, daily weights  - continue aspirin 81 mg daily, atorvastatin 80 mg daily  - Continue metoprolol tartrate 25 mg twice daily    Mendoza Jacobson MD  [facility]  04/19/25  08:26 EDT      Electronically signed by Mendoza Jacobson MD at 04/19/25 0830       Samia Michel APRN at 04/18/25 1008       Attestation signed by Jr Austin Stanton MD at 04/18/25 1208      I have reviewed this documentation and agree.                       LOS: 4 days   Patient Care Team:  Luz Maria White APRN as PCP - General (Family Medicine)    Chief Complaint: CAD    Subjective  \"Feeling okay\"    Vital Signs  Temp:  [98.2 °F (36.8 °C)-98.7 °F (37.1 °C)] 98.4 °F (36.9 °C)  Heart Rate:  [] 107  Resp:  [16-18] 16  BP: (102-122)/(62-90) 105/66  Body mass index is 38.01 kg/m².    Intake/Output Summary (Last 24 hours) at 4/18/2025 1008  Last data filed at 4/18/2025 0900  Gross per 24 hour   Intake 480 ml   Output 2060 ml   Net -1580 ml     I/O this shift:  In: 120 [P.O.:120]  Out: -            04/16/25  0525 04/17/25  0600 04/18/25  0522   Weight: 121 kg (266 lb 9.6 oz) 117 kg (257 lb 9.6 oz) 117 kg (257 lb 8 oz)         Objective:  Vital signs: (most recent): Blood pressure 105/66, pulse 107, temperature 98.4 °F (36.9 °C), temperature source Oral, resp. rate 16, height 175.3 cm (69.02\"), weight 117 kg (257 lb 8 oz), SpO2 99%.                Physical Exam:   General Appearance: awake and alert, no acute distress   Lungs: clear to auscultation, respirations regular, respirations even, and respirations unlabored,     Heart: tachycardic   Abdomen: soft or nontender, active bowel sounds    Skin: clean and dry   Neuro: alert and oriented, no focal deficits.     Results Review:        WBC WBC   Date Value Ref Range Status   04/17/2025 9.35 3.40 - 10.80 10*3/mm3 Final   04/16/2025 8.67 3.40 - " "10.80 10*3/mm3 Final      HGB Hemoglobin   Date Value Ref Range Status   04/17/2025 14.5 13.0 - 17.7 g/dL Final   04/16/2025 12.8 (L) 13.0 - 17.7 g/dL Final      HCT Hematocrit   Date Value Ref Range Status   04/17/2025 48.7 37.5 - 51.0 % Final   04/16/2025 40.9 37.5 - 51.0 % Final      Platelets Platelets   Date Value Ref Range Status   04/17/2025 272 140 - 450 10*3/mm3 Final   04/16/2025 249 140 - 450 10*3/mm3 Final        PT/INR:  No results found for: \"PROTIME\"/No results found for: \"INR\"    Sodium Sodium   Date Value Ref Range Status   04/18/2025 137 136 - 145 mmol/L Final   04/17/2025 135 (L) 136 - 145 mmol/L Final   04/16/2025 137 136 - 145 mmol/L Final      Potassium Potassium   Date Value Ref Range Status   04/18/2025 4.3 3.5 - 5.2 mmol/L Final   04/17/2025 3.8 3.5 - 5.2 mmol/L Final   04/17/2025 3.7 3.5 - 5.2 mmol/L Final   04/16/2025 4.2 3.5 - 5.2 mmol/L Final   04/16/2025 3.0 (L) 3.5 - 5.2 mmol/L Final      Chloride Chloride   Date Value Ref Range Status   04/18/2025 102 98 - 107 mmol/L Final   04/17/2025 98 98 - 107 mmol/L Final   04/16/2025 98 98 - 107 mmol/L Final      Bicarbonate CO2   Date Value Ref Range Status   04/18/2025 20.5 (L) 22.0 - 29.0 mmol/L Final   04/17/2025 25.0 22.0 - 29.0 mmol/L Final   04/16/2025 25.4 22.0 - 29.0 mmol/L Final      BUN BUN   Date Value Ref Range Status   04/18/2025 19 6 - 20 mg/dL Final   04/17/2025 15 6 - 20 mg/dL Final   04/16/2025 18 6 - 20 mg/dL Final      Creatinine Creatinine   Date Value Ref Range Status   04/18/2025 1.05 0.76 - 1.27 mg/dL Final   04/17/2025 1.01 0.76 - 1.27 mg/dL Final   04/16/2025 1.09 0.76 - 1.27 mg/dL Final      Calcium Calcium   Date Value Ref Range Status   04/18/2025 8.9 8.6 - 10.5 mg/dL Final   04/17/2025 8.8 8.6 - 10.5 mg/dL Final   04/16/2025 8.6 8.6 - 10.5 mg/dL Final      Magnesium No results found for: \"MG\"       aspirin, 81 mg, Oral, Daily  atorvastatin, 80 mg, Oral, Daily  famotidine, 40 mg, Oral, Daily  furosemide, 80 mg, " "Intravenous, TID  insulin glargine, 10 Units, Subcutaneous, Daily  insulin lispro, 2-9 Units, Subcutaneous, 4x Daily AC & at Bedtime  metOLazone, 5 mg, Oral, Once  metoprolol tartrate, 12.5 mg, Oral, Q12H  potassium chloride, 30 mEq, Oral, BID With Meals  sodium chloride, 10 mL, Intravenous, Q12H      milrinone, 0.25 mcg/kg/min, Last Rate: 0.25 mcg/kg/min (25 0904)              CHF (congestive heart failure), NYHA class IV, acute, systolic      Assessment & Plan    - Severe multi-vessel CAD   - Acute systolic heart failure, EF approximately 18% -- milrinone started   - DM II -- hgb A1c 8.4 in February   - HTN  - HLD  - Frequent PVC's -- AC with eliquis     Plan to continue to medically optimize prior to high risk PCI per cardiology.  We do not have much to add, will ask internal medicine to manage him while he is here since surgery is not recommended.     ENRIKE Villar  25  10:08 EDT      Electronically signed by Jr Austin Stanton MD at 25 1208       Aidan Betancourt MD at 25 0808              Patient Name: Luiz Christianson  :1985  39 y.o.      Patient Care Team:  Luz Maria White APRN as PCP - General (Family Medicine)    Chief Complaint:   Acute HFrEF, CAD    Interval History:   Milrinone discontinued given frequent ectopy. Rates improved, less ectopy, however UOP has also decreased.    Objective   Vital Signs  Temp:  [98.2 °F (36.8 °C)-98.7 °F (37.1 °C)] 98.7 °F (37.1 °C)  Heart Rate:  [] 101  Resp:  [16-18] 16  BP: (102-122)/(62-90) 110/62    Intake/Output Summary (Last 24 hours) at 2025 0808  Last data filed at 2025 0522  Gross per 24 hour   Intake 360 ml   Output 2060 ml   Net -1700 ml     Flowsheet Rows      Flowsheet Row First Filed Value   Admission Height 175.3 cm (69.02\") Documented at 2025   Admission Weight 122 kg (269 lb 13.5 oz) Documented at 2025            GEN: no distress, alert and oriented  HEENT: NACT, EOMI, moist " mucous membranes  Lungs: CTAB, no wheezes, rales or rhonchi  CV: normal rate, regular rhythm, normal S1, S2, no murmurs, +2 radial pulses b/l, JVD to mandible  Abdomen: soft, nontender, nondistended, NABS  Extremities: 2-3+ edema  Skin: no rash, warm, dry  Heme/Lymph: no bruising  Psych: organized thought, normal behavior and affect    Results Review:    Results from last 7 days   Lab Units 04/18/25  0309   SODIUM mmol/L 137   POTASSIUM mmol/L 4.3   CHLORIDE mmol/L 102   CO2 mmol/L 20.5*   BUN mg/dL 19   CREATININE mg/dL 1.05   GLUCOSE mg/dL 134*   CALCIUM mg/dL 8.9     Results from last 7 days   Lab Units 04/13/25  0052 04/12/25  2227   HSTROP T ng/L 49* 47*     Results from last 7 days   Lab Units 04/17/25  0947   WBC 10*3/mm3 9.35   HEMOGLOBIN g/dL 14.5   HEMATOCRIT % 48.7   PLATELETS 10*3/mm3 272         Results from last 7 days   Lab Units 04/12/25  2227   MAGNESIUM mg/dL 1.7                   Medication Review:   aspirin, 81 mg, Oral, Daily  atorvastatin, 80 mg, Oral, Daily  famotidine, 40 mg, Oral, Daily  furosemide, 80 mg, Intravenous, TID  insulin glargine, 10 Units, Subcutaneous, Daily  insulin lispro, 2-9 Units, Subcutaneous, 4x Daily AC & at Bedtime  metoprolol tartrate, 12.5 mg, Oral, Q12H  potassium chloride, 30 mEq, Oral, BID With Meals  sodium chloride, 10 mL, Intravenous, Q12H                Assessment & Plan   #Acute HFrEF  #Multivessel CAD  #Diabetes  #Hypertension  #Hyperlipidemia     39-year-old man, followed by Dr. Muir, with hypertension, hyperlipidemia, diabetes, recently diagnosed HFrEF with an EF of 18%, who presented to [facility] with dyspnea on exertion, cough, and exercise intolerance.  He underwent right and left heart catheterization yesterday which revealed elevated right and left-sided filling pressures with an RA pressure of 35 and a wedge pressure of 40.  Coronary angiography revealed a 99% stenosis of the distal circumflex (dominant, as well as a 90% stenosis of the mid  LAD.  The distal circumflex is supplied via septal left to left collaterals.  He was transferred here for evaluation of CABG.  Dr. Stanton has reviewed and does not think patient is a good surgical candidate.  He has asked me to see for possible high risk PCI. After discussion with patient, we will proceed with PCI once he is euvolemic.      Net neg 1900    He is still significantly volume overloaded.  We discontinued milrinone due to significant ectopy yesterday however his urine output has decreased.  Now that he is on beta-blocker, I would restart the milrinone in order to hopefully increase his urine output.  I would want to rest of the diurese over the weekend to hopefully have him okay for PCI on Monday.    - restart milrinone 0.25 mcg/kg/min  - metolazone 5 mg p.o. x 1, continue lasix 80 mg IV TID\  - K > 4, Mg > 2  - Strict I's/O's, daily weights  - continue aspirin 81 mg daily, atorvastatin 80 mg daily  - Continue metoprolol tartrate 25 mg twice daily    Aidan Saldana MD, FACC, [facility]  25  08:08 EDT      Electronically signed by Aidan Betancourt MD at 25 1315           Aidan Betancourt MD   Physician  Cardiology     Progress Notes     Signed     Date of Service: 25  Creation Time: 25     Signed       Expand All Collapse All[]Expand All by Default         Patient Name: Luiz Christianson  :1985  39 y.o.        Patient Care Team:  Luz Maria White APRN as PCP - General (Family Medicine)     Chief Complaint:   Acute HFrEF, CAD     Interval History:   NAEO, increased UOP.      Objective  Vital Signs  Temp:  [97.7 °F (36.5 °C)-98.5 °F (36.9 °C)] 98.5 °F (36.9 °C)  Heart Rate:  [105-112] 105  Resp:  [16-18] 16  BP: (104-121)/(76-80) 121/80     Intake/Output Summary (Last 24 hours) at 2025 0755  Last data filed at 2025 0350      Gross per 24 hour   Intake --   Output 7775 ml   Net -7775 ml      Flowsheet Rows       Flowsheet Row First Filed Value  "  Admission Height 175.3 cm (69.02\") Documented at 04/14/2025 2144   Admission Weight 122 kg (269 lb 13.5 oz) Documented at 04/14/2025 2144                GEN: no distress, alert and oriented  HEENT: NACT, EOMI, moist mucous membranes  Lungs: CTAB, no wheezes, rales or rhonchi  CV: normal rate, regular rhythm, normal S1, S2, no murmurs, +2 radial pulses b/l, JVD to mandible  Abdomen: soft, nontender, nondistended, NABS  Extremities: 2+ edema  Skin: no rash, warm, dry  Heme/Lymph: no bruising  Psych: organized thought, normal behavior and affect     Results Review:           Results from last 7 days   Lab Units 04/17/25  0038 04/16/25 1755 04/16/25  0235   SODIUM mmol/L  --   --  137   POTASSIUM mmol/L 3.7   < > 3.0*   CHLORIDE mmol/L  --   --  98   CO2 mmol/L  --   --  25.4   BUN mg/dL  --   --  18   CREATININE mg/dL  --   --  1.09   GLUCOSE mg/dL  --   --  102*   CALCIUM mg/dL  --   --  8.6    < > = values in this interval not displayed.            Results from last 7 days   Lab Units 04/13/25  0052 04/12/25  2227   HSTROP T ng/L 49* 47*           Results from last 7 days   Lab Units 04/16/25  0235   WBC 10*3/mm3 8.67   HEMOGLOBIN g/dL 12.8*   HEMATOCRIT % 40.9   PLATELETS 10*3/mm3 249               Results from last 7 days   Lab Units 04/12/25  2227   MAGNESIUM mg/dL 1.7                     Medication Review:     Scheduled Medication   aspirin, 81 mg, Oral, Daily  atorvastatin, 80 mg, Oral, Daily  famotidine, 40 mg, Oral, Daily  furosemide, 80 mg, Intravenous, TID  insulin glargine, 10 Units, Subcutaneous, Daily  insulin lispro, 2-9 Units, Subcutaneous, 4x Daily AC & at Bedtime  potassium chloride, 30 mEq, Oral, BID With Meals  sodium chloride, 10 mL, Intravenous, Q12H              Infusion Medications   milrinone, 0.25 mcg/kg/min, Last Rate: 0.25 mcg/kg/min (04/17/25 0039)            Assessment & Plan  #Acute HFrEF  #Multivessel CAD  #Diabetes  #Hypertension  #Hyperlipidemia     39-year-old man, followed by " "Wood, with hypertension, hyperlipidemia, diabetes, recently diagnosed HFrEF with an EF of 18%, who presented to [facility] with dyspnea on exertion, cough, and exercise intolerance.  He underwent right and left heart catheterization yesterday which revealed elevated right and left-sided filling pressures with an RA pressure of 35 and a wedge pressure of 40.  Coronary angiography revealed a 99% stenosis of the distal circumflex (dominant, as well as a 90% stenosis of the mid LAD.  The distal circumflex is supplied via septal left to left collaterals.  He was transferred here for evaluation of CABG.  Dr. Stanton has reviewed and does not think patient is a good surgical candidate.  He has asked me to see for possible high risk PCI. After discussion with patient, we will proceed with PCI once he is euvolemic.      Had 7.7L of UOP, remains overloaded.     Having a lot of NSVT, will discontinue milrinone     - BMP  - continue lasix 80 mg IV TID, if adequately diuresed by tomorrow, may consider PCI  - K > 4, Mg > 2  - Strict I's/O's, daily weights  - continue aspirin 81 mg daily, atorvastatin 80 mg daily  - SBP currently low 100s, continue to monitor and start further GDMT as tolerated     Aidan Saldana MD, FACC, [facility]  04/17/25  07:55 Samia Joseph APRN   Nurse Practitioner  Cardiothoracic Surgery     Progress Notes     Attested     Date of Service: 04/17/25 0804  Creation Time: 04/17/25 0804     Attested           Attestation signed by Jr Austin Stanton MD at 04/17/25 1202     I have reviewed this documentation and agree.               Expand All Collapse All[]Expand All by Default     LOS: 3 days   Patient Care Team:  Luz Maria White APRN as PCP - General (Family Medicine)     Chief Complaint: CAD     Subjective  \"Feeling okay\"     Vital Signs  Temp:  [97.7 °F (36.5 °C)-98.5 °F (36.9 °C)] 98.5 °F (36.9 °C)  Heart Rate:  [105-112] 105  Resp:  " "[16-18] 16  BP: (104-121)/(76-80) 110/78  Body mass index is 38.02 kg/m².     Intake/Output Summary (Last 24 hours) at 4/17/2025 0804  Last data filed at 4/17/2025 0750      Gross per 24 hour   Intake --   Output 7975 ml   Net -7975 ml      I/O this shift:  In: -   Out: 200 [Urine:200]        Vitals               04/16/25  0324 04/16/25  0525 04/17/25  0600   Weight: 120 kg (264 lb 5.3 oz) 121 kg (266 lb 9.6 oz) 117 kg (257 lb 9.6 oz)               Objective:  Vital signs: (most recent): Blood pressure 110/78, pulse 105, temperature 98.5 °F (36.9 °C), temperature source Oral, resp. rate 16, height 175.3 cm (69.02\"), weight 117 kg (257 lb 9.6 oz), SpO2 98%.                      Physical Exam:              General Appearance: awake and alert, no acute distress              Lungs: clear to auscultation, respirations regular, respirations even, and respirations unlabored,                Heart: tachycardic              Abdomen: soft or nontender, active bowel sounds               Skin: clean and dry              Neuro: alert and oriented, no focal deficits.      Results Review:          WBC       WBC   Date Value Ref Range Status   04/16/2025 8.67 3.40 - 10.80 10*3/mm3 Final   04/15/2025 10.45 3.40 - 10.80 10*3/mm3 Final   04/14/2025 11.31 (H) 3.40 - 10.80 10*3/mm3 Final      HGB       Hemoglobin   Date Value Ref Range Status   04/16/2025 12.8 (L) 13.0 - 17.7 g/dL Final   04/15/2025 13.7 13.0 - 17.7 g/dL Final   04/14/2025 15.3 12.0 - 17.0 g/dL Final   04/14/2025 15.1 13.0 - 17.7 g/dL Final      HCT       Hematocrit   Date Value Ref Range Status   04/16/2025 40.9 37.5 - 51.0 % Final   04/15/2025 45.1 37.5 - 51.0 % Final   04/14/2025 45 38 - 51 % Final   04/14/2025 47.3 37.5 - 51.0 % Final      Platelets       Platelets   Date Value Ref Range Status   04/16/2025 249 140 - 450 10*3/mm3 Final   04/15/2025 306 140 - 450 10*3/mm3 Final   04/14/2025 311 140 - 450 10*3/mm3 Final         PT/INR:  No results found for: " "\"PROTIME\"/No results found for: \"INR\"     Sodium       Sodium   Date Value Ref Range Status   04/16/2025 137 136 - 145 mmol/L Final   04/15/2025 134 (L) 136 - 145 mmol/L Final   04/14/2025 135 (L) 136 - 145 mmol/L Final      Potassium       Potassium   Date Value Ref Range Status   04/17/2025 3.7 3.5 - 5.2 mmol/L Final   04/16/2025 4.2 3.5 - 5.2 mmol/L Final   04/16/2025 3.0 (L) 3.5 - 5.2 mmol/L Final   04/15/2025 3.8 3.5 - 5.2 mmol/L Final   04/14/2025 3.8 3.5 - 5.2 mmol/L Final      Chloride       Chloride   Date Value Ref Range Status   04/16/2025 98 98 - 107 mmol/L Final   04/15/2025 101 98 - 107 mmol/L Final   04/14/2025 96 (L) 98 - 107 mmol/L Final      Bicarbonate       CO2   Date Value Ref Range Status   04/16/2025 25.4 22.0 - 29.0 mmol/L Final   04/15/2025 21.0 (L) 22.0 - 29.0 mmol/L Final   04/14/2025 23.4 22.0 - 29.0 mmol/L Final      BUN       BUN   Date Value Ref Range Status   04/16/2025 18 6 - 20 mg/dL Final   04/15/2025 20 6 - 20 mg/dL Final   04/14/2025 21 (H) 6 - 20 mg/dL Final      Creatinine       Creatinine   Date Value Ref Range Status   04/16/2025 1.09 0.76 - 1.27 mg/dL Final   04/15/2025 1.20 0.76 - 1.27 mg/dL Final   04/14/2025 0.87 0.60 - 1.30 mg/dL Final   04/14/2025 1.16 0.76 - 1.27 mg/dL Final      Calcium       Calcium   Date Value Ref Range Status   04/16/2025 8.6 8.6 - 10.5 mg/dL Final   04/15/2025 8.9 8.6 - 10.5 mg/dL Final   04/14/2025 9.6 8.6 - 10.5 mg/dL Final      Magnesium No results found for: \"MG\"           Scheduled Medication   aspirin, 81 mg, Oral, Daily  atorvastatin, 80 mg, Oral, Daily  famotidine, 40 mg, Oral, Daily  furosemide, 80 mg, Intravenous, TID  insulin glargine, 10 Units, Subcutaneous, Daily  insulin lispro, 2-9 Units, Subcutaneous, 4x Daily AC & at Bedtime  potassium chloride, 30 mEq, Oral, BID With Meals  sodium chloride, 10 mL, Intravenous, Q12H           Infusion Medications   milrinone, 0.25 mcg/kg/min, Last Rate: 0.25 mcg/kg/min (04/17/25 0039)             "        CHF (congestive heart failure), NYHA class IV, acute, systolic        Assessment & Plan     - Severe multi-vessel CAD   - Acute systolic heart failure, EF approximately 18% -- milrinone started   - DM II -- hgb A1c 8.4 in February   - HTN  - HLD  - Frequent PVC's -- AC with eliquis     Tearful this morning. Missing his children.    Milrinone discontinued due to NSVT.  Great response to diuresis down nearly 9 lbs.  Plan to continue to medically optimize prior to high risk PCI.     ENRIKE Villar  04/17/25  08:04 EDT               Cosigned by: Jr Austin Stanton MD at 04/17/25 1204     Revision History

## 2025-04-21 NOTE — PLAN OF CARE
Goal Outcome Evaluation:  Plan of Care Reviewed With: patient     Progress: no change    Outcome Evaluation: Pt is a possible PCI today and NPO since midnight. Lasix given per order. Good urine output overnight. Labs pending. VSS on room air. SR-ST on the tele monitor with frequent PVCs. No complaints of pain. Will update as needed.

## 2025-04-22 ENCOUNTER — TELEPHONE (OUTPATIENT)
Dept: FAMILY MEDICINE CLINIC | Facility: CLINIC | Age: 40
End: 2025-04-22

## 2025-04-22 LAB
ACT BLD: 233 SECONDS (ref 82–152)
ACT BLD: 268 SECONDS (ref 82–152)
ACT BLD: 343 SECONDS (ref 82–152)
ANION GAP SERPL CALCULATED.3IONS-SCNC: 13.5 MMOL/L (ref 5–15)
BUN SERPL-MCNC: 23 MG/DL (ref 6–20)
BUN/CREAT SERPL: 23.7 (ref 7–25)
CALCIUM SPEC-SCNC: 8.6 MG/DL (ref 8.6–10.5)
CHLORIDE SERPL-SCNC: 96 MMOL/L (ref 98–107)
CO2 SERPL-SCNC: 26.5 MMOL/L (ref 22–29)
CREAT SERPL-MCNC: 0.97 MG/DL (ref 0.76–1.27)
DEPRECATED RDW RBC AUTO: 44.4 FL (ref 37–54)
EGFRCR SERPLBLD CKD-EPI 2021: 100.6 ML/MIN/1.73
ERYTHROCYTE [DISTWIDTH] IN BLOOD BY AUTOMATED COUNT: 14 % (ref 12.3–15.4)
GLUCOSE BLDC GLUCOMTR-MCNC: 114 MG/DL (ref 70–130)
GLUCOSE BLDC GLUCOMTR-MCNC: 151 MG/DL (ref 70–130)
GLUCOSE BLDC GLUCOMTR-MCNC: 179 MG/DL (ref 70–130)
GLUCOSE BLDC GLUCOMTR-MCNC: 217 MG/DL (ref 70–130)
GLUCOSE SERPL-MCNC: 109 MG/DL (ref 65–99)
HCO3 BLDA-SCNC: 25.8 MMOL/L (ref 21–28)
HCO3 BLDA-SCNC: 28.9 MMOL/L (ref 21–28)
HCT VFR BLD AUTO: 44.4 % (ref 37.5–51)
HCT VFR BLDA CALC: 50 % (ref 38–51)
HCT VFR BLDA CALC: 51 % (ref 38–51)
HGB BLD-MCNC: 13.7 G/DL (ref 13–17.7)
HGB BLDA-MCNC: 17 G/DL (ref 12–17)
HGB BLDA-MCNC: 17.3 G/DL (ref 12–17)
MCH RBC QN AUTO: 27.2 PG (ref 26.6–33)
MCHC RBC AUTO-ENTMCNC: 30.9 G/DL (ref 31.5–35.7)
MCV RBC AUTO: 88.3 FL (ref 79–97)
PCO2 BLDA: 35 MM HG (ref 35–45)
PCO2 BLDV: 43.9 MMHG (ref 41–51)
PH BLDA: 7.43 [PH] (ref 7.31–7.41)
PH BLDA: 7.47 PH UNITS (ref 7.35–7.45)
PLATELET # BLD AUTO: 218 10*3/MM3 (ref 140–450)
PMV BLD AUTO: 10.6 FL (ref 6–12)
PO2 BLDA: 121 MMHG (ref 80–105)
PO2 BLDV: 28 MM HG (ref 35–42)
POTASSIUM BLDA-SCNC: 4 MMOL/L (ref 3.5–4.9)
POTASSIUM BLDA-SCNC: 4.2 MMOL/L (ref 3.5–4.9)
POTASSIUM SERPL-SCNC: 3.3 MMOL/L (ref 3.5–5.2)
QT INTERVAL: 408 MS
QTC INTERVAL: 501 MS
RBC # BLD AUTO: 5.03 10*6/MM3 (ref 4.14–5.8)
SAO2 % BLDA: 99 % (ref 95–98)
SAO2 % BLDCOA: 53 % (ref 45–75)
SODIUM SERPL-SCNC: 136 MMOL/L (ref 136–145)
WBC NRBC COR # BLD AUTO: 7.01 10*3/MM3 (ref 3.4–10.8)

## 2025-04-22 PROCEDURE — 93005 ELECTROCARDIOGRAM TRACING: CPT | Performed by: STUDENT IN AN ORGANIZED HEALTH CARE EDUCATION/TRAINING PROGRAM

## 2025-04-22 PROCEDURE — 99232 SBSQ HOSP IP/OBS MODERATE 35: CPT | Performed by: STUDENT IN AN ORGANIZED HEALTH CARE EDUCATION/TRAINING PROGRAM

## 2025-04-22 PROCEDURE — 93010 ELECTROCARDIOGRAM REPORT: CPT | Performed by: INTERNAL MEDICINE

## 2025-04-22 PROCEDURE — 82948 REAGENT STRIP/BLOOD GLUCOSE: CPT

## 2025-04-22 PROCEDURE — 63710000001 INSULIN GLARGINE PER 5 UNITS: Performed by: STUDENT IN AN ORGANIZED HEALTH CARE EDUCATION/TRAINING PROGRAM

## 2025-04-22 PROCEDURE — 80048 BASIC METABOLIC PNL TOTAL CA: CPT | Performed by: STUDENT IN AN ORGANIZED HEALTH CARE EDUCATION/TRAINING PROGRAM

## 2025-04-22 PROCEDURE — 25010000002 FUROSEMIDE PER 20 MG: Performed by: STUDENT IN AN ORGANIZED HEALTH CARE EDUCATION/TRAINING PROGRAM

## 2025-04-22 PROCEDURE — 63710000001 INSULIN LISPRO (HUMAN) PER 5 UNITS: Performed by: STUDENT IN AN ORGANIZED HEALTH CARE EDUCATION/TRAINING PROGRAM

## 2025-04-22 PROCEDURE — 85027 COMPLETE CBC AUTOMATED: CPT | Performed by: STUDENT IN AN ORGANIZED HEALTH CARE EDUCATION/TRAINING PROGRAM

## 2025-04-22 RX ORDER — FUROSEMIDE 10 MG/ML
80 INJECTION INTRAMUSCULAR; INTRAVENOUS 3 TIMES DAILY
Status: COMPLETED | OUTPATIENT
Start: 2025-04-22 | End: 2025-04-22

## 2025-04-22 RX ADMIN — FUROSEMIDE 80 MG: 10 INJECTION, SOLUTION INTRAMUSCULAR; INTRAVENOUS at 00:58

## 2025-04-22 RX ADMIN — Medication 10 ML: at 20:45

## 2025-04-22 RX ADMIN — INSULIN LISPRO 2 UNITS: 100 INJECTION, SOLUTION INTRAVENOUS; SUBCUTANEOUS at 12:31

## 2025-04-22 RX ADMIN — FUROSEMIDE 80 MG: 10 INJECTION, SOLUTION INTRAMUSCULAR; INTRAVENOUS at 08:50

## 2025-04-22 RX ADMIN — FAMOTIDINE 20 MG: 20 TABLET, FILM COATED ORAL at 17:15

## 2025-04-22 RX ADMIN — INSULIN LISPRO 2 UNITS: 100 INJECTION, SOLUTION INTRAVENOUS; SUBCUTANEOUS at 17:15

## 2025-04-22 RX ADMIN — FAMOTIDINE 20 MG: 20 TABLET, FILM COATED ORAL at 06:12

## 2025-04-22 RX ADMIN — ASPIRIN 81 MG: 81 TABLET, COATED ORAL at 08:51

## 2025-04-22 RX ADMIN — Medication 10 ML: at 08:52

## 2025-04-22 RX ADMIN — ATORVASTATIN CALCIUM 40 MG: 20 TABLET, FILM COATED ORAL at 20:43

## 2025-04-22 RX ADMIN — INSULIN GLARGINE 15 UNITS: 100 INJECTION, SOLUTION SUBCUTANEOUS at 08:50

## 2025-04-22 RX ADMIN — POTASSIUM CHLORIDE 40 MEQ: 1500 TABLET, EXTENDED RELEASE ORAL at 12:32

## 2025-04-22 RX ADMIN — POTASSIUM CHLORIDE 40 MEQ: 1500 TABLET, EXTENDED RELEASE ORAL at 08:51

## 2025-04-22 RX ADMIN — INSULIN LISPRO 4 UNITS: 100 INJECTION, SOLUTION INTRAVENOUS; SUBCUTANEOUS at 20:42

## 2025-04-22 RX ADMIN — FUROSEMIDE 80 MG: 10 INJECTION, SOLUTION INTRAMUSCULAR; INTRAVENOUS at 15:32

## 2025-04-22 RX ADMIN — POTASSIUM CHLORIDE 40 MEQ: 1500 TABLET, EXTENDED RELEASE ORAL at 17:15

## 2025-04-22 NOTE — DISCHARGE SUMMARY
[facility]         HOSPITALIST  DISCHARGE SUMMARY    Patient Name: Luiz Christianson  : 1985  MRN: [id number]    Date of Admission: 2025  Date of Discharge:  2025  Primary Care Physician: Luz Maria White APRN    Consults       No orders found from 3/14/2025 to 2025.            Active and Resolved Hospital Problems:  Active Hospital Problems    Diagnosis POA    Acute systolic CHF (congestive heart failure) [I50.21] Unknown    Hypertension, essential [I10] Yes      Resolved Hospital Problems    Diagnosis POA    **Acute exacerbation of CHF (congestive heart failure) [I50.9] Yes       Hospital Course     Hospital Course:  Luiz Christianson is a 39 y.o. male  sig PMHx of HFrEF 18% on echo 2025, Afib on eliquis, DM, HTN, HLD, Depression presented to the ED complaining of chest pain and shortness of breath.  Positive for orthopnea, lower extremity edema, PND.          Patient admitted for acute CHF exacerbation.  Cardiology consulted.  Echo from 2023 EF normal, recent outpatient echo on  EF had dropped to 18%.  He also had an outpatient EKG which was concerning for A-fib, he was initiated on p.o. Eliquis at that time.  Patient initiated IV Lasix 40 mg twice daily.  CT angiogram showed cardiomegaly with small pericardial effusion, anasarca and small amount of ascites.  Cardiology took him for left and right heart cath on .  Severe multivessel CAD was found.  Cardiology recommended transfer to [facility] for CABG evaluation and advanced heart failure therapies.    Patient discharged in stable condition.    Day of Discharge     Vital Signs:  Temp:  [97.4 °F (36.3 °C)-98.5 °F (36.9 °C)] 98.3 °F (36.8 °C)  Heart Rate:  [] 94  Resp:  [16] 16  BP: ()/(47-86) 101/81    Physical Exam:   Gen: NAD, Alert and Oriented  Pulm: CTA b/l, no wheezing  Abd: soft, nondistended  Extremities: no pitting edema      Discharge Details        Discharge Medications         ASK your doctor about these medications        Instructions Start Date   apixaban 5 MG tablet tablet  Commonly known as: ELIQUIS   5 mg, Oral, 2 Times Daily      atorvastatin 80 MG tablet  Commonly known as: LIPITOR   80 mg, Oral, Daily      cephalexin 500 MG capsule  Commonly known as: KEFLEX  Ask about: Should I take this medication?   500 mg, Oral, 4 Times Daily      furosemide 20 MG tablet  Commonly known as: LASIX   20 mg, Oral, 2 Times Daily      Januvia 100 MG tablet  Generic drug: SITagliptin   100 mg, Oral, Daily      lactulose 10 GM/15ML solution  Commonly known as: CHRONULAC   Take 15-30 mls daily for a goal of 2-3 BM/day      losartan 50 MG tablet  Commonly known as: COZAAR   50 mg, Oral, Daily      metFORMIN 500 MG tablet  Commonly known as: GLUCOPHAGE   TAKE 2 TABLETS 2 TIMES     DAILY WITH MEALS      metoprolol succinate XL 25 MG 24 hr tablet  Commonly known as: TOPROL-XL   25 mg, Oral, Nightly      ondansetron ODT 4 MG disintegrating tablet  Commonly known as: ZOFRAN-ODT   4 mg, Translingual, 4 Times Daily PRN      phenazopyridine 200 MG tablet  Commonly known as: PYRIDIUM   200 mg, Oral, 3 Times Daily PRN      spironolactone 25 MG tablet  Commonly known as: ALDACTONE   25 mg, Oral, Daily               Allergies   Allergen Reactions    Farxiga [Dapagliflozin] Other (See Comments)     Bladder irritation (persistant)       Discharge Disposition:  Hospice/Medical Facility ([facility])    Diet:  Hospital:No active diet order      Discharge Activity:       CODE STATUS:  Code Status and Medical Interventions: CPR (Attempt to Resuscitate); Full Support   Ordered at: 04/13/25 0154     Code Status (Patient has no pulse and is not breathing):    CPR (Attempt to Resuscitate)     Medical Interventions (Patient has pulse or is breathing):    Full Support         Future Appointments   Date Time Provider Department Center   4/30/2025  1:00 PM Gracie Sam RDN, TIFFANY  [facility] C [facility]   4/30/2025   6:00 PM [facility] HOSPITAL CT 2 [facility] CT [facility]   5/13/2025  1:00 PM Luz Maria White APRN [facility] RADCL [facility]   5/29/2025  1:00 PM [facility] HOLTER PAV [facility] CARDP None   7/8/2025 10:00 AM Brooke Natarajan APRN [facility] GE ETWR [facility]   12/2/2025  2:15 PM Russell Bledsoe MD [facility] CD ETOWN [facility]           Pertinent  and/or Most Recent Results         LAB RESULTS:      Lab 04/22/25  0559 04/21/25  1509 04/21/25  1505 04/21/25  0619 04/20/25  0327 04/19/25  0432 04/17/25  0947 04/16/25  0235   WBC 7.01  --   --  8.64 8.74 8.54 9.35 8.67   HEMOGLOBIN 13.7  --   --  14.3 14.5 13.7 14.5 12.8*   HEMOGLOBIN, POC  --  17.3* 17.0  --   --   --   --   --    HEMATOCRIT 44.4  --   --  45.9 47.1 43.4 48.7 40.9   HEMATOCRIT POC  --  51 50  --   --   --   --   --    PLATELETS 218  --   --  240 242 234 272 249   NEUTROS ABS  --   --   --  3.68 4.40 4.01  --  4.76   IMMATURE GRANS (ABS)  --   --   --  0.03 0.05 0.04  --  0.04   LYMPHS ABS  --   --   --  3.54* 2.86 3.08  --  2.48   MONOS ABS  --   --   --  1.02* 1.05* 1.06*  --  1.02*   EOS ABS  --   --   --  0.23 0.26 0.23  --  0.25   MCV 88.3  --   --  87.9 89.4 88.0 93.3 88.7         Lab 04/22/25  0559 04/21/25  1823 04/21/25  0619 04/20/25  1835 04/20/25  0605 04/20/25  0327 04/19/25  1838 04/19/25  1613 04/19/25  1612 04/19/25  0432   SODIUM 136  --  133*  --  133* 134*  --  132*  --  134*   POTASSIUM 3.3* 4.3 3.6 4.6 3.3* 3.1*  3.1*   < > 3.6   < > 3.0*   CHLORIDE 96*  --  92*  --  91* 91*  --  91*  --  95*   CO2 26.5  --  29.6*  --  28.0 26.0  --  25.0  --  26.0   ANION GAP 13.5  --  11.4  --  14.0 17.0*  --  16.0*  --  13.0   BUN 23*  --  27*  --  24* 25*  --  23*  --  22*   CREATININE 0.97  --  1.15  --  1.05 1.07  --  1.05  --  1.02   EGFR 100.6  --  82.0  --  91.5 89.4  --  91.5  --  94.7   GLUCOSE 109*  --  121*  --  117* 131*  --  197*  --  171*   CALCIUM 8.6  --  9.1  --  9.0 9.2  --  9.1  --  8.6   MAGNESIUM  --   --  2.1  --   --  1.9  --  2.1  --   --    TSH  --   --   --    --   --   --   --   --   --  5.580*    < > = values in this interval not displayed.         Lab 04/19/25  0432 04/16/25  0235   TOTAL PROTEIN 6.7 6.3   ALBUMIN 3.6 3.2*   GLOBULIN 3.1 3.1   ALT (SGPT) 213* 104*   AST (SGOT) 201* 60*   BILIRUBIN 2.2* 2.3*   ALK PHOS 88 81         Lab 04/16/25  1755   PROBNP 1,693.0*         Lab 04/19/25  0432   CHOLESTEROL 82   LDL CHOL 49   HDL CHOL 16*   TRIGLYCERIDES 79             Lab 04/21/25  1505   PH, ARTERIAL 7.470*     Brief Urine Lab Results  (Last result in the past 365 days)        Color   Clarity   Blood   Leuk Est   Nitrite   Protein   CREAT   Urine HCG        04/04/25 1131 Dark Yellow   Clear   Negative   Small (1+)   Negative   >=300 mg/dL (3+)                 Microbiology Results (last 10 days)       Procedure Component Value - Date/Time    Blood Culture - Blood, Hand, Left [[id number]]  (Normal) Collected: 04/13/25 0436    Lab Status: Final result Specimen: Blood from Hand, Left Updated: 04/18/25 0500     Blood Culture No growth at 5 days    Blood Culture - Blood, Hand, Right [[id number]]  (Normal) Collected: 04/13/25 0436    Lab Status: Final result Specimen: Blood from Hand, Right Updated: 04/18/25 0500     Blood Culture No growth at 5 days            No radiology results for the last 7 days             Results for orders placed during the hospital encounter of 04/08/25    Adult Transthoracic Echo Complete w/ Color, Spectral and Contrast if necessary per protocol    Interpretation Summary    Left ventricular systolic function is severely decreased. Calculated left ventricular EF = 18.6%    The left ventricular cavity is dilated.    The left atrial cavity is dilated.    Estimated right ventricular systolic pressure from tricuspid regurgitation is mildly elevated (35-45 mmHg).    There is a small (<1cm) pericardial effusion.    Mild MR and mild TR.          Time spent on Discharge including face to face service:  >30 minutes    Electronically signed by Ly SUNG  DO Jeffry, 04/22/25, 4:56 PM EDT.

## 2025-04-22 NOTE — CASE MANAGEMENT/SOCIAL WORK
Continued Stay Note  [facility]     Patient Name: Luiz Christianson  MRN: [id number]  Today's Date: 4/22/2025    Admit Date: 4/14/2025    Plan: Home w/ spouse   Discharge Plan       Row Name 04/22/25 1648       Plan    Plan Home w/ spouse    Plan Comments Patient is status post right heart catheterization yesterday with PCI to mid LAD and distal circumflex.  Lasix 80 mg IV 3 times daily continues.  CCP following.........Alena MELARA/ELÍAS                   Discharge Codes    No documentation.                 Expected Discharge Date and Time       Expected Discharge Date Expected Discharge Time    Apr 24, 2025               Alena Marin RN

## 2025-04-22 NOTE — TELEPHONE ENCOUNTER
Caller: Luiz Christianson    Relationship: Self    Best call back number: [phone]    What form or medical record are you requesting: FORM FOR SHORT TERM DISABILITY     Who is requesting this form or medical record from you: PATIENT FOR EMPLOYER     How would you like to receive the form or medical records (pick-up, mail, fax):   If fax, what is the fax number: INCLUDED ON FORM     Timeframe paperwork needed: AS SOON AS POSSIBLE SO PAY CHECKS DON'T STOP     Additional notes: PATIENT IS STILL IN THE HOSPITAL AND IS EXPECTED TO BE RELEASED ON 04/24/2025 IF HE NEEDS TO STOP BY THE OFFICE   PLEASE CONTACT AND ADVISE THE STATUS OF THIS PAPERWORK

## 2025-04-22 NOTE — PLAN OF CARE
Goal Outcome Evaluation: pt required 2 units insulin coverage prior to lunch and dinner.  Pt up ad giovana.  Pt voided 1,750 in urinal and reported 1 unmeasured urine, forgot to use urinal.  Pt also reported 1 BM.  Pt continues to be SR-ST on monitor w/ frequent ectopy and intermittent runs of NSVT.

## 2025-04-22 NOTE — PROGRESS NOTES
"    Patient Name: Luiz Christianson  :1985  39 y.o.      Patient Care Team:  Luz Maria White APRN as PCP - General (Family Medicine)    Chief Complaint:   Acute HFrEF, CAD    Interval History:   PCI to LAD and Lcx. Feels good this morning.    Objective   Vital Signs  Temp:  [97.4 °F (36.3 °C)-98.5 °F (36.9 °C)] 98.3 °F (36.8 °C)  Heart Rate:  [] 83  Resp:  [15-24] 16  BP: ()/(47-86) 101/81    Intake/Output Summary (Last 24 hours) at 2025 0842  Last data filed at 2025 0610  Gross per 24 hour   Intake --   Output 2320 ml   Net -2320 ml     Flowsheet Rows      Flowsheet Row First Filed Value   Admission Height 175.3 cm (69.02\") Documented at 2025   Admission Weight 122 kg (269 lb 13.5 oz) Documented at 2025            GEN: no distress, alert and oriented  HEENT: NACT, EOMI, moist mucous membranes  Lungs: CTAB, no wheezes, rales or rhonchi  CV: normal rate, regular rhythm, normal S1, S2, no murmurs, +2 radial pulses b/l, JVD to mid neck  Abdomen: soft, nontender, nondistended, NABS  Extremities: no edema  Skin: no rash, warm, dry  Heme/Lymph: no bruising  Psych: organized thought, normal behavior and affect    Results Review:    Results from last 7 days   Lab Units 25  0559   SODIUM mmol/L 136   POTASSIUM mmol/L 3.3*   CHLORIDE mmol/L 96*   CO2 mmol/L 26.5   BUN mg/dL 23*   CREATININE mg/dL 0.97   GLUCOSE mg/dL 109*   CALCIUM mg/dL 8.6           Results from last 7 days   Lab Units 25  0559   WBC 10*3/mm3 7.01   HEMOGLOBIN g/dL 13.7   HEMATOCRIT % 44.4   PLATELETS 10*3/mm3 218         Results from last 7 days   Lab Units 25  0619   MAGNESIUM mg/dL 2.1     Results from last 7 days   Lab Units 25  0432   CHOLESTEROL mg/dL 82   TRIGLYCERIDES mg/dL 79   HDL CHOL mg/dL 16*   LDL CHOL mg/dL 49               Medication Review:   aspirin, 81 mg, Oral, Daily  atorvastatin, 40 mg, Oral, Nightly  famotidine, 20 mg, Oral, BID AC  furosemide, 80 mg, " Intravenous, TID  furosemide, 80 mg, Intravenous, TID  insulin glargine, 15 Units, Subcutaneous, Daily  insulin lispro, 2-9 Units, Subcutaneous, 4x Daily AC & at Bedtime  potassium chloride, 40 mEq, Oral, TID With Meals  sodium chloride, 10 mL, Intravenous, Q12H                Assessment & Plan   #Acute HFrEF  #Multivessel CAD  #Diabetes  #Hypertension  #Hyperlipidemia     39-year-old man, followed by Dr. Muir, with hypertension, hyperlipidemia, diabetes, recently diagnosed HFrEF with an EF of 18%, who presented to [facility] with dyspnea on exertion, cough, and exercise intolerance.  He underwent right and left heart catheterization yesterday which revealed elevated right and left-sided filling pressures with an RA pressure of 35 and a wedge pressure of 40.  Coronary angiography revealed a 99% stenosis of the distal circumflex (dominant, as well as a 90% stenosis of the mid LAD.  The distal circumflex is supplied via septal left to left collaterals.  He was transferred here for evaluation of CABG.  Dr. Stanton has reviewed and does not think patient is a good surgical candidate.  He has asked me to see for possible high risk PCI.    Patient has diuresed over 25 pounds over the last week or so.  He has mild residual left lower extremity edema though the right lower extremity looks great.    Right heart catheterization yesterday with persistently elevated right and left-sided filling pressures: Mean RA of 22, wedge of 44.    Status post PCI to mid LAD and distal circumflex.    - Continue Lasix 80 mg IV 3 times daily  - K > 4, Mg > 2  - Strict I's/O's, daily weights  - continue aspirin 81 mg daily, atorvastatin 80 mg daily  - Discontinue Toprol 25 mg daily given hypotension overnight    Aidan Saldana MD, [facility], [facility]  04/22/25  08:42 EDT

## 2025-04-22 NOTE — PROGRESS NOTES
"Daily progress note    Referring physician      Subjective  Status post PCI to LAD and circumflex and feeling better with no new complaints doing same with no new complaints.    History of present illness  39-year-old white male with history of chronic congestive heart failure diabetes mellitus hypertension hyperlipidemia admitted to cardiology service from [facility] where he was admitted with shortness of breath and found to have severe LV dysfunction and multivessel coronary artery disease.  I am asked to follow patient for medical problems.  At the time of examination he has denies any chest pain shortness of breath palpitation but feeling very weak.    Review of Systems:  Unremarkable    Physical Exam:  Blood pressure 101/81, pulse 83, temperature 98.3 °F (36.8 °C), temperature source Oral, resp. rate 16, height 175.3 cm (69.02\"), weight 108 kg (238 lb 6.4 oz), SpO2 99%.    General: Awake alert and in no distress  HEENT: Unremarkable  Neck: Supple  Heart: Tachycardic and irregular rhythm, no murmurs or rubs, 2+ radial pulses bilaterally  Lungs: Clear to auscultation bilaterally without wheezes or crackles, no respiratory distress  Abdomen: Soft, mildly tender diffusely throughout the abdomen but no generalized peritonitis, nondistended, no rebound or guarding  Skin: Warm, dry, no rash  Musculoskeletal: Normal range of motion, 2+ pitting bilateral symmetric lower extremity edema up to both thighs  Neurologic: Oriented x3, no motor deficits no sensory deficit  Psychiatric: Mood appears stable, no psychosis    LABS  Lab Results (last 24 hours)       Procedure Component Value Units Date/Time    POCT OR PANEL, Venous [[id number]]  (Abnormal) Collected: 04/21/25 1509    Specimen: Blood Updated: 04/22/25 1456     Hemoglobin 17.3 g/dL      Hematocrit 51 %      Comment: Serial Number: [id number]:  [id number]        HCO3 28.9 mmol/L      POC Potassium 4.0 mmol/L      PH (Venous) 7.430     pCO2 " (Venous) 43.9 mmHg      pO2, Venous 28.0 mm Hg      SO2 53.0 %     POC Panel 9, ISTAT [[id number]]  (Abnormal) Collected: 04/21/25 1505    Specimen: Blood Updated: 04/22/25 1456     Hemoglobin 17.0 g/dL      Hematocrit 50 %      Comment: Serial Number: [id number]:  [id number]        O2 Saturation, Arterial 99 %      HCO3 25.8 mmol/L      POC Potassium 4.2 mmol/L      pH, Arterial 7.470 pH units      pCO2, Arterial 35.0 mm Hg      pO2, Arterial 121 mmHg     POC Activated Clotting Time [[id number]]  (Abnormal) Collected: 04/21/25 1615    Specimen: Blood Updated: 04/22/25 1331     Activated Clotting Time  268 Seconds      Comment: Serial Number: [id number]:  [id number]       POC Activated Clotting Time [[id number]]  (Abnormal) Collected: 04/21/25 1551    Specimen: Blood Updated: 04/22/25 1331     Activated Clotting Time  233 Seconds      Comment: Serial Number: [id number]:  [id number]       POC Activated Clotting Time [[id number]]  (Abnormal) Collected: 04/21/25 1529    Specimen: Blood Updated: 04/22/25 1331     Activated Clotting Time  343 Seconds      Comment: Serial Number: [id number]:  [id number]       POC Glucose Once [[id number]]  (Abnormal) Collected: 04/22/25 1154    Specimen: Blood Updated: 04/22/25 1212     Glucose 151 mg/dL     Basic Metabolic Panel [[id number]]  (Abnormal) Collected: 04/22/25 0559    Specimen: Blood Updated: 04/22/25 0719     Glucose 109 mg/dL      BUN 23 mg/dL      Creatinine 0.97 mg/dL      Sodium 136 mmol/L      Potassium 3.3 mmol/L      Chloride 96 mmol/L      CO2 26.5 mmol/L      Calcium 8.6 mg/dL      BUN/Creatinine Ratio 23.7     Anion Gap 13.5 mmol/L      eGFR 100.6 mL/min/1.73     Narrative:      GFR Categories in Chronic Kidney Disease (CKD)      GFR Category          GFR (mL/min/1.73)    Interpretation  G1                     90 or greater         Normal or high (1)  G2                      60-89                Mild decrease (1)  G3a                   45-59                Mild to  moderate decrease  G3b                   30-44                Moderate to severe decrease  G4                    15-29                Severe decrease  G5                    14 or less           Kidney failure          (1)In the absence of evidence of kidney disease, neither GFR category G1 or G2 fulfill the criteria for CKD.    eGFR calculation 2021 CKD-EPI creatinine equation, which does not include race as a factor    CBC (No Diff) [[id number]]  (Abnormal) Collected: 04/22/25 0559    Specimen: Blood Updated: 04/22/25 0655     WBC 7.01 10*3/mm3      RBC 5.03 10*6/mm3      Hemoglobin 13.7 g/dL      Hematocrit 44.4 %      MCV 88.3 fL      MCH 27.2 pg      MCHC 30.9 g/dL      RDW 14.0 %      RDW-SD 44.4 fl      MPV 10.6 fL      Platelets 218 10*3/mm3     POC Glucose Once [[id number]]  (Normal) Collected: 04/22/25 0608    Specimen: Blood Updated: 04/22/25 0610     Glucose 114 mg/dL     POC Glucose Once [[id number]]  (Abnormal) Collected: 04/21/25 2025    Specimen: Blood Updated: 04/21/25 2029     Glucose 214 mg/dL     Potassium [[id number]]  (Normal) Collected: 04/21/25 1823    Specimen: Blood Updated: 04/21/25 1858     Potassium 4.3 mmol/L     POC Glucose Once [[id number]]  (Abnormal) Collected: 04/21/25 1657    Specimen: Blood Updated: 04/21/25 1658     Glucose 135 mg/dL           Study Result  Narrative & Impression   XR CHEST PA AND LATERAL-     Clinical: CHF     FINDINGS: Cardiac enlargement similar to the previous examination. No  pleural effusion or acute airspace disease has developed. No gross  vascular congestion. The could be mild central vascular congestion.  Remainder is unremarkable.        Scan on 4/17/2025 1635 by [employer], [place]: ECG 12-LEAD         Author: -- Service: -- Author Type: --   Filed: Date of Service: Creation Time:   Status: (Other)   HEART QZML=358  bpm  RR Ncjayjok=904  ms  CO Jceflcen=909  ms  P Horizontal Axis=-58  deg  P Front Axis=83  deg  QRSD Vuvuohhq=495  ms  QT Sefdgudf=405   ms  IVfV=767  ms  QRS Axis=93  deg  T Wave Axis=192  deg  - ABNORMAL ECG -  Sinus tachycardia  Ventricular tachycardia, unsustained  Borderline right axis deviation  Anteroseptal infarct, old  Abnormal T, consider ischemia, diffuse leads          Current Facility-Administered Medications:     acetaminophen (TYLENOL) tablet 650 mg, 650 mg, Oral, Q6H PRN, Aidan Betancourt MD, 650 mg at 04/19/25 1637    acetaminophen (TYLENOL) tablet 650 mg, 650 mg, Oral, Q4H PRN, Aidan Betancourt MD    [Transfer Hold] ALPRAZolam (XANAX) tablet 0.5 mg, 0.5 mg, Oral, TID PRN, Billy Quijano MD, 0.5 mg at 04/20/25 1644    aspirin EC tablet 81 mg, 81 mg, Oral, Daily, Aidan Betancourt MD, 81 mg at 04/22/25 0851    atorvastatin (LIPITOR) tablet 40 mg, 40 mg, Oral, Nightly, Aidan Betancourt MD, 40 mg at 04/21/25 2117    benzonatate (TESSALON) capsule 100 mg, 100 mg, Oral, TID PRN, Aidan Betancourt MD, 100 mg at 04/21/25 0552    sennosides-docusate (PERICOLACE) 8.6-50 MG per tablet 2 tablet, 2 tablet, Oral, BID PRN **AND** polyethylene glycol (MIRALAX) packet 17 g, 17 g, Oral, Daily PRN **AND** bisacodyl (DULCOLAX) EC tablet 5 mg, 5 mg, Oral, Daily PRN **AND** bisacodyl (DULCOLAX) suppository 10 mg, 10 mg, Rectal, Daily PRN, Aidan Betancourt MD    Calcium Replacement - Follow Nurse / BPA Driven Protocol, , Not Applicable, PRNSerafin Ivan, MD    dextrose (D50W) (25 g/50 mL) IV injection 25 g, 25 g, Intravenous, Q15 Min PRN, Aidan Betancourt MD    dextrose (GLUTOSE) oral gel 15 g, 15 g, Oral, Q15 Min PRN, Aidan Betancourt MD    famotidine (PEPCID) tablet 20 mg, 20 mg, Oral, BID AC, Aidan Betancourt MD, 20 mg at 04/22/25 0612    furosemide (LASIX) injection 80 mg, 80 mg, Intravenous, TID, Aidan Betancourt MD, 80 mg at 04/22/25 0850    glucagon (GLUCAGEN) injection 1 mg, 1 mg, Intramuscular, Q15 Min PRN, Aidan Betancourt MD    insulin glargine (LANTUS, SEMGLEE) injection 15 Units, 15 Units, Subcutaneous, Daily, Aidan Betancourt MD, 15 Units at 04/22/25 0850    insulin lispro (HUMALOG/ADMELOG) injection 2-9  Units, 2-9 Units, Subcutaneous, 4x Daily AC & at Bedtime, Aidan Betancourt MD, 2 Units at 04/22/25 1231    Magnesium Cardiology Dose Replacement - Follow Nurse / BPA Driven Protocol, , Not Applicable, PRSerafin SHEEHAN Ivan, MD    nitroglycerin (NITROSTAT) SL tablet 0.4 mg, 0.4 mg, Sublingual, Q5 Min PRN, Aidan Betancourt MD    nitroglycerin (NITROSTAT) SL tablet 0.4 mg, 0.4 mg, Sublingual, Q5 Min PRN, Aidan Betancourt MD    ondansetron (ZOFRAN) injection 4 mg, 4 mg, Intravenous, Q6H PRN, Aidan Betancourt MD    Phosphorus Replacement - Follow Nurse / BPA Driven Protocol, , Not Applicable, Serafin BRIGGS Ivan, MD    potassium chloride (KLOR-CON M20) CR tablet 40 mEq, 40 mEq, Oral, TID With Meals, Aidan Betancourt MD, 40 mEq at 04/22/25 1232    Potassium Replacement - Follow Nurse / BPA Driven Protocol, , Not Applicable, Serafin BRIGGS Ivan, MD    sodium chloride 0.9 % flush 10 mL, 10 mL, Intravenous, Q12H, Aidan Betancourt MD, 10 mL at 04/22/25 0852    sodium chloride 0.9 % flush 10 mL, 10 mL, Intravenous, PRN, Aidan Betancourt MD    sodium chloride 0.9 % infusion 40 mL, 40 mL, Intravenous, PRSerafin SHEEHAN Ivan, MD    [Transfer Hold] temazepam (RESTORIL) capsule 15 mg, 15 mg, Oral, Nightly PRN, Trinidad Quijano MD, 15 mg at 04/21/25 0038     ASSESSMENT  Multivessel coronary artery disease status post PCI  Severe LV dysfunction  Diabetes mellitus  Hypertension  Hyperlipidemia  Morbidly obese  Anxiety disorder  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

## 2025-04-22 NOTE — CONSULTS
Met with patient to discuss the benefits of cardiac rehab. Provided phase II information along with the contact information for cardiac rehab at [facility]. Requested for referral to be sent.

## 2025-04-23 LAB
ANION GAP SERPL CALCULATED.3IONS-SCNC: 10.6 MMOL/L (ref 5–15)
BASOPHILS # BLD AUTO: 0.11 10*3/MM3 (ref 0–0.2)
BASOPHILS NFR BLD AUTO: 1.5 % (ref 0–1.5)
BUN SERPL-MCNC: 20 MG/DL (ref 6–20)
BUN/CREAT SERPL: 22.7 (ref 7–25)
CALCIUM SPEC-SCNC: 8.8 MG/DL (ref 8.6–10.5)
CHLORIDE SERPL-SCNC: 98 MMOL/L (ref 98–107)
CO2 SERPL-SCNC: 25.4 MMOL/L (ref 22–29)
CREAT SERPL-MCNC: 0.88 MG/DL (ref 0.76–1.27)
DEPRECATED RDW RBC AUTO: 44.1 FL (ref 37–54)
EGFRCR SERPLBLD CKD-EPI 2021: 110.8 ML/MIN/1.73
EOSINOPHIL # BLD AUTO: 0.28 10*3/MM3 (ref 0–0.4)
EOSINOPHIL NFR BLD AUTO: 3.7 % (ref 0.3–6.2)
ERYTHROCYTE [DISTWIDTH] IN BLOOD BY AUTOMATED COUNT: 13.8 % (ref 12.3–15.4)
GLUCOSE BLDC GLUCOMTR-MCNC: 104 MG/DL (ref 70–130)
GLUCOSE BLDC GLUCOMTR-MCNC: 145 MG/DL (ref 70–130)
GLUCOSE BLDC GLUCOMTR-MCNC: 186 MG/DL (ref 70–130)
GLUCOSE BLDC GLUCOMTR-MCNC: 230 MG/DL (ref 70–130)
GLUCOSE SERPL-MCNC: 119 MG/DL (ref 65–99)
HCT VFR BLD AUTO: 46.1 % (ref 37.5–51)
HGB BLD-MCNC: 14.6 G/DL (ref 13–17.7)
IMM GRANULOCYTES # BLD AUTO: 0.03 10*3/MM3 (ref 0–0.05)
IMM GRANULOCYTES NFR BLD AUTO: 0.4 % (ref 0–0.5)
LYMPHOCYTES # BLD AUTO: 2.84 10*3/MM3 (ref 0.7–3.1)
LYMPHOCYTES NFR BLD AUTO: 38 % (ref 19.6–45.3)
MCH RBC QN AUTO: 28 PG (ref 26.6–33)
MCHC RBC AUTO-ENTMCNC: 31.7 G/DL (ref 31.5–35.7)
MCV RBC AUTO: 88.3 FL (ref 79–97)
MONOCYTES # BLD AUTO: 1.1 10*3/MM3 (ref 0.1–0.9)
MONOCYTES NFR BLD AUTO: 14.7 % (ref 5–12)
NEUTROPHILS NFR BLD AUTO: 3.11 10*3/MM3 (ref 1.7–7)
NEUTROPHILS NFR BLD AUTO: 41.7 % (ref 42.7–76)
NRBC BLD AUTO-RTO: 0 /100 WBC (ref 0–0.2)
PLATELET # BLD AUTO: 218 10*3/MM3 (ref 140–450)
PMV BLD AUTO: 10.4 FL (ref 6–12)
POTASSIUM SERPL-SCNC: 3.5 MMOL/L (ref 3.5–5.2)
POTASSIUM SERPL-SCNC: 3.9 MMOL/L (ref 3.5–5.2)
POTASSIUM SERPL-SCNC: 4 MMOL/L (ref 3.5–5.2)
RBC # BLD AUTO: 5.22 10*6/MM3 (ref 4.14–5.8)
SODIUM SERPL-SCNC: 134 MMOL/L (ref 136–145)
WBC NRBC COR # BLD AUTO: 7.47 10*3/MM3 (ref 3.4–10.8)

## 2025-04-23 PROCEDURE — 25010000002 FUROSEMIDE PER 20 MG: Performed by: STUDENT IN AN ORGANIZED HEALTH CARE EDUCATION/TRAINING PROGRAM

## 2025-04-23 PROCEDURE — 63710000001 INSULIN GLARGINE PER 5 UNITS: Performed by: STUDENT IN AN ORGANIZED HEALTH CARE EDUCATION/TRAINING PROGRAM

## 2025-04-23 PROCEDURE — 80048 BASIC METABOLIC PNL TOTAL CA: CPT | Performed by: HOSPITALIST

## 2025-04-23 PROCEDURE — 84132 ASSAY OF SERUM POTASSIUM: CPT | Performed by: HOSPITALIST

## 2025-04-23 PROCEDURE — 63710000001 INSULIN LISPRO (HUMAN) PER 5 UNITS: Performed by: STUDENT IN AN ORGANIZED HEALTH CARE EDUCATION/TRAINING PROGRAM

## 2025-04-23 PROCEDURE — 85025 COMPLETE CBC W/AUTO DIFF WBC: CPT | Performed by: HOSPITALIST

## 2025-04-23 PROCEDURE — 99232 SBSQ HOSP IP/OBS MODERATE 35: CPT | Performed by: STUDENT IN AN ORGANIZED HEALTH CARE EDUCATION/TRAINING PROGRAM

## 2025-04-23 PROCEDURE — 82948 REAGENT STRIP/BLOOD GLUCOSE: CPT

## 2025-04-23 RX ORDER — ALPRAZOLAM 0.5 MG
0.5 TABLET ORAL 3 TIMES DAILY PRN
Status: DISCONTINUED | OUTPATIENT
Start: 2025-04-23 | End: 2025-04-25

## 2025-04-23 RX ORDER — POTASSIUM CHLORIDE 1500 MG/1
40 TABLET, EXTENDED RELEASE ORAL EVERY 4 HOURS
Status: DISCONTINUED | OUTPATIENT
Start: 2025-04-23 | End: 2025-04-23

## 2025-04-23 RX ORDER — TEMAZEPAM 15 MG/1
15 CAPSULE ORAL NIGHTLY PRN
Status: DISCONTINUED | OUTPATIENT
Start: 2025-04-23 | End: 2025-04-25

## 2025-04-23 RX ORDER — ATORVASTATIN CALCIUM 80 MG/1
80 TABLET, FILM COATED ORAL NIGHTLY
Status: DISCONTINUED | OUTPATIENT
Start: 2025-04-23 | End: 2025-04-25 | Stop reason: HOSPADM

## 2025-04-23 RX ORDER — CLOPIDOGREL BISULFATE 75 MG/1
75 TABLET ORAL DAILY
Status: DISCONTINUED | OUTPATIENT
Start: 2025-04-23 | End: 2025-04-25 | Stop reason: HOSPADM

## 2025-04-23 RX ORDER — POTASSIUM CHLORIDE 1500 MG/1
20 TABLET, EXTENDED RELEASE ORAL ONCE
Status: DISCONTINUED | OUTPATIENT
Start: 2025-04-23 | End: 2025-04-25 | Stop reason: HOSPADM

## 2025-04-23 RX ORDER — FUROSEMIDE 10 MG/ML
80 INJECTION INTRAMUSCULAR; INTRAVENOUS 3 TIMES DAILY
Status: DISCONTINUED | OUTPATIENT
Start: 2025-04-23 | End: 2025-04-25

## 2025-04-23 RX ADMIN — ASPIRIN 81 MG: 81 TABLET, COATED ORAL at 08:32

## 2025-04-23 RX ADMIN — INSULIN LISPRO 4 UNITS: 100 INJECTION, SOLUTION INTRAVENOUS; SUBCUTANEOUS at 16:39

## 2025-04-23 RX ADMIN — TEMAZEPAM 15 MG: 15 CAPSULE ORAL at 00:06

## 2025-04-23 RX ADMIN — POTASSIUM CHLORIDE 40 MEQ: 1500 TABLET, EXTENDED RELEASE ORAL at 11:23

## 2025-04-23 RX ADMIN — INSULIN LISPRO 2 UNITS: 100 INJECTION, SOLUTION INTRAVENOUS; SUBCUTANEOUS at 20:28

## 2025-04-23 RX ADMIN — FUROSEMIDE 80 MG: 10 INJECTION, SOLUTION INTRAMUSCULAR; INTRAVENOUS at 16:39

## 2025-04-23 RX ADMIN — FUROSEMIDE 80 MG: 10 INJECTION, SOLUTION INTRAMUSCULAR; INTRAVENOUS at 20:28

## 2025-04-23 RX ADMIN — FUROSEMIDE 80 MG: 10 INJECTION, SOLUTION INTRAMUSCULAR; INTRAVENOUS at 11:23

## 2025-04-23 RX ADMIN — POTASSIUM CHLORIDE 40 MEQ: 1500 TABLET, EXTENDED RELEASE ORAL at 16:39

## 2025-04-23 RX ADMIN — CLOPIDOGREL BISULFATE 75 MG: 75 TABLET, FILM COATED ORAL at 11:22

## 2025-04-23 RX ADMIN — INSULIN GLARGINE 15 UNITS: 100 INJECTION, SOLUTION SUBCUTANEOUS at 08:33

## 2025-04-23 RX ADMIN — Medication 10 ML: at 08:34

## 2025-04-23 RX ADMIN — POTASSIUM CHLORIDE 40 MEQ: 1500 TABLET, EXTENDED RELEASE ORAL at 06:43

## 2025-04-23 RX ADMIN — POTASSIUM CHLORIDE 40 MEQ: 1500 TABLET, EXTENDED RELEASE ORAL at 20:27

## 2025-04-23 RX ADMIN — FAMOTIDINE 20 MG: 20 TABLET, FILM COATED ORAL at 16:39

## 2025-04-23 RX ADMIN — FAMOTIDINE 20 MG: 20 TABLET, FILM COATED ORAL at 06:43

## 2025-04-23 RX ADMIN — ATORVASTATIN CALCIUM 80 MG: 80 TABLET, FILM COATED ORAL at 20:26

## 2025-04-23 NOTE — PROGRESS NOTES
"Daily progress note    Referring physician      Subjective  Doing same with no new complaints    History of present illness  39-year-old white male with history of chronic congestive heart failure diabetes mellitus hypertension hyperlipidemia [facility] admitted with shortness of breath and found to have severe LV dysfunction and multivessel coronary artery disease.  I am asked to follow patient for medical problems.  At the time of examination he has denies any chest pain shortness of breath palpitation but feeling very weak.    Review of Systems:  Unremarkable    Physical Exam:  Blood pressure 93/72, pulse 102, temperature 97.8 °F (36.6 °C), temperature source Oral, resp. rate 17, height 175.3 cm (69.02\"), weight 108 kg (238 lb 6.4 oz), SpO2 97%.    General: Awake alert and in no distress  HEENT: Unremarkable  Neck: Supple  Heart: Tachycardic and irregular rhythm, no murmurs or rubs, 2+ radial pulses bilaterally  Lungs: Clear to auscultation bilaterally without wheezes or crackles, no respiratory distress  Abdomen: Soft, mildly tender diffusely throughout the abdomen but no generalized peritonitis, nondistended, no rebound or guarding  Skin: Warm, dry, no rash  Musculoskeletal: Normal range of motion, 2+ pitting bilateral symmetric lower extremity edema up to both thighs  Neurologic: Oriented x3, no motor deficits no sensory deficit  Psychiatric: Mood appears stable, no psychosis    LABS  Lab Results (last 24 hours)       Procedure Component Value Units Date/Time    Potassium [[id number]]  (Normal) Collected: 04/23/25 1327    Specimen: Blood Updated: 04/23/25 1429     Potassium 3.9 mmol/L     POC Glucose Once [[id number]]  (Abnormal) Collected: 04/23/25 1126    Specimen: Blood Updated: 04/23/25 1130     Glucose 145 mg/dL     POC Glucose Once [[id number]]  (Normal) Collected: 04/23/25 0642    Specimen: Blood Updated: 04/23/25 0643     Glucose 104 " mg/dL     Basic Metabolic Panel [[id number]]  (Abnormal) Collected: 04/23/25 0317    Specimen: Blood Updated: 04/23/25 0412     Glucose 119 mg/dL      BUN 20 mg/dL      Creatinine 0.88 mg/dL      Sodium 134 mmol/L      Potassium 3.5 mmol/L      Chloride 98 mmol/L      CO2 25.4 mmol/L      Calcium 8.8 mg/dL      BUN/Creatinine Ratio 22.7     Anion Gap 10.6 mmol/L      eGFR 110.8 mL/min/1.73     Narrative:      GFR Categories in Chronic Kidney Disease (CKD)      GFR Category          GFR (mL/min/1.73)    Interpretation  G1                     90 or greater         Normal or high (1)  G2                      60-89                Mild decrease (1)  G3a                   45-59                Mild to moderate decrease  G3b                   30-44                Moderate to severe decrease  G4                    15-29                Severe decrease  G5                    14 or less           Kidney failure          (1)In the absence of evidence of kidney disease, neither GFR category G1 or G2 fulfill the criteria for CKD.    eGFR calculation 2021 CKD-EPI creatinine equation, which does not include race as a factor    CBC & Differential [[id number]]  (Abnormal) Collected: 04/23/25 0317    Specimen: Blood Updated: 04/23/25 0351    Narrative:      The following orders were created for panel order CBC & Differential.  Procedure                               Abnormality         Status                     ---------                               -----------         ------                     CBC Auto Differential[[id number]]        Abnormal            Final result                 Please view results for these tests on the individual orders.    CBC Auto Differential [[id number]]  (Abnormal) Collected: 04/23/25 0317    Specimen: Blood Updated: 04/23/25 0351     WBC 7.47 10*3/mm3      RBC 5.22 10*6/mm3      Hemoglobin 14.6 g/dL      Hematocrit 46.1 %      MCV 88.3 fL      MCH 28.0 pg      MCHC 31.7 g/dL      RDW 13.8 %      RDW-SD 44.1  fl      MPV 10.4 fL      Platelets 218 10*3/mm3      Neutrophil % 41.7 %      Lymphocyte % 38.0 %      Monocyte % 14.7 %      Eosinophil % 3.7 %      Basophil % 1.5 %      Immature Grans % 0.4 %      Neutrophils, Absolute 3.11 10*3/mm3      Lymphocytes, Absolute 2.84 10*3/mm3      Monocytes, Absolute 1.10 10*3/mm3      Eosinophils, Absolute 0.28 10*3/mm3      Basophils, Absolute 0.11 10*3/mm3      Immature Grans, Absolute 0.03 10*3/mm3      nRBC 0.0 /100 WBC     POC Glucose Once [[id number]]  (Abnormal) Collected: 04/22/25 2025    Specimen: Blood Updated: 04/22/25 2026     Glucose 217 mg/dL     POC Glucose Once [[id number]]  (Abnormal) Collected: 04/22/25 1623    Specimen: Blood Updated: 04/22/25 1627     Glucose 179 mg/dL           Study Result  Narrative & Impression   XR CHEST PA AND LATERAL-     Clinical: CHF     FINDINGS: Cardiac enlargement similar to the previous examination. No  pleural effusion or acute airspace disease has developed. No gross  vascular congestion. The could be mild central vascular congestion.  Remainder is unremarkable.        Scan on 4/17/2025 1635 by [employer], [place]: ECG 12-LEAD         Author: -- Service: -- Author Type: --   Filed: Date of Service: Creation Time:   Status: (Other)   HEART VBOB=970  bpm  RR Dixrpsps=161  ms  AR Ljpnqlqd=552  ms  P Horizontal Axis=-58  deg  P Front Axis=83  deg  QRSD Ydjoszoh=470  ms  QT Vynxmfyi=939  ms  WOzG=403  ms  QRS Axis=93  deg  T Wave Axis=192  deg  - ABNORMAL ECG -  Sinus tachycardia  Ventricular tachycardia, unsustained  Borderline right axis deviation  Anteroseptal infarct, old  Abnormal T, consider ischemia, diffuse leads          Current Facility-Administered Medications:     acetaminophen (TYLENOL) tablet 650 mg, 650 mg, Oral, Q6H PRN, Aidan Betancourt MD, 650 mg at 04/19/25 1637    acetaminophen (TYLENOL) tablet 650 mg, 650 mg, Oral, Q4H PRN, Aiadn Betancourt MD    aspirin EC tablet 81 mg, 81 mg, Oral, Daily, Aidan Betancourt MD, 81 mg at 04/23/25  0832    atorvastatin (LIPITOR) tablet 80 mg, 80 mg, Oral, Nightly, Aidan Betancourt MD    benzonatate (TESSALON) capsule 100 mg, 100 mg, Oral, TID PRN, Aidan Betancourt MD, 100 mg at 04/21/25 0552    sennosides-docusate (PERICOLACE) 8.6-50 MG per tablet 2 tablet, 2 tablet, Oral, BID PRN **AND** polyethylene glycol (MIRALAX) packet 17 g, 17 g, Oral, Daily PRN **AND** bisacodyl (DULCOLAX) EC tablet 5 mg, 5 mg, Oral, Daily PRN **AND** bisacodyl (DULCOLAX) suppository 10 mg, 10 mg, Rectal, Daily PRN, Aidan Betancourt MD    Calcium Replacement - Follow Nurse / BPA Driven Protocol, , Not Applicable, PRN, Aidan Betancourt MD    clopidogrel (PLAVIX) tablet 75 mg, 75 mg, Oral, Daily, Aidan Betancourt MD, 75 mg at 04/23/25 1122    dextrose (D50W) (25 g/50 mL) IV injection 25 g, 25 g, Intravenous, Q15 Min PRN, Aidan Betancourt MD    dextrose (GLUTOSE) oral gel 15 g, 15 g, Oral, Q15 Min PRN, Aidan Betancourt MD    famotidine (PEPCID) tablet 20 mg, 20 mg, Oral, BID AC, Aidan Betancourt MD, 20 mg at 04/23/25 0643    furosemide (LASIX) injection 80 mg, 80 mg, Intravenous, TID, Aidan Betancourt MD, 80 mg at 04/23/25 1123    glucagon (GLUCAGEN) injection 1 mg, 1 mg, Intramuscular, Q15 Min PRN, Aidan Betancourt MD    insulin glargine (LANTUS, SEMGLEE) injection 15 Units, 15 Units, Subcutaneous, Daily, Aidan Betancourt MD, 15 Units at 04/23/25 0833    insulin lispro (HUMALOG/ADMELOG) injection 2-9 Units, 2-9 Units, Subcutaneous, 4x Daily AC & at Bedtime, Aidan Betancourt MD, 4 Units at 04/22/25 2042    Magnesium Cardiology Dose Replacement - Follow Nurse / BPA Driven Protocol, , Not Applicable, PRN, Aidan Betancourt MD    nitroglycerin (NITROSTAT) SL tablet 0.4 mg, 0.4 mg, Sublingual, Q5 Min PRN, Aidan Betancourt MD    nitroglycerin (NITROSTAT) SL tablet 0.4 mg, 0.4 mg, Sublingual, Q5 Min PRNSerafin Ivan, MD    ondansetron (ZOFRAN) injection 4 mg, 4 mg, Intravenous, Q6H PRN, Aidan Betancourt MD    Phosphorus Replacement - Follow Nurse / BPA Driven Protocol, , Not Applicable, PRN, Aidan Betancourt MD    potassium  chloride (KLOR-CON M20) CR tablet 40 mEq, 40 mEq, Oral, TID With Meals, Aidan Betancourt MD, 40 mEq at 04/23/25 1123    Potassium Replacement - Follow Nurse / BPA Driven Protocol, , Not Applicable, PRNSerafin Ivan, MD    sodium chloride 0.9 % flush 10 mL, 10 mL, Intravenous, Q12H, Aidan Betancourt MD, 10 mL at 04/23/25 0834    sodium chloride 0.9 % flush 10 mL, 10 mL, Intravenous, PRN, Aidan Betancourt MD    sodium chloride 0.9 % infusion 40 mL, 40 mL, Intravenous, Serafin BRIGGS Ivan, MD     ASSESSMENT  Multivessel coronary artery disease status post PCI  Severe LV dysfunction  Diabetes mellitus  Hypertension  Hyperlipidemia  Morbidly obese  Anxiety disorder  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

## 2025-04-23 NOTE — PLAN OF CARE
Problem: Adult Inpatient Plan of Care  Goal: Plan of Care Review  Outcome: Progressing  Goal: Patient-Specific Goal (Individualized)  Outcome: Progressing  Goal: Absence of Hospital-Acquired Illness or Injury  Outcome: Progressing  Intervention: Identify and Manage Fall Risk  Recent Flowsheet Documentation  Taken 4/23/2025 0407 by Marianna You RN  Safety Promotion/Fall Prevention:   activity supervised   safety round/check completed   room organization consistent   nonskid shoes/slippers when out of bed   lighting adjusted   fall prevention program maintained   elopement precautions   clutter free environment maintained   assistive device/personal items within reach  Taken 4/22/2025 2045 by Marianna You RN  Safety Promotion/Fall Prevention:   activity supervised   safety round/check completed   room organization consistent   nonskid shoes/slippers when out of bed   lighting adjusted   fall prevention program maintained   elopement precautions   clutter free environment maintained   assistive device/personal items within reach  Intervention: Prevent Skin Injury  Recent Flowsheet Documentation  Taken 4/23/2025 0407 by Marianna You RN  Body Position:   turned   position changed independently  Taken 4/22/2025 2045 by Marianna You RN  Body Position:   turned   position changed independently   dangle, side of bed  Intervention: Prevent and Manage VTE (Venous Thromboembolism) Risk  Recent Flowsheet Documentation  Taken 4/22/2025 2045 by Marianna You RN  VTE Prevention/Management: SCDs (sequential compression devices) off  Intervention: Prevent Infection  Recent Flowsheet Documentation  Taken 4/23/2025 0407 by Marianna You RN  Infection Prevention:   single patient room provided   rest/sleep promoted   personal protective equipment utilized   hand hygiene promoted   equipment surfaces disinfected  Taken 4/22/2025 2045 by Marianna You RN  Infection Prevention:   single patient room provided   rest/sleep  promoted   personal protective equipment utilized   hand hygiene promoted   equipment surfaces disinfected  Goal: Optimal Comfort and Wellbeing  Outcome: Progressing  Intervention: Provide Person-Centered Care  Recent Flowsheet Documentation  Taken 4/22/2025 2045 by Marianna You RN  Trust Relationship/Rapport:   care explained   choices provided  Goal: Readiness for Transition of Care  Outcome: Progressing     Problem: Fall Injury Risk  Goal: Absence of Fall and Fall-Related Injury  Outcome: Progressing  Intervention: Identify and Manage Contributors  Recent Flowsheet Documentation  Taken 4/23/2025 0407 by Marianna You RN  Medication Review/Management: medications reviewed  Taken 4/22/2025 2045 by Marianna You RN  Medication Review/Management: medications reviewed  Intervention: Promote Injury-Free Environment  Recent Flowsheet Documentation  Taken 4/23/2025 0407 by Marianna You RN  Safety Promotion/Fall Prevention:   activity supervised   safety round/check completed   room organization consistent   nonskid shoes/slippers when out of bed   lighting adjusted   fall prevention program maintained   elopement precautions   clutter free environment maintained   assistive device/personal items within reach  Taken 4/22/2025 2045 by Marianna You RN  Safety Promotion/Fall Prevention:   activity supervised   safety round/check completed   room organization consistent   nonskid shoes/slippers when out of bed   lighting adjusted   fall prevention program maintained   elopement precautions   clutter free environment maintained   assistive device/personal items within reach     Problem: Heart Failure  Goal: Optimal Coping  Outcome: Progressing  Intervention: Support Psychosocial Response  Recent Flowsheet Documentation  Taken 4/22/2025 2045 by Marianna You RN  Supportive Measures: active listening utilized  Goal: Optimal Cardiac Output and Blood Flow  Outcome: Progressing  Intervention: Optimize Cardiac  Output  Recent Flowsheet Documentation  Taken 4/22/2025 2045 by Marianna You RN  Environmental Support: calm environment promoted  Goal: Stable Heart Rate and Rhythm  Outcome: Progressing  Goal: Fluid and Electrolyte Balance  Outcome: Progressing  Goal: Optimal Functional Ability  Outcome: Progressing  Intervention: Optimize Functional Ability  Recent Flowsheet Documentation  Taken 4/23/2025 0407 by Marianna You RN  Activity Management:   activity encouraged   up ad giovana  Taken 4/22/2025 2045 by Marianna You RN  Activity Management:   activity encouraged   up ad giovana  Goal: Improved Oral Intake  Outcome: Progressing  Goal: Effective Oxygenation and Ventilation  Outcome: Progressing  Intervention: Promote Airway Secretion Clearance  Recent Flowsheet Documentation  Taken 4/23/2025 0407 by Marianna You RN  Activity Management:   activity encouraged   up ad giovana  Taken 4/22/2025 2045 by Marianna You RN  Activity Management:   activity encouraged   up ad giovana  Cough And Deep Breathing: done independently per patient  Intervention: Optimize Oxygenation and Ventilation  Recent Flowsheet Documentation  Taken 4/23/2025 0407 by Marianna You RN  Head of Bed (HOB) Positioning: HOB at 30 degrees  Taken 4/22/2025 2045 by Marianna You RN  Head of Bed (HOB) Positioning: HOB at 30 degrees  Goal: Effective Breathing Pattern During Sleep  Outcome: Progressing  Intervention: Monitor and Manage Obstructive Sleep Apnea  Recent Flowsheet Documentation  Taken 4/23/2025 0407 by Marianna You RN  Medication Review/Management: medications reviewed  Taken 4/22/2025 2045 by Marianna You RN  Medication Review/Management: medications reviewed   Goal Outcome Evaluation:

## 2025-04-23 NOTE — PROGRESS NOTES
"    Patient Name: Luiz Christianson  :1985  39 y.o.      Patient Care Team:  Luz Maria White APRN as PCP - General (Family Medicine)    Chief Complaint:   Acute HFrEF, CAD    Interval History:   NAEO. Feels fine.    Objective   Vital Signs  Temp:  [97.5 °F (36.4 °C)-98 °F (36.7 °C)] 97.6 °F (36.4 °C)  Heart Rate:  [] 94  Resp:  [16-20] 17  BP: ()/(69-90) 107/90    Intake/Output Summary (Last 24 hours) at 2025 0903  Last data filed at 2025 0838  Gross per 24 hour   Intake 720 ml   Output 2675 ml   Net -1955 ml     Flowsheet Rows      Flowsheet Row First Filed Value   Admission Height 175.3 cm (69.02\") Documented at 2025   Admission Weight 122 kg (269 lb 13.5 oz) Documented at 2025            GEN: no distress, alert and oriented  HEENT: NACT, EOMI, moist mucous membranes  Lungs: CTAB, no wheezes, rales or rhonchi  CV: normal rate, regular rhythm, normal S1, S2, no murmurs, +2 radial pulses b/l, JVD to mid neck  Abdomen: soft, nontender, nondistended, NABS  Extremities: no edema  Skin: no rash, warm, dry  Heme/Lymph: no bruising  Psych: organized thought, normal behavior and affect    Results Review:    Results from last 7 days   Lab Units 25  0317   SODIUM mmol/L 134*   POTASSIUM mmol/L 3.5   CHLORIDE mmol/L 98   CO2 mmol/L 25.4   BUN mg/dL 20   CREATININE mg/dL 0.88   GLUCOSE mg/dL 119*   CALCIUM mg/dL 8.8           Results from last 7 days   Lab Units 25  0317   WBC 10*3/mm3 7.47   HEMOGLOBIN g/dL 14.6   HEMATOCRIT % 46.1   PLATELETS 10*3/mm3 218         Results from last 7 days   Lab Units 25  0619   MAGNESIUM mg/dL 2.1     Results from last 7 days   Lab Units 25  0432   CHOLESTEROL mg/dL 82   TRIGLYCERIDES mg/dL 79   HDL CHOL mg/dL 16*   LDL CHOL mg/dL 49               Medication Review:   aspirin, 81 mg, Oral, Daily  atorvastatin, 40 mg, Oral, Nightly  famotidine, 20 mg, Oral, BID AC  insulin glargine, 15 Units, Subcutaneous, " Daily  insulin lispro, 2-9 Units, Subcutaneous, 4x Daily AC & at Bedtime  potassium chloride, 40 mEq, Oral, TID With Meals  potassium chloride ER, 40 mEq, Oral, Q4H  sodium chloride, 10 mL, Intravenous, Q12H                Assessment & Plan   #Acute HFrEF  #Multivessel CAD  #Diabetes  #Hypertension  #Hyperlipidemia     39-year-old man, followed by Dr. Muir, with hypertension, hyperlipidemia, diabetes, recently diagnosed HFrEF with an EF of 18%, who presented to [facility] with dyspnea on exertion, cough, and exercise intolerance.  He underwent right and left heart catheterization yesterday which revealed elevated right and left-sided filling pressures with an RA pressure of 35 and a wedge pressure of 40.  Coronary angiography revealed a 99% stenosis of the distal circumflex (dominant, as well as a 90% stenosis of the mid LAD.  The distal circumflex is supplied via septal left to left collaterals.  He was transferred here for evaluation of CABG.  Dr. Stanton has reviewed and does not think patient is a good surgical candidate. He ultimately underwent PCI to LAD and Cx on 4/21/24.     Right heart catheterization with persistently elevated right and left-sided filling pressures: Mean RA of 22, wedge of 44.    Net neg 2L. He still has significant JVD. Will likely need diuresis over the next couple of days.    - Continue Lasix 80 mg IV 3 times daily  - K > 4, Mg > 2  - Strict I's/O's, daily weights  - continue aspirin 81 mg daily, clopidogrel 75 mg daily, atorvastatin 80 mg daily    Aidan Saldana MD, FAC, [facility]  04/23/25  09:03 EDT

## 2025-04-24 LAB
ANION GAP SERPL CALCULATED.3IONS-SCNC: 15.2 MMOL/L (ref 5–15)
BUN SERPL-MCNC: 18 MG/DL (ref 6–20)
BUN/CREAT SERPL: 22.2 (ref 7–25)
CALCIUM SPEC-SCNC: 9.7 MG/DL (ref 8.6–10.5)
CHLORIDE SERPL-SCNC: 99 MMOL/L (ref 98–107)
CO2 SERPL-SCNC: 23.8 MMOL/L (ref 22–29)
CREAT SERPL-MCNC: 0.81 MG/DL (ref 0.76–1.27)
EGFRCR SERPLBLD CKD-EPI 2021: 113.6 ML/MIN/1.73
GLUCOSE BLDC GLUCOMTR-MCNC: 132 MG/DL (ref 70–130)
GLUCOSE BLDC GLUCOMTR-MCNC: 201 MG/DL (ref 70–130)
GLUCOSE BLDC GLUCOMTR-MCNC: 214 MG/DL (ref 70–130)
GLUCOSE BLDC GLUCOMTR-MCNC: 231 MG/DL (ref 70–130)
GLUCOSE SERPL-MCNC: 122 MG/DL (ref 65–99)
POTASSIUM SERPL-SCNC: 3.6 MMOL/L (ref 3.5–5.2)
SODIUM SERPL-SCNC: 138 MMOL/L (ref 136–145)

## 2025-04-24 PROCEDURE — 99232 SBSQ HOSP IP/OBS MODERATE 35: CPT | Performed by: STUDENT IN AN ORGANIZED HEALTH CARE EDUCATION/TRAINING PROGRAM

## 2025-04-24 PROCEDURE — 63710000001 INSULIN GLARGINE PER 5 UNITS: Performed by: STUDENT IN AN ORGANIZED HEALTH CARE EDUCATION/TRAINING PROGRAM

## 2025-04-24 PROCEDURE — 63710000001 INSULIN LISPRO (HUMAN) PER 5 UNITS: Performed by: STUDENT IN AN ORGANIZED HEALTH CARE EDUCATION/TRAINING PROGRAM

## 2025-04-24 PROCEDURE — 82948 REAGENT STRIP/BLOOD GLUCOSE: CPT

## 2025-04-24 PROCEDURE — 80048 BASIC METABOLIC PNL TOTAL CA: CPT | Performed by: HOSPITALIST

## 2025-04-24 PROCEDURE — 25010000002 FUROSEMIDE PER 20 MG: Performed by: STUDENT IN AN ORGANIZED HEALTH CARE EDUCATION/TRAINING PROGRAM

## 2025-04-24 RX ORDER — METOLAZONE 5 MG/1
5 TABLET ORAL ONCE
Status: COMPLETED | OUTPATIENT
Start: 2025-04-24 | End: 2025-04-24

## 2025-04-24 RX ORDER — METOPROLOL SUCCINATE 25 MG/1
25 TABLET, EXTENDED RELEASE ORAL
Status: DISCONTINUED | OUTPATIENT
Start: 2025-04-24 | End: 2025-04-25 | Stop reason: HOSPADM

## 2025-04-24 RX ADMIN — FUROSEMIDE 80 MG: 10 INJECTION, SOLUTION INTRAMUSCULAR; INTRAVENOUS at 21:01

## 2025-04-24 RX ADMIN — ASPIRIN 81 MG: 81 TABLET, COATED ORAL at 08:36

## 2025-04-24 RX ADMIN — FAMOTIDINE 20 MG: 20 TABLET, FILM COATED ORAL at 17:07

## 2025-04-24 RX ADMIN — FAMOTIDINE 20 MG: 20 TABLET, FILM COATED ORAL at 06:37

## 2025-04-24 RX ADMIN — ATORVASTATIN CALCIUM 80 MG: 80 TABLET, FILM COATED ORAL at 21:01

## 2025-04-24 RX ADMIN — POTASSIUM CHLORIDE 40 MEQ: 1500 TABLET, EXTENDED RELEASE ORAL at 11:48

## 2025-04-24 RX ADMIN — Medication 10 ML: at 08:37

## 2025-04-24 RX ADMIN — INSULIN LISPRO 4 UNITS: 100 INJECTION, SOLUTION INTRAVENOUS; SUBCUTANEOUS at 22:25

## 2025-04-24 RX ADMIN — INSULIN LISPRO 4 UNITS: 100 INJECTION, SOLUTION INTRAVENOUS; SUBCUTANEOUS at 17:08

## 2025-04-24 RX ADMIN — INSULIN GLARGINE 15 UNITS: 100 INJECTION, SOLUTION SUBCUTANEOUS at 08:36

## 2025-04-24 RX ADMIN — CLOPIDOGREL BISULFATE 75 MG: 75 TABLET, FILM COATED ORAL at 08:36

## 2025-04-24 RX ADMIN — POTASSIUM CHLORIDE 40 MEQ: 1500 TABLET, EXTENDED RELEASE ORAL at 08:35

## 2025-04-24 RX ADMIN — FUROSEMIDE 80 MG: 10 INJECTION, SOLUTION INTRAMUSCULAR; INTRAVENOUS at 08:36

## 2025-04-24 RX ADMIN — Medication 10 ML: at 21:01

## 2025-04-24 RX ADMIN — INSULIN LISPRO 4 UNITS: 100 INJECTION, SOLUTION INTRAVENOUS; SUBCUTANEOUS at 11:48

## 2025-04-24 RX ADMIN — FUROSEMIDE 80 MG: 10 INJECTION, SOLUTION INTRAMUSCULAR; INTRAVENOUS at 16:01

## 2025-04-24 RX ADMIN — METOLAZONE 5 MG: 5 TABLET ORAL at 11:48

## 2025-04-24 RX ADMIN — POTASSIUM CHLORIDE 40 MEQ: 1500 TABLET, EXTENDED RELEASE ORAL at 17:08

## 2025-04-24 RX ADMIN — METOPROLOL SUCCINATE 25 MG: 25 TABLET, EXTENDED RELEASE ORAL at 09:37

## 2025-04-24 NOTE — PLAN OF CARE
Problem: Adult Inpatient Plan of Care  Goal: Plan of Care Review  Outcome: Progressing  Goal: Patient-Specific Goal (Individualized)  Outcome: Progressing  Goal: Absence of Hospital-Acquired Illness or Injury  Outcome: Progressing  Intervention: Identify and Manage Fall Risk  Recent Flowsheet Documentation  Taken 4/24/2025 0315 by Marianna You RN  Safety Promotion/Fall Prevention:   activity supervised   safety round/check completed   room organization consistent   nonskid shoes/slippers when out of bed   lighting adjusted   fall prevention program maintained   elopement precautions   clutter free environment maintained   assistive device/personal items within reach  Taken 4/23/2025 2030 by Marianna You RN  Safety Promotion/Fall Prevention:   activity supervised   safety round/check completed   room organization consistent   nonskid shoes/slippers when out of bed   lighting adjusted   fall prevention program maintained   elopement precautions   clutter free environment maintained   assistive device/personal items within reach  Intervention: Prevent Skin Injury  Recent Flowsheet Documentation  Taken 4/24/2025 0315 by Marianna You RN  Body Position:   turned   position changed independently   supine  Taken 4/23/2025 2030 by Marianna You RN  Body Position:   turned   position changed independently  Intervention: Prevent and Manage VTE (Venous Thromboembolism) Risk  Recent Flowsheet Documentation  Taken 4/23/2025 2030 by Marianna You RN  VTE Prevention/Management: SCDs (sequential compression devices) off  Intervention: Prevent Infection  Recent Flowsheet Documentation  Taken 4/24/2025 0315 by Marianna You RN  Infection Prevention:   single patient room provided   rest/sleep promoted   personal protective equipment utilized   hand hygiene promoted   equipment surfaces disinfected  Taken 4/23/2025 2030 by Marianna You RN  Infection Prevention:   single patient room provided   rest/sleep promoted    personal protective equipment utilized   hand hygiene promoted   equipment surfaces disinfected  Goal: Optimal Comfort and Wellbeing  Outcome: Progressing  Goal: Readiness for Transition of Care  Outcome: Progressing     Problem: Fall Injury Risk  Goal: Absence of Fall and Fall-Related Injury  Outcome: Progressing  Intervention: Identify and Manage Contributors  Recent Flowsheet Documentation  Taken 4/24/2025 0315 by Marianna You RN  Medication Review/Management: medications reviewed  Taken 4/23/2025 2030 by Marianna You RN  Medication Review/Management: medications reviewed  Intervention: Promote Injury-Free Environment  Recent Flowsheet Documentation  Taken 4/24/2025 0315 by Marianna You RN  Safety Promotion/Fall Prevention:   activity supervised   safety round/check completed   room organization consistent   nonskid shoes/slippers when out of bed   lighting adjusted   fall prevention program maintained   elopement precautions   clutter free environment maintained   assistive device/personal items within reach  Taken 4/23/2025 2030 by Marianna You RN  Safety Promotion/Fall Prevention:   activity supervised   safety round/check completed   room organization consistent   nonskid shoes/slippers when out of bed   lighting adjusted   fall prevention program maintained   elopement precautions   clutter free environment maintained   assistive device/personal items within reach     Problem: Heart Failure  Goal: Optimal Coping  Outcome: Progressing  Intervention: Support Psychosocial Response  Recent Flowsheet Documentation  Taken 4/23/2025 2030 by Marianna You RN  Supportive Measures: active listening utilized  Goal: Optimal Cardiac Output and Blood Flow  Outcome: Progressing  Intervention: Optimize Cardiac Output  Recent Flowsheet Documentation  Taken 4/23/2025 2030 by Marianna You RN  Environmental Support:   caregiver consistency promoted   calm environment promoted  Goal: Stable Heart Rate and  Rhythm  Outcome: Progressing  Goal: Fluid and Electrolyte Balance  Outcome: Progressing  Goal: Optimal Functional Ability  Outcome: Progressing  Intervention: Optimize Functional Ability  Recent Flowsheet Documentation  Taken 4/24/2025 0315 by Marianna You RN  Activity Management:   activity encouraged   up ad giovana  Taken 4/23/2025 2030 by Marianna You RN  Activity Management:   activity encouraged   up ad giovana  Goal: Improved Oral Intake  Outcome: Progressing  Goal: Effective Oxygenation and Ventilation  Outcome: Progressing  Intervention: Promote Airway Secretion Clearance  Recent Flowsheet Documentation  Taken 4/24/2025 0315 by Marianna You RN  Activity Management:   activity encouraged   up ad giovana  Taken 4/23/2025 2030 by Marianna You RN  Activity Management:   activity encouraged   up ad giovana  Cough And Deep Breathing: done independently per patient  Intervention: Optimize Oxygenation and Ventilation  Recent Flowsheet Documentation  Taken 4/24/2025 0315 by Marianna You RN  Head of Bed (HOB) Positioning: HOB at 30 degrees  Taken 4/23/2025 2030 by Marianna You RN  Head of Bed (HOB) Positioning: HOB at 30 degrees  Goal: Effective Breathing Pattern During Sleep  Outcome: Progressing  Intervention: Monitor and Manage Obstructive Sleep Apnea  Recent Flowsheet Documentation  Taken 4/24/2025 0315 by Marianna You RN  Medication Review/Management: medications reviewed  Taken 4/23/2025 2030 by Marianna You RN  Medication Review/Management: medications reviewed   Goal Outcome Evaluation:

## 2025-04-24 NOTE — PLAN OF CARE
Goal Outcome Evaluation: Pt continuing to diurese, voided 1650 in urinal, no signs of BLE present, PO Metolazone administered per MAR along w/ scheduled Lasix, potassium administered per order.  Metoprolol added back and administered per MAR.  4 units insulin given prior to lunch and dinner d/t elevated blood sugar.  Pt c/o scattered rash, offered to contact attending to ask for medication to treat, pt refused in preference of lotion at bedside, rash not raised, no signs of anaphylaxis present.  Pt A&O4, RA, up ad giovana.  Continues to be SR-ST w/ NSVT on monitor.

## 2025-04-24 NOTE — PROGRESS NOTES
"    Patient Name: Luiz Christianson  :1985  39 y.o.      Patient Care Team:  Luz Maria White APRN as PCP - General (Family Medicine)    Chief Complaint:   Acute HFrEF, CAD    Interval History:   NAEO.  Continues to diurese well.    Objective   Vital Signs  Temp:  [97.6 °F (36.4 °C)-98.2 °F (36.8 °C)] 98.2 °F (36.8 °C)  Heart Rate:  [102-120] 110  Resp:  [16-18] 18  BP: ()/(58-77) 106/77    Intake/Output Summary (Last 24 hours) at 2025 0840  Last data filed at 2025 0633  Gross per 24 hour   Intake 680 ml   Output 3300 ml   Net -2620 ml     Flowsheet Rows      Flowsheet Row First Filed Value   Admission Height 175.3 cm (69.02\") Documented at 2025   Admission Weight 122 kg (269 lb 13.5 oz) Documented at 2025            GEN: no distress, alert and oriented  HEENT: NACT, EOMI, moist mucous membranes  Lungs: CTAB, no wheezes, rales or rhonchi  CV: normal rate, regular rhythm, normal S1, S2, no murmurs, +2 radial pulses b/l, JVD to lower neck with positive HJR  Abdomen: soft, nontender, nondistended, NABS  Extremities: no edema  Skin: no rash, warm, dry  Heme/Lymph: no bruising  Psych: organized thought, normal behavior and affect    Results Review:    Results from last 7 days   Lab Units 25  0310   SODIUM mmol/L 138   POTASSIUM mmol/L 3.6   CHLORIDE mmol/L 99   CO2 mmol/L 23.8   BUN mg/dL 18   CREATININE mg/dL 0.81   GLUCOSE mg/dL 122*   CALCIUM mg/dL 9.7           Results from last 7 days   Lab Units 25  0317   WBC 10*3/mm3 7.47   HEMOGLOBIN g/dL 14.6   HEMATOCRIT % 46.1   PLATELETS 10*3/mm3 218         Results from last 7 days   Lab Units 25  0619   MAGNESIUM mg/dL 2.1     Results from last 7 days   Lab Units 25  0432   CHOLESTEROL mg/dL 82   TRIGLYCERIDES mg/dL 79   HDL CHOL mg/dL 16*   LDL CHOL mg/dL 49               Medication Review:   aspirin, 81 mg, Oral, Daily  atorvastatin, 80 mg, Oral, Nightly  clopidogrel, 75 mg, Oral, Daily  famotidine, " 20 mg, Oral, BID AC  furosemide, 80 mg, Intravenous, TID  insulin glargine, 15 Units, Subcutaneous, Daily  insulin lispro, 2-9 Units, Subcutaneous, 4x Daily AC & at Bedtime  potassium chloride ER, 20 mEq, Oral, Once  potassium chloride, 40 mEq, Oral, TID With Meals  sodium chloride, 10 mL, Intravenous, Q12H                Assessment & Plan   #Acute HFrEF  #Multivessel CAD  #Diabetes  #Hypertension  #Hyperlipidemia     39-year-old man, followed by Dr. Muir, with hypertension, hyperlipidemia, diabetes, recently diagnosed HFrEF with an EF of 18%, who presented to [facility] with dyspnea on exertion, cough, and exercise intolerance.  He underwent right and left heart catheterization yesterday which revealed elevated right and left-sided filling pressures with an RA pressure of 35 and a wedge pressure of 40.  Coronary angiography revealed a 99% stenosis of the distal circumflex (dominant, as well as a 90% stenosis of the mid LAD.  The distal circumflex is supplied via septal left to left collaterals.  He was transferred here for evaluation of CABG.  Dr. Stanton has reviewed and does not think patient is a good surgical candidate. He ultimately underwent PCI to LAD and Cx on 4/21/24.     Right heart catheterization with persistently elevated right and left-sided filling pressures: Mean RA of 22, wedge of 44.    Net neg 2.5L. He still has JVD with positive HJR.  He is eager to go home.  Will try aggressive diuresis today and switch him to p.o. tomorrow would likely discharge home tomorrow.    - Metolazone 5 mg 30 minutes prior to next Lasix dose  - Continue Lasix 80 mg IV 3 times daily  - Start Toprol 25 mg daily given significant ectopy and NSVT  - K > 4, Mg > 2  - Strict I's/O's, daily weights  - continue aspirin 81 mg daily, clopidogrel 75 mg daily, atorvastatin 80 mg daily    Aidan Saldana MD, FAC, [facility]  04/24/25  08:40 EDT

## 2025-04-24 NOTE — PROGRESS NOTES
"Daily progress note    Referring physician      Subjective  Doing same with no new complaints    History of present illness  39-year-old white male with history of chronic congestive heart failure diabetes mellitus hypertension hyperlipidemia admitted to cardiology service from [facility] where he was admitted with shortness of breath and found to have severe LV dysfunction and multivessel coronary artery disease.  I am asked to follow patient for medical problems.  At the time of examination he has denies any chest pain shortness of breath palpitation but feeling very weak.    Review of Systems:  Unremarkable    Physical Exam:  Blood pressure 100/71, pulse 103, temperature 97.6 °F (36.4 °C), temperature source Oral, resp. rate 18, height 175.3 cm (69.02\"), weight 106 kg (234 lb 11.2 oz), SpO2 97%.    General: Awake alert and in no distress  HEENT: Unremarkable  Neck: Supple  Heart: Tachycardic and irregular rhythm, no murmurs or rubs, 2+ radial pulses bilaterally  Lungs: Clear to auscultation bilaterally without wheezes or crackles, no respiratory distress  Abdomen: Soft, mildly tender diffusely throughout the abdomen but no generalized peritonitis, nondistended, no rebound or guarding  Skin: Warm, dry, no rash  Musculoskeletal: Normal range of motion, 2+ pitting bilateral symmetric lower extremity edema up to both thighs  Neurologic: Oriented x3, no motor deficits no sensory deficit  Psychiatric: Mood appears stable, no psychosis    LABS  Lab Results (last 24 hours)       Procedure Component Value Units Date/Time    POC Glucose Once [[id number]]  (Abnormal) Collected: 04/24/25 1105    Specimen: Blood Updated: 04/24/25 1106     Glucose 201 mg/dL     POC Glucose Once [[id number]]  (Abnormal) Collected: 04/24/25 0630    Specimen: Blood Updated: 04/24/25 0632     Glucose 132 mg/dL     Basic Metabolic Panel [[id number]]  (Abnormal) Collected: 04/24/25 0310    Specimen: Blood Updated: 04/24/25 0425     " Glucose 122 mg/dL      BUN 18 mg/dL      Creatinine 0.81 mg/dL      Sodium 138 mmol/L      Potassium 3.6 mmol/L      Comment: Slight hemolysis detected by analyzer. Result may be falsely elevated.        Chloride 99 mmol/L      CO2 23.8 mmol/L      Calcium 9.7 mg/dL      BUN/Creatinine Ratio 22.2     Anion Gap 15.2 mmol/L      eGFR 113.6 mL/min/1.73     Narrative:      GFR Categories in Chronic Kidney Disease (CKD)      GFR Category          GFR (mL/min/1.73)    Interpretation  G1                     90 or greater         Normal or high (1)  G2                      60-89                Mild decrease (1)  G3a                   45-59                Mild to moderate decrease  G3b                   30-44                Moderate to severe decrease  G4                    15-29                Severe decrease  G5                    14 or less           Kidney failure          (1)In the absence of evidence of kidney disease, neither GFR category G1 or G2 fulfill the criteria for CKD.    eGFR calculation 2021 CKD-EPI creatinine equation, which does not include race as a factor    Potassium [[id number]]  (Normal) Collected: 04/23/25 2254    Specimen: Blood Updated: 04/23/25 2345     Potassium 4.0 mmol/L     POC Glucose Once [[id number]]  (Abnormal) Collected: 04/23/25 2005    Specimen: Blood Updated: 04/23/25 2006     Glucose 186 mg/dL     POC Glucose Once [[id number]]  (Abnormal) Collected: 04/23/25 1606    Specimen: Blood Updated: 04/23/25 1608     Glucose 230 mg/dL           Study Result  Narrative & Impression   XR CHEST PA AND LATERAL-     Clinical: CHF     FINDINGS: Cardiac enlargement similar to the previous examination. No  pleural effusion or acute airspace disease has developed. No gross  vascular congestion. The could be mild central vascular congestion.  Remainder is unremarkable.        Scan on 4/17/2025 1635 by [employer] On[employer], [place]: ECG 12-LEAD         Author: -- Service: -- Author Type: --   Filed: Date of Service:  Creation Time:   Status: (Other)   HEART XFEF=568  bpm  RR Idohusdh=930  ms  TX Jviavvfr=424  ms  P Horizontal Axis=-58  deg  P Front Axis=83  deg  QRSD Vtxclqah=048  ms  QT Qlpamhwc=410  ms  KZaW=424  ms  QRS Axis=93  deg  T Wave Axis=192  deg  - ABNORMAL ECG -  Sinus tachycardia  Ventricular tachycardia, unsustained  Borderline right axis deviation  Anteroseptal infarct, old  Abnormal T, consider ischemia, diffuse leads          Current Facility-Administered Medications:     acetaminophen (TYLENOL) tablet 650 mg, 650 mg, Oral, Q6H PRN, Aidan Betancourt MD, 650 mg at 04/19/25 1637    acetaminophen (TYLENOL) tablet 650 mg, 650 mg, Oral, Q4H PRN, Aidan Betancourt MD    ALPRAZolam (XANAX) tablet 0.5 mg, 0.5 mg, Oral, TID PRN, Billy Quijano MD    aspirin EC tablet 81 mg, 81 mg, Oral, Daily, Aidan Betancourt MD, 81 mg at 04/24/25 0836    atorvastatin (LIPITOR) tablet 80 mg, 80 mg, Oral, Nightly, Aidan Betancourt MD, 80 mg at 04/23/25 2026    benzonatate (TESSALON) capsule 100 mg, 100 mg, Oral, TID PRN, Aidan Betancourt MD, 100 mg at 04/21/25 0552    sennosides-docusate (PERICOLACE) 8.6-50 MG per tablet 2 tablet, 2 tablet, Oral, BID PRN **AND** polyethylene glycol (MIRALAX) packet 17 g, 17 g, Oral, Daily PRN **AND** bisacodyl (DULCOLAX) EC tablet 5 mg, 5 mg, Oral, Daily PRN **AND** bisacodyl (DULCOLAX) suppository 10 mg, 10 mg, Rectal, Daily PRN, Aidan Betancourt MD    Calcium Replacement - Follow Nurse / BPA Driven Protocol, , Not Applicable, PRN, Aidan Betancourt MD    clopidogrel (PLAVIX) tablet 75 mg, 75 mg, Oral, Daily, Aidan Betancourt MD, 75 mg at 04/24/25 0836    dextrose (D50W) (25 g/50 mL) IV injection 25 g, 25 g, Intravenous, Q15 Min PRN, Aidan Betancourt MD    dextrose (GLUTOSE) oral gel 15 g, 15 g, Oral, Q15 Min PRN, Aidan Betancourt MD    famotidine (PEPCID) tablet 20 mg, 20 mg, Oral, BID AC, Aidan Betancourt MD, 20 mg at 04/24/25 0637    furosemide (LASIX) injection 80 mg, 80 mg, Intravenous, TID, Aidan Betancourt MD, 80 mg at 04/24/25 0836    glucagon (GLUCAGEN)  injection 1 mg, 1 mg, Intramuscular, Q15 Min PRN, Aidan Betancourt MD    insulin glargine (LANTUS, SEMGLEE) injection 15 Units, 15 Units, Subcutaneous, Daily, Aidan Betancourt MD, 15 Units at 04/24/25 0836    insulin lispro (HUMALOG/ADMELOG) injection 2-9 Units, 2-9 Units, Subcutaneous, 4x Daily AC & at Bedtime, Aidan Betancourt MD, 4 Units at 04/24/25 1148    Magnesium Cardiology Dose Replacement - Follow Nurse / BPA Driven Protocol, , Not Applicable, PRNSerafin Ivan, MD    metoprolol succinate XL (TOPROL-XL) 24 hr tablet 25 mg, 25 mg, Oral, Q24H, Aidan Betancourt MD, 25 mg at 04/24/25 0937    nitroglycerin (NITROSTAT) SL tablet 0.4 mg, 0.4 mg, Sublingual, Q5 Min PRN, Aidan Betancourt MD    nitroglycerin (NITROSTAT) SL tablet 0.4 mg, 0.4 mg, Sublingual, Q5 Min PRN, Aidan Betancourt MD    ondansetron (ZOFRAN) injection 4 mg, 4 mg, Intravenous, Q6H PRN, Aidan Betancourt MD    Phosphorus Replacement - Follow Nurse / BPA Driven Protocol, , Not Applicable, Serafin BRIGGS Ivan, MD    potassium chloride (KLOR-CON M20) CR tablet 20 mEq, 20 mEq, Oral, Once, Billy Quijano MD    potassium chloride (KLOR-CON M20) CR tablet 40 mEq, 40 mEq, Oral, TID With Meals, Aidan Betancourt MD, 40 mEq at 04/24/25 1148    Potassium Replacement - Follow Nurse / BPA Driven Protocol, , Not Applicable, PRNSerafin Ivan, MD    sodium chloride 0.9 % flush 10 mL, 10 mL, Intravenous, Q12H, Aidan Betancourt MD, 10 mL at 04/24/25 0837    sodium chloride 0.9 % flush 10 mL, 10 mL, Intravenous, PRN, Aidan Betancourt MD    sodium chloride 0.9 % infusion 40 mL, 40 mL, Intravenous, PRN, Aidan Betancourt MD    temazepam (RESTORIL) capsule 15 mg, 15 mg, Oral, Nightly PRN, Billy Quijano MD     ASSESSMENT  Multivessel coronary artery disease status post PCI  Severe LV dysfunction  Diabetes mellitus  Hypertension  Hyperlipidemia  Morbidly obese  Anxiety disorder  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

## 2025-04-25 ENCOUNTER — READMISSION MANAGEMENT (OUTPATIENT)
Dept: CALL CENTER | Facility: HOSPITAL | Age: 40
End: 2025-04-25
Payer: COMMERCIAL

## 2025-04-25 VITALS
HEART RATE: 115 BPM | RESPIRATION RATE: 17 BRPM | TEMPERATURE: 97.8 F | OXYGEN SATURATION: 98 % | SYSTOLIC BLOOD PRESSURE: 99 MMHG | HEIGHT: 69 IN | DIASTOLIC BLOOD PRESSURE: 71 MMHG | WEIGHT: 236.2 LBS | BODY MASS INDEX: 34.98 KG/M2

## 2025-04-25 LAB
ANION GAP SERPL CALCULATED.3IONS-SCNC: 13.2 MMOL/L (ref 5–15)
BUN SERPL-MCNC: 20 MG/DL (ref 6–20)
BUN/CREAT SERPL: 22.2 (ref 7–25)
CALCIUM SPEC-SCNC: 9.5 MG/DL (ref 8.6–10.5)
CHLORIDE SERPL-SCNC: 100 MMOL/L (ref 98–107)
CO2 SERPL-SCNC: 22.8 MMOL/L (ref 22–29)
CREAT SERPL-MCNC: 0.9 MG/DL (ref 0.76–1.27)
EGFRCR SERPLBLD CKD-EPI 2021: 110 ML/MIN/1.73
GLUCOSE BLDC GLUCOMTR-MCNC: 117 MG/DL (ref 70–130)
GLUCOSE BLDC GLUCOMTR-MCNC: 159 MG/DL (ref 70–130)
GLUCOSE SERPL-MCNC: 143 MG/DL (ref 65–99)
POTASSIUM SERPL-SCNC: 3.9 MMOL/L (ref 3.5–5.2)
SODIUM SERPL-SCNC: 136 MMOL/L (ref 136–145)

## 2025-04-25 PROCEDURE — 63710000001 INSULIN GLARGINE PER 5 UNITS: Performed by: STUDENT IN AN ORGANIZED HEALTH CARE EDUCATION/TRAINING PROGRAM

## 2025-04-25 PROCEDURE — 99232 SBSQ HOSP IP/OBS MODERATE 35: CPT | Performed by: STUDENT IN AN ORGANIZED HEALTH CARE EDUCATION/TRAINING PROGRAM

## 2025-04-25 PROCEDURE — 80048 BASIC METABOLIC PNL TOTAL CA: CPT | Performed by: HOSPITALIST

## 2025-04-25 PROCEDURE — 82948 REAGENT STRIP/BLOOD GLUCOSE: CPT

## 2025-04-25 RX ORDER — CLOPIDOGREL BISULFATE 75 MG/1
75 TABLET ORAL DAILY
Qty: 30 TABLET | Refills: 0 | Status: SHIPPED | OUTPATIENT
Start: 2025-04-26 | End: 2025-05-26

## 2025-04-25 RX ORDER — POTASSIUM CHLORIDE 1500 MG/1
40 TABLET, EXTENDED RELEASE ORAL
Qty: 180 TABLET | Refills: 0 | Status: SHIPPED | OUTPATIENT
Start: 2025-04-25 | End: 2025-05-25

## 2025-04-25 RX ORDER — FUROSEMIDE 40 MG/1
80 TABLET ORAL
Status: DISCONTINUED | OUTPATIENT
Start: 2025-04-25 | End: 2025-04-25 | Stop reason: HOSPADM

## 2025-04-25 RX ORDER — FUROSEMIDE 80 MG/1
80 TABLET ORAL 2 TIMES DAILY
Qty: 60 TABLET | Refills: 0 | Status: SHIPPED | OUTPATIENT
Start: 2025-04-25 | End: 2025-05-25

## 2025-04-25 RX ORDER — ASPIRIN 81 MG/1
81 TABLET ORAL DAILY
Qty: 30 TABLET | Refills: 0 | Status: SHIPPED | OUTPATIENT
Start: 2025-04-26 | End: 2025-05-26

## 2025-04-25 RX ORDER — FUROSEMIDE 40 MG/1
40 TABLET ORAL
Status: DISCONTINUED | OUTPATIENT
Start: 2025-04-25 | End: 2025-04-25

## 2025-04-25 RX ORDER — FAMOTIDINE 20 MG/1
20 TABLET, FILM COATED ORAL
Qty: 60 TABLET | Refills: 0 | Status: SHIPPED | OUTPATIENT
Start: 2025-04-25 | End: 2025-05-25

## 2025-04-25 RX ADMIN — METOPROLOL SUCCINATE 25 MG: 25 TABLET, EXTENDED RELEASE ORAL at 08:25

## 2025-04-25 RX ADMIN — ASPIRIN 81 MG: 81 TABLET, COATED ORAL at 08:25

## 2025-04-25 RX ADMIN — INSULIN GLARGINE 15 UNITS: 100 INJECTION, SOLUTION SUBCUTANEOUS at 08:26

## 2025-04-25 RX ADMIN — CLOPIDOGREL BISULFATE 75 MG: 75 TABLET, FILM COATED ORAL at 08:26

## 2025-04-25 RX ADMIN — FAMOTIDINE 20 MG: 20 TABLET, FILM COATED ORAL at 07:02

## 2025-04-25 RX ADMIN — Medication 10 ML: at 08:26

## 2025-04-25 RX ADMIN — POTASSIUM CHLORIDE 40 MEQ: 1500 TABLET, EXTENDED RELEASE ORAL at 08:30

## 2025-04-25 NOTE — PROGRESS NOTES
"Daily progress note    Referring physician      Subjective  Doing same with no new complaints and wants to go home    History of present illness  39-year-old white male with history of chronic congestive heart failure diabetes mellitus hypertension hyperlipidemia admitted to cardiology service from [facility] where he was admitted with shortness of breath and found to have severe LV dysfunction and multivessel coronary artery disease.  I am asked to follow patient for medical problems.  At the time of examination he has denies any chest pain shortness of breath palpitation but feeling very weak.    Review of Systems:  Unremarkable    Physical Exam:  Blood pressure 99/71, pulse 115, temperature 97.8 °F (36.6 °C), temperature source Oral, resp. rate 17, height 175.3 cm (69.02\"), weight 107 kg (236 lb 3.2 oz), SpO2 98%.    General: Awake alert and in no distress  HEENT: Unremarkable  Neck: Supple  Heart: Tachycardic and irregular rhythm, no murmurs or rubs, 2+ radial pulses bilaterally  Lungs: Clear to auscultation bilaterally without wheezes or crackles, no respiratory distress  Abdomen: Soft, mildly tender diffusely throughout the abdomen but no generalized peritonitis, nondistended, no rebound or guarding  Skin: Warm, dry, no rash  Musculoskeletal: Normal range of motion, 2+ pitting bilateral symmetric lower extremity edema up to both thighs  Neurologic: Oriented x3, no motor deficits no sensory deficit  Psychiatric: Mood appears stable, no psychosis    LABS  Lab Results (last 24 hours)       Procedure Component Value Units Date/Time    POC Glucose Once [[id number]]  (Abnormal) Collected: 04/25/25 1151    Specimen: Blood Updated: 04/25/25 1152     Glucose 159 mg/dL     Basic Metabolic Panel [[id number]]  (Abnormal) Collected: 04/25/25 0605    Specimen: Blood Updated: 04/25/25 0700     Glucose 143 mg/dL      BUN 20 mg/dL      Creatinine 0.90 mg/dL      Sodium 136 mmol/L      Potassium 3.9 mmol/L  "     Chloride 100 mmol/L      CO2 22.8 mmol/L      Calcium 9.5 mg/dL      BUN/Creatinine Ratio 22.2     Anion Gap 13.2 mmol/L      eGFR 110.0 mL/min/1.73     Narrative:      GFR Categories in Chronic Kidney Disease (CKD)      GFR Category          GFR (mL/min/1.73)    Interpretation  G1                     90 or greater         Normal or high (1)  G2                      60-89                Mild decrease (1)  G3a                   45-59                Mild to moderate decrease  G3b                   30-44                Moderate to severe decrease  G4                    15-29                Severe decrease  G5                    14 or less           Kidney failure          (1)In the absence of evidence of kidney disease, neither GFR category G1 or G2 fulfill the criteria for CKD.    eGFR calculation 2021 CKD-EPI creatinine equation, which does not include race as a factor    POC Glucose Once [[id number]]  (Normal) Collected: 04/25/25 0640    Specimen: Blood Updated: 04/25/25 0643     Glucose 117 mg/dL     POC Glucose Once [[id number]]  (Abnormal) Collected: 04/24/25 2110    Specimen: Blood Updated: 04/24/25 2113     Glucose 231 mg/dL     POC Glucose Once [[id number]]  (Abnormal) Collected: 04/24/25 1636    Specimen: Blood Updated: 04/24/25 1638     Glucose 214 mg/dL           Study Result  Narrative & Impression   XR CHEST PA AND LATERAL-     Clinical: CHF     FINDINGS: Cardiac enlargement similar to the previous examination. No  pleural effusion or acute airspace disease has developed. No gross  vascular congestion. The could be mild central vascular congestion.  Remainder is unremarkable.        Scan on 4/17/2025 1635 by [employer], [place]: ECG 12-LEAD         Author: -- Service: -- Author Type: --   Filed: Date of Service: Creation Time:   Status: (Other)   HEART GPTP=809  bpm  RR Ejcuufcb=369  ms  ID Dyahptjk=940  ms  P Horizontal Axis=-58  deg  P Front Axis=83  deg  QRSD Gfqyfudm=834  ms  QT Ynpzwxji=885   ms  AQkD=760  ms  QRS Axis=93  deg  T Wave Axis=192  deg  - ABNORMAL ECG -  Sinus tachycardia  Ventricular tachycardia, unsustained  Borderline right axis deviation  Anteroseptal infarct, old  Abnormal T, consider ischemia, diffuse leads          Current Facility-Administered Medications:     aspirin EC tablet 81 mg, 81 mg, Oral, Daily, Aidan Betancourt MD, 81 mg at 04/25/25 0825    atorvastatin (LIPITOR) tablet 80 mg, 80 mg, Oral, Nightly, Aidan Betancourt MD, 80 mg at 04/24/25 2101    clopidogrel (PLAVIX) tablet 75 mg, 75 mg, Oral, Daily, Aidan Betancourt MD, 75 mg at 04/25/25 0826    famotidine (PEPCID) tablet 20 mg, 20 mg, Oral, BID AC, Aidan Betancourt MD, 20 mg at 04/25/25 0702    furosemide (LASIX) injection 80 mg, 80 mg, Intravenous, TID, Aidan Betancourt MD, 80 mg at 04/24/25 2101    insulin glargine (LANTUS, SEMGLEE) injection 15 Units, 15 Units, Subcutaneous, Daily, Aidan Betancourt MD, 15 Units at 04/25/25 0826    insulin lispro (HUMALOG/ADMELOG) injection 2-9 Units, 2-9 Units, Subcutaneous, 4x Daily AC & at Bedtime, Aidan Betancourt MD, 4 Units at 04/24/25 2225    metoprolol succinate XL (TOPROL-XL) 24 hr tablet 25 mg, 25 mg, Oral, Q24H, Aidan Betancourt MD, 25 mg at 04/25/25 0825    potassium chloride (KLOR-CON M20) CR tablet 20 mEq, 20 mEq, Oral, Once, Trinidad Quijano MD    potassium chloride (KLOR-CON M20) CR tablet 40 mEq, 40 mEq, Oral, TID With Meals, Aidan Betancourt MD, 40 mEq at 04/25/25 0830    sodium chloride 0.9 % flush 10 mL, 10 mL, Intravenous, Q12H, Aidan Betancourt MD, 10 mL at 04/25/25 0826      ASSESSMENT  Multivessel coronary artery disease status post PCI  Severe LV dysfunction  Diabetes mellitus  Hypertension  Hyperlipidemia  Morbidly obese  Anxiety disorder  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

## 2025-04-25 NOTE — CASE MANAGEMENT/SOCIAL WORK
Case Management Discharge Note      Final Note: Home via private vehicle    Provided Post Acute Provider List?: N/A  Provided Post Acute Provider Quality & Resource List?: N/A    Selected Continued Care - Discharged on 4/25/2025 Admission date: 4/14/2025 - Discharge disposition: Home or Self Care      Destination    No services have been selected for the patient.                Durable Medical Equipment    No services have been selected for the patient.                Dialysis/Infusion    No services have been selected for the patient.                Home Medical Care    No services have been selected for the patient.                Therapy    No services have been selected for the patient.                Community Resources    No services have been selected for the patient.                Community & DME    No services have been selected for the patient.                    Transportation Services  Private: Car    Final Discharge Disposition Code: 01 - home or self-care

## 2025-04-25 NOTE — PLAN OF CARE
Goal Outcome Evaluation:  Plan of Care Reviewed With: patient        Progress: no change  Outcome Evaluation: pt aox4, on RA, no c/o pain, some soft pressures this am 90s sys asymptomatic, VSS. Lasix given per order. sinus-Sinus tach on monitor with multifocal PVCs. ambulating independently. Continue plan of care.

## 2025-04-25 NOTE — OUTREACH NOTE
Prep Survey      Flowsheet Row Responses   [facility] patient discharged from? [place]   Is LACE score < 7 ? No   Eligibility [facility]   Date of Admission 04/14/25   Date of Discharge 04/25/25   Discharge Disposition Home or Self Care   Discharge diagnosis Acute CHF, heart cath with PCI   Does the patient have one of the following disease processes/diagnoses(primary or secondary)? Acute MI (STEMI,NSTEMI)   Does the patient have Home health ordered? No   Is there a DME ordered? No   Prep survey completed? Yes            Tiffany Calderon Registered Nurse

## 2025-04-25 NOTE — PROGRESS NOTES
"    Patient Name: Luiz Christianson  :1985  39 y.o.      Patient Care Team:  Luz Maria White APRN as PCP - General (Family Medicine)    Chief Complaint:   Acute HFrEF, CAD    Interval History:   NAEO.  Eager to go home.    Objective   Vital Signs  Temp:  [97.6 °F (36.4 °C)-98.6 °F (37 °C)] 97.7 °F (36.5 °C)  Heart Rate:  [] 92  Resp:  [17-18] 17  BP: ()/(71-84) 95/71    Intake/Output Summary (Last 24 hours) at 2025 0839  Last data filed at 2025 0429  Gross per 24 hour   Intake 120 ml   Output  ml   Net -1905 ml     Flowsheet Rows      Flowsheet Row First Filed Value   Admission Height 175.3 cm (69.02\") Documented at 2025   Admission Weight 122 kg (269 lb 13.5 oz) Documented at 2025            GEN: no distress, alert and oriented  HEENT: NACT, EOMI, moist mucous membranes  Lungs: CTAB, no wheezes, rales or rhonchi  CV: normal rate, regular rhythm, normal S1, S2, no murmurs, +2 radial pulses b/l, JVD to clavicle  Abdomen: soft, nontender, nondistended, NABS  Extremities: no edema  Skin: no rash, warm, dry  Heme/Lymph: no bruising  Psych: organized thought, normal behavior and affect    Results Review:    Results from last 7 days   Lab Units 25  0605   SODIUM mmol/L 136   POTASSIUM mmol/L 3.9   CHLORIDE mmol/L 100   CO2 mmol/L 22.8   BUN mg/dL 20   CREATININE mg/dL 0.90   GLUCOSE mg/dL 143*   CALCIUM mg/dL 9.5           Results from last 7 days   Lab Units 25  0317   WBC 10*3/mm3 7.47   HEMOGLOBIN g/dL 14.6   HEMATOCRIT % 46.1   PLATELETS 10*3/mm3 218         Results from last 7 days   Lab Units 25  0619   MAGNESIUM mg/dL 2.1     Results from last 7 days   Lab Units 25  0432   CHOLESTEROL mg/dL 82   TRIGLYCERIDES mg/dL 79   HDL CHOL mg/dL 16*   LDL CHOL mg/dL 49               Medication Review:   aspirin, 81 mg, Oral, Daily  atorvastatin, 80 mg, Oral, Nightly  clopidogrel, 75 mg, Oral, Daily  famotidine, 20 mg, Oral, BID AC  furosemide, " 80 mg, Intravenous, TID  insulin glargine, 15 Units, Subcutaneous, Daily  insulin lispro, 2-9 Units, Subcutaneous, 4x Daily AC & at Bedtime  metoprolol succinate XL, 25 mg, Oral, Q24H  potassium chloride ER, 20 mEq, Oral, Once  potassium chloride, 40 mEq, Oral, TID With Meals  sodium chloride, 10 mL, Intravenous, Q12H                Assessment & Plan   #Acute HFrEF  #Multivessel CAD  #Diabetes  #Hypertension  #Hyperlipidemia     39-year-old man, followed by Dr. Muir, with hypertension, hyperlipidemia, diabetes, recently diagnosed HFrEF with an EF of 18%, who presented to [facility] with dyspnea on exertion, cough, and exercise intolerance.  He underwent right and left heart catheterization yesterday which revealed elevated right and left-sided filling pressures with an RA pressure of 35 and a wedge pressure of 40.  Coronary angiography revealed a 99% stenosis of the distal circumflex (dominant, as well as a 90% stenosis of the mid LAD.  The distal circumflex is supplied via septal left to left collaterals.  He was transferred here for evaluation of CABG.  Dr. Stanton has reviewed and does not think patient is a good surgical candidate. He ultimately underwent PCI to LAD and Cx on 4/21/24.     Right heart catheterization with persistently elevated right and left-sided filling pressures: Mean RA of 22, wedge of 44.    Net neg 2.2L.   His weight was 234 at its lowest. He was 270 lbs when admitted.    - Switch Lasix to 80 mg p.o. twice daily  - continue Toprol 25 mg daily  - K > 4, Mg > 2  - Strict I's/O's, daily weights  - continue aspirin 81 mg daily, clopidogrel 75 mg daily, atorvastatin 80 mg daily    Stable for discharge home from cardiac standpoint.  We will arrange for outpatient follow-up.    Aidan Saldana MD, [facility], [facility]  04/25/25  08:39 EDT

## 2025-04-25 NOTE — DISCHARGE SUMMARY
Discharge summary    Date of admission 4/14/2025  Date of discharge 4/25/2025    Final diagnosis  Multivessel coronary artery disease status post PCI  Severe LV dysfunction  Diabetes mellitus  Hypertension  Hyperlipidemia  Morbidly obese  Anxiety disorder    Discharge medications    Current Facility-Administered Medications:     aspirin EC tablet 81 mg, 81 mg, Oral, Daily, Aidan Betancourt MD, 81 mg at 04/25/25 0825    atorvastatin (LIPITOR) tablet 80 mg, 80 mg, Oral, Nightly, Aidan Betancourt MD, 80 mg at 04/24/25 2101    clopidogrel (PLAVIX) tablet 75 mg, 75 mg, Oral, Daily, Aidan Betancourt MD, 75 mg at 04/25/25 0826    famotidine (PEPCID) tablet 20 mg, 20 mg, Oral, BID AC, Aidan Betancourt MD, 20 mg at 04/25/25 0702    furosemide (LASIX) tablet 80 mg, 80 mg, Oral, BID Diuretics, Billy Quijano MD    insulin glargine (LANTUS, SEMGLEE) injection 15 Units, 15 Units, Subcutaneous, Daily, Aidan Betancourt MD, 15 Units at 04/25/25 0826    insulin lispro (HUMALOG/ADMELOG) injection 2-9 Units, 2-9 Units, Subcutaneous, 4x Daily AC & at Bedtime, Aidan Betancourt MD, 4 Units at 04/24/25 2225    metoprolol succinate XL (TOPROL-XL) 24 hr tablet 25 mg, 25 mg, Oral, Q24H, Aidan Betancourt MD, 25 mg at 04/25/25 0825    potassium chloride (KLOR-CON M20) CR tablet 20 mEq, 20 mEq, Oral, Once, Billy Quijano MD    potassium chloride (KLOR-CON M20) CR tablet 40 mEq, 40 mEq, Oral, TID With Meals, Aidan Betancourt MD, 40 mEq at 04/25/25 0830    sodium chloride 0.9 % flush 10 mL, 10 mL, Intravenous, Q12H, Aidan Betancourt MD, 10 mL at 04/25/25 0826     Consults obtained  Cardiology  Cardiothoracic surgery  Cardiac rehabilitation    Procedures  PCI to LAD and circumflex on 4/21/2025    Hospital course  39-year-old white male with history of congestive heart failure diabetes hypertension hyperlipidemia admitted to cardiology service from [facility] where workup revealed three-vessel coronary artery disease with severely dysfunction for possible CABG.  He is not a good  surgical candidate per cardiothoracic surgery and underwent PCI to LAD and circumflex.  Patient started feeling better and cleared for discharge from cardiology on aspirin Plavix Lipitor and he will continue Toprol-XL.  Patient will do outpatient cardiac rehab.  Patient required IV diuresis and changed to p.o. Lasix 80 mg twice daily at the time of discharge.    Discharge diet regular    Activity as tolerated    Medication as above    Follow-up with primary doctor in 1 week and follow-up with cardiology per the instructions and take medication as directed.    TRINIDAD STEPHENS MD

## 2025-04-28 ENCOUNTER — TRANSITIONAL CARE MANAGEMENT TELEPHONE ENCOUNTER (OUTPATIENT)
Dept: CALL CENTER | Facility: HOSPITAL | Age: 40
End: 2025-04-28
Payer: COMMERCIAL

## 2025-04-28 NOTE — TELEPHONE ENCOUNTER
Pt returned call to office, went over results and recommendations, pt verbalized understanding. Pt is scheduled for MRCP

## 2025-04-28 NOTE — OUTREACH NOTE
Call Center TCM Note      Flowsheet Row Responses   [facility] patient discharged from? [place]   Does the patient have one of the following disease processes/diagnoses(primary or secondary)? Acute MI (STEMI,NSTEMI)   TCM attempt successful? No   Unsuccessful attempts Attempt 1   Call Status Left message            Yariel EDEN - Registered Nurse    4/28/2025, 16:49 EDT

## 2025-04-28 NOTE — OUTREACH NOTE
Call Center TCM Note      Flowsheet Row Responses   [facility] patient discharged from? [place]   Does the patient have one of the following disease processes/diagnoses(primary or secondary)? Acute MI (STEMI,NSTEMI)   TCM attempt successful? Yes   Call start time 1716   Call end time 1719   Discharge diagnosis Acute CHF, heart cath with PCI   Person spoke with today (if not patient) and relationship Patient   Meds reviewed with patient/caregiver? Yes   Is the patient having any side effects they believe may be caused by any medication additions or changes? No   Does the patient have all prescriptions related to this admission filled (includes statins,anticoagulants,HTN meds,anti-arrhythmia meds) Yes   Is the patient taking all medications as directed (includes completed medication regime)? Yes   Comments Patient has a hospital followup scheduled tomorrow with PCP tomorrow on 4/29. He will also followup with Cardiology on 5/6   Does the patient have an appointment with their PCP within 7-14 days of discharge? Yes   Psychosocial issues? No   Did the patient receive a copy of their discharge instructions? Yes   Nursing interventions Reviewed instructions with patient   What is the patient's perception of their health status since discharge? Improving   Nursing interventions Nurse provided patient education   Is the patient/caregiver able to teach back signs and symptoms of when to call for help immediately: Sudden chest discomfort, Sudden discomfort in arms, back, neck or jaw, Shortness of breath at any time   Nursing interventions Nurse provided patient education   Nursing interventions Provided education on importance of cardiac rehab  [Patient has Cardiac Rehab arranged in [place] on May 1]   Is the patient/caregiver able to teach back ways to prevent a second heart attack: Take medications, Follow up with MD   If the patient is a current smoker, are they able to teach back resources for cessation? Not a  smoker   Is the patient/caregiver able to teach back the hierarchy of who to call/visit for symptoms/problems? PCP, Specialist, Home health nurse, Urgent Care, ED, 911 Yes   TCM call completed? Yes   Wrap up additional comments Patient is improving. He has all medications and followups scheduled.   Call end time 1719   Would this patient benefit from a Referral to [facility] Social Work? No   Is the patient interested in additional calls from an ambulatory ? No            Yariel EDEN - Registered Nurse    4/28/2025, 17:19 EDT

## 2025-04-29 ENCOUNTER — APPOINTMENT (OUTPATIENT)
Dept: DIABETES SERVICES | Facility: HOSPITAL | Age: 40
End: 2025-04-29
Payer: COMMERCIAL

## 2025-04-29 ENCOUNTER — OFFICE VISIT (OUTPATIENT)
Dept: FAMILY MEDICINE CLINIC | Facility: CLINIC | Age: 40
End: 2025-04-29
Payer: COMMERCIAL

## 2025-04-29 ENCOUNTER — TELEPHONE (OUTPATIENT)
Dept: CARDIOLOGY | Age: 40
End: 2025-04-29
Payer: COMMERCIAL

## 2025-04-29 VITALS
DIASTOLIC BLOOD PRESSURE: 62 MMHG | OXYGEN SATURATION: 98 % | WEIGHT: 238.8 LBS | BODY MASS INDEX: 35.37 KG/M2 | HEART RATE: 42 BPM | TEMPERATURE: 97.2 F | SYSTOLIC BLOOD PRESSURE: 104 MMHG | HEIGHT: 69 IN

## 2025-04-29 DIAGNOSIS — R00.1 BRADYCARDIA: ICD-10-CM

## 2025-04-29 DIAGNOSIS — K74.60 CIRRHOSIS OF LIVER WITHOUT ASCITES, UNSPECIFIED HEPATIC CIRRHOSIS TYPE: Primary | ICD-10-CM

## 2025-04-29 DIAGNOSIS — I50.9 ACUTE HEART FAILURE, UNSPECIFIED HEART FAILURE TYPE: ICD-10-CM

## 2025-04-29 DIAGNOSIS — Z09 HOSPITAL DISCHARGE FOLLOW-UP: Primary | ICD-10-CM

## 2025-04-29 DIAGNOSIS — E11.65 TYPE 2 DIABETES MELLITUS WITH HYPERGLYCEMIA, WITHOUT LONG-TERM CURRENT USE OF INSULIN: ICD-10-CM

## 2025-04-29 LAB
BASOPHILS # BLD AUTO: 0.1 10*3/MM3 (ref 0–0.2)
BASOPHILS NFR BLD AUTO: 1.1 % (ref 0–1.5)
DEPRECATED RDW RBC AUTO: 47.8 FL (ref 37–54)
EOSINOPHIL # BLD AUTO: 0.21 10*3/MM3 (ref 0–0.4)
EOSINOPHIL NFR BLD AUTO: 2.3 % (ref 0.3–6.2)
ERYTHROCYTE [DISTWIDTH] IN BLOOD BY AUTOMATED COUNT: 14.6 % (ref 12.3–15.4)
HCT VFR BLD AUTO: 51.4 % (ref 37.5–51)
HGB BLD-MCNC: 15.3 G/DL (ref 13–17.7)
IMM GRANULOCYTES # BLD AUTO: 0.04 10*3/MM3 (ref 0–0.05)
IMM GRANULOCYTES NFR BLD AUTO: 0.4 % (ref 0–0.5)
LYMPHOCYTES # BLD AUTO: 2.83 10*3/MM3 (ref 0.7–3.1)
LYMPHOCYTES NFR BLD AUTO: 31.5 % (ref 19.6–45.3)
MAGNESIUM SERPL-MCNC: 1.9 MG/DL (ref 1.6–2.6)
MCH RBC QN AUTO: 27.2 PG (ref 26.6–33)
MCHC RBC AUTO-ENTMCNC: 29.8 G/DL (ref 31.5–35.7)
MCV RBC AUTO: 91.3 FL (ref 79–97)
MONOCYTES # BLD AUTO: 1.1 10*3/MM3 (ref 0.1–0.9)
MONOCYTES NFR BLD AUTO: 12.2 % (ref 5–12)
NEUTROPHILS NFR BLD AUTO: 4.7 10*3/MM3 (ref 1.7–7)
NEUTROPHILS NFR BLD AUTO: 52.5 % (ref 42.7–76)
NRBC BLD AUTO-RTO: 0 /100 WBC (ref 0–0.2)
NT-PROBNP SERPL-MCNC: 2500 PG/ML (ref 0–450)
PLATELET # BLD AUTO: 284 10*3/MM3 (ref 140–450)
PMV BLD AUTO: 11.6 FL (ref 6–12)
RBC # BLD AUTO: 5.63 10*6/MM3 (ref 4.14–5.8)
WBC NRBC COR # BLD AUTO: 8.98 10*3/MM3 (ref 3.4–10.8)

## 2025-04-29 PROCEDURE — 80053 COMPREHEN METABOLIC PANEL: CPT

## 2025-04-29 PROCEDURE — 97802 MEDICAL NUTRITION INDIV IN: CPT | Performed by: DIETITIAN, REGISTERED

## 2025-04-29 PROCEDURE — 83735 ASSAY OF MAGNESIUM: CPT

## 2025-04-29 PROCEDURE — 85025 COMPLETE CBC W/AUTO DIFF WBC: CPT

## 2025-04-29 PROCEDURE — 83880 ASSAY OF NATRIURETIC PEPTIDE: CPT

## 2025-04-29 NOTE — TELEPHONE ENCOUNTER
Received call from Julio ([facility]) regarding Luiz Christianson.  Patient was seen in office by ENRIKE White.  During visit, patient had HR in the 40's.  At time of EKG, HR had increased.    ENRIKE White is asking for Dr. Saldana to review patient's EKG and let her know if patient needs to be seen sooner than 5/6/2025.    Thank you,  Qi DILLARD RN  Triage Nurse [facility]  04/29/25 13:22 EDT

## 2025-04-29 NOTE — PROGRESS NOTES
Luiz Christianson is a 39 y.o. male admitted to [facility] and  is seen for follow up.  Chief Complaint   Patient presents with    Hospital Follow Up Visit     [facility]- 04/12/25-04/14/25; [facility] 04/14/25-04/25/25    Congestive Heart Failure       History of Present Illness  The patient presents for evaluation of atrial fibrillation and heart failure.    He was initially evaluated by Dr. Wagner on 03/25/2025, who ruled out a cardiac etiology for his symptoms. Subsequently, he was seen by Dr. Liao, who identified atrial fibrillation during a respiratory assessment. This led to further cardiac evaluations at [place], where significant cardiac dysfunction was noted. He was admitted to the hospital on 04/14/2025 due to the discovery of three-vessel blockages and severe dysfunction, necessitating potential open-heart surgery. However, he was deemed an unsuitable candidate for this procedure due to his low ejection fraction. Instead, stents were placed in the LAD and circumflex arteries on 04/21/2025.    No chest pain, discomfort, or tightness is reported. He also reports no dizziness or weakness but notes a lack of energy despite a 50-pound weight loss. Weight has been monitored, with an additional 10 pounds lost since discharge from the hospital. A nutritionist appointment is scheduled for today at 1:00 PM. No palpitations or irregular heartbeats are reported, but fatigue is noted. Current medications include aspirin, Plavix, Lipitor, and Toprol XL 25 mg once daily in the evening. Outpatient cardiac rehabilitation is scheduled, and a follow-up appointment with Dr. Betancourt is set for 05/06/2025.    Currently, he is on Lasix 80 mg twice daily and potassium three times daily. Sodium and electrolytes have been avoided.          4/25/2025     5:19 PM   Date of TCM Phone Call   Hospital [facility]   Date of Admission 4/14/2025   Date of Discharge 4/25/2025   Discharge  "Disposition Home or Self Care     Discharge summary is available.  Current outpatient and discharge medications have been reconciled for the patient.  Reviewed by: ENRIKE Shaw      He was admitted for the following reason(s): PCI to LAD and circumflex  Primary admitting diagnosis at discharge was multivessel coronary artery disease status post PCI, severe LV dysfunction, diabetes, hypertension, hyperlipidemia.  Procedures performed while hospitalized:Procedures Performed in Hospital: please see EPIC record for further details of results and finding  Pending results:  Pending tests: none  Pain Control:  well controlled  Diet: Heart healthy low-sodium  Activity after Discharge: activity as tolerated  Items to be addressed at first follow up visit include:  1. Medication changes: Continue aspirin, Plavix, Lipitor, Toprol XL 25 mg daily.  Prescribed Lasix 80 mg twice daily at the time of discharge.    He reports his overall condition is are improving since discharge.  Specific complaints: Fatigue, decreased stamina.  Social support is said to be adequate to meet his needs.       Objective   Vitals:    04/29/25 1058   BP: 104/62   BP Location: Left arm   Patient Position: Sitting   Cuff Size: Adult   Pulse: (!) 42   Temp: 97.2 °F (36.2 °C)   TempSrc: Oral   SpO2: 98%   Weight: 108 kg (238 lb 12.8 oz)   Height: 175.3 cm (69.02\")     Body mass index is 35.24 kg/m².  Physical Exam  Vitals reviewed.   Constitutional:       Appearance: Normal appearance. He is well-developed.   HENT:      Head: Normocephalic and atraumatic.   Eyes:      Conjunctiva/sclera: Conjunctivae normal.      Pupils: Pupils are equal, round, and reactive to light.   Cardiovascular:      Rate and Rhythm: Bradycardia present. Rhythm irregular.      Heart sounds: No murmur heard.     No friction rub. No gallop.   Pulmonary:      Effort: Pulmonary effort is normal.      Breath sounds: Normal breath sounds. No wheezing or rhonchi. "   Abdominal:      General: Bowel sounds are normal. There is no distension.      Palpations: Abdomen is soft.      Tenderness: There is no abdominal tenderness.   Musculoskeletal:      Right lower leg: No edema.      Left lower leg: No edema.   Skin:     General: Skin is warm and dry.   Neurological:      Mental Status: He is alert and oriented to person, place, and time.      Cranial Nerves: No cranial nerve deficit.   Psychiatric:         Mood and Affect: Mood and affect normal.         Behavior: Behavior normal.         Thought Content: Thought content normal.         Judgment: Judgment normal.       Physical Exam  Respiratory: Clear to auscultation, no wheezing, rales or rhonchi  Cardiovascular: Irregular heart rate, bradycardia with a heart rate of 42 bpm    Results  Diagnostic Testing   - EK.22, EKG shows some irregularities       Assessment & Plan   Problem List Items Addressed This Visit    None  Visit Diagnoses         Hospital discharge follow-up    -  Primary      Bradycardia        Relevant Orders    ECG 12 Lead    CBC & Differential    Comprehensive Metabolic Panel    Magnesium      Acute heart failure, unspecified heart failure type        Relevant Orders    proBNP          Assessment & Plan  1. Atrial Fibrillation.  - Hospitalized due to atrial fibrillation and severe cardiac dysfunction.  - Underwent stenting of the LAD and circumflex arteries.  - Heart rate currently in the 40s, potentially due to recent PCI, electrolyte imbalance from high-dose Lasix and other medication changes.  - Advised to monitor heart rate closely; lab work to check sodium, potassium, magnesium and other electrolytes; EKG to be performed for comparison with previous results; further consultation with cardiology regarding abnormal EKG-continue current medications, keep follow-up appointments as scheduled.  - Patient instructed to monitor closely for any changes in symptoms including worsening of fatigue, lethargy, edema,  dyspnea, weight gain of 5 pounds in 1 week, chest pain and to report to the ER within severe or concerning symptoms.    2. Heart Failure.  - Significant fluid retention and subsequent diuresis due to heart failure and treatment with diuretics.  - Currently on Lasix 80 mg twice daily and potassium supplements three times a day.  - Advised to continue medications as prescribed, maintain adequate hydration, avoid excessive salt intake, and monitor weight for significant fluctuations; concerning if weight gain >5 pounds within 3 days.  - Continue with outpatient cardiac rehab and follow up with Dr. Betancourt on 05/06/2025.    Patient or patient representative verbalized consent for the use of Ambient Listening during the visit with  ENRIKE Shaw for chart documentation. 4/29/2025  15:24 EDT            ECG 12 Lead    Date/Time: 4/29/2025 12:08 PM  Performed by: Luz Maria White APRN    Authorized by: Luz Maria White APRN  Comparison: compared with previous ECG from 4/22/2025  Similar to previous ECG    Clinical impression: abnormal EKG  Comments: Cardiology consulted for interpretation

## 2025-04-30 ENCOUNTER — TELEPHONE (OUTPATIENT)
Dept: GASTROENTEROLOGY | Facility: CLINIC | Age: 40
End: 2025-04-30
Payer: COMMERCIAL

## 2025-04-30 DIAGNOSIS — R18.8 CIRRHOSIS OF LIVER WITH ASCITES, UNSPECIFIED HEPATIC CIRRHOSIS TYPE: ICD-10-CM

## 2025-04-30 DIAGNOSIS — K74.60 CIRRHOSIS OF LIVER WITH ASCITES, UNSPECIFIED HEPATIC CIRRHOSIS TYPE: ICD-10-CM

## 2025-04-30 LAB
ALBUMIN SERPL-MCNC: 3.8 G/DL (ref 3.5–5.2)
ALBUMIN/GLOB SERPL: 1 G/DL
ALP SERPL-CCNC: 87 U/L (ref 39–117)
ALT SERPL W P-5'-P-CCNC: 55 U/L (ref 1–41)
ANION GAP SERPL CALCULATED.3IONS-SCNC: 13.4 MMOL/L (ref 5–15)
AST SERPL-CCNC: 41 U/L (ref 1–40)
BILIRUB SERPL-MCNC: 1.9 MG/DL (ref 0–1.2)
BUN SERPL-MCNC: 25 MG/DL (ref 6–20)
BUN/CREAT SERPL: 19.2 (ref 7–25)
CALCIUM SPEC-SCNC: 10.3 MG/DL (ref 8.6–10.5)
CHLORIDE SERPL-SCNC: 100 MMOL/L (ref 98–107)
CO2 SERPL-SCNC: 21.6 MMOL/L (ref 22–29)
CREAT SERPL-MCNC: 1.3 MG/DL (ref 0.76–1.27)
EGFRCR SERPLBLD CKD-EPI 2021: 70.8 ML/MIN/1.73
GLOBULIN UR ELPH-MCNC: 3.7 GM/DL
GLUCOSE SERPL-MCNC: 129 MG/DL (ref 65–99)
POTASSIUM SERPL-SCNC: 4.8 MMOL/L (ref 3.5–5.2)
PROT SERPL-MCNC: 7.5 G/DL (ref 6–8.5)
SODIUM SERPL-SCNC: 135 MMOL/L (ref 136–145)

## 2025-04-30 RX ORDER — LACTULOSE 10 G/15ML
SOLUTION ORAL
Qty: 900 ML | Refills: 1 | Status: SHIPPED | OUTPATIENT
Start: 2025-04-30

## 2025-04-30 NOTE — TELEPHONE ENCOUNTER
Pharmacy:  [facility]  Rx Refill Request:  lactulose 10 gm / 15 ml solution  Last ordered:  04.08.25 with one refill  Last filled:    Last Office Visit:  04.08.25  Next Office Visit:   07.08.25

## 2025-04-30 NOTE — TELEPHONE ENCOUNTER
Refill sent for Lactulose sent to pharmacy on file. Orders placed to recheck hepatic function panel in 3 weeks

## 2025-05-01 ENCOUNTER — OFFICE VISIT (OUTPATIENT)
Dept: CARDIAC REHAB | Facility: HOSPITAL | Age: 40
End: 2025-05-01
Payer: COMMERCIAL

## 2025-05-01 DIAGNOSIS — Z95.5 S/P DRUG ELUTING CORONARY STENT PLACEMENT: Primary | ICD-10-CM

## 2025-05-01 PROCEDURE — 93797 PHYS/QHP OP CAR RHAB WO ECG: CPT

## 2025-05-01 PROCEDURE — 93798 PHYS/QHP OP CAR RHAB W/ECG: CPT

## 2025-05-02 ENCOUNTER — TREATMENT (OUTPATIENT)
Dept: CARDIAC REHAB | Facility: HOSPITAL | Age: 40
End: 2025-05-02
Payer: COMMERCIAL

## 2025-05-02 DIAGNOSIS — Z95.5 S/P DRUG ELUTING CORONARY STENT PLACEMENT: Primary | ICD-10-CM

## 2025-05-02 PROCEDURE — 93798 PHYS/QHP OP CAR RHAB W/ECG: CPT

## 2025-05-05 ENCOUNTER — TELEPHONE (OUTPATIENT)
Dept: FAMILY MEDICINE CLINIC | Facility: CLINIC | Age: 40
End: 2025-05-05
Payer: COMMERCIAL

## 2025-05-05 ENCOUNTER — READMISSION MANAGEMENT (OUTPATIENT)
Dept: CALL CENTER | Facility: HOSPITAL | Age: 40
End: 2025-05-05
Payer: COMMERCIAL

## 2025-05-05 ENCOUNTER — TREATMENT (OUTPATIENT)
Dept: CARDIAC REHAB | Facility: HOSPITAL | Age: 40
End: 2025-05-05
Payer: COMMERCIAL

## 2025-05-05 DIAGNOSIS — Z95.5 S/P DRUG ELUTING CORONARY STENT PLACEMENT: Primary | ICD-10-CM

## 2025-05-05 PROCEDURE — 93798 PHYS/QHP OP CAR RHAB W/ECG: CPT

## 2025-05-05 NOTE — OUTREACH NOTE
AMI Week 2 Survey      Flowsheet Row Responses   [facility] patient discharged from? [place]   Does the patient have one of the following disease processes/diagnoses(primary or secondary)? Acute MI (STEMI,NSTEMI)   Week 2 attempt successful? Yes   Call start time 1006   Call end time 1012   Discharge diagnosis Acute CHF, heart cath with PCI   Meds reviewed with patient/caregiver? Yes   Is the patient having any side effects they believe may be caused by any medication additions or changes? No   Is the patient taking all medications as directed (includes completed medication regime)? Yes   Has the patient kept scheduled appointments due by today? Yes   Comments The patient denies chest pain or dizziness but does report chest pressure and cough with exertion that resolves with rest. The patient reports that he is participating with Cardiac Rehab now, which he feels is going well. The patient is monitoring his daily wts and HR at home, per pt report.   What is the patient's perception of their health status since discharge? Improving   Is the patient/caregiver able to teach back signs and symptoms of when to call for help immediately: Sudden chest discomfort, Sudden discomfort in arms, back, neck or jaw, Shortness of breath at any time   Nursing interventions Nurse provided patient education   Is the pateint /caregiver able to teach back the importance of cardiac rehab? Yes   Is the patient/caregiver able to teach back lifestyle changes to help prevent MIs Regular exercise as approved by provider   Is the patient/caregiver able to teach back ways to prevent a second heart attack: Take medications, Participate in Cardiac Rehab, Follow up with MD   If the patient is a current smoker, are they able to teach back resources for cessation? Not a smoker   Is the patient/caregiver able to teach back the hierarchy of who to call/visit for symptoms/problems? PCP, Specialist, Home health nurse, Urgent Care, ED, 911 Yes    Week 2 call completed? Yes   Revoked No further contact(revokes)-requires comment   Call end time 1012            Herminia FRASER - Registered Nurse

## 2025-05-06 ENCOUNTER — OFFICE VISIT (OUTPATIENT)
Age: 40
End: 2025-05-06
Payer: COMMERCIAL

## 2025-05-06 VITALS
WEIGHT: 240 LBS | SYSTOLIC BLOOD PRESSURE: 102 MMHG | HEIGHT: 69 IN | BODY MASS INDEX: 35.55 KG/M2 | DIASTOLIC BLOOD PRESSURE: 64 MMHG | OXYGEN SATURATION: 100 % | HEART RATE: 103 BPM

## 2025-05-06 DIAGNOSIS — I50.22 CHRONIC HFREF (HEART FAILURE WITH REDUCED EJECTION FRACTION): ICD-10-CM

## 2025-05-06 DIAGNOSIS — I25.10 CORONARY ARTERY DISEASE INVOLVING NATIVE CORONARY ARTERY OF NATIVE HEART WITHOUT ANGINA PECTORIS: Primary | ICD-10-CM

## 2025-05-06 DIAGNOSIS — Z95.5 PRESENCE OF DRUG-ELUTING STENT IN LEFT CIRCUMFLEX CORONARY ARTERY: ICD-10-CM

## 2025-05-06 DIAGNOSIS — Z95.5 STATUS POST INSERTION OF DRUG-ELUTING STENT INTO LEFT ANTERIOR DESCENDING (LAD) ARTERY: ICD-10-CM

## 2025-05-06 PROCEDURE — 99214 OFFICE O/P EST MOD 30 MIN: CPT | Performed by: STUDENT IN AN ORGANIZED HEALTH CARE EDUCATION/TRAINING PROGRAM

## 2025-05-06 RX ORDER — LISINOPRIL 5 MG/1
5 TABLET ORAL DAILY
Qty: 30 TABLET | Refills: 0 | Status: SHIPPED | OUTPATIENT
Start: 2025-05-06 | End: 2025-05-08

## 2025-05-06 RX ORDER — METOPROLOL SUCCINATE 25 MG/1
25 TABLET, EXTENDED RELEASE ORAL NIGHTLY
Qty: 90 TABLET | Refills: 3 | Status: SHIPPED | OUTPATIENT
Start: 2025-05-06

## 2025-05-06 NOTE — PROGRESS NOTES
Subjective:     Encounter Date:05/06/2025      Patient ID: Luiz Christianson III is a 39 y.o. male.    Chief Complaint:  F/u HFrEF, CAD    HPI:   39 y.o. male, previously followed by Dr. Muir, with hypertension, hyperlipidemia, diabetes, recently diagnosed HFrEF with an EF of 18%, CAD status post PCI to LAD and circumflex who presents for follow-up. Patient was recently admitted to [facility] with heart failure exacerbation. While there he underwent right left heart catheterization revealed a circumflex  as well as a 90% stenosis of the LAD.  He was transferred to our hospital for evaluation by CT surgery but was deemed to be a poor surgical candidate.  He underwent aggressive diuresis, with a 40 pound weight loss.  He then underwent PCI to the LAD as well as to the circumflex .  Given low blood pressures, he was started on Toprol for GDMT for his HFrEF.  He presents today for follow-up.  He has been feeling well.  He notes that on his scale at home his weight this morning was 230 pounds.  He has been going to cardiac rehab and has done well with this.    The following portions of the patient's history were reviewed and updated as appropriate: allergies, current medications, past family history, past medical history, past social history, past surgical history and problem list.     REVIEW OF SYSTEMS:   All systems reviewed.  Pertinent positives identified in HPI.  All other systems are negative.    Past Medical History:   Diagnosis Date    Chronic diastolic congestive heart failure 11/18/2020    Diabetes mellitus, type 2 11/18/2020    Diastolic CHF, chronic  11/18/2020    Emotional depression     Essential hypertension 11/18/2020    Fatty liver     My GP thinks I have this but no official diagnosis was given    Finger numbness 02/24/2021    Hyperlipidemia     Left wrist pain 02/24/2021    Migraine 12/07/2020       Family History   Problem Relation Age of Onset    Diabetes Mother     Diabetes Father      Heart disease Other         AUNT OR UNCLE    Diabetes Other     Colon cancer Neg Hx        Social History     Socioeconomic History    Marital status:    Tobacco Use    Smoking status: Never     Passive exposure: Past    Smokeless tobacco: Never   Vaping Use    Vaping status: Never Used   Substance and Sexual Activity    Alcohol use: Not Currently    Drug use: Never    Sexual activity: Yes     Partners: Female     Birth control/protection: Injection       Allergies   Allergen Reactions    Farxiga [Dapagliflozin] Other (See Comments)     Bladder irritation (persistant)       Past Surgical History:   Procedure Laterality Date    CARDIAC CATHETERIZATION N/A 4/14/2025    Procedure: Right and Left Heart Cath/possible PCI;  Surgeon: Aidan Polanco MD;  Location: [facility] CATH INVASIVE LOCATION;  Service: Cardiovascular;  Laterality: N/A;    CARDIAC CATHETERIZATION  4/21/2025    Procedure: Right and Left Heart Cath;  Surgeon: Aidan Betancourt MD;  Location:  [facility] CATH INVASIVE LOCATION;  Service: Cardiovascular;;    CARDIAC CATHETERIZATION N/A 4/21/2025    Procedure: Percutaneous Coronary Intervention;  Surgeon: Aidan Betancourt MD;  Location: [facility] CATH INVASIVE LOCATION;  Service: Cardiovascular;  Laterality: N/A;  LCX, possible LAD    CARDIAC CATHETERIZATION N/A 4/21/2025    Procedure: Stent BAN coronary;  Surgeon: Aidan Betancourt MD;  Location:  [facility] CATH INVASIVE LOCATION;  Service: Cardiovascular;  Laterality: N/A;    CARDIAC CATHETERIZATION N/A 4/21/2025    Procedure: Coronary angiography;  Surgeon: Aidan Betancourt MD;  Location: [facility] CATH INVASIVE LOCATION;  Service: Cardiovascular;  Laterality: N/A;    CARDIAC CATHETERIZATION N/A 4/21/2025    Procedure: Chronic Total Occlussion;  Surgeon: Aidan Betancourt MD;  Location: [facility] CATH INVASIVE LOCATION;  Service: Cardiovascular;  Laterality: N/A;    INTRAVASCULAR ULTRASOUND N/A 4/21/2025    Procedure: Intravascular Ultrasound;  Surgeon: Aidan Betancourt MD;  Location: [facility] CATH  INVASIVE LOCATION;  Service: Cardiovascular;  Laterality: N/A;       Procedures       Objective:         Vitals:    05/06/25 1247   BP: 102/64   Pulse: 103   SpO2: 100%       PHYSICAL EXAM:  GEN: well appearing, in NAD   HEENT: NCAT, EOMI, moist mucus membranes   Respiratory: CTAB, no wheezes, rales or rhonchi  CV: normal rate, regular rhythm, normal S1, S2, no murmurs, rubs, gallops, +2 radial pulses b/l  GI: soft, nontender, nondistended  MSK: no edema  Skin: no rash, warm, dry  Heme/Lymph: no bruising or bleeding  Neuro: Alert and Oriented x 3, grossly normal motor function        Assessment:         (I25.10) Coronary artery disease involving native coronary artery of native heart without angina pectoris    (Z95.5) Status post insertion of drug-eluting stent into left anterior descending (LAD) artery    (Z95.5) Presence of drug-eluting stent in left circumflex coronary artery    (I50.22) Chronic HFrEF (heart failure with reduced ejection fraction)    39 y.o. male, previously followed by Dr. Muir, with hypertension, hyperlipidemia, diabetes, recently diagnosed HFrEF with an EF of 18%, CAD status post PCI to LAD and circumflex who presents for follow-up.       Plan:       #HFrEF, stage C, NYHA class I  EF of 18% on recent echocardiogram.  Status post revascularization to LAD and dominant circumflex.  We discussed uptitration of GDMT with recheck of his ejection fraction 3 months after he is on maximally tolerated therapy in order to assess for need for ICD.  His blood pressure is currently on the low side but I think we can trial a low-dose lisinopril and reassess in 6 weeks.  - Continue Toprol 25 mg daily  - Start lisinopril 5 mg daily  - Previously allergic to Farxiga    #CAD  Successful IVUS guided PCI to distal circumflex  with a 3.0 x 23 mm Xience Skypoint drug-eluting stent (postdilated throughout with a 5.0 mm NC) and PCI to severe mid LAD stenosis with a 2.5 x 18 mm Xience Skypoint drug-eluting stent  (postdilated throughout with a 3.5 mm NC to high-pressure)  - Continue aspirin 81 mg daily, Plavix 75 mg daily, atorvastatin 80 mg daily    ENRIKE Carlin, thank you very much for referring this kind patient to me. Please call me with any questions or concerns. I will see the patient again in the office in 6 weeks or earlier as needed.         Aidan Saldana MD, [facility]  05/06/25  [facility]    Outpatient Encounter Medications as of 5/6/2025   Medication Sig Dispense Refill    aspirin 81 MG EC tablet Take 1 tablet by mouth Daily for 30 days. 30 tablet 0    atorvastatin (LIPITOR) 80 MG tablet Take 1 tablet by mouth Daily. 90 tablet 3    clopidogrel (PLAVIX) 75 MG tablet Take 1 tablet by mouth Daily for 30 days. 30 tablet 0    famotidine (PEPCID) 20 MG tablet Take 1 tablet by mouth 2 (Two) Times a Day Before Meals for 30 days. 60 tablet 0    furosemide (LASIX) 80 MG tablet Take 1 tablet by mouth 2 (Two) Times a Day for 30 days. 60 tablet 0    Januvia 100 MG tablet TAKE 1 TABLET DAILY 90 tablet 3    lactulose (CHRONULAC) 10 GM/15ML solution Take 15-30 mls daily for a goal of 2-3 BM/day 900 mL 1    metFORMIN (GLUCOPHAGE) 500 MG tablet TAKE 2 TABLETS 2 TIMES     DAILY WITH MEALS (Patient taking differently: Take 2 tablets by mouth 2 (Two) Times a Day With Meals.) 360 tablet 3    metoprolol succinate XL (TOPROL-XL) 25 MG 24 hr tablet Take 1 tablet by mouth Every Night. 90 tablet 3    ondansetron ODT (ZOFRAN-ODT) 4 MG disintegrating tablet Place 1 tablet on the tongue 4 (Four) Times a Day As Needed for Nausea or Vomiting. 20 tablet 0    potassium chloride (KLOR-CON M20) 20 MEQ CR tablet Take 2 tablets by mouth 3 (Three) Times a Day With Meals for 30 days. 180 tablet 0    [DISCONTINUED] metoprolol succinate XL (TOPROL-XL) 25 MG 24 hr tablet Take 1 tablet by mouth Every Night. 90 tablet 3    lisinopril (PRINIVIL,ZESTRIL) 5 MG tablet Take 1 tablet by mouth Daily. 30 tablet 0     No  facility-administered encounter medications on file as of 5/6/2025.

## 2025-05-07 ENCOUNTER — TREATMENT (OUTPATIENT)
Dept: CARDIAC REHAB | Facility: HOSPITAL | Age: 40
End: 2025-05-07
Payer: COMMERCIAL

## 2025-05-07 DIAGNOSIS — Z95.5 S/P DRUG ELUTING CORONARY STENT PLACEMENT: Primary | ICD-10-CM

## 2025-05-07 DIAGNOSIS — E11.59 TYPE 2 DIABETES MELLITUS WITH OTHER CIRCULATORY COMPLICATION, WITHOUT LONG-TERM CURRENT USE OF INSULIN: Primary | ICD-10-CM

## 2025-05-07 PROCEDURE — 93798 PHYS/QHP OP CAR RHAB W/ECG: CPT

## 2025-05-08 ENCOUNTER — HOSPITAL ENCOUNTER (EMERGENCY)
Facility: HOSPITAL | Age: 40
Discharge: HOME | End: 2025-05-08
Attending: EMERGENCY MEDICINE
Payer: COMMERCIAL

## 2025-05-08 ENCOUNTER — APPOINTMENT (OUTPATIENT)
Dept: GENERAL RADIOLOGY | Facility: HOSPITAL | Age: 40
End: 2025-05-08
Payer: COMMERCIAL

## 2025-05-08 VITALS
DIASTOLIC BLOOD PRESSURE: 70 MMHG | BODY MASS INDEX: 36.31 KG/M2 | HEIGHT: 69 IN | TEMPERATURE: 98.8 F | WEIGHT: 245.15 LBS | RESPIRATION RATE: 20 BRPM | HEART RATE: 99 BPM | SYSTOLIC BLOOD PRESSURE: 102 MMHG | OXYGEN SATURATION: 95 %

## 2025-05-08 DIAGNOSIS — R00.2 PALPITATIONS: Primary | ICD-10-CM

## 2025-05-08 DIAGNOSIS — R07.89 CHEST DISCOMFORT: ICD-10-CM

## 2025-05-08 LAB
ALBUMIN SERPL-MCNC: 4 G/DL (ref 3.5–5.2)
ALBUMIN/GLOB SERPL: 1.1 G/DL
ALP SERPL-CCNC: 102 U/L (ref 39–117)
ALT SERPL W P-5'-P-CCNC: 21 U/L (ref 1–41)
ANION GAP SERPL CALCULATED.3IONS-SCNC: 13.3 MMOL/L (ref 5–15)
AST SERPL-CCNC: 27 U/L (ref 1–40)
BASOPHILS # BLD AUTO: 0.12 10*3/MM3 (ref 0–0.2)
BASOPHILS NFR BLD AUTO: 1.3 % (ref 0–1.5)
BILIRUB SERPL-MCNC: 1.7 MG/DL (ref 0–1.2)
BUN SERPL-MCNC: 14 MG/DL (ref 6–20)
BUN/CREAT SERPL: 17.5 (ref 7–25)
CALCIUM SPEC-SCNC: 9.8 MG/DL (ref 8.6–10.5)
CHLORIDE SERPL-SCNC: 102 MMOL/L (ref 98–107)
CO2 SERPL-SCNC: 23.7 MMOL/L (ref 22–29)
CREAT SERPL-MCNC: 0.8 MG/DL (ref 0.76–1.27)
DEPRECATED RDW RBC AUTO: 52.1 FL (ref 37–54)
EGFRCR SERPLBLD CKD-EPI 2021: 114 ML/MIN/1.73
EOSINOPHIL # BLD AUTO: 0.21 10*3/MM3 (ref 0–0.4)
EOSINOPHIL NFR BLD AUTO: 2.3 % (ref 0.3–6.2)
ERYTHROCYTE [DISTWIDTH] IN BLOOD BY AUTOMATED COUNT: 16 % (ref 12.3–15.4)
GEN 5 1HR TROPONIN T REFLEX: 37 NG/L
GLOBULIN UR ELPH-MCNC: 3.6 GM/DL
GLUCOSE SERPL-MCNC: 130 MG/DL (ref 65–99)
HCT VFR BLD AUTO: 48.5 % (ref 37.5–51)
HGB BLD-MCNC: 14.7 G/DL (ref 13–17.7)
HOLD SPECIMEN: NORMAL
HOLD SPECIMEN: NORMAL
IMM GRANULOCYTES # BLD AUTO: 0.06 10*3/MM3 (ref 0–0.05)
IMM GRANULOCYTES NFR BLD AUTO: 0.7 % (ref 0–0.5)
LYMPHOCYTES # BLD AUTO: 2.89 10*3/MM3 (ref 0.7–3.1)
LYMPHOCYTES NFR BLD AUTO: 32 % (ref 19.6–45.3)
MAGNESIUM SERPL-MCNC: 1.7 MG/DL (ref 1.6–2.6)
MCH RBC QN AUTO: 27.2 PG (ref 26.6–33)
MCHC RBC AUTO-ENTMCNC: 30.3 G/DL (ref 31.5–35.7)
MCV RBC AUTO: 89.6 FL (ref 79–97)
MONOCYTES # BLD AUTO: 0.87 10*3/MM3 (ref 0.1–0.9)
MONOCYTES NFR BLD AUTO: 9.6 % (ref 5–12)
NEUTROPHILS NFR BLD AUTO: 4.89 10*3/MM3 (ref 1.7–7)
NEUTROPHILS NFR BLD AUTO: 54.1 % (ref 42.7–76)
NRBC BLD AUTO-RTO: 0 /100 WBC (ref 0–0.2)
PLATELET # BLD AUTO: 235 10*3/MM3 (ref 140–450)
PMV BLD AUTO: 10.8 FL (ref 6–12)
POTASSIUM SERPL-SCNC: 4.3 MMOL/L (ref 3.5–5.2)
PROT SERPL-MCNC: 7.6 G/DL (ref 6–8.5)
QT INTERVAL: 350 MS
QTC INTERVAL: 461 MS
RBC # BLD AUTO: 5.41 10*6/MM3 (ref 4.14–5.8)
SODIUM SERPL-SCNC: 139 MMOL/L (ref 136–145)
TROPONIN T % DELTA: -10
TROPONIN T NUMERIC DELTA: -4 NG/L
TROPONIN T SERPL HS-MCNC: 41 NG/L
TSH SERPL DL<=0.05 MIU/L-ACNC: 2.66 UIU/ML (ref 0.27–4.2)
WBC NRBC COR # BLD AUTO: 9.04 10*3/MM3 (ref 3.4–10.8)
WHOLE BLOOD HOLD COAG: NORMAL
WHOLE BLOOD HOLD SPECIMEN: NORMAL

## 2025-05-08 PROCEDURE — 80050 GENERAL HEALTH PANEL: CPT | Performed by: EMERGENCY MEDICINE

## 2025-05-08 PROCEDURE — 99284 EMERGENCY DEPT VISIT MOD MDM: CPT

## 2025-05-08 PROCEDURE — 93005 ELECTROCARDIOGRAM TRACING: CPT | Performed by: EMERGENCY MEDICINE

## 2025-05-08 PROCEDURE — 84484 ASSAY OF TROPONIN QUANT: CPT | Performed by: EMERGENCY MEDICINE

## 2025-05-08 PROCEDURE — 36415 COLL VENOUS BLD VENIPUNCTURE: CPT

## 2025-05-08 PROCEDURE — 71045 X-RAY EXAM CHEST 1 VIEW: CPT

## 2025-05-08 PROCEDURE — 83735 ASSAY OF MAGNESIUM: CPT | Performed by: EMERGENCY MEDICINE

## 2025-05-08 RX ORDER — LISINOPRIL 2.5 MG/1
2.5 TABLET ORAL DAILY
Qty: 30 TABLET | Refills: 0 | Status: SHIPPED | OUTPATIENT
Start: 2025-05-08

## 2025-05-08 RX ORDER — SODIUM CHLORIDE 0.9 % (FLUSH) 0.9 %
10 SYRINGE (ML) INJECTION AS NEEDED
Status: DISCONTINUED | OUTPATIENT
Start: 2025-05-08 | End: 2025-05-08 | Stop reason: HOSPADM

## 2025-05-08 NOTE — DISCHARGE INSTRUCTIONS
Decrease your lisinopril to 2.5 mg daily.  Continue other medications.  Return for worsening symptoms.  Follow-up with your doctor this week

## 2025-05-08 NOTE — ED PROVIDER NOTES
Time: 1:38 PM EDT  Date of encounter:  5/8/2025  Independent Historian/Clinical History and Information was obtained by:   Patient    History is limited by: N/A    Chief Complaint: Palpitations and chest      History of Present Illness:  Patient is a 39 y.o. year old male who presents to the emergency department for evaluation of palpitations and chest discomfort.  This patient recently was discharged from [facility] after he had stents placed for coronary disease.  The patient states he had brief episodes of left shoulder pain going into the left arm and also some palpitations.  This brought him to the emergency department he said no fever chills cough shortness of breath vomiting or lightheadedness.  He states that all the symptoms started since he has been on lisinopril.      Patient Care Team  Primary Care Provider: Luz Maria White APRN    Past Medical History:     Allergies   Allergen Reactions    Farxiga [Dapagliflozin] Other (See Comments)     Bladder irritation (persistant)     Past Medical History:   Diagnosis Date    Chronic diastolic congestive heart failure 11/18/2020    Diabetes mellitus, type 2 11/18/2020    Diastolic CHF, chronic  11/18/2020    Emotional depression     Essential hypertension 11/18/2020    Fatty liver     My GP thinks I have this but no official diagnosis was given    Finger numbness 02/24/2021    Hyperlipidemia     Left wrist pain 02/24/2021    Migraine 12/07/2020     Past Surgical History:   Procedure Laterality Date    CARDIAC CATHETERIZATION N/A 4/14/2025    Procedure: Right and Left Heart Cath/possible PCI;  Surgeon: Aidan Polanco MD;  Location: [facility] INVASIVE LOCATION;  Service: Cardiovascular;  Laterality: N/A;    CARDIAC CATHETERIZATION  4/21/2025    Procedure: Right and Left Heart Cath;  Surgeon: Aidan Betancourt MD;  Location: [facility] INVASIVE LOCATION;  Service: Cardiovascular;;    CARDIAC CATHETERIZATION N/A 4/21/2025    Procedure:  Percutaneous Coronary Intervention;  Surgeon: Aidan Betancourt MD;  Location:  [facility] INVASIVE LOCATION;  Service: Cardiovascular;  Laterality: N/A;  LCX, possible LAD    CARDIAC CATHETERIZATION N/A 4/21/2025    Procedure: Stent BAN coronary;  Surgeon: Aidan Betancourt MD;  Location:  [facility] INVASIVE LOCATION;  Service: Cardiovascular;  Laterality: N/A;    CARDIAC CATHETERIZATION N/A 4/21/2025    Procedure: Coronary angiography;  Surgeon: Aidan Betancourt MD;  Location:  [facility] INVASIVE LOCATION;  Service: Cardiovascular;  Laterality: N/A;    CARDIAC CATHETERIZATION N/A 4/21/2025    Procedure: Chronic Total Occlussion;  Surgeon: Aidan Betancourt MD;  Location:  [facility] INVASIVE LOCATION;  Service: Cardiovascular;  Laterality: N/A;    INTRAVASCULAR ULTRASOUND N/A 4/21/2025    Procedure: Intravascular Ultrasound;  Surgeon: Aidan Betancourt MD;  Location:  [facility] INVASIVE LOCATION;  Service: Cardiovascular;  Laterality: N/A;     Family History   Problem Relation Age of Onset    Diabetes Mother     Diabetes Father     Heart disease Other         AUNT OR UNCLE    Diabetes Other     Colon cancer Neg Hx        Home Medications:  Prior to Admission medications    Medication Sig Start Date End Date Taking? Authorizing Provider   aspirin 81 MG EC tablet Take 1 tablet by mouth Daily for 30 days. 4/26/25 5/26/25  Billy Quijano MD   atorvastatin (LIPITOR) 80 MG tablet Take 1 tablet by mouth Daily. 6/21/24   Russell Bledsoe MD   clopidogrel (PLAVIX) 75 MG tablet Take 1 tablet by mouth Daily for 30 days. 4/26/25 5/26/25  Billy Quijano MD   famotidine (PEPCID) 20 MG tablet Take 1 tablet by mouth 2 (Two) Times a Day Before Meals for 30 days. 4/25/25 5/25/25  Billy Quijano MD   furosemide (LASIX) 80 MG tablet Take 1 tablet by mouth 2 (Two) Times a Day for 30 days. 4/25/25 5/25/25  Billy Quijano MD   Januvia 100 MG tablet TAKE 1 TABLET DAILY 11/18/24   Luz Maria White APRN   lactulose (CHRONULAC) 10 GM/15ML solution Take 15-30  "mls daily for a goal of 2-3 BM/day 4/30/25   Brooke Natarajan APRN   lisinopril (PRINIVIL,ZESTRIL) 5 MG tablet Take 1 tablet by mouth Daily. 5/6/25   Aidan Betancourt MD   metFORMIN (GLUCOPHAGE) 500 MG tablet TAKE 2 TABLETS 2 TIMES     DAILY WITH MEALS  Patient taking differently: Take 2 tablets by mouth 2 (Two) Times a Day With Meals. 11/18/24   Luz Maria White APRN   metoprolol succinate XL (TOPROL-XL) 25 MG 24 hr tablet Take 1 tablet by mouth Every Night. 5/6/25   Aidan Betancourt MD   ondansetron ODT (ZOFRAN-ODT) 4 MG disintegrating tablet Place 1 tablet on the tongue 4 (Four) Times a Day As Needed for Nausea or Vomiting. 4/4/25   Yue Burkett APRN   potassium chloride (KLOR-CON M20) 20 MEQ CR tablet Take 2 tablets by mouth 3 (Three) Times a Day With Meals for 30 days. 4/25/25 5/25/25  Billy Quijano MD        Social History:   Social History     Tobacco Use    Smoking status: Never     Passive exposure: Past    Smokeless tobacco: Never   Vaping Use    Vaping status: Never Used   Substance Use Topics    Alcohol use: Not Currently    Drug use: Never         Review of Systems:  Review of Systems   Constitutional:  Negative for chills and fever.   HENT:  Negative for congestion, ear pain and sore throat.    Eyes:  Negative for pain.   Respiratory:  Negative for cough, chest tightness and shortness of breath.    Cardiovascular:  Positive for chest pain and palpitations.   Gastrointestinal:  Negative for abdominal pain, diarrhea, nausea and vomiting.   Genitourinary:  Negative for flank pain and hematuria.   Musculoskeletal:  Negative for joint swelling.   Skin:  Negative for pallor.   Neurological:  Negative for seizures and headaches.   All other systems reviewed and are negative.       Physical Exam:  /70   Pulse 99   Temp 98.8 °F (37.1 °C) (Oral)   Resp 20   Ht 175.3 cm (69\")   Wt 111 kg (245 lb 2.4 oz)   SpO2 95%   BMI 36.20 kg/m²     Physical Exam  Vitals and nursing note reviewed. "   Constitutional:       General: He is not in acute distress.     Appearance: Normal appearance. He is not toxic-appearing.   HENT:      Head: Normocephalic and atraumatic.      Mouth/Throat:      Mouth: Mucous membranes are moist.   Eyes:      General: No scleral icterus.  Cardiovascular:      Rate and Rhythm: Normal rate and regular rhythm.      Pulses: Normal pulses.      Heart sounds: Normal heart sounds.   Pulmonary:      Effort: Pulmonary effort is normal. No respiratory distress.      Breath sounds: Normal breath sounds.   Abdominal:      General: Abdomen is flat.      Palpations: Abdomen is soft.      Tenderness: There is no abdominal tenderness.   Musculoskeletal:         General: Normal range of motion.      Cervical back: Normal range of motion and neck supple.   Skin:     General: Skin is warm and dry.   Neurological:      Mental Status: He is alert and oriented to person, place, and time. Mental status is at baseline.                    Medical Decision Making:      Comorbidities that affect care:    Coronary Artery Disease    External Notes reviewed:    Previous Admission Note: Admission for stent placement at [facility]      The following orders were placed and all results were independently analyzed by me:  Orders Placed This Encounter   Procedures    XR Chest 1 View    [place] Draw    Comprehensive Metabolic Panel    Magnesium    High Sensitivity Troponin T    TSH Rfx On Abnormal To Free T4    CBC Auto Differential    High Sensitivity Troponin T 1Hr    Undress & Gown    Continuous Pulse Oximetry    ECG 12 Lead ED Triage Standing Order; Dysrhythmia    CBC & Differential    Green Top (Gel)    Lavender Top    Gold Top - SST    Light Blue Top       Medications Given in the Emergency Department:  Medications - No data to display       ED Course:    ED Course as of 05/08/25 1838   Thu May 08, 2025   1338 XR Chest 1 View [PP]      ED Course User Index  [PP] Alden Purdy,      EKG:  Sinus tachycardia the rate of 104 beats  No acute ischemic changes are noted.        Labs:    Lab Results (last 24 hours)       Procedure Component Value Units Date/Time    CBC & Differential [[id number]]  (Abnormal) Collected: 05/08/25 1021    Specimen: Blood Updated: 05/08/25 1123    Narrative:      The following orders were created for panel order CBC & Differential.  Procedure                               Abnormality         Status                     ---------                               -----------         ------                     CBC Auto Differential[[id number]]        Abnormal            Final result                 Please view results for these tests on the individual orders.    Comprehensive Metabolic Panel [[id number]]  (Abnormal) Collected: 05/08/25 1021    Specimen: Blood Updated: 05/08/25 1051     Glucose 130 mg/dL      BUN 14 mg/dL      Creatinine 0.80 mg/dL      Sodium 139 mmol/L      Potassium 4.3 mmol/L      Comment: Slight hemolysis detected by analyzer. Result may be falsely elevated.        Chloride 102 mmol/L      CO2 23.7 mmol/L      Calcium 9.8 mg/dL      Total Protein 7.6 g/dL      Albumin 4.0 g/dL      ALT (SGPT) 21 U/L      AST (SGOT) 27 U/L      Alkaline Phosphatase 102 U/L      Total Bilirubin 1.7 mg/dL      Globulin 3.6 gm/dL      A/G Ratio 1.1 g/dL      BUN/Creatinine Ratio 17.5     Anion Gap 13.3 mmol/L      eGFR 114.0 mL/min/1.73     Narrative:      GFR Categories in Chronic Kidney Disease (CKD)              GFR Category          GFR (mL/min/1.73)    Interpretation  G1                    90 or greater        Normal or high (1)  G2                    60-89                Mild decrease (1)  G3a                   45-59                Mild to moderate decrease  G3b                   30-44                Moderate to severe decrease  G4                    15-29                Severe decrease  G5                    14 or less           Kidney failure    (1)In the absence of evidence of  kidney disease, neither GFR category G1 or G2 fulfill the criteria for CKD.    eGFR calculation 2021 CKD-EPI creatinine equation, which does not include race as a factor    Magnesium [[id number]]  (Normal) Collected: 05/08/25 1021    Specimen: Blood Updated: 05/08/25 1051     Magnesium 1.7 mg/dL     High Sensitivity Troponin T [[id number]]  (Abnormal) Collected: 05/08/25 1021    Specimen: Blood Updated: 05/08/25 1055     HS Troponin T 41 ng/L     Narrative:      High Sensitive Troponin T Reference Range:  <14.0 ng/L- Negative Female for AMI  <22.0 ng/L- Negative Male for AMI  >=14 - Abnormal Female indicating possible myocardial injury.  >=22 - Abnormal Male indicating possible myocardial injury.   Clinicians would have to utilize clinical acumen, EKG, Troponin, and serial changes to determine if it is an Acute Myocardial Infarction or myocardial injury due to an underlying chronic condition.         TSH Rfx On Abnormal To Free T4 [[id number]]  (Normal) Collected: 05/08/25 1021    Specimen: Blood Updated: 05/08/25 1055     TSH 2.660 uIU/mL     CBC Auto Differential [[id number]]  (Abnormal) Collected: 05/08/25 1021    Specimen: Blood Updated: 05/08/25 1123     WBC 9.04 10*3/mm3      RBC 5.41 10*6/mm3      Hemoglobin 14.7 g/dL      Hematocrit 48.5 %      MCV 89.6 fL      MCH 27.2 pg      MCHC 30.3 g/dL      RDW 16.0 %      RDW-SD 52.1 fl      MPV 10.8 fL      Platelets 235 10*3/mm3      Neutrophil % 54.1 %      Lymphocyte % 32.0 %      Monocyte % 9.6 %      Eosinophil % 2.3 %      Basophil % 1.3 %      Immature Grans % 0.7 %      Neutrophils, Absolute 4.89 10*3/mm3      Lymphocytes, Absolute 2.89 10*3/mm3      Monocytes, Absolute 0.87 10*3/mm3      Eosinophils, Absolute 0.21 10*3/mm3      Basophils, Absolute 0.12 10*3/mm3      Immature Grans, Absolute 0.06 10*3/mm3      nRBC 0.0 /100 WBC     High Sensitivity Troponin T 1Hr [[id number]]  (Abnormal) Collected: 05/08/25 1131    Specimen: Blood Updated: 05/08/25 1154     HS  Troponin T 37 ng/L      Troponin T Numeric Delta -4 ng/L      Troponin T % Delta -10    Narrative:      High Sensitive Troponin T Reference Range:  <14.0 ng/L- Negative Female for AMI  <22.0 ng/L- Negative Male for AMI  >=14 - Abnormal Female indicating possible myocardial injury.  >=22 - Abnormal Male indicating possible myocardial injury.   Clinicians would have to utilize clinical acumen, EKG, Troponin, and serial changes to determine if it is an Acute Myocardial Infarction or myocardial injury due to an underlying chronic condition.                  Imaging:    XR Chest 1 View  Result Date: 5/8/2025  XR CHEST 1 VW Date of Exam: 5/8/2025 10:37 AM EDT Indication: Dysrhythmia Triage Protocol Comparison: Chest/15/25 Findings: The heart is prominent yet stable in size. No evidence for pneumothorax or pleural effusion. No focal consolidation.     Impression: Stable chest without acute cardiopulmonary process. Electronically Signed: La Benavides MD  5/8/2025 11:05 AM EDT  Workstation ID: [id number]        Differential Diagnosis and Discussion:    Chest Pain:  Based on the patient's signs and symptoms, I considered aortic dissection, myocardial infaction, pulmonary embolism, cardiac tamponade, pericarditis, pneumothorax, musculoskeletal chest pain and other differential diagnosis as an etiology of the patient's chest pain.     PROCEDURES:    Labs were collected in the emergency department and all labs were reviewed and interpreted by me.  X-ray were performed in the emergency department and all X-ray impressions were independently interpreted by me.  An EKG was performed and the EKG was interpreted by me.    ECG 12 Lead ED Triage Standing Order; Dysrhythmia   Final Result   HEART ZMAY=002  bpm   RR Eqwrqayi=392  ms   NJ Chnnxvlx=876  ms   P Horizontal Axis=  deg   P Front Axis=69  deg   QRSD Ahynltys=921  ms   QT Aoqpqpuy=035  ms   TRfT=352  ms   QRS Axis=83  deg   T Wave Axis=220  deg   - ABNORMAL ECG -   Sinus  tachycardia   Paired ventricular premature complexes   Probable  left atrial enlargement   Incomplete left bundle branch block   Low voltage with right axis deviation   Nonspecific  T abnormalities, lateral leads   When compared with ECG of 22-Apr-2025 04:20:19,   Significant repolarization change   Significant axis, voltage or hypertrophy change   Electronically Signed By: Sean Kolb ([facility]) 2025-05-08 18:30:11   Date and Time of Study:2025-05-08 10:19:36          Procedures    MDM     Amount and/or Complexity of Data Reviewed  Clinical lab tests: reviewed  Tests in the radiology section of CPT®: reviewed  Tests in the medicine section of CPT®: reviewed  Decide to obtain previous medical records or to obtain history from someone other than the patient: yes                       Patient Care Considerations:    CT CHEST: I considered ordering a CT scan of the chest, however the patient currently has no signs of pulmonary embolism and he is asymptomatic      Consultants/Shared Management Plan:    Consultant: I have discussed the case with Serafin Belle who states patient can be discharged home with reduced dose of lisinopril and he is to follow-up with Dr. Saldana in [place]    Social Determinants of Health:    Patient is independent, reliable, and has access to care.       Disposition and Care Coordination:    Discharged: I considered escalation of care by admitting this patient to the hospital, however the patient has no signs of emergent condition requiring hospitalization.    I have explained discharge medications and the need for follow up with the patient/caretakers. This was also printed in the discharge instructions. Patient was discharged with the following medications and follow up:      Medication List        Changed      lisinopril 2.5 MG tablet  Commonly known as: PRINIVIL,ZESTRIL  Take 1 tablet by mouth Daily.  What changed:   medication strength  how much to take     metFORMIN 500 MG tablet  Commonly  known as: GLUCOPHAGE  TAKE 2 TABLETS 2 TIMES     DAILY WITH MEALS  What changed: See the new instructions.               Where to Get Your Medications        These medications were sent to [facility] [address] [place] [address]      Hours: Monday to Friday 9 AM to 7:30 PM, Saturday 9 AM to 2 PM Phone: [phone]   lisinopril 2.5 MG tablet      Aidan Betancourt MD  [facility] [id number] [phone]    In 1 day         Final diagnoses:   Palpitations   Chest discomfort        ED Disposition       ED Disposition   Discharge    Condition   Stable    Comment   --               This medical record created using voice recognition software.             Alden Purdy, DO  05/08/25 [id number]

## 2025-05-09 ENCOUNTER — TREATMENT (OUTPATIENT)
Dept: CARDIAC REHAB | Facility: HOSPITAL | Age: 40
End: 2025-05-09
Payer: COMMERCIAL

## 2025-05-09 ENCOUNTER — TELEPHONE (OUTPATIENT)
Dept: CARDIOLOGY | Age: 40
End: 2025-05-09

## 2025-05-09 DIAGNOSIS — Z95.5 S/P DRUG ELUTING CORONARY STENT PLACEMENT: Primary | ICD-10-CM

## 2025-05-09 PROCEDURE — 93798 PHYS/QHP OP CAR RHAB W/ECG: CPT

## 2025-05-09 NOTE — TELEPHONE ENCOUNTER
Caller: Luiz Christianson III    Relationship: Self    PH: [phone]      PATIENT WAS SEEN IN ER 5.8 FOR CHEST PAIN AND PALPITATIONS, WAS TOLD TO FOLLOW UP WITH CARDIOLOGY

## 2025-05-12 ENCOUNTER — TREATMENT (OUTPATIENT)
Dept: CARDIAC REHAB | Facility: HOSPITAL | Age: 40
End: 2025-05-12
Payer: COMMERCIAL

## 2025-05-12 DIAGNOSIS — Z95.5 S/P DRUG ELUTING CORONARY STENT PLACEMENT: Primary | ICD-10-CM

## 2025-05-12 DIAGNOSIS — E11.9 TYPE 2 DIABETES MELLITUS WITHOUT COMPLICATION, WITHOUT LONG-TERM CURRENT USE OF INSULIN: ICD-10-CM

## 2025-05-12 PROCEDURE — 93798 PHYS/QHP OP CAR RHAB W/ECG: CPT

## 2025-05-13 ENCOUNTER — OFFICE VISIT (OUTPATIENT)
Dept: FAMILY MEDICINE CLINIC | Facility: CLINIC | Age: 40
End: 2025-05-13
Payer: COMMERCIAL

## 2025-05-13 ENCOUNTER — TELEPHONE (OUTPATIENT)
Dept: FAMILY MEDICINE CLINIC | Facility: CLINIC | Age: 40
End: 2025-05-13

## 2025-05-13 VITALS
OXYGEN SATURATION: 98 % | HEIGHT: 69 IN | HEART RATE: 102 BPM | WEIGHT: 240 LBS | TEMPERATURE: 98.2 F | SYSTOLIC BLOOD PRESSURE: 110 MMHG | BODY MASS INDEX: 35.55 KG/M2 | DIASTOLIC BLOOD PRESSURE: 80 MMHG

## 2025-05-13 DIAGNOSIS — Z95.5 STATUS POST INSERTION OF DRUG-ELUTING STENT INTO LEFT ANTERIOR DESCENDING (LAD) ARTERY: ICD-10-CM

## 2025-05-13 DIAGNOSIS — E11.59 TYPE 2 DIABETES MELLITUS WITH OTHER CIRCULATORY COMPLICATION, WITHOUT LONG-TERM CURRENT USE OF INSULIN: Primary | ICD-10-CM

## 2025-05-13 DIAGNOSIS — I50.22 CHRONIC HFREF (HEART FAILURE WITH REDUCED EJECTION FRACTION): ICD-10-CM

## 2025-05-13 DIAGNOSIS — I10 HYPERTENSION, ESSENTIAL: ICD-10-CM

## 2025-05-13 DIAGNOSIS — I25.10 CORONARY ARTERY DISEASE INVOLVING NATIVE CORONARY ARTERY OF NATIVE HEART WITHOUT ANGINA PECTORIS: ICD-10-CM

## 2025-05-13 RX ORDER — GLIPIZIDE 5 MG/1
5 TABLET ORAL
Qty: 180 TABLET | OUTPATIENT
Start: 2025-05-13

## 2025-05-13 NOTE — TELEPHONE ENCOUNTER
Left voicemail for patient that while he was here at his appt, he stated he needed his [facility] paperwork extended to 5/30/25. That is the date that is already on the [facility] paperwork. When he was in the hospital I spoke with him and asked the date he needed and he stated through 5/30/25.

## 2025-05-13 NOTE — PROGRESS NOTES
Chief Complaint  Chief Complaint   Patient presents with    Hypertension    Hyperlipidemia    Diabetes     3 mo f/u    Congestive Heart Failure       Subjective      Luiz Christianson III presents to [facility]  History of Present Illness  The patient presents for evaluation of heart failure and diabetes.    He has been participating in cardiac rehabilitation sessions twice weekly in [place], which he finds beneficial despite the inconvenience of the 3-hour round trip. He is seeking additional time off work due to the unavailability of appointments at the [facility] until the end of the month. His current documentation permits him to be off work until Friday 5/16/2025. He reports experiencing fatigue from seemingly minor activities, such as driving his son, which necessitated a nap upon returning home. He also mentions occasional palpitations, with the most recent episode occurring last Tuesday, prompting a hospital visit. He describes the sensation as a sudden, unexpected pressure on his heart. His lisinopril dosage was subsequently reduced from 5 mg to 2.5 mg, which has resulted in an improvement in his condition. He continues to take Toprol 25 mg nightly and a high dose of Lasix 80 mg twice daily, along with potassium replacement. He reports no edema. He has a follow-up appointment with cardiology scheduled for 06/10/2025. His weight has decreased from 284 pounds a month ago to approximately 234 pounds currently. He has submitted an FMLA request extending until the end of the month. His occupation as an internet  involves sedentary work, requiring him to be seated for 8 hours daily without any heavy lifting or physical exertion. He is considering a return to light duty work.    He is currently on metformin and Januvia for diabetes management. He was previously on Ozempic.    He reports a persistent cough, which he attributes to residual fluid  accumulation, resulting in a dry and wet cough. He occasionally expectorates sputum.        Objective     Medical History:  Past Medical History:   Diagnosis Date    Atrial fibrillation 4/11    Chronic diastolic congestive heart failure 11/18/2020    Diabetes mellitus, type 2 11/18/2020    Diastolic CHF, chronic  11/18/2020    Emotional depression     Erectile dysfunction     Essential hypertension 11/18/2020    Fatty liver     My GP thinks I have this but no official diagnosis was given    Finger numbness 02/24/2021    Hyperlipidemia     Left wrist pain 02/24/2021    Migraine 12/07/2020     Past Surgical History:   Procedure Laterality Date    CARDIAC CATHETERIZATION N/A 04/14/2025    Procedure: Right and Left Heart Cath/possible PCI;  Surgeon: Aidan Polanco MD;  Location: [facility] CATH INVASIVE LOCATION;  Service: Cardiovascular;  Laterality: N/A;    CARDIAC CATHETERIZATION  04/21/2025    Procedure: Right and Left Heart Cath;  Surgeon: Aidan Betancourt MD;  Location[facility] CATH INVASIVE LOC[facility];  Service: Cardiovascular;;    CARDIAC CATHETERIZATION N/A 04/21/2025    Procedure: Percutaneous Coronary Intervention;  Surgeon: Aidan Betancourt MD;  Location: [facility] CATH INVASIVE LOCATION;  Service: Cardiovascular;  Laterality: N/A;  LCX, possible LAD    CARDIAC CATHETERIZATION N/A 04/21/2025    Procedure: Stent BAN coronary;  Surgeon: Aidan Betancourt MD;  Location: [facility] CATH INVASIVE LOCATION;  Service: Cardiovascular;  Laterality: N/A;    CARDIAC CATHETERIZATION N/A 04/21/2025    Procedure: Coronary angiography;  Surgeon: Aidan Betancourt MD;  Location: [facility] CATH INVASIVE LOCATION;  Service: Cardiovascular;  Laterality: N/A;    CARDIAC CATHETERIZATION N/A 04/21/2025    Procedure: Chronic Total Occlussion;  Surgeon: Aidan Betancourt MD;  Location: [facility] CATH INVASIVE LOCATION;  Service: Cardiovascular;  Laterality: N/A;    CORONARY STENT PLACEMENT  4/21/2025    INTRAVASCULAR ULTRASOUND N/A 04/21/2025    Procedure: Intravascular  Ultrasound;  Surgeon: Aidan Betancourt MD;  Location: [facility] INVASIVE LOCATION;  Service: Cardiovascular;  Laterality: N/A;      Social History     Tobacco Use    Smoking status: Never     Passive exposure: Past    Smokeless tobacco: Never   Vaping Use    Vaping status: Never Used   Substance Use Topics    Alcohol use: Not Currently    Drug use: Never     Family History   Problem Relation Age of Onset    Diabetes Mother     Diabetes Father     Heart disease Other         AUNT OR UNCLE    Diabetes Other     Diabetes Maternal Grandmother         Had diabetes which led to amputation of 1 leg.    Heart disease Maternal Grandmother         Talking with my uncle leads us both to believe she had heart issues causing fluid retention. They removed some via tubes but lasix weren't available at the time of her illnesses.    Cancer Maternal Uncle         Stomach, intestines    Diabetes Brother         Type 2    Colon cancer Neg Hx        Medications:  Prior to Admission medications    Medication Sig Start Date End Date Taking? Authorizing Provider   aspirin 81 MG EC tablet Take 1 tablet by mouth Daily for 30 days. 4/26/25 5/26/25 Yes Billy Quijano MD   atorvastatin (LIPITOR) 80 MG tablet Take 1 tablet by mouth Daily. 6/21/24  Yes Russell Bledsoe MD   clopidogrel (PLAVIX) 75 MG tablet Take 1 tablet by mouth Daily for 30 days. 4/26/25 5/26/25 Yes Billy Quijano MD   famotidine (PEPCID) 20 MG tablet Take 1 tablet by mouth 2 (Two) Times a Day Before Meals for 30 days. 4/25/25 5/25/25 Yes Billy Quijano MD   furosemide (LASIX) 80 MG tablet Take 1 tablet by mouth 2 (Two) Times a Day for 30 days. 4/25/25 5/25/25 Yes Billy Quijano MD   Januvia 100 MG tablet TAKE 1 TABLET DAILY 11/18/24  Yes Luz Maria White APRN   lactulose (CHRONULAC) 10 GM/15ML solution Take 15-30 mls daily for a goal of 2-3 BM/day 4/30/25  Yes Brooke Natarajan APRN   lisinopril (PRINIVIL,ZESTRIL) 2.5 MG tablet Take 1 tablet by mouth Daily. 5/8/25   "Yes Alden Purdy, DO   metFORMIN (GLUCOPHAGE) 500 MG tablet TAKE 2 TABLETS 2 TIMES     DAILY WITH MEALS  Patient taking differently: Take 2 tablets by mouth 2 (Two) Times a Day With Meals. 11/18/24  Yes Luz Maria White APRN   metoprolol succinate XL (TOPROL-XL) 25 MG 24 hr tablet Take 1 tablet by mouth Every Night. 5/6/25  Yes Aidan Betancourt MD   ondansetron ODT (ZOFRAN-ODT) 4 MG disintegrating tablet Place 1 tablet on the tongue 4 (Four) Times a Day As Needed for Nausea or Vomiting. 4/4/25  Yes Yue Burkett APRN   potassium chloride (KLOR-CON M20) 20 MEQ CR tablet Take 2 tablets by mouth 3 (Three) Times a Day With Meals for 30 days. 4/25/25 5/25/25 Yes Billy Quijano MD        Allergies:   Farxiga [dapagliflozin]    Health Maintenance Due   Topic Date Due    DIABETIC EYE EXAM  Never done    Hepatitis B (1 of 3 - 19+ 3-dose series) Never done    DIABETIC FOOT EXAM  09/19/2023    COVID-19 Vaccine (3 - 2024-25 season) 09/01/2024    URINE MICROALBUMIN-CREATININE RATIO (uACR)  02/14/2025         Vital Signs:   /80 (BP Location: Left arm, Patient Position: Sitting, Cuff Size: Adult)   Pulse 102   Temp 98.2 °F (36.8 °C)   Ht 175.3 cm (69\")   Wt 109 kg (240 lb)   SpO2 98%   BMI 35.44 kg/m²     Wt Readings from Last 3 Encounters:   05/13/25 109 kg (240 lb)   05/08/25 111 kg (245 lb 2.4 oz)   05/06/25 109 kg (240 lb)     BP Readings from Last 3 Encounters:   05/13/25 110/80   05/08/25 102/70   05/06/25 102/64       Physical Exam  Vitals reviewed.   Constitutional:       Appearance: Normal appearance. He is well-developed.   HENT:      Head: Normocephalic and atraumatic.   Eyes:      Conjunctiva/sclera: Conjunctivae normal.      Pupils: Pupils are equal, round, and reactive to light.   Cardiovascular:      Rate and Rhythm: Normal rate and regular rhythm.      Heart sounds: No murmur heard.     No friction rub. No gallop.   Pulmonary:      Effort: Pulmonary effort is normal.      Breath sounds: Normal " breath sounds. No wheezing or rhonchi.   Abdominal:      General: Bowel sounds are normal. There is no distension.      Palpations: Abdomen is soft.      Tenderness: There is no abdominal tenderness.   Skin:     General: Skin is warm and dry.   Neurological:      Mental Status: He is alert and oriented to person, place, and time.      Cranial Nerves: No cranial nerve deficit.   Psychiatric:         Mood and Affect: Mood and affect normal.         Behavior: Behavior normal.         Thought Content: Thought content normal.         Judgment: Judgment normal.       Physical Exam  Respiratory: Clear to auscultation, no wheezing, rales or rhonchi  Cardiovascular: Regular rate and rhythm      Result Review :    The following data was reviewed by ENRIKE Shaw on 05/13/25 at 14:05 EDT:    Common labs          4/25/2025    06:05 4/29/2025    12:22 5/8/2025    10:21   Common Labs   Glucose 143  129  130    BUN 20  25  14    Creatinine 0.90  1.30  0.80    Sodium 136  135  139    Potassium 3.9  4.8  4.3    Chloride 100  100  102    Calcium 9.5  10.3  9.8    Albumin  3.8  4.0    Total Bilirubin  1.9  1.7    Alkaline Phosphatase  87  102    AST (SGOT)  41  27    ALT (SGPT)  55  21    WBC  8.98  9.04    Hemoglobin  15.3  14.7    Hematocrit  51.4  48.5    Platelets  284  235          XR Chest 1 View  Result Date: 5/8/2025  Impression: Stable chest without acute cardiopulmonary process. Electronically Signed: La Benavides MD  5/8/2025 11:05 AM EDT  Workstation ID: [id number]    CT Angiogram Chest Pulmonary Embolism  Result Date: 4/13/2025  1.No pulmonary embolism is identified. 2.Cardiomegaly. Small pericardial effusion. 3.Mild groundglass changes in the lungs could be the result of hypoventilatory change and/or mild edema. 4.Anasarca. 5.Small amount of ascites. Electronically Signed: Jasen Adams MD  4/13/2025 1:18 AM EDT  Workstation ID: [id number]    XR Chest 1 View  Result Date: 4/12/2025  No acute  cardiopulmonary findings.  4/12/2025 10:37 PM by Dr. Jasen Adams MD on Workstation: [id number]      CT Abdomen Pelvis With Contrast  Result Date: 4/4/2025  1.Heterogeneous appearance of the liver with suggestion of a subtle nodular contour. Findings suggest underlying cirrhosis. Please correlate with liver function tests. 2.Small to moderate amount of ascites. 3.Diffuse wall thickening of the urinary bladder. Please correlate with urinalysis. 4.Other incidental findings as above. Electronically Signed: Jose Quiñonez MD  4/4/2025 12:39 PM EDT  Workstation ID: [id number]      Results  Labs   - A1c: Decreased but not yet at goal   - Potassium: Within normal limits   - Electrolytes: Within normal limits   - Liver enzymes: Improved               Assessment and Plan    Diagnoses and all orders for this visit:    1. Type 2 diabetes mellitus with other circulatory complication, without long-term current use of insulin (Primary)  -     Microalbumin / Creatinine Urine Ratio - Urine, Clean Catch; Future    2. Hypertension, essential    3. Chronic HFrEF (heart failure with reduced ejection fraction)    4. Coronary artery disease involving native coronary artery of native heart without angina pectoris    5. Status post insertion of drug-eluting stent into left anterior descending (LAD) artery       Assessment & Plan  1. Heart failure.  - His vitals are within normal limits. He reports feeling better since his lisinopril dosage was adjusted to 2.5 mg.  - He continues to take Toprol 25 mg every night and Lasix 80 mg twice a day with potassium replacement.  - The patient is advised to continue his current medication regimen. He is encouraged to maintain regular weight monitoring and cardiac rehabilitation exercises.  - A urine microalbumin test has been ordered to be completed during his next blood work session. He will extend his time off work until 05/30/2025 with a tentative return date of 06/02/2025 and will contact the office  a week prior if he feels unprepared to return to work.    2. Diabetes mellitus.  - His A1c levels have shown improvement but have not yet reached the target range.  - He is currently on metformin and Januvia.  - The potential addition of glipizide was discussed but decided against due to its sulfonylurea content and his recent heart failure episode. The possibility of introducing injectable medications, specifically GLP-1s, was discussed due to their potential cardiovascular benefits.  - His A1c levels will be rechecked once his condition stabilizes. If necessary, additional medications will be considered at that time.    Follow-up  - The patient will follow up in 1 month.          Smoking Cessation:    Luiz Christianson III  reports that he has never smoked. He has been exposed to tobacco smoke. He has never used smokeless tobacco.            Follow Up   Return in about 1 month (around 6/13/2025) for Next scheduled follow up.  Patient was given instructions and counseling regarding his condition or for health maintenance advice. Please see specific information pulled into the AVS if appropriate.     Please note that portions of this note were completed with a voice recognition program.    Patient or patient representative verbalized consent for the use of Ambient Listening during the visit with  ENRIKE Shaw for chart documentation. 5/13/2025  14:03 EDT

## 2025-05-13 NOTE — TELEPHONE ENCOUNTER
Pt called back after speaking with Bhupinder and calling [employer]. [employer] is in need of a second document that they state had been faxed to us on 5/9

## 2025-05-14 ENCOUNTER — TREATMENT (OUTPATIENT)
Dept: CARDIAC REHAB | Facility: HOSPITAL | Age: 40
End: 2025-05-14
Payer: COMMERCIAL

## 2025-05-14 DIAGNOSIS — Z95.5 S/P DRUG ELUTING CORONARY STENT PLACEMENT: Primary | ICD-10-CM

## 2025-05-14 PROCEDURE — 93798 PHYS/QHP OP CAR RHAB W/ECG: CPT

## 2025-05-15 ENCOUNTER — TELEPHONE (OUTPATIENT)
Dept: FAMILY MEDICINE CLINIC | Facility: CLINIC | Age: 40
End: 2025-05-15

## 2025-05-15 NOTE — TELEPHONE ENCOUNTER
Caller: Luiz Christianson III    Relationship: Self    Best call back number:[phone]    What form or medical record are you requesting: SHORT TERM DISABILITY EXTENSION     Who is requesting this form or medical record from you: [facility]    How would you like to receive the form or medical records (pick-up, mail, fax):   If fax, what is the fax number: [phone]    Timeframe paperwork needed: AS SOON AS POSSIBLE     Additional notes: PATIENT CALLED TO CHECK AND MAKE SURE THE INFORMATION THAT WAS MISSING HAS BEEN SENT   [facility] SENT A FAX WITH INFORMATION NEEDED   PLEASE CONTAC IF THERE ARE ANY QUESTIONS

## 2025-05-15 NOTE — TELEPHONE ENCOUNTER
Spoke with patient and let him know we received the paperwork and I will have Luz Maria White fill it out. He verbalized understanding.

## 2025-05-16 ENCOUNTER — PATIENT MESSAGE (OUTPATIENT)
Dept: FAMILY MEDICINE CLINIC | Facility: CLINIC | Age: 40
End: 2025-05-16
Payer: COMMERCIAL

## 2025-05-16 ENCOUNTER — APPOINTMENT (OUTPATIENT)
Dept: CARDIAC REHAB | Facility: HOSPITAL | Age: 40
End: 2025-05-16
Payer: COMMERCIAL

## 2025-05-16 NOTE — TELEPHONE ENCOUNTER
Hub staff attempted to follow warm transfer process and was unsuccessful     Caller: Luiz Christianson III    Relationship to patient: Self    Best call back number: [phone]     Patient is needing: PT CANNOT MAKE CARDIAC REHAB APPT TODAY. TRIED CALLING THERE DEPARTMENT BUT WAS ON HOLD FOR A FEW MINUTUES.        No

## 2025-05-19 ENCOUNTER — TREATMENT (OUTPATIENT)
Dept: CARDIAC REHAB | Facility: HOSPITAL | Age: 40
End: 2025-05-19
Payer: COMMERCIAL

## 2025-05-19 DIAGNOSIS — Z95.5 S/P DRUG ELUTING CORONARY STENT PLACEMENT: Primary | ICD-10-CM

## 2025-05-19 PROCEDURE — 93798 PHYS/QHP OP CAR RHAB W/ECG: CPT

## 2025-05-21 ENCOUNTER — TREATMENT (OUTPATIENT)
Dept: CARDIAC REHAB | Facility: HOSPITAL | Age: 40
End: 2025-05-21
Payer: COMMERCIAL

## 2025-05-21 DIAGNOSIS — Z95.5 S/P DRUG ELUTING CORONARY STENT PLACEMENT: Primary | ICD-10-CM

## 2025-05-21 PROCEDURE — 93798 PHYS/QHP OP CAR RHAB W/ECG: CPT

## 2025-05-21 PROCEDURE — 93797 PHYS/QHP OP CAR RHAB WO ECG: CPT

## 2025-05-22 RX ORDER — POTASSIUM CHLORIDE 1500 MG/1
40 TABLET, EXTENDED RELEASE ORAL
Qty: 540 TABLET | Refills: 3 | Status: SHIPPED | OUTPATIENT
Start: 2025-05-22 | End: 2026-05-17

## 2025-05-22 RX ORDER — CLOPIDOGREL BISULFATE 75 MG/1
75 TABLET ORAL DAILY
Qty: 90 TABLET | Refills: 3 | Status: SHIPPED | OUTPATIENT
Start: 2025-05-22 | End: 2026-05-17

## 2025-05-22 RX ORDER — ASPIRIN 81 MG/1
81 TABLET ORAL DAILY
Qty: 90 TABLET | Refills: 3 | Status: SHIPPED | OUTPATIENT
Start: 2025-05-22 | End: 2026-05-17

## 2025-05-22 RX ORDER — FUROSEMIDE 80 MG/1
80 TABLET ORAL 2 TIMES DAILY
Qty: 180 TABLET | Refills: 3 | Status: SHIPPED | OUTPATIENT
Start: 2025-05-22 | End: 2026-05-17

## 2025-05-22 RX ORDER — LISINOPRIL 2.5 MG/1
2.5 TABLET ORAL DAILY
Qty: 90 TABLET | Refills: 3 | Status: SHIPPED | OUTPATIENT
Start: 2025-05-22

## 2025-05-22 NOTE — TELEPHONE ENCOUNTER
Dr. Betancourt,    Pt called today and said he needs med refills on the most recent hospital-prescribed medications. I pended them to the [facility] in [place]. Is it ok to refill all of these? Also, could you check the potassium dosing and quantity to make sure it's correct?    Thank you,    Meliza Chaves, RN  Triage [facility]  05/22/25 14:03 EDT

## 2025-05-23 ENCOUNTER — TREATMENT (OUTPATIENT)
Dept: CARDIAC REHAB | Facility: HOSPITAL | Age: 40
End: 2025-05-23
Payer: COMMERCIAL

## 2025-05-23 DIAGNOSIS — Z95.5 S/P DRUG ELUTING CORONARY STENT PLACEMENT: Primary | ICD-10-CM

## 2025-05-23 PROCEDURE — 93798 PHYS/QHP OP CAR RHAB W/ECG: CPT

## 2025-05-27 ENCOUNTER — CLINICAL SUPPORT (OUTPATIENT)
Dept: FAMILY MEDICINE CLINIC | Facility: CLINIC | Age: 40
End: 2025-05-27
Payer: COMMERCIAL

## 2025-05-27 DIAGNOSIS — R18.8 CIRRHOSIS OF LIVER WITH ASCITES, UNSPECIFIED HEPATIC CIRRHOSIS TYPE: ICD-10-CM

## 2025-05-27 DIAGNOSIS — K74.60 CIRRHOSIS OF LIVER WITH ASCITES, UNSPECIFIED HEPATIC CIRRHOSIS TYPE: ICD-10-CM

## 2025-05-27 DIAGNOSIS — E11.59 TYPE 2 DIABETES MELLITUS WITH OTHER CIRCULATORY COMPLICATION, WITHOUT LONG-TERM CURRENT USE OF INSULIN: ICD-10-CM

## 2025-05-27 DIAGNOSIS — E80.6 HYPERBILIRUBINEMIA: ICD-10-CM

## 2025-05-27 LAB
ALBUMIN SERPL-MCNC: 4.2 G/DL (ref 3.5–5.2)
ALBUMIN UR-MCNC: 4.7 MG/DL
ALBUMIN/GLOB SERPL: 1.1 G/DL
ALP SERPL-CCNC: 137 U/L (ref 39–117)
ALT SERPL W P-5'-P-CCNC: 15 U/L (ref 1–41)
ANION GAP SERPL CALCULATED.3IONS-SCNC: 11.8 MMOL/L (ref 5–15)
AST SERPL-CCNC: 22 U/L (ref 1–40)
BILIRUB CONJ SERPL-MCNC: 0.7 MG/DL (ref 0–0.3)
BILIRUB SERPL-MCNC: 1.7 MG/DL (ref 0–1.2)
BUN SERPL-MCNC: 16 MG/DL (ref 6–20)
BUN/CREAT SERPL: 22.9 (ref 7–25)
CALCIUM SPEC-SCNC: 10.4 MG/DL (ref 8.6–10.5)
CHLORIDE SERPL-SCNC: 102 MMOL/L (ref 98–107)
CO2 SERPL-SCNC: 26.2 MMOL/L (ref 22–29)
CREAT SERPL-MCNC: 0.7 MG/DL (ref 0.76–1.27)
CREAT UR-MCNC: 89.1 MG/DL
EGFRCR SERPLBLD CKD-EPI 2021: 118 ML/MIN/1.73
GLOBULIN UR ELPH-MCNC: 4 GM/DL
GLUCOSE SERPL-MCNC: 115 MG/DL (ref 65–99)
MICROALBUMIN/CREAT UR: 52.7 MG/G (ref 0–29)
POTASSIUM SERPL-SCNC: 4.2 MMOL/L (ref 3.5–5.2)
PROT SERPL-MCNC: 8.2 G/DL (ref 6–8.5)
SODIUM SERPL-SCNC: 140 MMOL/L (ref 136–145)

## 2025-05-27 PROCEDURE — 82570 ASSAY OF URINE CREATININE: CPT

## 2025-05-27 PROCEDURE — 80053 COMPREHEN METABOLIC PANEL: CPT

## 2025-05-27 PROCEDURE — 82248 BILIRUBIN DIRECT: CPT

## 2025-05-27 PROCEDURE — 82043 UR ALBUMIN QUANTITATIVE: CPT

## 2025-05-27 PROCEDURE — 36415 COLL VENOUS BLD VENIPUNCTURE: CPT

## 2025-05-29 ENCOUNTER — TELEPHONE (OUTPATIENT)
Dept: FAMILY MEDICINE CLINIC | Facility: CLINIC | Age: 40
End: 2025-05-29

## 2025-05-29 ENCOUNTER — TELEMEDICINE (OUTPATIENT)
Dept: FAMILY MEDICINE CLINIC | Facility: CLINIC | Age: 40
End: 2025-05-29
Payer: COMMERCIAL

## 2025-05-29 ENCOUNTER — HOSPITAL ENCOUNTER (OUTPATIENT)
Facility: HOSPITAL | Age: 40
Discharge: HOME | End: 2025-05-29
Admitting: FAMILY MEDICINE
Payer: COMMERCIAL

## 2025-05-29 VITALS — BODY MASS INDEX: 34.96 KG/M2 | WEIGHT: 236 LBS | HEIGHT: 69 IN

## 2025-05-29 DIAGNOSIS — E11.59 TYPE 2 DIABETES MELLITUS WITH OTHER CIRCULATORY COMPLICATION, WITHOUT LONG-TERM CURRENT USE OF INSULIN: Primary | ICD-10-CM

## 2025-05-29 DIAGNOSIS — R00.0 TACHYCARDIA: ICD-10-CM

## 2025-05-29 DIAGNOSIS — I10 HYPERTENSION, ESSENTIAL: ICD-10-CM

## 2025-05-29 DIAGNOSIS — I50.22 CHRONIC HFREF (HEART FAILURE WITH REDUCED EJECTION FRACTION): ICD-10-CM

## 2025-05-29 DIAGNOSIS — R80.9 MICROALBUMINURIA: ICD-10-CM

## 2025-05-29 DIAGNOSIS — Z95.5 STATUS POST INSERTION OF DRUG-ELUTING STENT INTO LEFT ANTERIOR DESCENDING (LAD) ARTERY: ICD-10-CM

## 2025-05-29 DIAGNOSIS — I49.9 IRREGULAR HEARTBEAT: ICD-10-CM

## 2025-05-29 PROCEDURE — 93242 EXT ECG>48HR<7D RECORDING: CPT

## 2025-05-29 NOTE — TELEPHONE ENCOUNTER
Liudmila WILLIAMSON is needing clarification on the paperwork that was filled out this morning by Luz Maria White. There seems to be a discrepancy about when he can return to work. Please call and advise       [phone]  ext/# [phone]  Use Claim#  [id number]

## 2025-05-29 NOTE — PROGRESS NOTES
"  Luiz Christianson III is a 40 y.o. male who presents to [facility] for nutrition consult r/t diagnosis of cirrhosis, T2DM.  Luiz Christianson III is referred by ENRIKE Crisostomo.    Past Medical History:   Diagnosis Date    Atrial fibrillation 4/11    Chronic diastolic congestive heart failure 11/18/2020    Diabetes mellitus, type 2 11/18/2020    Diastolic CHF, chronic  11/18/2020    Emotional depression     Erectile dysfunction     Essential hypertension 11/18/2020    Fatty liver     My GP thinks I have this but no official diagnosis was given    Finger numbness 02/24/2021    Hyperlipidemia     Left wrist pain 02/24/2021    Migraine 12/07/2020       Anthropometrics    175.3 cm (69\")  107 kg (236 lb)  34.85 kg/m²    Education Preferences    Education Preferences  What areas of diabetes would you like to learn about?: diet information    Nutrition Information    Nutrition Information  Enter everything you can remember eating in the last 24 hours (1 day): breakfast- greek yogurt, berries; lunch- sub sandwich w/ vegetables; dinner- salmon, vegetables; snacks- popsicles, fruit; beverages- water  What is the biggest challenge you have with your diet?: Knowledge    Education Needs    DM Education Needs  Medication: Oral  Healthy Eating: RD consult, Reviewed meal plan, Basic meal plan provided  Motivation: Engaged  Teaching Method: Explanation, Discussion, Handouts  Patient Response: Verbalized understanding    DM Goals    DM Goals  Healthy Eating - Goal: Today  Being Active - Goal: Today  Taking Medication - Goal: Today      Medications    Current Outpatient Medications   Medication    aspirin    atorvastatin    clopidogrel    empagliflozin    furosemide    Januvia    lactulose    lisinopril    metFORMIN    metoprolol succinate XL    ondansetron ODT    potassium chloride     Labs       Lab Results   Component Value Date    CHOL 82 04/19/2025    TRIG 79 04/19/2025    HDL 16 (L) " 04/19/2025    LDL 49 04/19/2025 April 2025 A1c 7.3%    Nutrition counseling provided on carbohydrate counting, portion control, measuring and reading labels.  Discussed eating out and gave suggestions on controlling carbohydrate intake and making healthier food choices.   Discussed mediterranean/low sodium eating pattern w/ patient to manage liver issues and diabetes.  Pt states he has been making dietary adjustment since d/c from hospital.    Meal Plan:     Total Carbohydrates per meal: 3-4 carb servings/meal, 3 meals/day  Lean protein with meals.  Limit added fats.  Snacks: 1 carbohydrate serving (</= 22 g) + 1 protein serving.     Daily exercise encouraged (as recommended by healthcare provider). Discussed the benefits of exercise in lowering blood glucose, blood pressure, cholesterol, stress and controlling body weight.     Discussed blood glucose monitoring to assist with understanding of factors affecting blood glucose and assist with management of diabetes.  Discussed and provided with target BG ranges.     Literature provided: Diabetes Nutrition Placemat, Choose Your Foods Booklet    Dietitian contact number provided.  Patient encouraged to call with questions or concerns.     Time spent with patient: 30 minutes    Gracie Sam RDN, LD  04/29/2025

## 2025-05-29 NOTE — PROGRESS NOTES
Chief Complaint  Chief Complaint   Patient presents with   • Results     From labs he had 5/27/25       Subjective      Luiz Christianson III presents to [facility]  History of Present Illness    Objective     Medical History:  Past Medical History:   Diagnosis Date   • Atrial fibrillation 4/11   • Chronic diastolic congestive heart failure 11/18/2020   • Diabetes mellitus, type 2 11/18/2020   • Diastolic CHF, chronic  11/18/2020   • Emotional depression    • Erectile dysfunction    • Essential hypertension 11/18/2020   • Fatty liver     My GP thinks I have this but no official diagnosis was given   • Finger numbness 02/24/2021   • Hyperlipidemia    • Left wrist pain 02/24/2021   • Migraine 12/07/2020     Past Surgical History:   Procedure Laterality Date   • CARDIAC CATHETERIZATION N/A 04/14/2025    Procedure: Right and Left Heart Cath/possible PCI;  Surgeon: Aidan Polanco MD;  Location: [facility] CATH INVASIVE LOCATION;  Service: Cardiovascular;  Laterality: N/A;   • CARDIAC CATHETERIZATION  04/21/2025    Procedure: Right and Left Heart Cath;  Surgeon: Aidan Betancourt MD;  Location: [facility] CATH INVASIVE LOCATION;  Service: Cardiovascular;;   • CARDIAC CATHETERIZATION N/A 04/21/2025    Procedure: Percutaneous Coronary Intervention;  Surgeon: Aidan Betancourt MD;  Location: [facility] CATH INVASIVE LOCATION;  Service: Cardiovascular;  Laterality: N/A;  LCX, possible LAD   • CARDIAC CATHETERIZATION N/A 04/21/2025    Procedure: Stent BAN coronary;  Surgeon: Aidan Betancourt MD;  Location: [facility] CATH INVASIVE LOCATION;  Service: Cardiovascular;  Laterality: N/A;   • CARDIAC CATHETERIZATION N/A 04/21/2025    Procedure: Coronary angiography;  Surgeon: Aidan Betancourt MD;  Location: [facility] CATH INVASIVE LOCATION;  Service: Cardiovascular;  Laterality: N/A;   • CARDIAC CATHETERIZATION N/A 04/21/2025    Procedure: Chronic Total Occlussion;  Surgeon: Aidan Betancourt MD;  Location: [facility] CATH INVASIVE LOCATION;   Service: Cardiovascular;  Laterality: N/A;   • CORONARY STENT PLACEMENT  4/21/2025   • INTRAVASCULAR ULTRASOUND N/A 04/21/2025    Procedure: Intravascular Ultrasound;  Surgeon: Aidan Betancourt MD;  Location: [facility] INVASIVE LOCATION;  Service: Cardiovascular;  Laterality: N/A;      Social History     Tobacco Use   • Smoking status: Never     Passive exposure: Past   • Smokeless tobacco: Never   Vaping Use   • Vaping status: Never Used   Substance Use Topics   • Alcohol use: Not Currently   • Drug use: Never     Family History   Problem Relation Age of Onset   • Diabetes Mother    • Diabetes Father    • Heart disease Other         AUNT OR UNCLE   • Diabetes Other    • Diabetes Maternal Grandmother         Had diabetes which led to amputation of 1 leg.   • Heart disease Maternal Grandmother         Talking with my uncle leads us both to believe she had heart issues causing fluid retention. They removed some via tubes but lasix weren't available at the time of her illnesses.   • Cancer Maternal Uncle         Stomach, intestines   • Diabetes Brother         Type 2   • Colon cancer Neg Hx        Medications:  Prior to Admission medications    Medication Sig Start Date End Date Taking? Authorizing Provider   aspirin 81 MG EC tablet Take 1 tablet by mouth Daily for 360 days. 5/22/25 5/17/26 Yes Aidan Betancourt MD   atorvastatin (LIPITOR) 80 MG tablet Take 1 tablet by mouth Daily. 6/21/24  Yes Russell Bledsoe MD   clopidogrel (PLAVIX) 75 MG tablet Take 1 tablet by mouth Daily for 360 days. 5/22/25 5/17/26 Yes Aidan Betancourt MD   furosemide (LASIX) 80 MG tablet Take 1 tablet by mouth 2 (Two) Times a Day for 360 days. 5/22/25 5/17/26 Yes Aidan Betancourt MD   Januvia 100 MG tablet TAKE 1 TABLET DAILY 11/18/24  Yes Luz Maria White APRN   lactulose (CHRONULAC) 10 GM/15ML solution Take 15-30 mls daily for a goal of 2-3 BM/day 4/30/25  Yes Brooke Natarajan APRN   lisinopril (PRINIVIL,ZESTRIL) 2.5 MG tablet Take 1  tablet by mouth Daily. 5/22/25  Yes Aidan Betancourt MD   metFORMIN (GLUCOPHAGE) 500 MG tablet TAKE 2 TABLETS 2 TIMES     DAILY WITH MEALS  Patient taking differently: Take 2 tablets by mouth 2 (Two) Times a Day With Meals. 11/18/24  Yes Luz Maria White APRN   metoprolol succinate XL (TOPROL-XL) 25 MG 24 hr tablet Take 1 tablet by mouth Every Night. 5/6/25  Yes Aidan Betancourt MD   ondansetron ODT (ZOFRAN-ODT) 4 MG disintegrating tablet Place 1 tablet on the tongue 4 (Four) Times a Day As Needed for Nausea or Vomiting. 4/4/25  Yes Yue Burkett APRN   potassium chloride (KLOR-CON M20) 20 MEQ CR tablet Take 2 tablets by mouth 3 (Three) Times a Day With Meals for 360 days. 5/22/25 5/17/26 Yes Aidan Betancourt MD        Allergies:   Farxiga [dapagliflozin]    Health Maintenance Due   Topic Date Due   • DIABETIC EYE EXAM  Never done   • Hepatitis B (1 of 3 - 19+ 3-dose series) Never done   • DIABETIC FOOT EXAM  09/19/2023   • COVID-19 Vaccine (3 - 2024-25 season) 09/01/2024         Vital Signs:   There were no vitals taken for this visit.    Wt Readings from Last 3 Encounters:   05/13/25 109 kg (240 lb)   05/08/25 111 kg (245 lb 2.4 oz)   05/06/25 109 kg (240 lb)     BP Readings from Last 3 Encounters:   05/13/25 110/80   05/08/25 102/70   05/06/25 102/64                Physical Exam     Result Review :    The following data was reviewed by ENRIKE Shaw on 05/29/25 at 08:17 EDT:    {[facility] Ambulatory Labs (Optional):45491}    XR Chest 1 View  Result Date: 5/8/2025  Impression: Stable chest without acute cardiopulmonary process. Electronically Signed: La Benavides MD  5/8/2025 11:05 AM EDT  Workstation ID: [id number]    CT Angiogram Chest Pulmonary Embolism  Result Date: 4/13/2025  1.No pulmonary embolism is identified. 2.Cardiomegaly. Small pericardial effusion. 3.Mild groundglass changes in the lungs could be the result of hypoventilatory change and/or mild edema. 4.Anasarca. 5.Small amount of  "ascites. Electronically Signed: Jasen Adams MD  4/13/2025 1:18 AM EDT  Workstation ID: [id number]    XR Chest 1 View  Result Date: 4/12/2025  No acute cardiopulmonary findings.  4/12/2025 10:37 PM by Dr. Jasen Adams MD on Workstation: [id number]      CT Abdomen Pelvis With Contrast  Result Date: 4/4/2025  1.Heterogeneous appearance of the liver with suggestion of a subtle nodular contour. Findings suggest underlying cirrhosis. Please correlate with liver function tests. 2.Small to moderate amount of ascites. 3.Diffuse wall thickening of the urinary bladder. Please correlate with urinalysis. 4.Other incidental findings as above. Electronically Signed: Jose Quiñonez MD  4/4/2025 12:39 PM EDT  Workstation ID: [id number]      {Data reviewed (Optional):09147:::1}              Assessment and Plan    There are no diagnoses linked to this encounter.     {Time Spent (Optional):17109}    Smoking Cessation:    Luiz Christianson III  reports that he has never smoked. He has been exposed to tobacco smoke. He has never used smokeless tobacco. I have educated him on the risk of diseases from using tobacco products such as {Tobacco Cessation Diseases:35117::\"cancer\",\"COPD\",\"heart disease\"}.     I advised him to quit and he is {Willing/Not Willing to Quit Tobacco Products:47280}.    I spent {Time Spent Tobacco :02413} minutes counseling the patient.            Follow Up   No follow-ups on file.  Patient was given instructions and counseling regarding his condition or for health maintenance advice. Please see specific information pulled into the AVS if appropriate.     Please note that portions of this note were completed with a voice recognition program.    "

## 2025-05-29 NOTE — PROGRESS NOTES
"Chief Complaint  Results (From labs he had 5/27/25)    Subjective           Luiz Christianson III presents to [facility]  History of Present Illness  Objective   Vital Signs:   There were no vitals taken for this visit.    Estimated body mass index is 35.44 kg/m² as calculated from the following:    Height as of 5/13/25: 175.3 cm (69\").    Weight as of 5/13/25: 109 kg (240 lb).     Patient is seen today via video to follow-up on heart failure, hypertension, type 2 diabetes.  He recently had some labs done including a urine microalbumin which indicated microalbuminuria.  He had previously been prescribed Farxiga but was discontinued due to significant urinary symptoms.  He reports that no changes have been made to his medications or treatment plan from a cardiology perspective.  He continues to lose weight due to to diuretic dosing.  He says his dry weight this morning was 228 pounds.  He denies any neurologic changes or syncope/presyncope.  He overall is feeling better but continues to experience fatigue.  He is attending cardiac rehab in the [place] location and plans to transition to the [place] location on 6/20.  He is currently released to return to work with light duty on 6/12/2025 but states that he would like to extend this given the plans at cardiac rehab.  He would like to return to work on 6/23/2025 with light duty expectations.  Patient also was found to have elevated bilirubin and alkaline phosphatase.  He does see gastroenterology and they have scheduled him for an MRI of the abdomen as well as an ultrasound of the liver.    Virtual Visit Physical Exam  Result Review :                   Assessment and Plan      Diagnoses and all orders for this visit:    1. Type 2 diabetes mellitus with other circulatory complication, without long-term current use of insulin (Primary)  -     empagliflozin (Jardiance) 10 MG tablet tablet; Take 1 tablet by mouth Daily.  Dispense: 30 " tablet; Refill: 2    2. Hypertension, essential    3. Chronic HFrEF (heart failure with reduced ejection fraction)    4. Microalbuminuria  -     empagliflozin (Jardiance) 10 MG tablet tablet; Take 1 tablet by mouth Daily.  Dispense: 30 tablet; Refill: 2    5. Status post insertion of drug-eluting stent into left anterior descending (LAD) artery    Patient will continue current medication regimen and treatment plan.  He will start on Jardiance 10 mg daily for management of diabetes and microalbuminuria.  Patient understands that there are potential  side effects of this medication and to follow proper hygiene and monitor for any abnormal urinary symptoms.  If this occurs, medication may need to be discontinued or adjusted.    [facility] documentation will be adjusted to reflect a return to work with light duty on 6/23/2025.  Patient will continue with cardiac rehab at the [place] location until he transitions to the [place] location on 6/20/2025.        Follow Up     No follow-ups on file.  Patient was given instructions and counseling regarding his condition or for health maintenance advice. Please see specific information pulled into the AVS if appropriate.     Mode of Visit: Video  Location of patient: home  Location of provider: [facility]  You have chosen to receive care through a telehealth visit.  The patient has signed the video visit consent form.  The visit included audio and video interaction. No technical issues occurred during this visit.

## 2025-05-30 ENCOUNTER — TREATMENT (OUTPATIENT)
Dept: CARDIAC REHAB | Facility: HOSPITAL | Age: 40
End: 2025-05-30
Payer: COMMERCIAL

## 2025-05-30 DIAGNOSIS — Z95.5 S/P DRUG ELUTING CORONARY STENT PLACEMENT: Primary | ICD-10-CM

## 2025-05-30 PROCEDURE — 93798 PHYS/QHP OP CAR RHAB W/ECG: CPT

## 2025-05-30 NOTE — TELEPHONE ENCOUNTER
Caller: TIA    Relationship to patient: Other    Best call back number:    [phone]    ext/# [phone]  Use Claim#  [id number]    Patient is needing:     CALLER REQUESTING STATUS UPDATE ON REQUEST.    CALLER STATES SHE FAXED OVER CLARIFICATION QUESTIONS ON 5.29.2025 THAT ALSO NEED TO BE COMPLETED.    CONTACT CALLER TO ADVISE

## 2025-06-02 ENCOUNTER — HOSPITAL ENCOUNTER (OUTPATIENT)
Dept: MRI IMAGING | Facility: HOSPITAL | Age: 40
Discharge: HOME | End: 2025-06-02
Payer: COMMERCIAL

## 2025-06-02 ENCOUNTER — TREATMENT (OUTPATIENT)
Dept: CARDIAC REHAB | Facility: HOSPITAL | Age: 40
End: 2025-06-02
Payer: COMMERCIAL

## 2025-06-02 ENCOUNTER — LAB (OUTPATIENT)
Dept: LAB | Facility: HOSPITAL | Age: 40
End: 2025-06-02
Payer: COMMERCIAL

## 2025-06-02 ENCOUNTER — HOSPITAL ENCOUNTER (OUTPATIENT)
Dept: INTERVENTIONAL RADIOLOGY/VASCULAR | Facility: HOSPITAL | Age: 40
Discharge: HOME | End: 2025-06-02
Payer: COMMERCIAL

## 2025-06-02 VITALS
DIASTOLIC BLOOD PRESSURE: 73 MMHG | OXYGEN SATURATION: 95 % | HEART RATE: 90 BPM | SYSTOLIC BLOOD PRESSURE: 105 MMHG | RESPIRATION RATE: 16 BRPM

## 2025-06-02 DIAGNOSIS — E80.6 HYPERBILIRUBINEMIA: ICD-10-CM

## 2025-06-02 DIAGNOSIS — K74.60 CIRRHOSIS OF LIVER WITH ASCITES, UNSPECIFIED HEPATIC CIRRHOSIS TYPE: ICD-10-CM

## 2025-06-02 DIAGNOSIS — R18.8 CIRRHOSIS OF LIVER WITH ASCITES, UNSPECIFIED HEPATIC CIRRHOSIS TYPE: ICD-10-CM

## 2025-06-02 DIAGNOSIS — Z95.5 S/P DRUG ELUTING CORONARY STENT PLACEMENT: Primary | ICD-10-CM

## 2025-06-02 LAB
APTT PPP: 28.3 SECONDS (ref 24.2–34.2)
INR PPP: 1.22 (ref 0.86–1.15)
PLATELET # BLD AUTO: 278 10*3/MM3 (ref 140–450)
PROTHROMBIN TIME: 16 SECONDS (ref 11.8–14.9)

## 2025-06-02 PROCEDURE — 76942 ECHO GUIDE FOR BIOPSY: CPT

## 2025-06-02 PROCEDURE — 85730 THROMBOPLASTIN TIME PARTIAL: CPT

## 2025-06-02 PROCEDURE — 25510000001 GADOPICLENOL 0.5 MMOL/ML SOLUTION

## 2025-06-02 PROCEDURE — 74183 MRI ABD W/O CNTR FLWD CNTR: CPT

## 2025-06-02 PROCEDURE — 93798 PHYS/QHP OP CAR RHAB W/ECG: CPT

## 2025-06-02 PROCEDURE — A9573 GADOPICLENOL 0.5 MMOL/ML SOLUTION: HCPCS

## 2025-06-02 PROCEDURE — 85610 PROTHROMBIN TIME: CPT

## 2025-06-02 PROCEDURE — 85049 AUTOMATED PLATELET COUNT: CPT

## 2025-06-02 RX ADMIN — GADOPICLENOL 10 ML: 485.1 INJECTION INTRAVENOUS at 10:29

## 2025-06-04 ENCOUNTER — TREATMENT (OUTPATIENT)
Dept: CARDIAC REHAB | Facility: HOSPITAL | Age: 40
End: 2025-06-04
Payer: COMMERCIAL

## 2025-06-04 DIAGNOSIS — Z95.5 S/P DRUG ELUTING CORONARY STENT PLACEMENT: Primary | ICD-10-CM

## 2025-06-04 PROCEDURE — 93798 PHYS/QHP OP CAR RHAB W/ECG: CPT

## 2025-06-06 ENCOUNTER — TREATMENT (OUTPATIENT)
Dept: CARDIAC REHAB | Facility: HOSPITAL | Age: 40
End: 2025-06-06
Payer: COMMERCIAL

## 2025-06-06 DIAGNOSIS — Z95.5 S/P DRUG ELUTING CORONARY STENT PLACEMENT: Primary | ICD-10-CM

## 2025-06-06 PROCEDURE — 93798 PHYS/QHP OP CAR RHAB W/ECG: CPT

## 2025-06-09 ENCOUNTER — TREATMENT (OUTPATIENT)
Dept: CARDIAC REHAB | Facility: HOSPITAL | Age: 40
End: 2025-06-09
Payer: COMMERCIAL

## 2025-06-09 DIAGNOSIS — Z95.5 S/P DRUG ELUTING CORONARY STENT PLACEMENT: Primary | ICD-10-CM

## 2025-06-09 PROCEDURE — 93798 PHYS/QHP OP CAR RHAB W/ECG: CPT

## 2025-06-10 ENCOUNTER — OFFICE VISIT (OUTPATIENT)
Age: 40
End: 2025-06-10
Payer: COMMERCIAL

## 2025-06-10 VITALS
WEIGHT: 229 LBS | HEART RATE: 94 BPM | HEIGHT: 69 IN | SYSTOLIC BLOOD PRESSURE: 108 MMHG | OXYGEN SATURATION: 97 % | DIASTOLIC BLOOD PRESSURE: 80 MMHG | BODY MASS INDEX: 33.92 KG/M2

## 2025-06-10 DIAGNOSIS — I25.10 CORONARY ARTERY DISEASE INVOLVING NATIVE CORONARY ARTERY OF NATIVE HEART WITHOUT ANGINA PECTORIS: ICD-10-CM

## 2025-06-10 DIAGNOSIS — I50.22 CHRONIC HFREF (HEART FAILURE WITH REDUCED EJECTION FRACTION): Primary | ICD-10-CM

## 2025-06-10 PROCEDURE — 99214 OFFICE O/P EST MOD 30 MIN: CPT | Performed by: STUDENT IN AN ORGANIZED HEALTH CARE EDUCATION/TRAINING PROGRAM

## 2025-06-10 NOTE — PROGRESS NOTES
Subjective:     Encounter Date:06/10/2025      Patient ID: Luiz Christianson III is a 40 y.o. male.    Chief Complaint:  F/u HFrEF, CAD    HPI:   40 y.o. male, previously followed by Dr. Muir, with hypertension, hyperlipidemia, diabetes, HFrEF with an EF of 18%, CAD status post PCI to LAD and circumflex who presents for follow-up.  I last saw the patient in May and he was doing well at that time.  After our visit he presented to the ED at [place] with palpitations and chest pain and was noted to be hypotensive so his lisinopril was decreased to 2.5 mg daily.  Since then he has felt well.  He has continued to go to cardiac rehab multiple times a week and is doing great.    Per prior note:  Patient was recently admitted to [facility] with heart failure exacerbation. While there he underwent right left heart catheterization revealed a circumflex  as well as a 90% stenosis of the LAD.  He was transferred to our hospital for evaluation by CT surgery but was deemed to be a poor surgical candidate.  He underwent aggressive diuresis, with a 40 pound weight loss.  He then underwent PCI to the LAD as well as to the circumflex .  Given low blood pressures, he was started on Toprol for GDMT for his HFrEF.     The following portions of the patient's history were reviewed and updated as appropriate: allergies, current medications, past family history, past medical history, past social history, past surgical history and problem list.     REVIEW OF SYSTEMS:   All systems reviewed.  Pertinent positives identified in HPI.  All other systems are negative.    Past Medical History:   Diagnosis Date    Atrial fibrillation 4/11    Chronic diastolic congestive heart failure 11/18/2020    Diabetes mellitus, type 2 11/18/2020    Diastolic CHF, chronic  11/18/2020    Emotional depression     Erectile dysfunction     Essential hypertension 11/18/2020    Fatty liver     My GP thinks I have this but no official diagnosis was given     Finger numbness 02/24/2021    Hyperlipidemia     Left wrist pain 02/24/2021    Migraine 12/07/2020       Family History   Problem Relation Age of Onset    Diabetes Mother     Diabetes Father     Heart disease Other         AUNT OR UNCLE    Diabetes Other     Diabetes Maternal Grandmother         Had diabetes which led to amputation of 1 leg.    Heart disease Maternal Grandmother         Talking with my uncle leads us both to believe she had heart issues causing fluid retention. They removed some via tubes but lasix weren't available at the time of her illnesses.    Cancer Maternal Uncle         Stomach, intestines    Diabetes Brother         Type 2    Colon cancer Neg Hx        Social History     Socioeconomic History    Marital status:    Tobacco Use    Smoking status: Never     Passive exposure: Past    Smokeless tobacco: Never   Vaping Use    Vaping status: Never Used   Substance and Sexual Activity    Alcohol use: Not Currently    Drug use: Never    Sexual activity: Yes     Partners: Female     Birth control/protection: Injection       Allergies   Allergen Reactions    Farxiga [Dapagliflozin] Other (See Comments)     Bladder irritation (persistant)       Past Surgical History:   Procedure Laterality Date    CARDIAC CATHETERIZATION N/A 04/14/2025    Procedure: Right and Left Heart Cath/possible PCI;  Surgeon: Aidan Polanco MD;  Location: [facility] CATH INVASIVE LOCATION;  Service: Cardiovascular;  Laterality: N/A;    CARDIAC CATHETERIZATION  04/21/2025    Procedure: Right and Left Heart Cath;  Surgeon: Aidan Betancourt MD;  Location:  [facility] CATH INVASIVE LOCATION;  Service: Cardiovascular;;    CARDIAC CATHETERIZATION N/A 04/21/2025    Procedure: Percutaneous Coronary Intervention;  Surgeon: Aidan Betancourt MD;  Location:  [facility] CATH INVASIVE LOCATION;  Service: Cardiovascular;  Laterality: N/A;  LCX, possible LAD    CARDIAC CATHETERIZATION N/A 04/21/2025    Procedure: Stent BAN coronary;  Surgeon: Aidan Betancourt  MD;  Location:  [facility] CATH INVASIVE LOCATION;  Service: Cardiovascular;  Laterality: N/A;    CARDIAC CATHETERIZATION N/A 04/21/2025    Procedure: Coronary angiography;  Surgeon: Aidan Betancourt MD;  Location:  [facility] CATH INVASIVE LOCATION;  Service: Cardiovascular;  Laterality: N/A;    CARDIAC CATHETERIZATION N/A 04/21/2025    Procedure: Chronic Total Occlussion;  Surgeon: Aidan Betancourt MD;  Location:  [facility] CATH INVASIVE LOCATION;  Service: Cardiovascular;  Laterality: N/A;    CORONARY STENT PLACEMENT  4/21/2025    INTRAVASCULAR ULTRASOUND N/A 04/21/2025    Procedure: Intravascular Ultrasound;  Surgeon: Aidan Betancourt MD;  Location:  [facility] CATH INVASIVE LOC[facility];  Service: Cardiovascular;  Laterality: N/A;       Procedures       Objective:         Vitals:    06/10/25 1305   BP: 108/80   Pulse: 94   SpO2: 97%       PHYSICAL EXAM:  GEN: well appearing, in NAD   HEENT: NCAT, EOMI, moist mucus membranes   Respiratory: CTAB, no wheezes, rales or rhonchi  CV: normal rate, regular rhythm, normal S1, S2, no murmurs, rubs, gallops, +2 radial pulses b/l  GI: soft, nontender, nondistended  MSK: no edema  Skin: no rash, warm, dry  Heme/Lymph: no bruising or bleeding  Neuro: Alert and Oriented x 3, grossly normal motor function        Assessment:         (I50.22) Chronic HFrEF (heart failure with reduced ejection fraction) - Plan: Adult Transthoracic Echo Complete w/ Color, Spectral and Contrast if Necessary Per Protocol, Basic Metabolic Panel, Magnesium    (I25.10) Coronary artery disease involving native coronary artery of native heart without angina pectoris - Plan: Adult Transthoracic Echo Complete w/ Color, Spectral and Contrast if Necessary Per Protocol    40 y.o. male, previously followed by Dr. Muir, with hypertension, hyperlipidemia, diabetes, HFrEF with an EF of 18%, CAD status post PCI to LAD and circumflex who presents for follow-up.       Plan:       #HFrEF, stage C, NYHA class I  EF of 18% on recent echocardiogram.   Status post revascularization to LAD and dominant circumflex.  We discussed uptitration of GDMT with recheck of his ejection fraction 3 months after he is on maximally tolerated therapy in order to assess for need for ICD.  - Continue Toprol 25 mg daily, lisinopril 2.5 mg daily, Jardiance 10 mg daily  - Echocardiogram in 3 months    #CAD  Successful IVUS guided PCI to distal circumflex  with a 3.0 x 23 mm Xience Skypoint drug-eluting stent (postdilated throughout with a 5.0 mm NC) and PCI to severe mid LAD stenosis with a 2.5 x 18 mm Xience Skypoint drug-eluting stent (postdilated throughout with a 3.5 mm NC to high-pressure)  - Continue aspirin 81 mg daily, Plavix 75 mg daily, atorvastatin 80 mg daily    ENRIKE Carlin, thank you very much for referring this kind patient to me. Please call me with any questions or concerns. I will see the patient again in the office in 3 months or earlier as needed.         Aidan Saldana MD, [facility]  06/10/25  [facility]    Outpatient Encounter Medications as of 6/10/2025   Medication Sig Dispense Refill    aspirin 81 MG EC tablet Take 1 tablet by mouth Daily for 360 days. 90 tablet 3    atorvastatin (LIPITOR) 80 MG tablet Take 1 tablet by mouth Daily. 90 tablet 3    clopidogrel (PLAVIX) 75 MG tablet Take 1 tablet by mouth Daily for 360 days. 90 tablet 3    empagliflozin (Jardiance) 10 MG tablet tablet Take 1 tablet by mouth Daily. 30 tablet 2    furosemide (LASIX) 80 MG tablet Take 1 tablet by mouth 2 (Two) Times a Day for 360 days. 180 tablet 3    Januvia 100 MG tablet TAKE 1 TABLET DAILY 90 tablet 3    lisinopril (PRINIVIL,ZESTRIL) 2.5 MG tablet Take 1 tablet by mouth Daily. 90 tablet 3    metFORMIN (GLUCOPHAGE) 500 MG tablet TAKE 2 TABLETS 2 TIMES     DAILY WITH MEALS (Patient taking differently: Take 2 tablets by mouth 2 (Two) Times a Day With Meals.) 360 tablet 3    metoprolol succinate XL (TOPROL-XL) 25 MG 24 hr tablet Take 1 tablet by mouth Every  Night. 90 tablet 3    potassium chloride (KLOR-CON M20) 20 MEQ CR tablet Take 2 tablets by mouth 3 (Three) Times a Day With Meals for 360 days. 540 tablet 3    [DISCONTINUED] lactulose (CHRONULAC) 10 GM/15ML solution Take 15-30 mls daily for a goal of 2-3 BM/day 900 mL 1    [DISCONTINUED] ondansetron ODT (ZOFRAN-ODT) 4 MG disintegrating tablet Place 1 tablet on the tongue 4 (Four) Times a Day As Needed for Nausea or Vomiting. 20 tablet 0     No facility-administered encounter medications on file as of 6/10/2025.

## 2025-06-11 ENCOUNTER — TREATMENT (OUTPATIENT)
Dept: CARDIAC REHAB | Facility: HOSPITAL | Age: 40
End: 2025-06-11
Payer: COMMERCIAL

## 2025-06-11 ENCOUNTER — TELEPHONE (OUTPATIENT)
Dept: GASTROENTEROLOGY | Facility: CLINIC | Age: 40
End: 2025-06-11
Payer: COMMERCIAL

## 2025-06-11 DIAGNOSIS — Z95.5 S/P DRUG ELUTING CORONARY STENT PLACEMENT: Primary | ICD-10-CM

## 2025-06-11 LAB
BUN SERPL-MCNC: 17 MG/DL (ref 6–20)
BUN/CREAT SERPL: 24.3 (ref 7–25)
CALCIUM SERPL-MCNC: 10.5 MG/DL (ref 8.6–10.5)
CHLORIDE SERPL-SCNC: 98 MMOL/L (ref 98–107)
CO2 SERPL-SCNC: 25.1 MMOL/L (ref 22–29)
CREAT SERPL-MCNC: 0.7 MG/DL (ref 0.76–1.27)
CV ZIO BASELINE AVG BPM: 87 BPM
CV ZIO BASELINE BPM HIGH: 231 BPM
CV ZIO BASELINE BPM LOW: 67 BPM
CV ZIO DEVICE ANALYSIS TIME: NORMAL
CV ZIO ECT SVE COUNT: 266 EPISODES
CV ZIO ECT SVE CPLT COUNT: 0 EPISODES
CV ZIO ECT SVE CPLT FREQ: 0
CV ZIO ECT SVE FREQ: NORMAL
CV ZIO ECT SVE TPLT COUNT: 0 EPISODES
CV ZIO ECT SVE TPLT FREQ: 0
CV ZIO ECT VE COUNT: 5292 EPISODES
CV ZIO ECT VE CPLT COUNT: 7937 EPISODES
CV ZIO ECT VE CPLT FREQ: NORMAL
CV ZIO ECT VE FREQ: NORMAL
CV ZIO ECT VE TPLT COUNT: 1696 EPISODES
CV ZIO ECT VE TPLT FREQ: NORMAL
CV ZIO ECTOPIC SVE COUPLET RAW PERCENT: 0 %
CV ZIO ECTOPIC SVE ISOLATED PERCENT: 0.07 %
CV ZIO ECTOPIC SVE TRIPLET RAW PERCENT: 0 %
CV ZIO ECTOPIC VE COUPLET RAW PERCENT: 4.31 %
CV ZIO ECTOPIC VE ISOLATED PERCENT: 1.44 %
CV ZIO ECTOPIC VE TRIPLET RAW PERCENT: 1.38 %
CV ZIO ENROLLMENT END: NORMAL
CV ZIO ENROLLMENT START: NORMAL
CV ZIO L BIGEMINY DUR: 10.4 SEC
CV ZIO L BIGEMINY END: NORMAL
CV ZIO L BIGEMINY START: NORMAL
CV ZIO L TRIGEMINY DUR: 9.1 SEC
CV ZIO L TRIGEMINY END: NORMAL
CV ZIO L TRIGEMINY START: NORMAL
CV ZIO PATIENT EVENTS DIARIES: 0
CV ZIO PATIENT EVENTS TRIGGERS: 0
CV ZIO PAUSE COUNT: 0
CV ZIO PRESCRIPTION STATUS: NORMAL
CV ZIO SVT COUNT: 0
CV ZIO TOTAL  ENROLLMENT PERIOD: NORMAL
CV ZIO VT AVG BPM: 148 BPM
CV ZIO VT BPM HIGH: 231 BPM
CV ZIO VT BPM LOW: 67 BPM
CV ZIO VT COUNT: 50
CV ZIO VT F EPI AVG BPM: 200
CV ZIO VT F EPI BEATS: 7 BEATS
CV ZIO VT F EPI BPM HIGH: 231
CV ZIO VT F EPI BPM LOW: 140
CV ZIO VT F EPI DUR: 2.3 SEC
CV ZIO VT F EPI END: NORMAL
CV ZIO VT F EPI START: NORMAL
CV ZIO VT L EPI AVG BPM: 153
CV ZIO VT L EPI BEATS: 10 BEATS
CV ZIO VT L EPI BPM HIGH: 162 BPM
CV ZIO VT L EPI BPM LOW: 141 BPM
CV ZIO VT L EPI DUR: 3.9
CV ZIO VT L EPI END: NORMAL
CV ZIO VT L EPI START: NORMAL
EGFRCR SERPLBLD CKD-EPI 2021: 118 ML/MIN/1.73
GLUCOSE SERPL-MCNC: 144 MG/DL (ref 65–99)
MAGNESIUM SERPL-MCNC: 1.9 MG/DL (ref 1.6–2.6)
POTASSIUM SERPL-SCNC: 4.3 MMOL/L (ref 3.5–5.2)
SODIUM SERPL-SCNC: 137 MMOL/L (ref 136–145)

## 2025-06-11 PROCEDURE — 93798 PHYS/QHP OP CAR RHAB W/ECG: CPT

## 2025-06-11 NOTE — TELEPHONE ENCOUNTER
Hub staff attempted to follow warm transfer process and was unsuccessful     Caller: Luiz Christianson III    Relationship to patient: Self    Best call back number: [phone]    Patient is needing: PT STATES HE IS RETURNING MISSED CALL FROM [facility] YESTERDAY. NO NOTES FOUND. PLEASE CONTACT PT TO ASSIST.

## 2025-06-11 NOTE — TELEPHONE ENCOUNTER
Attempted to contact pt to go over results and recommendations, left pt vm requesting return call     See previous TS Results

## 2025-06-13 ENCOUNTER — TREATMENT (OUTPATIENT)
Dept: CARDIAC REHAB | Facility: HOSPITAL | Age: 40
End: 2025-06-13
Payer: COMMERCIAL

## 2025-06-13 DIAGNOSIS — R80.9 MICROALBUMINURIA: ICD-10-CM

## 2025-06-13 DIAGNOSIS — I50.22 CHRONIC HFREF (HEART FAILURE WITH REDUCED EJECTION FRACTION): ICD-10-CM

## 2025-06-13 DIAGNOSIS — Z95.5 S/P DRUG ELUTING CORONARY STENT PLACEMENT: Primary | ICD-10-CM

## 2025-06-13 DIAGNOSIS — E11.59 TYPE 2 DIABETES MELLITUS WITH OTHER CIRCULATORY COMPLICATION, WITHOUT LONG-TERM CURRENT USE OF INSULIN: Primary | ICD-10-CM

## 2025-06-13 PROCEDURE — 93798 PHYS/QHP OP CAR RHAB W/ECG: CPT

## 2025-06-16 ENCOUNTER — TREATMENT (OUTPATIENT)
Dept: CARDIAC REHAB | Facility: HOSPITAL | Age: 40
End: 2025-06-16
Payer: COMMERCIAL

## 2025-06-16 DIAGNOSIS — Z95.5 S/P DRUG ELUTING CORONARY STENT PLACEMENT: Primary | ICD-10-CM

## 2025-06-16 PROCEDURE — 93798 PHYS/QHP OP CAR RHAB W/ECG: CPT

## 2025-06-18 ENCOUNTER — TREATMENT (OUTPATIENT)
Dept: CARDIAC REHAB | Facility: HOSPITAL | Age: 40
End: 2025-06-18
Payer: COMMERCIAL

## 2025-06-18 DIAGNOSIS — Z95.5 S/P DRUG ELUTING CORONARY STENT PLACEMENT: Primary | ICD-10-CM

## 2025-06-18 PROCEDURE — 93798 PHYS/QHP OP CAR RHAB W/ECG: CPT

## 2025-06-19 ENCOUNTER — OFFICE VISIT (OUTPATIENT)
Dept: FAMILY MEDICINE CLINIC | Facility: CLINIC | Age: 40
End: 2025-06-19
Payer: COMMERCIAL

## 2025-06-19 VITALS
TEMPERATURE: 98.8 F | DIASTOLIC BLOOD PRESSURE: 68 MMHG | HEIGHT: 69 IN | HEART RATE: 92 BPM | SYSTOLIC BLOOD PRESSURE: 104 MMHG | OXYGEN SATURATION: 97 % | BODY MASS INDEX: 33.59 KG/M2 | WEIGHT: 226.8 LBS

## 2025-06-19 DIAGNOSIS — R80.9 MICROALBUMINURIA: ICD-10-CM

## 2025-06-19 DIAGNOSIS — I50.22 CHRONIC HFREF (HEART FAILURE WITH REDUCED EJECTION FRACTION): ICD-10-CM

## 2025-06-19 DIAGNOSIS — K74.60 CIRRHOSIS OF LIVER WITH ASCITES, UNSPECIFIED HEPATIC CIRRHOSIS TYPE: ICD-10-CM

## 2025-06-19 DIAGNOSIS — I10 HYPERTENSION, ESSENTIAL: ICD-10-CM

## 2025-06-19 DIAGNOSIS — M54.41 ACUTE MIDLINE LOW BACK PAIN WITH RIGHT-SIDED SCIATICA: ICD-10-CM

## 2025-06-19 DIAGNOSIS — R18.8 CIRRHOSIS OF LIVER WITH ASCITES, UNSPECIFIED HEPATIC CIRRHOSIS TYPE: ICD-10-CM

## 2025-06-19 DIAGNOSIS — E11.59 TYPE 2 DIABETES MELLITUS WITH OTHER CIRCULATORY COMPLICATION, WITHOUT LONG-TERM CURRENT USE OF INSULIN: Primary | ICD-10-CM

## 2025-06-19 DIAGNOSIS — I25.10 CORONARY ARTERY DISEASE INVOLVING NATIVE CORONARY ARTERY OF NATIVE HEART WITHOUT ANGINA PECTORIS: ICD-10-CM

## 2025-06-19 DIAGNOSIS — Z95.5 STATUS POST INSERTION OF DRUG-ELUTING STENT INTO LEFT ANTERIOR DESCENDING (LAD) ARTERY: ICD-10-CM

## 2025-06-19 NOTE — PROGRESS NOTES
Chief Complaint  Chief Complaint   Patient presents with    Diabetes    Hypertension    Congestive Heart Failure    Numbness     Pt states he is having numbness in both thighs intermittently.       Subjective      Luiz Christianson III presents to [facility]  History of Present Illness  The patient is a 40-year-old male who presents today to follow up on hypertension and congestive heart failure.    He reports an improvement in his condition, with plans to resume work on Monday. 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 He has been advised to maintain his exercise regimen even outside the hospital setting. He does not experience palpitations or irregularities, which have been minimal since his surgery. His most recent consultation was on 06/10/2025. He continues his Lasix regimen without any changes, as he still requires assistance with fluid management. He has been informed to monitor for a weight gain of 2 pounds per day, at which point he should contact the healthcare team. He has noticed a decrease in muscle pain, which he attributes to his Lasix and electrolyte fluctuations. He has been engaging in gym activities as part of his therapy, ensuring not to overexert himself. He uses a watch to monitor his heart rate, which typically ranges from 108 to 140 beats per minute during exercise, and around 120s to 130s at other times. His weight fluctuates around 220 pounds, with a recorded low of 218 pounds. He occasionally experiences soreness in the left area when performing exercises such as push-ups or butterfly exercises, which he suspects may be due to the stents.    He is currently on Januvia 100 mg once daily and Jardiance 10 mg once daily. He has received a call from the endocrinology department this morning while en route to the  clinic.    He reports intermittent numbness in his thighs, predominantly on the right side and rarely on the left. The numbness does not extend down his leg and is localized to the upper leg above the knee. He is uncertain if this is due to poor posture or a pinched nerve. He does not report any issues with his feet or ankles. He describes a tingling sensation, similar to the feeling of a limb falling asleep and then waking up. He does not cross his legs but does sleep on his right side. He also reports mild low back pain, which he suspects may be related to treadmill use.    PAST SURGICAL HISTORY:  Status post revascularization to LAD and dominant circumflex.      Objective     Medical History:  Past Medical History:   Diagnosis Date    Atrial fibrillation 4/11    Chronic diastolic congestive heart failure 11/18/2020    Diabetes mellitus, type 2 11/18/2020    Diastolic CHF, chronic  11/18/2020    Emotional depression     Erectile dysfunction     Essential hypertension 11/18/2020    Fatty liver     My GP thinks I have this but no official diagnosis was given    Finger numbness 02/24/2021    Hyperlipidemia     Left wrist pain 02/24/2021    Migraine 12/07/2020     Past Surgical History:   Procedure Laterality Date    CARDIAC CATHETERIZATION N/A 04/14/2025    Procedure: Right and Left Heart Cath/possible PCI;  Surgeon: Aidan Polanco MD;  Location: [facility] CATH INVASIVE LOCATION;  Service: Cardiovascular;  Laterality: N/A;    CARDIAC CATHETERIZATION  04/21/2025    Procedure: Right and Left Heart Cath;  Surgeon: Aidan Betancourt MD;  Location: [facility] CATH INVASIVE LOCATION;  Service: Cardiovascular;;    CARDIAC CATHETERIZATION N/A 04/21/2025    Procedure: Percutaneous Coronary Intervention;  Surgeon: Aidan Betancourt MD;  Location: [facility] CATH INVASIVE LOCATION;  Service: Cardiovascular;  Laterality: N/A;  LCX, possible LAD    CARDIAC CATHETERIZATION N/A 04/21/2025    Procedure: Stent BAN coronary;  Surgeon: Aidan Betancourt MD;   Location:  [facility] C[facility]VASIVE [facility];  Service: Cardiovascular;  Laterality: N/A;    CARDIAC CATHETERIZATION N/A 04/21/2025    Procedure: Coronary angiography;  Surgeon: Aidan Betancourt MD;  Location:  [facility] CATH INVASIVE LOCATION;  Service: Cardiovascular;  Laterality: N/A;    CARDIAC CATHETERIZATION N/A 04/21/2025    Procedure: Chronic Total Occlussion;  Surgeon: Aidan Betancourt MD;  Location:  [facility] CATH INVASIVE LOC[facility];  Service: Cardiovascular;  Laterality: N/A;    CORONARY STENT PLACEMENT  4/21/2025    INTRAVASCULAR ULTRASOUND N/A 04/21/2025    Procedure: Intravascular Ultrasound;  Surgeon: Aidan Betancourt MD;  Location:  [facility] CATH INVASIVE [facility];  Service: Cardiovascular;  Laterality: N/A;      Social History     Tobacco Use    Smoking status: Never     Passive exposure: Past    Smokeless tobacco: Never   Vaping Use    Vaping status: Never Used   Substance Use Topics    Alcohol use: Not Currently    Drug use: Never     Family History   Problem Relation Age of Onset    Diabetes Mother     Diabetes Father     Heart disease Other         AUNT OR UNCLE    Diabetes Other     Diabetes Maternal Grandmother         Had diabetes which led to amputation of 1 leg.    Heart disease Maternal Grandmother         Talking with my uncle leads us both to believe she had heart issues causing fluid retention. They removed some via tubes but lasix weren't available at the time of her illnesses.    Cancer Maternal Uncle         Stomach, intestines    Diabetes Brother         Type 2    Colon cancer Neg Hx        Medications:  Prior to Admission medications    Medication Sig Start Date End Date Taking? Authorizing Provider   aspirin 81 MG EC tablet Take 1 tablet by mouth Daily for 360 days. 5/22/25 5/17/26 Yes Aidan Betancourt MD   atorvastatin (LIPITOR) 80 MG tablet Take 1 tablet by mouth Daily. 6/21/24  Yes Russell Bledsoe MD   clopidogrel (PLAVIX) 75 MG tablet Take 1 tablet by mouth Daily for 360 days. 5/22/25 5/17/26 Yes Serafin  "MD Aidan   empagliflozin (Jardiance) 10 MG tablet tablet Take 1 tablet by mouth Daily. 5/29/25  Yes Luz Maria White APRN   furosemide (LASIX) 80 MG tablet Take 1 tablet by mouth 2 (Two) Times a Day for 360 days. 5/22/25 5/17/26 Yes Aidan Betancourt MD   Januvia 100 MG tablet TAKE 1 TABLET DAILY 11/18/24  Yes Luz Maria White APRN   lisinopril (PRINIVIL,ZESTRIL) 2.5 MG tablet Take 1 tablet by mouth Daily. 5/22/25  Yes Aidan Betancourt MD   metFORMIN (GLUCOPHAGE) 500 MG tablet TAKE 2 TABLETS 2 TIMES     DAILY WITH MEALS  Patient taking differently: Take 2 tablets by mouth 2 (Two) Times a Day With Meals. 11/18/24  Yes Luz Maria White APRN   metoprolol succinate XL (TOPROL-XL) 25 MG 24 hr tablet Take 1 tablet by mouth Every Night. 5/6/25  Yes Aidan Betancourt MD   potassium chloride (KLOR-CON M20) 20 MEQ CR tablet Take 2 tablets by mouth 3 (Three) Times a Day With Meals for 360 days. 5/22/25 5/17/26 Yes Aidan Betancourt MD        Allergies:   Farxiga [dapagliflozin]    Health Maintenance Due   Topic Date Due    DIABETIC EYE EXAM  Never done    Hepatitis B (1 of 3 - 19+ 3-dose series) Never done    DIABETIC FOOT EXAM  09/19/2023    COVID-19 Vaccine (3 - 2024-25 season) 09/01/2024         Vital Signs:   /68 (BP Location: Left arm, Patient Position: Sitting, Cuff Size: Adult)   Pulse 92   Temp 98.8 °F (37.1 °C) (Oral)   Ht 175.3 cm (69\")   Wt 103 kg (226 lb 12.8 oz)   SpO2 97%   BMI 33.49 kg/m²     Wt Readings from Last 3 Encounters:   06/19/25 103 kg (226 lb 12.8 oz)   06/10/25 104 kg (229 lb)   05/13/25 109 kg (240 lb)     BP Readings from Last 3 Encounters:   06/19/25 104/68   06/10/25 108/80   06/02/25 105/73       Physical Exam  Vitals reviewed.   Constitutional:       Appearance: Normal appearance. He is well-developed.   HENT:      Head: Normocephalic and atraumatic.   Eyes:      Conjunctiva/sclera: Conjunctivae normal.      Pupils: Pupils are equal, round, and reactive to light.   Cardiovascular:     "  Rate and Rhythm: Normal rate and regular rhythm.      Heart sounds: No murmur heard.     No friction rub. No gallop.   Pulmonary:      Effort: Pulmonary effort is normal.      Breath sounds: Normal breath sounds. No wheezing or rhonchi.   Abdominal:      General: Bowel sounds are normal. There is no distension.      Palpations: Abdomen is soft.      Tenderness: There is no abdominal tenderness.   Skin:     General: Skin is warm and dry.   Neurological:      Mental Status: He is alert and oriented to person, place, and time.      Cranial Nerves: No cranial nerve deficit.   Psychiatric:         Mood and Affect: Mood and affect normal.         Behavior: Behavior normal.         Thought Content: Thought content normal.         Judgment: Judgment normal.       Physical Exam  Respiratory: Clear to auscultation, no wheezing, rales or rhonchi  Cardiovascular: Regular rate and rhythm, no murmurs, rubs, or gallops      Result Review :    The following data was reviewed by ENRIKE Shaw on 06/19/25 at 10:11 EDT:    Common labs          5/27/2025    08:22 6/2/2025    09:29 6/10/2025    13:29   Common Labs   Glucose 115   144    BUN 16.0   17.0    Creatinine 0.70   0.70    Sodium 140   137    Potassium 4.2   4.3    Chloride 102   98    Calcium 10.4   10.5    Albumin 4.2      Total Bilirubin 1.7      Alkaline Phosphatase 137      AST (SGOT) 22      ALT (SGPT) 15      Platelets  278     Microalbumin, Urine 4.7            MRI abdomen w wo contrast mrcp  Result Date: 6/4/2025  Impression: 1. Morphologic changes of the liver concerning for early/mild chronic liver disease. Mild upper abdominal varices and trace ascites suggest sequelae of portal hypertension. No splenomegaly. 2. No MRI evidence of hepatocellular carcinoma. 3. Unremarkable cholangiogram. 4. Cardiomegaly with small pericardial effusion without significant change from recent CT comparison. 5. Multiple simple and hemorrhagic/proteinaceous renal cysts. 6.  Other ancillary findings as above. Electronically Signed: Colin Gray MD  6/4/2025 2:04 PM EDT  Workstation ID: [id number]    US Paracentesis  Result Date: 6/2/2025  Impression: No ascitic fluid amenable to drainage Report dictated by: Sunni Matute  I have personally reviewed this case and agree with the findings above: Electronically Signed: Douglas Rogers MD  6/2/2025 12:32 PM EDT  Workstation ID: [id number]    XR Chest 1 View  Result Date: 5/8/2025  Impression: Stable chest without acute cardiopulmonary process. Electronically Signed: La Benavides MD  5/8/2025 11:05 AM EDT  Workstation ID: [id number]    CT Angiogram Chest Pulmonary Embolism  Result Date: 4/13/2025  1.No pulmonary embolism is identified. 2.Cardiomegaly. Small pericardial effusion. 3.Mild groundglass changes in the lungs could be the result of hypoventilatory change and/or mild edema. 4.Anasarca. 5.Small amount of ascites. Electronically Signed: Jasen Adams MD  4/13/2025 1:18 AM EDT  Workstation ID: [id number]    XR Chest 1 View  Result Date: 4/12/2025  No acute cardiopulmonary findings.  4/12/2025 10:37 PM by Dr. Jasen Adams MD on Workstation: [id number]      CT Abdomen Pelvis With Contrast  Result Date: 4/4/2025  1.Heterogeneous appearance of the liver with suggestion of a subtle nodular contour. Findings suggest underlying cirrhosis. Please correlate with liver function tests. 2.Small to moderate amount of ascites. 3.Diffuse wall thickening of the urinary bladder. Please correlate with urinalysis. 4.Other incidental findings as above. Electronically Signed: Jose Quiñonez MD  4/4/2025 12:39 PM EDT  Workstation ID: [id number]      Results  Labs   - Kidney function test: Normal   - Electrolytes test: Normal   - A1c: 7.3               Assessment and Plan    Diagnoses and all orders for this visit:    1. Type 2 diabetes mellitus with other circulatory complication, without long-term current use of insulin (Primary)    2. Chronic HFrEF (heart  failure with reduced ejection fraction)    3. Microalbuminuria    4. Hypertension, essential    5. Status post insertion of drug-eluting stent into left anterior descending (LAD) artery    6. Coronary artery disease involving native coronary artery of native heart without angina pectoris    7. Cirrhosis of liver with ascites, unspecified hepatic cirrhosis type    8. Acute midline low back pain with right-sided sciatica  -     XR Spine Lumbar 4+ View; Future       Assessment & Plan  1. Hypertension.  - Blood pressure is being monitored with current medications.  - Kidney function and electrolytes are within normal limits.  - Continued use of Toprol 25 mg daily, lisinopril 2.5 mg daily, Jardiance 10 mg daily, aspirin daily, Plavix daily, and atorvastatin daily.  - Echocardiogram will be repeated in 3 months to assess ejection fraction and determine the need for ICD.    2. Congestive heart failure.  - Status post revascularization to LAD and dominant circumflex.  - Kidney function and electrolytes are normal, indicating good tolerance to high dose of Lasix.  - Prolonged recovery period for heart to regain fluid pumping ability; monitor heart rate during exercise to avoid excessive stress.  - Continued use of current medications; echocardiogram in 3 months to assess ejection fraction and determine the need for ICD.    3. Diabetes mellitus.  - Blood sugar levels slightly elevated; A1c was 7.3 two months ago, showing improvement from previous values.  - Currently on Januvia 100 mg once daily and Jardiance 10 mg once daily.  - Referral to endocrinology for further management and educational tips on diet and activity.  - Monitoring blood sugar levels and adjusting medications as needed.    4. Intermittent thigh numbness.  - Numbness potentially related to increased activity and stress on spine; possible mild spinal arthritis.  - Muscle pain may be related to Lasix and electrolyte fluctuations; current symptoms suggest  nerve-related issue.  - Advised to maintain active lifestyle, incorporate regular movement, and stretch legs every hour during work.  - X-ray of lower back ordered to investigate potential issues.    Follow-up  The patient will follow up in 3 months.          Smoking Cessation:    Luiz Christianson III  reports that he has never smoked. He has been exposed to tobacco smoke. He has never used smokeless tobacco.             Follow Up   Return in about 3 months (around 9/19/2025), or if symptoms worsen or fail to improve, for Next scheduled follow up.  Patient was given instructions and counseling regarding his condition or for health maintenance advice. Please see specific information pulled into the AVS if appropriate.     Please note that portions of this note were completed with a voice recognition program.    Patient or patient representative verbalized consent for the use of Ambient Listening during the visit with  ENRIKE Shaw for chart documentation. 6/19/2025  10:10 EDT  Answers submitted by the patient for this visit:  Problem not listed (Submitted on 6/18/2025)  Chief Complaint: Other medical problem  Reason for appointment: Follow up of existing visits.  abdominal pain: No  anorexia: No  joint pain: No  change in stool: No  chest pain: Yes  chills: No  nasal congestion: No  cough: No  diaphoresis: No  fatigue: No  fever: No  headaches: No  joint swelling: No  myalgias: Yes  nausea: No  neck pain: No  numbness: Yes  rash: No  sore throat: No  swollen glands: No  dysuria: No  vertigo: No  visual change: No  vomiting: No  weakness: No  Onset: 1 to 6 months  Chronicity: recurrent  Frequency: intermittently

## 2025-06-20 ENCOUNTER — OFFICE VISIT (OUTPATIENT)
Dept: CARDIAC REHAB | Facility: HOSPITAL | Age: 40
End: 2025-06-20
Payer: COMMERCIAL

## 2025-06-20 ENCOUNTER — APPOINTMENT (OUTPATIENT)
Dept: CARDIAC REHAB | Facility: HOSPITAL | Age: 40
End: 2025-06-20
Payer: COMMERCIAL

## 2025-06-20 VITALS
DIASTOLIC BLOOD PRESSURE: 80 MMHG | OXYGEN SATURATION: 98 % | WEIGHT: 227.29 LBS | SYSTOLIC BLOOD PRESSURE: 120 MMHG | BODY MASS INDEX: 33.57 KG/M2

## 2025-06-20 DIAGNOSIS — Z95.5 S/P CORONARY ARTERY STENT PLACEMENT: Primary | ICD-10-CM

## 2025-06-20 PROCEDURE — 93798 PHYS/QHP OP CAR RHAB W/ECG: CPT

## 2025-06-20 NOTE — PROGRESS NOTES
Phase II and III Education    Discipline Teaching:  Cardiac Rehab Phase II [x]      Cardiac Rehab Phase III []      Pulmonary Rehab II []      Pulmonary Rehab III []      Peripheral Artery Disease []        Class Given:  Asthma Management []      Beginners Cardiac Rehab []      Beginners Pulmonary Rehab []      CAD I []      CAD II []      CHF []      Diabetes Mellitus []     Diet & Cholesterol []     Diet Consult []      Energy Conservation []     Exercise []     Grocery Store Tour []     Harmonica Therapy []     High Blood Pressure []     Living with COPD []     Medication Safety []     Peripheral Artery Disease []     S & S of Infection []      Stress []        Education Topics:  Angina []      Arrhythmias []      Asthma Management []      Beginning Cardiac Rehab []      Blood Pressure [x]     Blood Sugar [x]     Breathing Retrainment []     CHF [x]     Cooking []     Daily Weight [x]     Diabetes Mellitus [x]      Diet [x]     Energy Conservation []     Exercise [x]      Foot Care []      Grocery Store Tour []      Heart Disease [x]      Heart Function [x]      Incision Care []     Living with COPD []     Medication Safety []     PAD Symptoms/Treatment []     Reconditioning Exercises []     S & S Infection []     Smoking Consult []     Stress Management []     Test Results []       Teaching Aids:  Video []      PAD Handout []      Pulmonary Workbook []      CAD Teaching Packet [x]      Stress Teaching Packet []     Exercise Teaching Packet [x]      Diet Teaching Packet [x]      CHF Teaching Packet [x]      Blood Pressure Teaching Packet [x]      Smoking Cessation Packet []      Medication Safety Handout []      Pflex Training []      Diabetes Teaching Packet [x]      Harmonica Therapy Packet []        Teaching Method:  Discussion [x]      Written Information [x]      Audio Visual [x]      Demonstration []        Teaching Recipient:  Patient []      Family []      Friend []      Legal Guardian []      Primary  Care Giver []      Significant Other []        Barriers To Learning:  None [x]      Auditory []      Cultural []      Emotional []      Financial []      Language []    Mental []      Motivation []      Physical []      Reading Skills []      Advent []      Verbal/Cognitive []      Visual []      Written/Cognitive []        Teaching Response:  Verified Via Teach back [x]      Return Demo Via Teach Back []      Reinforcement Needed []      Unable to Return Demo []      Unable to Comprehend []      Declines Teaching  []        Additional Education Topics:  Instructed pt on nutritional and exercise goals.  Reviewed importance of keeping BP log and taking BS pre/post exercise and eating 1 hr prior to exercise.    Follow Up Instruction Comment:

## 2025-06-20 NOTE — PROGRESS NOTES
Chief Complaint  Cardiac Rehab Phase II    Liuz Christianson III presents to [facility] CARDIOPULMONARY REHABILITATION    Physical Exam  Constitutional:       Appearance: Normal appearance.   Cardiovascular:      Rate and Rhythm: Regular rhythm. Tachycardia present.      Pulses: Normal pulses.      Heart sounds: Normal heart sounds.      Comments: Sinus rhythm/sinus tachycardia  Pulmonary:      Effort: Pulmonary effort is normal.      Breath sounds: Normal breath sounds.   Musculoskeletal:      Right lower leg: No edema.      Left lower leg: No edema.   Skin:     General: Skin is warm and dry.   Neurological:      Mental Status: He is alert and oriented to person, place, and time.   Psychiatric:         Mood and Affect: Mood normal.         Behavior: Behavior normal.         Thought Content: Thought content normal.         Judgment: Judgment normal.      Comments: PHQ-9 score 0  [facility]      Result Review :          Assessment and Plan    Diagnoses and all orders for this visit:    1. S/P coronary artery stent placement (Primary)        Kandy Le RN

## 2025-06-23 ENCOUNTER — APPOINTMENT (OUTPATIENT)
Dept: CARDIAC REHAB | Facility: HOSPITAL | Age: 40
End: 2025-06-23
Payer: COMMERCIAL

## 2025-06-23 ENCOUNTER — TREATMENT (OUTPATIENT)
Dept: CARDIAC REHAB | Facility: HOSPITAL | Age: 40
End: 2025-06-23
Payer: COMMERCIAL

## 2025-06-23 DIAGNOSIS — Z95.5 S/P CORONARY ARTERY STENT PLACEMENT: Primary | ICD-10-CM

## 2025-06-23 PROCEDURE — 93798 PHYS/QHP OP CAR RHAB W/ECG: CPT

## 2025-06-25 ENCOUNTER — TREATMENT (OUTPATIENT)
Dept: CARDIAC REHAB | Facility: HOSPITAL | Age: 40
End: 2025-06-25
Payer: COMMERCIAL

## 2025-06-25 ENCOUNTER — APPOINTMENT (OUTPATIENT)
Dept: CARDIAC REHAB | Facility: HOSPITAL | Age: 40
End: 2025-06-25
Payer: COMMERCIAL

## 2025-06-25 DIAGNOSIS — Z95.5 S/P CORONARY ARTERY STENT PLACEMENT: Primary | ICD-10-CM

## 2025-06-25 PROCEDURE — 93798 PHYS/QHP OP CAR RHAB W/ECG: CPT

## 2025-06-27 ENCOUNTER — TREATMENT (OUTPATIENT)
Dept: CARDIAC REHAB | Facility: HOSPITAL | Age: 40
End: 2025-06-27
Payer: COMMERCIAL

## 2025-06-27 ENCOUNTER — APPOINTMENT (OUTPATIENT)
Dept: CARDIAC REHAB | Facility: HOSPITAL | Age: 40
End: 2025-06-27
Payer: COMMERCIAL

## 2025-06-27 DIAGNOSIS — Z95.5 S/P CORONARY ARTERY STENT PLACEMENT: Primary | ICD-10-CM

## 2025-06-27 PROCEDURE — 93798 PHYS/QHP OP CAR RHAB W/ECG: CPT

## 2025-06-30 ENCOUNTER — APPOINTMENT (OUTPATIENT)
Dept: CARDIAC REHAB | Facility: HOSPITAL | Age: 40
End: 2025-06-30
Payer: COMMERCIAL

## 2025-06-30 ENCOUNTER — TREATMENT (OUTPATIENT)
Dept: CARDIAC REHAB | Facility: HOSPITAL | Age: 40
End: 2025-06-30
Payer: COMMERCIAL

## 2025-06-30 DIAGNOSIS — Z95.5 S/P CORONARY ARTERY STENT PLACEMENT: Primary | ICD-10-CM

## 2025-06-30 PROCEDURE — 93798 PHYS/QHP OP CAR RHAB W/ECG: CPT

## 2025-07-02 ENCOUNTER — APPOINTMENT (OUTPATIENT)
Dept: CARDIAC REHAB | Facility: HOSPITAL | Age: 40
End: 2025-07-02
Payer: COMMERCIAL

## 2025-07-07 ENCOUNTER — APPOINTMENT (OUTPATIENT)
Dept: CARDIAC REHAB | Facility: HOSPITAL | Age: 40
End: 2025-07-07
Payer: COMMERCIAL

## 2025-07-07 ENCOUNTER — TREATMENT (OUTPATIENT)
Dept: CARDIAC REHAB | Facility: HOSPITAL | Age: 40
End: 2025-07-07
Payer: COMMERCIAL

## 2025-07-07 DIAGNOSIS — Z95.5 S/P CORONARY ARTERY STENT PLACEMENT: Primary | ICD-10-CM

## 2025-07-07 PROCEDURE — 93798 PHYS/QHP OP CAR RHAB W/ECG: CPT

## 2025-07-08 ENCOUNTER — OFFICE VISIT (OUTPATIENT)
Dept: GASTROENTEROLOGY | Facility: CLINIC | Age: 40
End: 2025-07-08
Payer: COMMERCIAL

## 2025-07-08 VITALS
WEIGHT: 230.8 LBS | HEIGHT: 69 IN | BODY MASS INDEX: 34.18 KG/M2 | OXYGEN SATURATION: 98 % | HEART RATE: 92 BPM | DIASTOLIC BLOOD PRESSURE: 79 MMHG | SYSTOLIC BLOOD PRESSURE: 108 MMHG

## 2025-07-08 DIAGNOSIS — K74.60 CIRRHOSIS OF LIVER WITH ASCITES, UNSPECIFIED HEPATIC CIRRHOSIS TYPE: Primary | ICD-10-CM

## 2025-07-08 DIAGNOSIS — R18.8 CIRRHOSIS OF LIVER WITH ASCITES, UNSPECIFIED HEPATIC CIRRHOSIS TYPE: Primary | ICD-10-CM

## 2025-07-08 DIAGNOSIS — R17 ELEVATED BILIRUBIN: ICD-10-CM

## 2025-07-08 PROCEDURE — 99213 OFFICE O/P EST LOW 20 MIN: CPT

## 2025-07-08 NOTE — PROGRESS NOTES
Chief Complaint     Follow-up (3 mo f/u Elevated liver enzymes)    Patient or patient representative verbalized consent for the use of Ambient Listening during the visit with  ENRIKE Crisostomo for chart documentation. 7/9/2025  10:34 EDT      History of Present Illness     Luiz Christianson III is a 40 y.o. male who presents to [facility] for follow-up of elevated liver enzymes.    History of Present Illness  The patient is a 40-year-old male who presents for elevated bilirubin levels.    He has been diagnosed with heart failure, characterized by a low ejection fraction rate of approximately 18 percent and two blockages in the mid-90s. Following a heart catheterization, two stents were placed. He has been participating in cardiac rehabilitation since then. His current medication regimen includes Lasix 80 mg twice daily, which has effectively managed his fluid retention and swelling. He reports no swelling in his wrist, abdomen, or ankle. He reports good urine output and regular bowel movements, with at least two per day. He reports no sleep disturbances or memory issues and feels much better after completing online cardiac rehab to increase strength and imaging. He reports no presence of blood in his stool, black tarry stool, abdominal pain, nausea, vomiting, heartburn, difficulty swallowing, or unintentional weight loss. He spent about 2 weeks in the hospital on Lasix and underwent paracentesis, which yielded no significant findings.    He has made dietary modifications, which have resulted in weight loss. His diet primarily consists of salads and yogurts in the morning, followed by a substantial meal in the evening. He reports no yellowing of the skin or eyes. He also reports no right upper quadrant pain or itching. He has noticed a dark band under his nail bed but is unsure of its cause. He still carries scars from 04/2025 but reports no excessive bruising or  bleeding.    SOCIAL HISTORY  Exercise: Participates in online cardiac rehab to increase strength.  Diet: Consumes a lot of salads and yogurts in the morning and a decent meal in the evening.  Alcohol: Does not drink alcohol.  Tobacco: Does not smoke cigarettes.    6/2/2025 MRI abdomen with without contrast MRCP - Morphologic changes of the liver concerning for early/mild chronic liver disease. Mild upper abdominal varices and trace ascites suggest sequela of portal hypertension. No evidence of hepatocellular carcinoma. Unremarkable cholangiogram. Cardiomegaly with small pericardial effusion without significant change from recent CT comparison. Multiple small and hemorrhagic proteinemia's renal cyst noted.     4/8/2025 Last office visit: The patient is a 39-year-old male who presents for evaluation of elevated liver enzymes.     He was recently evaluated in the emergency room on 04/04/2025 due to generalized abdominal pain, which he describes as intermittent and localized to the right upper quadrant, lower abdomen, and lower back. The most severe discomfort is experienced in the flanks and back, which he attributes to a sensation of accumulated pressure. He reports no alcohol consumption outside of work functions, with the last instance being in 11/2024. He does not use intravenous drugs or have unprofessional tattoos. He has no history of hepatitis exposure or blood transfusions. He does not use herbal medications and only takes prescribed medications. His pain fluctuates, with periods of no pain followed by dull or acute pain, currently experiencing no pain. He reports no itching but notes increased abdominal swelling, particularly at night, which disrupts his sleep. He has noticed increased bruising and bleeding, which he attributes to his recent initiation of Eliquis. His bowel movements occur 2 to 3 times daily, often coinciding with urination. He reports no cognitive issues such as disorientation or memory  loss. He has been informed of a fatty liver diagnosis by his general practitioner in the past. He has not undergone any upper or lower endoscopy procedures. He has been under significant stress due to work and health concerns. He reports no black, tarry stools but notes occasional bright red blood in his stool when it is dry and hard. He reports no heartburn but admits to weight gain, from 265 pounds in 02/2025 to nearly 290 pounds currently. He has been experiencing nausea and vomiting, particularly after consuming processed foods, and has requested his wife to avoid these. He has been experiencing dizziness, which he believes is a side effect of his new medications. He uses acetaminophen for pain management but has not used it recently due to concerns about potential side effects. He has not been prescribed any gastrointestinal medications. He is currently on Lasix and Aldactone and has been avoiding salt in his diet.     SOCIAL HISTORY  He rarely drinks alcohol, only at work functions, with the last one being in November. He does not use illicit drugs.     FAMILY HISTORY  He reports no family history of liver disease, colon cancer, or colon polyps.     MEDICATIONS  Current: Eliquis, metoprolol, losartan, Lasix, Aldactone       History      Past Medical History:   Diagnosis Date    Atrial fibrillation 4/11    Chronic diastolic congestive heart failure 11/18/2020    Diabetes mellitus, type 2 11/18/2020    Diastolic CHF, chronic  11/18/2020    Emotional depression     Erectile dysfunction     Essential hypertension 11/18/2020    Fatty liver     My GP thinks I have this but no official diagnosis was given    Finger numbness 02/24/2021    Heart disease     Hyperlipidemia     Left wrist pain 02/24/2021    Migraine 12/07/2020     Past Surgical History:   Procedure Laterality Date    CARDIAC CATHETERIZATION N/A 04/14/2025    Procedure: Right and Left Heart Cath/possible PCI;  Surgeon: Aidan Polanco MD;  Location:   [facility] CATH INVASIVE LOCATION;  Service: Cardiovascular;  Laterality: N/A;    CARDIAC CATHETERIZATION  04/21/2025    Procedure: Right and Left Heart Cath;  Surgeon: Aidan Betancourt MD;  Location: [facility] CATH INVASIVE LOCATION;  Service: Cardiovascular;;    CARDIAC CATHETERIZATION N/A 04/21/2025    Procedure: Percutaneous Coronary Intervention;  Surgeon: Aidan Betancourt MD;  Location: [facility] CATH INVASIVE LOCATION;  Service: Cardiovascular;  Laterality: N/A;  LCX, possible LAD    CARDIAC CATHETERIZATION N/A 04/21/2025    Procedure: Stent BAN coronary;  Surgeon: Aidan Betancourt MD;  Location: [facility] CATH INVASIVE LOCATION;  Service: Cardiovascular;  Laterality: N/A;    CARDIAC CATHETERIZATION N/A 04/21/2025    Procedure: Coronary angiography;  Surgeon: Aidan Betancourt MD;  Location: [facility] CATH INVASIVE LOCATION;  Service: Cardiovascular;  Laterality: N/A;    CARDIAC CATHETERIZATION N/A 04/21/2025    Procedure: Chronic Total Occlussion;  Surgeon: Aidan Betancourt MD;  Location: [facility] CATH INVASIVE LOCATION;  Service: Cardiovascular;  Laterality: N/A;    CORONARY STENT PLACEMENT  4/21/2025    INTRAVASCULAR ULTRASOUND N/A 04/21/2025    Procedure: Intravascular Ultrasound;  Surgeon: Aidan Betancourt MD;  Location: [facility] CATH INVASIVE LOCATION;  Service: Cardiovascular;  Laterality: N/A;     Family History   Problem Relation Age of Onset    Diabetes Mother     Diabetes Father     Heart disease Other         AUNT OR UNCLE    Diabetes Other     Diabetes Maternal Grandmother         Had diabetes which led to amputation of 1 leg.    Heart disease Maternal Grandmother         Talking with my uncle leads us both to believe she had heart issues causing fluid retention. They removed some via tubes but lasix weren't available at the time of her illnesses.    Cancer Maternal Uncle         Stomach, intestines    Diabetes Brother         Type 2    Colon cancer Neg Hx         Current Medications       Current Outpatient Medications:     aspirin 81 MG EC tablet,  "Take 1 tablet by mouth Daily for 360 days., Disp: 90 tablet, Rfl: 3    atorvastatin (LIPITOR) 80 MG tablet, Take 1 tablet by mouth Daily., Disp: 90 tablet, Rfl: 3    empagliflozin (Jardiance) 10 MG tablet tablet, Take 1 tablet by mouth Daily., Disp: 30 tablet, Rfl: 2    furosemide (LASIX) 80 MG tablet, Take 1 tablet by mouth 2 (Two) Times a Day for 360 days., Disp: 180 tablet, Rfl: 3    Januvia 100 MG tablet, TAKE 1 TABLET DAILY, Disp: 90 tablet, Rfl: 3    lisinopril (PRINIVIL,ZESTRIL) 2.5 MG tablet, Take 1 tablet by mouth Daily., Disp: 90 tablet, Rfl: 3    metFORMIN (GLUCOPHAGE) 500 MG tablet, TAKE 2 TABLETS 2 TIMES     DAILY WITH MEALS (Patient taking differently: Take 2 tablets by mouth 2 (Two) Times a Day With Meals.), Disp: 360 tablet, Rfl: 3    metoprolol succinate XL (TOPROL-XL) 25 MG 24 hr tablet, Take 1 tablet by mouth Every Night., Disp: 90 tablet, Rfl: 3    potassium chloride (KLOR-CON M20) 20 MEQ CR tablet, Take 2 tablets by mouth 3 (Three) Times a Day With Meals for 360 days., Disp: 540 tablet, Rfl: 3     Allergies     Allergies   Allergen Reactions    Farxiga [Dapagliflozin] Other (See Comments)     Bladder irritation (persistant)       Social History       Social History     Social History Narrative    Not on file         Objective       /79 (BP Location: Left arm, Patient Position: Sitting, Cuff Size: Adult)   Pulse 92   Ht 175.3 cm (69\")   Wt 105 kg (230 lb 12.8 oz)   SpO2 98%   BMI 34.08 kg/m²       Physical Exam  HENT:      Head: Normocephalic.   Eyes:      General: No scleral icterus.  Cardiovascular:      Rate and Rhythm: Normal rate.   Pulmonary:      Effort: Pulmonary effort is normal.   Musculoskeletal:         General: Normal range of motion.   Skin:     Coloration: Skin is not jaundiced.   Neurological:      General: No focal deficit present.      Mental Status: He is alert.   Psychiatric:         Mood and Affect: Mood normal.               Results       Result Review :    The " following data was reviewed by: ENRIKE Crisostomo on 07/08/2025:    Lab Results - Last 18 Months   Lab Units 06/02/25  0929 05/08/25  1021 04/29/25  1222 04/23/25  0317   WBC 10*3/mm3  --  9.04 8.98 7.47   HEMOGLOBIN g/dL  --  14.7 15.3 14.6   MCV fL  --  89.6 91.3 88.3   PLATELETS 10*3/mm3 278 235 284 218         Lab Results - Last 18 Months   Lab Units 06/10/25  1329 05/27/25  0822 05/08/25  1021   BUN mg/dL 17.0 16.0 14   CREATININE mg/dL 0.70* 0.70* 0.80   SODIUM mmol/L 137 140 139   POTASSIUM mmol/L 4.3 4.2 4.3   CHLORIDE mmol/L 98 102 102   CO2 mmol/L 25.1 26.2 23.7   GLUCOSE mg/dL 144* 115* 130*      Lab Results - Last 18 Months   Lab Units 06/02/25  0929 04/08/25  1146 04/04/25  1417   PROTIME Seconds 16.0* 28.9* 30.7*   INR  1.22* 2.56* 2.77*   APTT seconds 28.3  --   --      Lab Results - Last 18 Months   Lab Units 05/27/25  0822 05/08/25  1021 04/29/25  1222 04/12/25  2227 04/08/25  1146 04/04/25  1131 02/12/25  1349   AST (SGOT) U/L 22 27 41*   < > 249*   < > 28   ALT (SGPT) U/L 15 21 55*   < > 313*   < > 44*   ALK PHOS U/L 137* 102 87   < > 73   < > 67   BILIRUBIN mg/dL 1.7* 1.7* 1.9*   < > 1.6*   < > 0.9   BILIRUBIN DIRECT mg/dL 0.7*  --   --   --  0.6*  --  0.3   TOTAL PROTEIN g/dL 8.2 7.6 7.5   < > 7.2  7.0   < > 7.2   ALBUMIN g/dL 4.2 4.0 3.8   < > 3.3*  3.4   < > 4.2    < > = values in this interval not displayed.      Lab Results - Last 18 Months   Lab Units 04/08/25  1146   IRON mcg/dL 41*   TRANSFERRIN mg/dL 358   TIBC mcg/dL 533   FERRITIN ng/mL 135.00     Lab Results - Last 18 Months   Lab Units 04/08/25  1146 04/04/25  1510   HEP A IGM  Non-Reactive Non-Reactive       MRI abdomen w wo contrast mrcp  Result Date: 6/4/2025  Impression: 1. Morphologic changes of the liver concerning for early/mild chronic liver disease. Mild upper abdominal varices and trace ascites suggest sequelae of portal hypertension. No splenomegaly. 2. No MRI evidence of hepatocellular carcinoma. 3.  Unremarkable cholangiogram. 4. Cardiomegaly with small pericardial effusion without significant change from recent CT comparison. 5. Multiple simple and hemorrhagic/proteinaceous renal cysts. 6. Other ancillary findings as above. Electronically Signed: Colin Gray MD  6/4/2025 2:04 PM EDT  Workstation ID: [id number]    US Paracentesis  Result Date: 6/2/2025  Impression: No ascitic fluid amenable to drainage Report dictated by: Sunni Matute  I have personally reviewed this case and agree with the findings above: Electronically Signed: Douglas Rogers MD  6/2/2025 12:32 PM EDT  Workstation ID: [id number]    XR Chest 1 View  Result Date: 5/8/2025  Impression: Stable chest without acute cardiopulmonary process. Electronically Signed: La Benavides MD  5/8/2025 11:05 AM EDT  Workstation ID: [id number]    CT Angiogram Chest Pulmonary Embolism  Result Date: 4/13/2025  1.No pulmonary embolism is identified. 2.Cardiomegaly. Small pericardial effusion. 3.Mild groundglass changes in the lungs could be the result of hypoventilatory change and/or mild edema. 4.Anasarca. 5.Small amount of ascites. Electronically Signed: Jasen Adams MD  4/13/2025 1:18 AM EDT  Workstation ID: [id number]    XR Chest 1 View  Result Date: 4/12/2025  No acute cardiopulmonary findings.  4/12/2025 10:37 PM by Dr. Jasen Adams MD on Workstation: [email or web address]      CT Abdomen Pelvis With Contrast  Result Date: 4/4/2025  1.Heterogeneous appearance of the liver with suggestion of a subtle nodular contour. Findings suggest underlying cirrhosis. Please correlate with liver function tests. 2.Small to moderate amount of ascites. 3.Diffuse wall thickening of the urinary bladder. Please correlate with urinalysis. 4.Other incidental findings as above. Electronically Signed: Jose Quiñonez MD  4/4/2025 12:39 PM EDT  Workstation ID: [id number]      Results  Labs   - Direct bilirubin: Elevated   - Creatinine: 0.7   - Bilirubin: 1.7   - INR: 1.22   - Sodium: 137   -  Albumin: 4.2   - Ammonia levels: Normal    Imaging   - MRI of the abdomen with MRCP: Unremarkable cholangiogram, morphologic changes of the liver concerning for early/mild chronic liver disease, mild upper abdominal varices and trace ascites suggest sequela caused by portal hypertension             Assessment and Plan              Diagnoses and all orders for this visit:    1. Cirrhosis of liver with ascites, unspecified hepatic cirrhosis type (Primary)  -     Ambulatory Referral to Gastroenterology    2. Elevated bilirubin  -     Ambulatory Referral to Gastroenterology        Assessment & Plan  1. Elevated bilirubin levels:  - Referral to hepatology for further investigation into the cause of elevated bilirubin levels.  - Maintain a low sodium diet (<2 g/day) and a heart-healthy diet.  - Regular imaging and lab work every 6 months.  - Provide information on esophageal varices.  - Consume a high-protein snack at night to prevent potential hypoglycemia.  - Monitor for changes in condition; initiate lactulose and Xifaxan if necessary.    2. Cirrhosis:  - MRI of the abdomen indicated morphologic changes of the liver concerning for early/mild chronic liver disease, mild upper abdominal varices, and trace ascites suggestive of portal hypertension.  - Lasix 80 mg twice a day to manage fluid retention.  - Recommend upper endoscopy (EGD) to check for varices and determine if banding is necessary.  - Prescribe carvedilol if varices are found to decrease portal hypertension.  - Regular imaging and lab work every 6 months to monitor for liver cancer and calculate Child-Blanchard and MELD scores.    Follow-up: 12/2025.        * Surgery not found *    Follow Up     Follow Up   No follow-ups on file.    Patient should follow a heart healthy lifestyle related to cirrhosis.    Restrict dietary sodium to less than than 2 g/day as this should decrease ascites development.   If smoking, stop smoking. Abstain from alcohol.    If patient  experiences increasing extremity or abdominal swelling notify office to determine the need for diuretics.   Goal of 2-3 bowel movements per day, if not please contact office to start Lactulose.   Ordered labs to get current MELD scores for  liver transplant screening.   Every 6 months imaging of the liver required to screen for hepatocellular carcinoma.  Colon cancer screening - up to date  EGD to screen for esophageal varices - to be discussed at next appointment    Patient was given instructions and counseling regarding his condition or for health maintenance advice. Please see specific information pulled into the AVS if appropriate.

## 2025-07-08 NOTE — PATIENT INSTRUCTIONS
Patient Instructions   Avoid anti-inflammatories - aleve, ibuprofen, aspirin.   Sodium less than 2000mg/day.   Tylenol OK up to 2000mg/day.   Avoid alcohol completely.   Continue all medications as prescribed.   Nutrition is very important. High protein night time snacks.

## 2025-07-09 ENCOUNTER — APPOINTMENT (OUTPATIENT)
Dept: CARDIAC REHAB | Facility: HOSPITAL | Age: 40
End: 2025-07-09
Payer: COMMERCIAL

## 2025-07-09 ENCOUNTER — TREATMENT (OUTPATIENT)
Dept: CARDIAC REHAB | Facility: HOSPITAL | Age: 40
End: 2025-07-09
Payer: COMMERCIAL

## 2025-07-09 DIAGNOSIS — Z95.5 S/P CORONARY ARTERY STENT PLACEMENT: Primary | ICD-10-CM

## 2025-07-09 PROCEDURE — 93798 PHYS/QHP OP CAR RHAB W/ECG: CPT

## 2025-07-11 ENCOUNTER — TREATMENT (OUTPATIENT)
Dept: CARDIAC REHAB | Facility: HOSPITAL | Age: 40
End: 2025-07-11
Payer: COMMERCIAL

## 2025-07-11 ENCOUNTER — APPOINTMENT (OUTPATIENT)
Dept: CARDIAC REHAB | Facility: HOSPITAL | Age: 40
End: 2025-07-11
Payer: COMMERCIAL

## 2025-07-11 DIAGNOSIS — Z95.5 S/P CORONARY ARTERY STENT PLACEMENT: Primary | ICD-10-CM

## 2025-07-11 PROCEDURE — 93798 PHYS/QHP OP CAR RHAB W/ECG: CPT

## 2025-07-14 ENCOUNTER — APPOINTMENT (OUTPATIENT)
Dept: CARDIAC REHAB | Facility: HOSPITAL | Age: 40
End: 2025-07-14
Payer: COMMERCIAL

## 2025-07-14 ENCOUNTER — TREATMENT (OUTPATIENT)
Dept: CARDIAC REHAB | Facility: HOSPITAL | Age: 40
End: 2025-07-14
Payer: COMMERCIAL

## 2025-07-14 DIAGNOSIS — Z95.5 S/P CORONARY ARTERY STENT PLACEMENT: Primary | ICD-10-CM

## 2025-07-14 PROCEDURE — 93798 PHYS/QHP OP CAR RHAB W/ECG: CPT

## 2025-07-16 ENCOUNTER — TREATMENT (OUTPATIENT)
Dept: CARDIAC REHAB | Facility: HOSPITAL | Age: 40
End: 2025-07-16
Payer: COMMERCIAL

## 2025-07-16 ENCOUNTER — APPOINTMENT (OUTPATIENT)
Dept: CARDIAC REHAB | Facility: HOSPITAL | Age: 40
End: 2025-07-16
Payer: COMMERCIAL

## 2025-07-16 DIAGNOSIS — Z95.5 S/P CORONARY ARTERY STENT PLACEMENT: Primary | ICD-10-CM

## 2025-07-16 PROCEDURE — 93798 PHYS/QHP OP CAR RHAB W/ECG: CPT

## 2025-07-18 ENCOUNTER — APPOINTMENT (OUTPATIENT)
Dept: CARDIAC REHAB | Facility: HOSPITAL | Age: 40
End: 2025-07-18
Payer: COMMERCIAL

## 2025-07-18 ENCOUNTER — TREATMENT (OUTPATIENT)
Dept: CARDIAC REHAB | Facility: HOSPITAL | Age: 40
End: 2025-07-18
Payer: COMMERCIAL

## 2025-07-18 DIAGNOSIS — Z95.5 S/P CORONARY ARTERY STENT PLACEMENT: Primary | ICD-10-CM

## 2025-07-18 PROCEDURE — 93798 PHYS/QHP OP CAR RHAB W/ECG: CPT

## 2025-07-21 ENCOUNTER — TREATMENT (OUTPATIENT)
Dept: CARDIAC REHAB | Facility: HOSPITAL | Age: 40
End: 2025-07-21
Payer: COMMERCIAL

## 2025-07-21 ENCOUNTER — TELEPHONE (OUTPATIENT)
Dept: CARDIAC REHAB | Facility: HOSPITAL | Age: 40
End: 2025-07-21

## 2025-07-21 ENCOUNTER — TELEPHONE (OUTPATIENT)
Dept: CARDIOLOGY | Age: 40
End: 2025-07-21
Payer: COMMERCIAL

## 2025-07-21 ENCOUNTER — APPOINTMENT (OUTPATIENT)
Dept: CARDIAC REHAB | Facility: HOSPITAL | Age: 40
End: 2025-07-21
Payer: COMMERCIAL

## 2025-07-21 DIAGNOSIS — Z95.5 S/P CORONARY ARTERY STENT PLACEMENT: Primary | ICD-10-CM

## 2025-07-21 NOTE — TELEPHONE ENCOUNTER
Left voicemail for Luiz Christianson III requesting callback.    HUB: please transfer to Triage if patient returns call    Thank you,  Qi DILLARD RN  Triage Nurse [facility]  07/21/25   14:05 EDT

## 2025-07-21 NOTE — TELEPHONE ENCOUNTER
Patient came into rehab reporting chest pain and palpitations over the weekend. Called Dr. Betancourt's office. See notes in patient session report 7/21/25

## 2025-07-21 NOTE — TELEPHONE ENCOUNTER
Reviewed recommendations with Luiz Christianson III and he is agreeable to f/u appointment.  Declined appointment on 7/22 due to schedule conflicts but is agreeable to see Dr. Saldana in [facility] office on Wednesday 7/23 at 1030 am and has been scheduled.  Provided office address and suite number and he verbalized understanding.    Thank you,  Qi DILLARD RN  Triage Nurse [facility]  07/21/25 14:17 EDT

## 2025-07-21 NOTE — TELEPHONE ENCOUNTER
"Bryon ([facility], P: [phone], F: [phone]) called regarding Luiz Christianson III.  Patient is currently in facility for rehab session and reported episode of chest pain Friday.  Patient placed on call.    Friday evening, approximately 2.5 hours after rehab session he had a brief episode of chest pain in his upper chest.  Pain was described as sharp and lasted only a few seconds.  He also had some tingling in his left arm after.  Since the episode of pain, he has continued to have some \"soreness\" in his chest still.  He does experience some tingling/numbness in his left arm when he lays in bed and puts pressure on the arm/elbow.      Patient also reports one episode of palpitations on Saturday.  He reports he had palpitations commonly before his stent was placed, but this was the first time since stent placement.  He felt a \"couple of flutters\" but denies any shortness of breath, pain or dizziness during episode.  No recurrent palpitations since.    He denies generally any shortness of breath and reports lower extremity swelling is at baseline.    /60,   Weight: 222.8 lbs    Requested staff place patient on tele and Bryon reports rhythm is sinus tach at rate of 109.    LOV: 6/10/2025 with Dr. Saldana  Next OV: 9/16/2025 with Dr. Saldana    Patient expressed concern about the episode of chest pain and palpitations.  He is asking if he needs to be seen in office?  Additionally, cardiac rehab will need a clearance note from Dr. Saldana stating that he is ok to resume/return to cardiac rehab given episode of chest pain/palpitations over the weekend.    Please let me know how you would like to proceed.    Thank you,  Qi DILLARD RN  Triage Nurse [facility]  07/21/25  11:09 EDT          "

## 2025-07-21 NOTE — TELEPHONE ENCOUNTER
Caller: Luiz Christianson III    Relationship to patient: Self    Best call back number: [phone]    Patient is needing: PATIENT WAS RETURNING A CALL FROM [facility] AND WAS WARM TRANSFERRED TO PRACTICE.

## 2025-07-21 NOTE — PROGRESS NOTES
Patient came into cardiac rehab reporting he had chest pain last Friday night after rehab and palpitations again on Saturday. He was not having chest pain today but wanted to consult with his Dr. We called Dr. Betancourt's office. Patient talked with Qi (triage). /60,  sinus tach. Patient was asymptomatic of chest pain and no SOB.  Qi was going to leave a note for Dr. Betancourt to review. Discussed getting a release to return to cardiac rehab with the patient and Qi (triage nurse).

## 2025-07-23 ENCOUNTER — APPOINTMENT (OUTPATIENT)
Dept: CARDIAC REHAB | Facility: HOSPITAL | Age: 40
End: 2025-07-23
Payer: COMMERCIAL

## 2025-07-23 ENCOUNTER — OFFICE VISIT (OUTPATIENT)
Dept: CARDIOLOGY | Facility: CLINIC | Age: 40
End: 2025-07-23
Payer: COMMERCIAL

## 2025-07-23 VITALS
SYSTOLIC BLOOD PRESSURE: 122 MMHG | WEIGHT: 230.8 LBS | HEART RATE: 102 BPM | BODY MASS INDEX: 34.18 KG/M2 | DIASTOLIC BLOOD PRESSURE: 86 MMHG | HEIGHT: 69 IN | OXYGEN SATURATION: 97 %

## 2025-07-23 DIAGNOSIS — R07.89 ATYPICAL CHEST PAIN: ICD-10-CM

## 2025-07-23 DIAGNOSIS — I25.10 CORONARY ARTERY DISEASE INVOLVING NATIVE CORONARY ARTERY OF NATIVE HEART WITHOUT ANGINA PECTORIS: ICD-10-CM

## 2025-07-23 DIAGNOSIS — I50.22 CHRONIC HFREF (HEART FAILURE WITH REDUCED EJECTION FRACTION): Primary | ICD-10-CM

## 2025-07-23 PROCEDURE — 99214 OFFICE O/P EST MOD 30 MIN: CPT | Performed by: STUDENT IN AN ORGANIZED HEALTH CARE EDUCATION/TRAINING PROGRAM

## 2025-07-23 RX ORDER — LISINOPRIL 5 MG/1
5 TABLET ORAL DAILY
Qty: 90 TABLET | Refills: 3 | Status: SHIPPED | OUTPATIENT
Start: 2025-07-23

## 2025-07-23 RX ORDER — METOPROLOL SUCCINATE 50 MG/1
50 TABLET, EXTENDED RELEASE ORAL DAILY
Qty: 90 TABLET | Refills: 3 | Status: SHIPPED | OUTPATIENT
Start: 2025-07-23

## 2025-07-23 NOTE — PROGRESS NOTES
Subjective:     Encounter Date:07/23/2025      Patient ID: Luiz Christianson III is a 40 y.o. male.    Chief Complaint:  F/u HFrEF, CAD    HPI:   40 y.o. male, previously followed by Dr. Muir, with hypertension, hyperlipidemia, diabetes, HFrEF with an EF of 18%, CAD status post PCI to LAD and circumflex who presents for follow-up.  I last saw the patient about a month and a half ago he was doing well at the time.  Unfortunately this past weekend while at cardiac rehab he had some post exercise chest discomfort with lingering soreness.  He notes that he did both cardio and weightlifting while at rehab and during the exercise felt well.  He also had some intermittent palpitations since then.  He denies any other associated symptoms.      Per prior note:  Patient was recently admitted to [facility] with heart failure exacerbation. While there he underwent right left heart catheterization revealed a circumflex  as well as a 90% stenosis of the LAD.  He was transferred to our hospital for evaluation by CT surgery but was deemed to be a poor surgical candidate.  He underwent aggressive diuresis, with a 40 pound weight loss.  He then underwent PCI to the LAD as well as to the circumflex .  Given low blood pressures, he was started on Toprol for GDMT for his HFrEF.     The following portions of the patient's history were reviewed and updated as appropriate: allergies, current medications, past family history, past medical history, past social history, past surgical history and problem list.     REVIEW OF SYSTEMS:   All systems reviewed.  Pertinent positives identified in HPI.  All other systems are negative.    Past Medical History:   Diagnosis Date    Atrial fibrillation 4/11    Chronic diastolic congestive heart failure 11/18/2020    Diabetes mellitus, type 2 11/18/2020    Diastolic CHF, chronic  11/18/2020    Emotional depression     Erectile dysfunction     Essential hypertension 11/18/2020    Fatty liver      My GP thinks I have this but no official diagnosis was given    Finger numbness 02/24/2021    Heart disease     Hyperlipidemia     Left wrist pain 02/24/2021    Migraine 12/07/2020       Family History   Problem Relation Age of Onset    Diabetes Mother     Diabetes Father     Heart disease Other         AUNT OR UNCLE    Diabetes Other     Diabetes Maternal Grandmother         Had diabetes which led to amputation of 1 leg.    Heart disease Maternal Grandmother         Talking with my uncle leads us both to believe she had heart issues causing fluid retention. They removed some via tubes but lasix weren't available at the time of her illnesses.    Cancer Maternal Uncle         Stomach, intestines    Diabetes Brother         Type 2    Colon cancer Neg Hx        Social History     Socioeconomic History    Marital status:     Number of children: 2   Tobacco Use    Smoking status: Never     Passive exposure: Past    Smokeless tobacco: Never   Vaping Use    Vaping status: Never Used   Substance and Sexual Activity    Alcohol use: Not Currently    Drug use: Never    Sexual activity: Yes     Partners: Female     Birth control/protection: Injection       Allergies   Allergen Reactions    Farxiga [Dapagliflozin] Other (See Comments)     Bladder irritation (persistant)       Past Surgical History:   Procedure Laterality Date    CARDIAC CATHETERIZATION N/A 04/14/2025    Procedure: Right and Left Heart Cath/possible PCI;  Surgeon: Aidan Polanco MD;  Location: [facility] CATH INVASIVE LOCATION;  Service: Cardiovascular;  Laterality: N/A;    CARDIAC CATHETERIZATION  04/21/2025    Procedure: Right and Left Heart Cath;  Surgeon: Aidan Betancourt MD;  Location:  [facility] CATH INVASIVE LOCATION;  Service: Cardiovascular;;    CARDIAC CATHETERIZATION N/A 04/21/2025    Procedure: Percutaneous Coronary Intervention;  Surgeon: Aidan Betancourt MD;  Location:  [facility] CATH INVASIVE LOCATION;  Service: Cardiovascular;  Laterality: N/A;  LCX,  possible LAD    CARDIAC CATHETERIZATION N/A 04/21/2025    Procedure: Stent BAN coronary;  Surgeon: Aidan Betancourt MD;  Location:  [facility] CATH INVASIVE LOCATION;  Service: Cardiovascular;  Laterality: N/A;    CARDIAC CATHETERIZATION N/A 04/21/2025    Procedure: Coronary angiography;  Surgeon: Aidan Betancourt MD;  Location:  [facility] CATH INVASIVE LOCATION;  Service: Cardiovascular;  Laterality: N/A;    CARDIAC CATHETERIZATION N/A 04/21/2025    Procedure: Chronic Total Occlussion;  Surgeon: Aidan Betancourt MD;  Location:  [facility] CATH INVASIVE LOCATION;  Service: Cardiovascular;  Laterality: N/A;    CORONARY STENT PLACEMENT  4/21/2025    INTRAVASCULAR ULTRASOUND N/A 04/21/2025    Procedure: Intravascular Ultrasound;  Surgeon: Aidan Betancourt MD;  Location:  [facility] CATH INVASIVE LOCATION;  Service: Cardiovascular;  Laterality: N/A;       Procedures       Objective:         Vitals:    07/23/25 1032   BP: 122/86   Pulse: 102   SpO2: 97%       PHYSICAL EXAM:  GEN: well appearing, in NAD   HEENT: NCAT, EOMI, moist mucus membranes   Respiratory: CTAB, no wheezes, rales or rhonchi  CV: normal rate, regular rhythm, normal S1, S2, no murmurs, rubs, gallops, +2 radial pulses b/l  GI: soft, nontender, nondistended  MSK: no edema  Skin: no rash, warm, dry  Heme/Lymph: no bruising or bleeding  Neuro: Alert and Oriented x 3, grossly normal motor function        Assessment:         (I50.22) Chronic HFrEF (heart failure with reduced ejection fraction)    (I25.10) Coronary artery disease involving native coronary artery of native heart without angina pectoris    (R07.89) Atypical chest pain    40 y.o. male, previously followed by Dr. Muir, with hypertension, hyperlipidemia, diabetes, HFrEF with an EF of 18%, CAD status post PCI to LAD and circumflex who presents for follow-up.       Plan:       #HFrEF, stage C, NYHA class I  EF of 18% on recent echocardiogram.  Status post revascularization to LAD and dominant circumflex.  We discussed uptitration of  GDMT with recheck of his ejection fraction 3 months after he is on maximally tolerated therapy in order to assess for need for ICD.  - Increase Toprol to 50 mg daily, increase lisinopril back to 5 mg daily given adequate BP on today's visit.  He will check his blood pressure about 3 times a week and let me know in a couple weeks what it has been ranging and we will decide on up titration at that time.  - Continue Jardiance 10 mg daily  - Echocardiogram in 3 months    #CAD  Successful IVUS guided PCI to distal circumflex  with a 3.0 x 23 mm Xience Skypoint drug-eluting stent (postdilated throughout with a 5.0 mm NC) and PCI to severe mid LAD stenosis with a 2.5 x 18 mm Xience Skypoint drug-eluting stent (postdilated throughout with a 3.5 mm NC to high-pressure).  His chest pain does not appear to be cardiac in origin, possibly secondary to muscle soreness from his workout.  Continue to monitor this but for now no further workup.  - Continue aspirin 81 mg daily, Plavix 75 mg daily, atorvastatin 80 mg daily    ENRIKE Carlin, thank you very much for referring this kind patient to me. Please call me with any questions or concerns. I will see the patient again in the office in 3 months or earlier as needed.         Aidan Saldana MD, [facility], [facility]  07/23/25  [facility]    Outpatient Encounter Medications as of 7/23/2025   Medication Sig Dispense Refill    aspirin 81 MG EC tablet Take 1 tablet by mouth Daily for 360 days. 90 tablet 3    atorvastatin (LIPITOR) 80 MG tablet Take 1 tablet by mouth Daily. 90 tablet 3    empagliflozin (Jardiance) 10 MG tablet tablet Take 1 tablet by mouth Daily. 30 tablet 2    furosemide (LASIX) 80 MG tablet Take 1 tablet by mouth 2 (Two) Times a Day for 360 days. 180 tablet 3    Januvia 100 MG tablet TAKE 1 TABLET DAILY 90 tablet 3    lisinopril (PRINIVIL,ZESTRIL) 5 MG tablet Take 1 tablet by mouth Daily. 90 tablet 3    metFORMIN (GLUCOPHAGE) 500 MG tablet TAKE 2 TABLETS  2 TIMES     DAILY WITH MEALS (Patient taking differently: Take 2 tablets by mouth 2 (Two) Times a Day With Meals.) 360 tablet 3    metoprolol succinate XL (TOPROL-XL) 50 MG 24 hr tablet Take 1 tablet by mouth Daily. 90 tablet 3    potassium chloride (KLOR-CON M20) 20 MEQ CR tablet Take 2 tablets by mouth 3 (Three) Times a Day With Meals for 360 days. 540 tablet 3    [DISCONTINUED] lisinopril (PRINIVIL,ZESTRIL) 2.5 MG tablet Take 1 tablet by mouth Daily. 90 tablet 3    [DISCONTINUED] metoprolol succinate XL (TOPROL-XL) 25 MG 24 hr tablet Take 1 tablet by mouth Every Night. 90 tablet 3     No facility-administered encounter medications on file as of 7/23/2025.

## 2025-07-24 RX ORDER — BISOPROLOL FUMARATE 10 MG/1
10 TABLET, FILM COATED ORAL DAILY
Qty: 90 TABLET | Refills: 3 | OUTPATIENT
Start: 2025-07-24

## 2025-07-24 RX ORDER — LISINOPRIL 10 MG/1
10 TABLET ORAL DAILY
Qty: 90 TABLET | Refills: 3 | OUTPATIENT
Start: 2025-07-24

## 2025-07-25 ENCOUNTER — APPOINTMENT (OUTPATIENT)
Dept: CARDIAC REHAB | Facility: HOSPITAL | Age: 40
End: 2025-07-25
Payer: COMMERCIAL

## 2025-07-28 ENCOUNTER — APPOINTMENT (OUTPATIENT)
Dept: CARDIAC REHAB | Facility: HOSPITAL | Age: 40
End: 2025-07-28
Payer: COMMERCIAL

## 2025-07-28 NOTE — TELEPHONE ENCOUNTER
Pt called in to triage this morning.  Pt states that he saw you on 7/23, and that you verbally told him that he was ok to continue cardiac rehab.  He states that cardiac rehab requires a clearance letter from you prior to letting pt continue.  The person who originally called from there was Bryon, and here is the contact info:    [facility], P: [phone], F: [phone]     Thanks so much,  Keke Quinn, RN  Triage RN  07/28/25 10:57 EDT

## 2025-08-04 ENCOUNTER — TREATMENT (OUTPATIENT)
Dept: CARDIAC REHAB | Facility: HOSPITAL | Age: 40
End: 2025-08-04
Payer: COMMERCIAL

## 2025-08-04 DIAGNOSIS — E11.59 TYPE 2 DIABETES MELLITUS WITH OTHER CIRCULATORY COMPLICATION, WITHOUT LONG-TERM CURRENT USE OF INSULIN: ICD-10-CM

## 2025-08-04 DIAGNOSIS — R80.9 MICROALBUMINURIA: ICD-10-CM

## 2025-08-04 DIAGNOSIS — Z95.5 S/P CORONARY ARTERY STENT PLACEMENT: Primary | ICD-10-CM

## 2025-08-04 PROCEDURE — 93798 PHYS/QHP OP CAR RHAB W/ECG: CPT

## 2025-08-04 RX ORDER — ATORVASTATIN CALCIUM 80 MG/1
80 TABLET, FILM COATED ORAL DAILY
Qty: 90 TABLET | Refills: 3 | Status: SHIPPED | OUTPATIENT
Start: 2025-08-04

## 2025-08-06 ENCOUNTER — TREATMENT (OUTPATIENT)
Dept: CARDIAC REHAB | Facility: HOSPITAL | Age: 40
End: 2025-08-06
Payer: COMMERCIAL

## 2025-08-06 DIAGNOSIS — Z95.5 S/P CORONARY ARTERY STENT PLACEMENT: Primary | ICD-10-CM

## 2025-08-06 PROCEDURE — 93798 PHYS/QHP OP CAR RHAB W/ECG: CPT

## 2025-08-08 ENCOUNTER — TREATMENT (OUTPATIENT)
Dept: CARDIAC REHAB | Facility: HOSPITAL | Age: 40
End: 2025-08-08
Payer: COMMERCIAL

## 2025-08-08 DIAGNOSIS — Z95.5 S/P CORONARY ARTERY STENT PLACEMENT: Primary | ICD-10-CM

## 2025-08-08 PROCEDURE — 93798 PHYS/QHP OP CAR RHAB W/ECG: CPT

## 2025-08-11 ENCOUNTER — TREATMENT (OUTPATIENT)
Dept: CARDIAC REHAB | Facility: HOSPITAL | Age: 40
End: 2025-08-11
Payer: COMMERCIAL

## 2025-08-11 DIAGNOSIS — Z95.5 S/P CORONARY ARTERY STENT PLACEMENT: Primary | ICD-10-CM

## 2025-08-11 PROCEDURE — 93798 PHYS/QHP OP CAR RHAB W/ECG: CPT

## 2025-08-25 ENCOUNTER — OFFICE VISIT (OUTPATIENT)
Dept: DIABETES SERVICES | Facility: HOSPITAL | Age: 40
End: 2025-08-25
Payer: COMMERCIAL

## 2025-08-25 VITALS
WEIGHT: 230 LBS | HEART RATE: 89 BPM | OXYGEN SATURATION: 97 % | SYSTOLIC BLOOD PRESSURE: 125 MMHG | DIASTOLIC BLOOD PRESSURE: 72 MMHG | BODY MASS INDEX: 34.07 KG/M2 | HEIGHT: 69 IN

## 2025-08-25 DIAGNOSIS — R80.9 CONTROLLED TYPE 2 DIABETES MELLITUS WITH MICROALBUMINURIA, WITHOUT LONG-TERM CURRENT USE OF INSULIN: ICD-10-CM

## 2025-08-25 DIAGNOSIS — E11.29 CONTROLLED TYPE 2 DIABETES MELLITUS WITH MICROALBUMINURIA, WITHOUT LONG-TERM CURRENT USE OF INSULIN: ICD-10-CM

## 2025-08-25 DIAGNOSIS — E66.811 CLASS 1 OBESITY DUE TO EXCESS CALORIES WITH SERIOUS COMORBIDITY AND BODY MASS INDEX (BMI) OF 33.0 TO 33.9 IN ADULT: ICD-10-CM

## 2025-08-25 DIAGNOSIS — E11.65 CONTROLLED TYPE 2 DIABETES MELLITUS WITH HYPERGLYCEMIA, WITHOUT LONG-TERM CURRENT USE OF INSULIN: Primary | ICD-10-CM

## 2025-08-25 DIAGNOSIS — E66.09 CLASS 1 OBESITY DUE TO EXCESS CALORIES WITH SERIOUS COMORBIDITY AND BODY MASS INDEX (BMI) OF 33.0 TO 33.9 IN ADULT: ICD-10-CM

## 2025-08-25 LAB
EXPIRATION DATE: ABNORMAL
GLUCOSE BLDC GLUCOMTR-MCNC: 117 MG/DL (ref 70–99)
HBA1C MFR BLD: 5.8 % (ref 4.5–5.7)
Lab: ABNORMAL

## 2025-08-25 PROCEDURE — 83036 HEMOGLOBIN GLYCOSYLATED A1C: CPT

## 2025-08-25 PROCEDURE — 82948 REAGENT STRIP/BLOOD GLUCOSE: CPT

## 2025-08-25 PROCEDURE — 99214 OFFICE O/P EST MOD 30 MIN: CPT

## 2025-08-25 PROCEDURE — G0463 HOSPITAL OUTPT CLINIC VISIT: HCPCS

## (undated) DEVICE — CORONARY IMAGING CATHETER: Brand: OPTICROSS™ 6 HD

## (undated) DEVICE — GW FC FLOP/TP .035 260CM 3MM

## (undated) DEVICE — DGW .035 FC J3MM 260CM TEF: Brand: EMERALD

## (undated) DEVICE — CATH F5INF TLPIGST145 110CM6SH: Brand: INFINITI

## (undated) DEVICE — PK CATH CARD 40

## (undated) DEVICE — NC TREK NEO™ CORONARY DILATATION CATHETER 3.00 MM X 15 MM / RAPID-EXCHANGE: Brand: NC TREK NEO™

## (undated) DEVICE — FEMORAL ENTRY ANGIOGRAPHY SHIELD-YELLOW: Brand: RADPAD

## (undated) DEVICE — ANGIO-SEAL VIP VASCULAR CLOSURE DEVICE: Brand: ANGIO-SEAL

## (undated) DEVICE — Device: Brand: FIELDER XT

## (undated) DEVICE — NC TREK NEO™ CORONARY DILATATION CATHETER 5.00 MM X 12 MM / RAPID-EXCHANGE: Brand: NC TREK NEO™

## (undated) DEVICE — BALN PTCA TAKERU RX 1.5X15MM

## (undated) DEVICE — NC TREK NEO™ CORONARY DILATATION CATHETER 4.00 MM X 15 MM / RAPID-EXCHANGE: Brand: NC TREK NEO™

## (undated) DEVICE — BALN CATH PRESS WEDGE 6F 110CM

## (undated) DEVICE — THE VASC BAND HEMOSTAT IS A COMPRESSION DEVICE TO ASSIST HEMOSTASIS OF ARTERIAL, VENOUS AND HEMODIALYSIS PERCUTANEOUS ACCESS SITES.: Brand: VASC BAND™ HEMOSTAT

## (undated) DEVICE — CATH F6 ST JR 3.5 100CM: Brand: SUPERTORQUE

## (undated) DEVICE — GLIDESHEATH SLENDER STAINLESS STEEL KIT: Brand: GLIDESHEATH SLENDER

## (undated) DEVICE — KT MANIFLD CARDIAC

## (undated) DEVICE — NC TREK NEO™ CORONARY DILATATION CATHETER 2.00 MM X 20 MM / RAPID-EXCHANGE: Brand: NC TREK NEO™

## (undated) DEVICE — DEV INDEFLATOR P/N 580289

## (undated) DEVICE — TREK CORONARY DILATATION CATHETER 3.50 MM X 15 MM / RAPID-EXCHANGE: Brand: TREK

## (undated) DEVICE — THE TURNPIKE CATHETERS ARE INTENDED TO BE USED TO ACCESS DISCRETE REGIONS OF THE CORONARY AND/OR PERIPHERAL VASCULATURE. THEY MAY BE USED TO FACILITATE PLACEMENT AND EXCHANGE OF GUIDEWIRES AND TO SUBSELECTIVELY INFUSE/DELIVER DIAGNOSTIC AND THERAPEUTIC AGENTS.: Brand: TURNPIKE® LP CATHETER

## (undated) DEVICE — CATH F6 ST JL 3.5 100CM: Brand: SUPERTORQUE

## (undated) DEVICE — TREK CORONARY DILATATION CATHETER 2.50 MM X 15 MM / RAPID-EXCHANGE: Brand: TREK

## (undated) DEVICE — LOU PACE DEFIB: Brand: MEDLINE INDUSTRIES, INC.

## (undated) DEVICE — GLIDESHEATH BASIC HYDROPHILIC COATED INTRODUCER SHEATH: Brand: GLIDESHEATH

## (undated) DEVICE — NC TREK NEO™ CORONARY DILATATION CATHETER 3.50 MM X 12 MM / RAPID-EXCHANGE: Brand: NC TREK NEO™

## (undated) DEVICE — BALN PRESS WEDGE 5F 110CM

## (undated) DEVICE — Device: Brand: ASAHI SION BLUE

## (undated) DEVICE — COPILOT BLEEDBACK CONTROL VALVE: Brand: COPILOT

## (undated) DEVICE — CATH GUIDE LAUNCHER EBU3.5 6F 100CM